# Patient Record
Sex: FEMALE | Race: BLACK OR AFRICAN AMERICAN | Employment: OTHER
[De-identification: names, ages, dates, MRNs, and addresses within clinical notes are randomized per-mention and may not be internally consistent; named-entity substitution may affect disease eponyms.]

---

## 2017-02-20 ENCOUNTER — TELEPHONE (OUTPATIENT)
Dept: INTERNAL MEDICINE | Facility: CLINIC | Age: 62
End: 2017-02-20

## 2017-03-07 ENCOUNTER — HOSPITAL ENCOUNTER (OUTPATIENT)
Age: 62
Discharge: HOME | End: 2017-03-07
Payer: MEDICARE

## 2017-03-07 ENCOUNTER — OFFICE VISIT (OUTPATIENT)
Dept: INTERNAL MEDICINE | Facility: CLINIC | Age: 62
End: 2017-03-07

## 2017-03-07 ENCOUNTER — TELEPHONE (OUTPATIENT)
Dept: INTERNAL MEDICINE | Facility: CLINIC | Age: 62
End: 2017-03-07

## 2017-03-07 VITALS
SYSTOLIC BLOOD PRESSURE: 127 MMHG | OXYGEN SATURATION: 97 % | DIASTOLIC BLOOD PRESSURE: 79 MMHG | BODY MASS INDEX: 33.12 KG/M2 | WEIGHT: 194 LBS | HEIGHT: 64 IN | HEART RATE: 84 BPM

## 2017-03-07 DIAGNOSIS — R73.03 PREDIABETES: ICD-10-CM

## 2017-03-07 DIAGNOSIS — E03.9 HYPOTHYROIDISM, UNSPECIFIED TYPE: ICD-10-CM

## 2017-03-07 DIAGNOSIS — J43.9 PULMONARY EMPHYSEMA, UNSPECIFIED EMPHYSEMA TYPE (HCC): ICD-10-CM

## 2017-03-07 DIAGNOSIS — G47.33 OSA (OBSTRUCTIVE SLEEP APNEA): ICD-10-CM

## 2017-03-07 DIAGNOSIS — I10 ESSENTIAL HYPERTENSION: Primary | ICD-10-CM

## 2017-03-07 DIAGNOSIS — E78.00 PURE HYPERCHOLESTEROLEMIA: ICD-10-CM

## 2017-03-07 DIAGNOSIS — R06.09 DOE (DYSPNEA ON EXERTION): ICD-10-CM

## 2017-03-07 PROCEDURE — G8484 FLU IMMUNIZE NO ADMIN: HCPCS | Performed by: INTERNAL MEDICINE

## 2017-03-07 PROCEDURE — 3023F SPIROM DOC REV: CPT | Performed by: INTERNAL MEDICINE

## 2017-03-07 PROCEDURE — 1036F TOBACCO NON-USER: CPT | Performed by: INTERNAL MEDICINE

## 2017-03-07 PROCEDURE — 3017F COLORECTAL CA SCREEN DOC REV: CPT | Performed by: INTERNAL MEDICINE

## 2017-03-07 PROCEDURE — 99214 OFFICE O/P EST MOD 30 MIN: CPT | Performed by: INTERNAL MEDICINE

## 2017-03-07 PROCEDURE — 99213 OFFICE O/P EST LOW 20 MIN: CPT | Performed by: INTERNAL MEDICINE

## 2017-03-07 PROCEDURE — G8599 NO ASA/ANTIPLAT THER USE RNG: HCPCS | Performed by: INTERNAL MEDICINE

## 2017-03-07 PROCEDURE — G8417 CALC BMI ABV UP PARAM F/U: HCPCS | Performed by: INTERNAL MEDICINE

## 2017-03-07 PROCEDURE — G8926 SPIRO NO PERF OR DOC: HCPCS | Performed by: INTERNAL MEDICINE

## 2017-03-07 PROCEDURE — 3014F SCREEN MAMMO DOC REV: CPT | Performed by: INTERNAL MEDICINE

## 2017-03-07 PROCEDURE — G8427 DOCREV CUR MEDS BY ELIG CLIN: HCPCS | Performed by: INTERNAL MEDICINE

## 2017-03-07 RX ORDER — ATORVASTATIN CALCIUM 40 MG/1
40 TABLET, FILM COATED ORAL DAILY
Qty: 30 TABLET | Refills: 3 | Status: SHIPPED | OUTPATIENT
Start: 2017-03-07 | End: 2017-10-27 | Stop reason: ALTCHOICE

## 2017-03-07 RX ORDER — CEPHALEXIN 500 MG/1
500 CAPSULE ORAL 2 TIMES DAILY
COMMUNITY
End: 2018-01-19

## 2017-03-08 ASSESSMENT — ENCOUNTER SYMPTOMS
SPUTUM PRODUCTION: 0
ABDOMINAL PAIN: 0
SORE THROAT: 0
PHOTOPHOBIA: 0
NAUSEA: 0
SHORTNESS OF BREATH: 1
CONSTIPATION: 0
EYE REDNESS: 0
COUGH: 0
BLURRED VISION: 0

## 2017-06-01 DIAGNOSIS — E03.9 ACQUIRED HYPOTHYROIDISM: ICD-10-CM

## 2017-06-02 RX ORDER — LEVOTHYROXINE SODIUM 0.03 MG/1
TABLET ORAL
Qty: 30 TABLET | Refills: 0 | Status: SHIPPED | OUTPATIENT
Start: 2017-06-02 | End: 2017-07-13 | Stop reason: SDUPTHER

## 2017-07-13 DIAGNOSIS — E03.9 ACQUIRED HYPOTHYROIDISM: ICD-10-CM

## 2017-07-13 RX ORDER — LEVOTHYROXINE SODIUM 0.03 MG/1
TABLET ORAL
Qty: 30 TABLET | Refills: 0 | Status: SHIPPED | OUTPATIENT
Start: 2017-07-13 | End: 2017-08-03 | Stop reason: SDUPTHER

## 2017-07-20 DIAGNOSIS — I10 ESSENTIAL HYPERTENSION: ICD-10-CM

## 2017-08-03 DIAGNOSIS — E03.9 ACQUIRED HYPOTHYROIDISM: ICD-10-CM

## 2017-08-04 RX ORDER — LEVOTHYROXINE SODIUM 0.03 MG/1
TABLET ORAL
Qty: 30 TABLET | Refills: 0 | Status: SHIPPED | OUTPATIENT
Start: 2017-08-04 | End: 2017-09-06 | Stop reason: SDUPTHER

## 2017-09-06 DIAGNOSIS — I10 ESSENTIAL HYPERTENSION: ICD-10-CM

## 2017-09-06 DIAGNOSIS — E03.9 ACQUIRED HYPOTHYROIDISM: ICD-10-CM

## 2017-09-07 RX ORDER — LEVOTHYROXINE SODIUM 0.03 MG/1
TABLET ORAL
Qty: 30 TABLET | Refills: 0 | Status: SHIPPED | OUTPATIENT
Start: 2017-09-07 | End: 2017-10-27 | Stop reason: SDUPTHER

## 2017-10-03 ENCOUNTER — HOSPITAL ENCOUNTER (OUTPATIENT)
Age: 62
Discharge: HOME | End: 2017-10-03
Payer: MEDICARE

## 2017-10-03 LAB
ALBUMIN SERPL-MCNC: 4.3 G/DL (ref 3.5–5.2)
ALBUMIN/GLOBULIN RATIO: ABNORMAL (ref 1–2.5)
ALP BLD-CCNC: 73 U/L (ref 35–104)
ALT SERPL-CCNC: 35 U/L (ref 5–33)
ANION GAP SERPL CALCULATED.3IONS-SCNC: 12 MMOL/L (ref 9–17)
AST SERPL-CCNC: 34 U/L
BILIRUB SERPL-MCNC: 0.71 MG/DL (ref 0.3–1.2)
BUN BLDV-MCNC: 16 MG/DL (ref 8–23)
BUN/CREAT BLD: 17 (ref 9–20)
CALCIUM SERPL-MCNC: 10.2 MG/DL (ref 8.6–10.4)
CHLORIDE BLD-SCNC: 104 MMOL/L (ref 98–107)
CHOLESTEROL/HDL RATIO: 3.6
CHOLESTEROL: 216 MG/DL
CO2: 27 MMOL/L (ref 20–31)
CREAT SERPL-MCNC: 0.95 MG/DL (ref 0.5–0.9)
GFR AFRICAN AMERICAN: >60 ML/MIN
GFR NON-AFRICAN AMERICAN: 59 ML/MIN
GFR SERPL CREATININE-BSD FRML MDRD: ABNORMAL ML/MIN/{1.73_M2}
GFR SERPL CREATININE-BSD FRML MDRD: ABNORMAL ML/MIN/{1.73_M2}
GLUCOSE BLD-MCNC: 87 MG/DL (ref 70–99)
HDLC SERPL-MCNC: 60 MG/DL
LDL CHOLESTEROL: 142 MG/DL (ref 0–130)
LITHIUM DATE LAST DOSE: ABNORMAL
LITHIUM DOSE AMOUNT: ABNORMAL
LITHIUM DOSE TIME: ABNORMAL
LITHIUM LEVEL: 0.2 MMOL/L (ref 0.6–1.2)
POTASSIUM SERPL-SCNC: 4.5 MMOL/L (ref 3.7–5.3)
SODIUM BLD-SCNC: 143 MMOL/L (ref 135–144)
TOTAL PROTEIN: 7.6 G/DL (ref 6.4–8.3)
TRIGL SERPL-MCNC: 70 MG/DL
VLDLC SERPL CALC-MCNC: ABNORMAL MG/DL (ref 1–30)

## 2017-10-03 PROCEDURE — 80053 COMPREHEN METABOLIC PANEL: CPT

## 2017-10-03 PROCEDURE — 80061 LIPID PANEL: CPT

## 2017-10-03 PROCEDURE — 83036 HEMOGLOBIN GLYCOSYLATED A1C: CPT

## 2017-10-03 PROCEDURE — 36415 COLL VENOUS BLD VENIPUNCTURE: CPT

## 2017-10-03 PROCEDURE — 80178 ASSAY OF LITHIUM: CPT

## 2017-10-04 LAB
ESTIMATED AVERAGE GLUCOSE: 103 MG/DL
HBA1C MFR BLD: 5.2 % (ref 4–6)

## 2017-10-05 DIAGNOSIS — I10 ESSENTIAL HYPERTENSION: ICD-10-CM

## 2017-10-27 ENCOUNTER — OFFICE VISIT (OUTPATIENT)
Dept: INTERNAL MEDICINE | Age: 62
End: 2017-10-27
Payer: MEDICARE

## 2017-10-27 VITALS
HEIGHT: 65 IN | DIASTOLIC BLOOD PRESSURE: 80 MMHG | BODY MASS INDEX: 30.52 KG/M2 | HEART RATE: 77 BPM | WEIGHT: 183.2 LBS | SYSTOLIC BLOOD PRESSURE: 130 MMHG

## 2017-10-27 DIAGNOSIS — Z11.59 NEED FOR HEPATITIS C SCREENING TEST: ICD-10-CM

## 2017-10-27 DIAGNOSIS — Z11.4 SCREENING FOR HIV WITHOUT PRESENCE OF RISK FACTORS: ICD-10-CM

## 2017-10-27 DIAGNOSIS — E03.9 ACQUIRED HYPOTHYROIDISM: ICD-10-CM

## 2017-10-27 DIAGNOSIS — F31.9 BIPOLAR AFFECTIVE DISORDER, REMISSION STATUS UNSPECIFIED (HCC): ICD-10-CM

## 2017-10-27 DIAGNOSIS — E78.00 PURE HYPERCHOLESTEROLEMIA: ICD-10-CM

## 2017-10-27 DIAGNOSIS — R06.09 DOE (DYSPNEA ON EXERTION): ICD-10-CM

## 2017-10-27 DIAGNOSIS — J43.9 PULMONARY EMPHYSEMA, UNSPECIFIED EMPHYSEMA TYPE (HCC): ICD-10-CM

## 2017-10-27 DIAGNOSIS — I10 ESSENTIAL HYPERTENSION: Primary | ICD-10-CM

## 2017-10-27 DIAGNOSIS — G47.33 OSA (OBSTRUCTIVE SLEEP APNEA): ICD-10-CM

## 2017-10-27 DIAGNOSIS — R73.03 PREDIABETES: ICD-10-CM

## 2017-10-27 PROCEDURE — G8926 SPIRO NO PERF OR DOC: HCPCS | Performed by: INTERNAL MEDICINE

## 2017-10-27 PROCEDURE — G8427 DOCREV CUR MEDS BY ELIG CLIN: HCPCS | Performed by: INTERNAL MEDICINE

## 2017-10-27 PROCEDURE — 1036F TOBACCO NON-USER: CPT | Performed by: INTERNAL MEDICINE

## 2017-10-27 PROCEDURE — G8417 CALC BMI ABV UP PARAM F/U: HCPCS | Performed by: INTERNAL MEDICINE

## 2017-10-27 PROCEDURE — G8599 NO ASA/ANTIPLAT THER USE RNG: HCPCS | Performed by: INTERNAL MEDICINE

## 2017-10-27 PROCEDURE — 99214 OFFICE O/P EST MOD 30 MIN: CPT

## 2017-10-27 PROCEDURE — 99214 OFFICE O/P EST MOD 30 MIN: CPT | Performed by: INTERNAL MEDICINE

## 2017-10-27 PROCEDURE — 3023F SPIROM DOC REV: CPT | Performed by: INTERNAL MEDICINE

## 2017-10-27 PROCEDURE — 3014F SCREEN MAMMO DOC REV: CPT | Performed by: INTERNAL MEDICINE

## 2017-10-27 PROCEDURE — 3017F COLORECTAL CA SCREEN DOC REV: CPT | Performed by: INTERNAL MEDICINE

## 2017-10-27 PROCEDURE — G8484 FLU IMMUNIZE NO ADMIN: HCPCS | Performed by: INTERNAL MEDICINE

## 2017-10-27 RX ORDER — LEVOTHYROXINE SODIUM 0.03 MG/1
TABLET ORAL
Qty: 30 TABLET | Refills: 2 | Status: SHIPPED | OUTPATIENT
Start: 2017-10-27 | End: 2018-01-19 | Stop reason: SDUPTHER

## 2017-10-27 ASSESSMENT — ENCOUNTER SYMPTOMS
PHOTOPHOBIA: 0
SHORTNESS OF BREATH: 1
SPUTUM PRODUCTION: 0
COUGH: 0
NAUSEA: 0
ABDOMINAL PAIN: 0
EYE REDNESS: 0
SORE THROAT: 0
BLURRED VISION: 0
CONSTIPATION: 0

## 2017-10-27 NOTE — PROGRESS NOTES
a history of hypertension, diet-controlled diabetes and hyperlipidemia. Past Medical History:   Diagnosis Date    Anxiety     Arrhythmia     Asthma     [facility]     Depression     [facility]ma of lung ([facility]     GERD (gastroesophageal reflux disease)     GERD (gastroesophageal reflux disease)     Hyperlipidemia     Hypertension     Hypothyroidism     OAB (overactive bladder)     Osteoarthritis     Pulmonary fibrosis (HCC)     sjogrens syndrome    Pulmonary hypertension (HCC)     Stroke ([facility]     Unspecified sleep apnea     on cpap    Urinary incontinence        Past Surgical History:   Procedure Laterality Date    COLONOSCOPY  04/2015         ALLERGIES      Allergies   Allergen Reactions    Motrin [Ibuprofen]     Aspirin Rash    Blue Dyes (Parenteral) Rash    Hctz [Hydrochlorothiazide] Rash     Fixed drug reaction    Sulfa Antibiotics Rash    Tetracyclines & Related Rash       MEDICATIONS:      Current Outpatient Prescriptions on File Prior to Visit   Medication Sig Dispense Refill    metoprolol tartrate (LOPRESSOR) 25 MG tablet TAKE ONE TABLET BY MOUTH TWICE DAILY 60 tablet 0    metoprolol tartrate (LOPRESSOR) 25 MG tablet TAKE ONE TABLET BY MOUTH TWICE DAILY **GENERIC  LOPRESSOR 60 tablet 0    levothyroxine (SYNTHROID) 25 MCG tablet TAKE ONE TABLET BY MOUTH ONCE DAILY **CALL  FOR  APPOINTMENT  BEFORE  ANY  OTHER  REFILLS** 30 tablet 0    cephALEXin (KEFLEX) 500 MG capsule Take 500 mg by mouth 2 times daily      FLUoxetine (PROZAC) 40 MG capsule Take 40 mg by mouth daily      ipratropium (ATROVENT) 0.03 % nasal spray 2 sprays by Nasal route every 12 hours      ranitidine (ZANTAC) 150 MG tablet Take 150 mg by mouth daily      valACYclovir (VALTREX) 1 G tablet Take 1,000 mg by mouth daily.  fluticasone (FLONASE) 50 MCG/ACT nasal spray 2 sprays by Nasal route daily.  lithium 300 MG capsule Take 300 mg by mouth 2 times daily (with meals).       albuterol 130/80   Site: Left Arm   Position: Sitting   Cuff Size: Medium Adult   Pulse: 77   Weight: 183 lb 3.2 oz (83.1 kg)   Height: 5' 5\" (1.651 m)     Body mass index is 30.49 kg/m². BP Readings from Last 3 Encounters:   10/27/17 130/80   03/07/17 127/79   11/10/16 (!) 145/97        Wt Readings from Last 3 Encounters:   10/27/17 183 lb 3.2 oz (83.1 kg)   03/07/17 194 lb (88 kg)   11/10/16 191 lb (86.6 kg)       Physical Exam      HENT:  Normocephalic, Atraumatic. Neck- Normal range of motion, No tenderness, Supple, No stridor. Eyes:  PERRL, EOMI, Conjunctiva normal, No discharge. Respiratory:  Normal breath sounds, No respiratory distress, No wheezing, No chest tenderness. Cardiovascular:  Normal heart rate, Normal rhythm, No murmurs, No rubs, No gallops. GI:  Bowel sounds normal, Soft, No tenderness  Musculoskeletal:  Intact distal pulses, No edema, No tenderness,  Back- No tenderness. No edema noted on exam today. Integument:  Warm, Dry, No erythema, No rash. Lymphatic:  No lymphadenopathy noted. Neurologic:  Alert & oriented x 3, Normal motor function, Normal sensory function, No focal deficits noted.    Psychiatric:  Affect normal    LABORATORY FINDINGS:    CBC:  Lab Results   Component Value Date    WBC 7.6 11/10/2016    HGB 11.8 11/10/2016     11/10/2016     05/02/2012     BMP:    Lab Results   Component Value Date     10/03/2017    K 4.5 10/03/2017     10/03/2017    CO2 27 10/03/2017    BUN 16 10/03/2017    CREATININE 0.95 10/03/2017    GLUCOSE 87 10/03/2017    GLUCOSE 119 05/02/2012     HEMOGLOBIN A1C:   Lab Results   Component Value Date    LABA1C 5.2 10/03/2017     MICROALBUMIN URINE:   Lab Results   Component Value Date    MICROALBUR <12 11/10/2016     FASTING LIPID PANEL:  Lab Results   Component Value Date    CHOL 216 (H) 10/03/2017    HDL 60 10/03/2017    TRIG 70 10/03/2017     Lab Results   Component Value Date    LDLCHOLESTEROL 142 (H) 10/03/2017       LIVER PROFILE:  Lab Results   Component Value Date    ALT 35 10/03/2017    AST 34 10/03/2017    PROT 7.6 10/03/2017    BILITOT 0.71 10/03/2017    BILIDIR 0.11 08/05/2014    LABALBU 4.3 10/03/2017    LABALBU 4.8 05/02/2012      THYROID FUNCTION:   Lab Results   Component Value Date    TSH 2.00 11/10/2016      URINE ANALYSIS: No results found for: LABURIN  ASSESSMENT AND PLAN:    1. Essential hypertension    - metoprolol tartrate (LOPRESSOR) 25 MG tablet; TAKE ONE TABLET BY MOUTH TWICE DAILY **GENERIC  LOPRESSOR  Dispense: 60 tablet; Refill: 2    2. Pulmonary emphysema, unspecified emphysema type ([facility]  Get PFT's from pulmonology office  Refuses flu vaccine    - ECHO Complete 2D W Doppler W Color; Future    3. Prediabetes  Weight loss  Diet changes  monitor    4. HOUGH (dyspnea on exertion)    - ECHO Complete 2D W Doppler W Color; Future    5. XAVIER (obstructive sleep apnea)  Advised compliance  Follow up with Pulmonology    6. Acquired hypothyroidism    - levothyroxine (SYNTHROID) 25 MCG tablet; TAKE ONE TABLET BY MOUTH ONCE DAILY **CALL  FOR  APPOINTMENT  BEFORE  ANY  OTHER  REFILLS**  Dispense: 30 tablet; Refill: 2    7. Pure hypercholesterolemia  Not taking statins  Lipid panel      8. Need for hepatitis C screening test    - Hepatitis C Antibody; Future    9. Screening for HIV without presence of risk factors    - HIV Screen; Future    10. Bipolar affective disorder, remission status unspecified ([facility]  Follow up with psychiatry            FOLLOW UP AND INSTRUCTIONS:   No Follow-up on file. 1. Debere received counseling on the following healthy behaviors: nutrition    2. Reviewed prior labs and health maintenance. 3. Discussed use, benefit, and side effects of prescribed medications. Barriers to medication compliance addressed. All patient questions answered. Pt voiced understanding.        Sony Tirado  Attending Physician, [facility]  [facility] - [facility]  10/27/2017, 3:28 PM

## 2017-10-27 NOTE — PATIENT INSTRUCTIONS
-Follow-up appointment scheduled for 4/27/18, AVS given to patient. It is very important that you keep your appointments, please contact us if you are unable to keep your scheduled appt     -Labs given to patient, they will have them done before their next visit.    -Echo ordered for patient-- she will call and set up an appt  -Tarps form completed for patient and given back to her  -Records from pulmonary and GI requested---Jayshree

## 2017-11-06 ENCOUNTER — HOSPITAL ENCOUNTER (OUTPATIENT)
Dept: NON INVASIVE DIAGNOSTICS | Age: 62
Discharge: HOME | End: 2017-11-06
Payer: MEDICARE

## 2017-11-06 ENCOUNTER — HOSPITAL ENCOUNTER (OUTPATIENT)
Age: 62
Discharge: HOME | End: 2017-11-06
Payer: MEDICARE

## 2017-11-06 DIAGNOSIS — R06.09 DOE (DYSPNEA ON EXERTION): ICD-10-CM

## 2017-11-06 DIAGNOSIS — Z11.59 NEED FOR HEPATITIS C SCREENING TEST: ICD-10-CM

## 2017-11-06 DIAGNOSIS — J43.9 PULMONARY EMPHYSEMA, UNSPECIFIED EMPHYSEMA TYPE (HCC): ICD-10-CM

## 2017-11-06 DIAGNOSIS — Z11.4 SCREENING FOR HIV WITHOUT PRESENCE OF RISK FACTORS: ICD-10-CM

## 2017-11-06 LAB
HEPATITIS C ANTIBODY: NONREACTIVE
HIV AG/AB: NONREACTIVE
LV EF: 60 %
LVEF MODALITY: NORMAL

## 2017-11-06 PROCEDURE — 93306 TTE W/DOPPLER COMPLETE: CPT

## 2017-11-06 PROCEDURE — 36415 COLL VENOUS BLD VENIPUNCTURE: CPT

## 2017-11-06 PROCEDURE — 86803 HEPATITIS C AB TEST: CPT

## 2017-11-06 PROCEDURE — 87389 HIV-1 AG W/HIV-1&-2 AB AG IA: CPT

## 2017-11-07 ENCOUNTER — TELEPHONE (OUTPATIENT)
Dept: INTERNAL MEDICINE | Age: 62
End: 2017-11-07

## 2017-11-07 DIAGNOSIS — Z12.39 BREAST CANCER SCREENING: Primary | ICD-10-CM

## 2017-11-07 NOTE — TELEPHONE ENCOUNTER
Phone call from patient she states that she had a heart echo and labs at [facility] 11/6 and would like results, please review and advise.

## 2017-11-08 NOTE — TELEPHONE ENCOUNTER
Pt calling and asking for results, pt made aware that her results of her ECHO and Lab work came back normal, Pt also asking for an order to get a mammogram done.  Order is pending please sign if appropriate

## 2018-01-19 ENCOUNTER — OFFICE VISIT (OUTPATIENT)
Dept: INTERNAL MEDICINE | Age: 63
End: 2018-01-19
Payer: MEDICARE

## 2018-01-19 ENCOUNTER — HOSPITAL ENCOUNTER (OUTPATIENT)
Age: 63
Setting detail: SPECIMEN
Discharge: HOME | End: 2018-01-19
Payer: MEDICARE

## 2018-01-19 VITALS
HEIGHT: 65 IN | WEIGHT: 179 LBS | SYSTOLIC BLOOD PRESSURE: 136 MMHG | OXYGEN SATURATION: 98 % | DIASTOLIC BLOOD PRESSURE: 74 MMHG | HEART RATE: 90 BPM | BODY MASS INDEX: 29.82 KG/M2

## 2018-01-19 DIAGNOSIS — Z99.89 OSA ON CPAP: Primary | ICD-10-CM

## 2018-01-19 DIAGNOSIS — I10 ESSENTIAL HYPERTENSION: ICD-10-CM

## 2018-01-19 DIAGNOSIS — E03.9 ACQUIRED HYPOTHYROIDISM: ICD-10-CM

## 2018-01-19 DIAGNOSIS — G47.33 OSA ON CPAP: Primary | ICD-10-CM

## 2018-01-19 DIAGNOSIS — R73.03 PREDIABETES: ICD-10-CM

## 2018-01-19 DIAGNOSIS — E78.00 PURE HYPERCHOLESTEROLEMIA: ICD-10-CM

## 2018-01-19 DIAGNOSIS — I50.32 CHRONIC DIASTOLIC HEART FAILURE (HCC): ICD-10-CM

## 2018-01-19 DIAGNOSIS — J45.30 MILD PERSISTENT ASTHMA WITHOUT COMPLICATION: ICD-10-CM

## 2018-01-19 LAB — TSH SERPL DL<=0.05 MIU/L-ACNC: 1.59 MIU/L (ref 0.3–5)

## 2018-01-19 PROCEDURE — 1036F TOBACCO NON-USER: CPT | Performed by: INTERNAL MEDICINE

## 2018-01-19 PROCEDURE — G8427 DOCREV CUR MEDS BY ELIG CLIN: HCPCS | Performed by: INTERNAL MEDICINE

## 2018-01-19 PROCEDURE — 99213 OFFICE O/P EST LOW 20 MIN: CPT | Performed by: INTERNAL MEDICINE

## 2018-01-19 PROCEDURE — 3014F SCREEN MAMMO DOC REV: CPT | Performed by: INTERNAL MEDICINE

## 2018-01-19 PROCEDURE — G8417 CALC BMI ABV UP PARAM F/U: HCPCS | Performed by: INTERNAL MEDICINE

## 2018-01-19 PROCEDURE — 3017F COLORECTAL CA SCREEN DOC REV: CPT | Performed by: INTERNAL MEDICINE

## 2018-01-19 PROCEDURE — G8484 FLU IMMUNIZE NO ADMIN: HCPCS | Performed by: INTERNAL MEDICINE

## 2018-01-19 PROCEDURE — 36415 COLL VENOUS BLD VENIPUNCTURE: CPT

## 2018-01-19 PROCEDURE — 84443 ASSAY THYROID STIM HORMONE: CPT

## 2018-01-19 PROCEDURE — G8599 NO ASA/ANTIPLAT THER USE RNG: HCPCS | Performed by: INTERNAL MEDICINE

## 2018-01-19 PROCEDURE — 99213 OFFICE O/P EST LOW 20 MIN: CPT

## 2018-01-19 RX ORDER — LEVOTHYROXINE SODIUM 0.03 MG/1
TABLET ORAL
Qty: 30 TABLET | Refills: 2 | Status: SHIPPED | OUTPATIENT
Start: 2018-01-19 | End: 2018-02-28 | Stop reason: SDUPTHER

## 2018-01-19 ASSESSMENT — PATIENT HEALTH QUESTIONNAIRE - PHQ9
1. LITTLE INTEREST OR PLEASURE IN DOING THINGS: 0
2. FEELING DOWN, DEPRESSED OR HOPELESS: 0
SUM OF ALL RESPONSES TO PHQ9 QUESTIONS 1 & 2: 0
SUM OF ALL RESPONSES TO PHQ QUESTIONS 1-9: 0

## 2018-01-19 ASSESSMENT — ENCOUNTER SYMPTOMS
SHORTNESS OF BREATH: 1
SPUTUM PRODUCTION: 0
PHOTOPHOBIA: 0
ABDOMINAL PAIN: 0
EYE REDNESS: 0
CONSTIPATION: 0
SORE THROAT: 0
BLURRED VISION: 0
COUGH: 0
NAUSEA: 0

## 2018-01-19 NOTE — PROGRESS NOTES
diabetes and hyperlipidemia. Echo 2017  CONCLUSIONS    Summary  Left ventricle is normal in size and wall thickness. Global left ventricular systolic function is normal with an estimated  ejection fraction of 60 % . No obvious wall motion abnormality seen. Grade I (mild) left ventricular diastolic dysfunction. Thickened mitral valve leaflets. Mild mitral regurgitation. Mild tricuspid regurgitation. Estimated right ventricular systolic pressure is 28 mmHg. No significant pericardial effusion is seen.     Past Medical History:   Diagnosis Date    Anxiety     Arrhythmia     Asthma     Bipolar disorder ([address]     Depression     Emphysema of lung (HCC)     GERD (gastroesophageal reflux disease)     GERD (gastroesophageal reflux disease)     Hyperlipidemia     Hypertension     Hypothyroidism     OAB (overactive bladder)     Osteoarthritis     Pulmonary fibrosis (HCC)     sjogrens syndrome    Pulmonary hypertension     Stroke (HCC)     Unspecified sleep apnea     on cpap    Urinary incontinence        Past Surgical History:   Procedure Laterality Date    COLONOSCOPY  04/2015         ALLERGIES      Allergies   Allergen Reactions    Motrin [Ibuprofen]     Aspirin Rash    Blue Dyes (Parenteral) Rash    Hctz [Hydrochlorothiazide] Rash     Fixed drug reaction    Sulfa Antibiotics Rash    Tetracyclines & Related Rash       MEDICATIONS:      Current Outpatient Prescriptions on File Prior to Visit   Medication Sig Dispense Refill    metoprolol tartrate (LOPRESSOR) 25 MG tablet TAKE ONE TABLET BY MOUTH TWICE DAILY **GENERIC  LOPRESSOR 60 tablet 2    levothyroxine (SYNTHROID) 25 MCG tablet TAKE ONE TABLET BY MOUTH ONCE DAILY **CALL  FOR  APPOINTMENT  BEFORE  ANY  OTHER  REFILLS** 30 tablet 2    cephALEXin (KEFLEX) 500 MG capsule Take 500 mg by mouth 2 times daily      FLUoxetine (PROZAC) 40 MG capsule Take 40 mg by mouth daily      ipratropium (ATROVENT) 0.03 % nasal spray 2 sprays by Nasal route every 12 hours      ranitidine (ZANTAC) 150 MG tablet Take 150 mg by mouth daily      valACYclovir (VALTREX) 1 G tablet Take 1,000 mg by mouth daily.  fluticasone (FLONASE) 50 MCG/ACT nasal spray 2 sprays by Nasal route daily.  lithium 300 MG capsule Take 300 mg by mouth 2 times daily (with meals).  albuterol (PROVENTIL HFA;VENTOLIN HFA) 108 (90 BASE) MCG/ACT inhaler Inhale 2 puffs into the lungs 4 times daily as needed for Wheezing.  QUEtiapine (SEROQUEL) 200 MG tablet Take 200 mg by mouth daily.  Fesoterodine Fumarate ER (TOVIAZ) 8 MG TB24 Take 8 mg by mouth daily as needed. No current facility-administered medications on file prior to visit. SOCIAL HISTORY    Reviewed and no change from previous record. Dulce  reports that she has never smoked. She has never used smokeless tobacco.    FAMILY HISTORY:    Reviewed and No change from previous visit    HEALTH MAINTENANCE DUE:      Health Maintenance Due   Topic Date Due    Pneumococcal med risk (1 of 1 - PPSV23) 04/22/1972    Cervical cancer screen  04/22/1974    Colon cancer screen colonoscopy  04/22/2003    Flu vaccine (1) 09/01/2017    TSH testing  11/10/2017       REVIEW OF SYSTEMS:    12 point review of symptoms completed and found to be normal except noted in the HPI    Review of Systems   Constitutional: Negative for fever, malaise/fatigue and weight loss. HENT: Negative for congestion and sore throat. Eyes: Negative for blurred vision, photophobia and redness. Respiratory: Positive for shortness of breath. Negative for cough and sputum production. Cardiovascular: Positive for leg swelling. Negative for chest pain and palpitations. Gastrointestinal: Negative for abdominal pain, constipation and nausea. Genitourinary: Positive for urgency. Negative for dysuria, frequency and hematuria. Musculoskeletal: Negative for joint pain and myalgias.    Neurological: Negative for dizziness, tremors, sensory change, loss of consciousness and headaches. Endo/Heme/Allergies: Negative for polydipsia. Does not bruise/bleed easily. Psychiatric/Behavioral: Positive for depression. Negative for hallucinations, substance abuse and suicidal ideas. PHYSICAL EXAM:      Vitals:    01/19/18 1334   BP: 136/74   Site: Right Arm   Position: Sitting   Cuff Size: Medium Adult   Pulse: 90   SpO2: 98%   Weight: 179 lb (81.2 kg)   Height: 5' 5\" (1.651 m)     Body mass index is 29.79 kg/m². BP Readings from Last 3 Encounters:   01/19/18 136/74   10/27/17 130/80   03/07/17 127/79        Wt Readings from Last 3 Encounters:   01/19/18 179 lb (81.2 kg)   10/27/17 183 lb 3.2 oz (83.1 kg)   03/07/17 194 lb (88 kg)       Physical Exam      HENT:  Normocephalic, Atraumatic. Neck- Normal range of motion, No tenderness, Supple, No stridor. Eyes:  PERRL, EOMI, Conjunctiva normal, No discharge. Respiratory:  Normal breath sounds, No respiratory distress, No wheezing, No chest tenderness. Cardiovascular:  Normal heart rate, Normal rhythm, No murmurs, No rubs, No gallops. GI:  Bowel sounds normal, Soft, No tenderness  Musculoskeletal:  Intact distal pulses, No edema, No tenderness,  Back- No tenderness. No edema noted on exam today. Integument:  Warm, Dry, No erythema, No rash. Lymphatic:  No lymphadenopathy noted. Neurologic:  Alert & oriented x 3, Normal motor function, Normal sensory function, No focal deficits noted.    Psychiatric:  Affect normal    LABORATORY FINDINGS:    CBC:  Lab Results   Component Value Date    WBC 7.6 11/10/2016    HGB 11.8 11/10/2016     11/10/2016     05/02/2012     BMP:    Lab Results   Component Value Date     10/03/2017    K 4.5 10/03/2017     10/03/2017    CO2 27 10/03/2017    BUN 16 10/03/2017    CREATININE 0.95 10/03/2017    GLUCOSE 87 10/03/2017    GLUCOSE 119 05/02/2012     HEMOGLOBIN A1C:   Lab Results   Component Value Date    LABA1C 5.2 10/03/2017

## 2018-01-23 ENCOUNTER — HOSPITAL ENCOUNTER (OUTPATIENT)
Age: 63
Discharge: HOME | End: 2018-01-23
Payer: MEDICARE

## 2018-01-23 LAB
ALBUMIN SERPL-MCNC: 4.2 G/DL (ref 3.5–5.2)
ALBUMIN/GLOBULIN RATIO: ABNORMAL (ref 1–2.5)
ALP BLD-CCNC: 89 U/L (ref 35–104)
ALT SERPL-CCNC: 19 U/L (ref 5–33)
ANION GAP SERPL CALCULATED.3IONS-SCNC: 12 MMOL/L (ref 9–17)
AST SERPL-CCNC: 23 U/L
BILIRUB SERPL-MCNC: 0.41 MG/DL (ref 0.3–1.2)
BUN BLDV-MCNC: 25 MG/DL (ref 8–23)
BUN/CREAT BLD: 29 (ref 9–20)
CALCIUM SERPL-MCNC: 10.1 MG/DL (ref 8.6–10.4)
CHLORIDE BLD-SCNC: 101 MMOL/L (ref 98–107)
CHOLESTEROL, FASTING: 197 MG/DL
CHOLESTEROL/HDL RATIO: 3.5
CO2: 24 MMOL/L (ref 20–31)
CREAT SERPL-MCNC: 0.86 MG/DL (ref 0.5–0.9)
GFR AFRICAN AMERICAN: >60 ML/MIN
GFR NON-AFRICAN AMERICAN: >60 ML/MIN
GFR SERPL CREATININE-BSD FRML MDRD: ABNORMAL ML/MIN/{1.73_M2}
GFR SERPL CREATININE-BSD FRML MDRD: ABNORMAL ML/MIN/{1.73_M2}
GLUCOSE FASTING: 80 MG/DL (ref 70–99)
HDLC SERPL-MCNC: 56 MG/DL
LDL CHOLESTEROL: 118 MG/DL (ref 0–130)
LITHIUM DATE LAST DOSE: ABNORMAL
LITHIUM DOSE AMOUNT: ABNORMAL
LITHIUM DOSE TIME: 2330
LITHIUM LEVEL: 0.5 MMOL/L (ref 0.6–1.2)
POTASSIUM SERPL-SCNC: 4.3 MMOL/L (ref 3.7–5.3)
SODIUM BLD-SCNC: 137 MMOL/L (ref 135–144)
TOTAL PROTEIN: 7.5 G/DL (ref 6.4–8.3)
TRIGLYCERIDE, FASTING: 113 MG/DL
VLDLC SERPL CALC-MCNC: NORMAL MG/DL (ref 1–30)

## 2018-01-23 PROCEDURE — 80053 COMPREHEN METABOLIC PANEL: CPT

## 2018-01-23 PROCEDURE — 36415 COLL VENOUS BLD VENIPUNCTURE: CPT

## 2018-01-23 PROCEDURE — 80178 ASSAY OF LITHIUM: CPT

## 2018-01-23 PROCEDURE — 80061 LIPID PANEL: CPT

## 2018-01-29 ENCOUNTER — HOSPITAL ENCOUNTER (OUTPATIENT)
Dept: MAMMOGRAPHY | Age: 63
Discharge: HOME | End: 2018-01-29
Payer: MEDICARE

## 2018-01-29 DIAGNOSIS — Z12.39 BREAST CANCER SCREENING: ICD-10-CM

## 2018-01-29 PROCEDURE — 77063 BREAST TOMOSYNTHESIS BI: CPT

## 2018-02-28 ENCOUNTER — OFFICE VISIT (OUTPATIENT)
Dept: INTERNAL MEDICINE | Age: 63
End: 2018-02-28
Payer: MEDICARE

## 2018-02-28 VITALS
HEART RATE: 76 BPM | DIASTOLIC BLOOD PRESSURE: 80 MMHG | WEIGHT: 182 LBS | HEIGHT: 65 IN | BODY MASS INDEX: 30.32 KG/M2 | OXYGEN SATURATION: 99 % | SYSTOLIC BLOOD PRESSURE: 142 MMHG

## 2018-02-28 DIAGNOSIS — Z99.89 OSA ON CPAP: ICD-10-CM

## 2018-02-28 DIAGNOSIS — E66.9 OBESITY (BMI 30-39.9): ICD-10-CM

## 2018-02-28 DIAGNOSIS — N32.81 OAB (OVERACTIVE BLADDER): ICD-10-CM

## 2018-02-28 DIAGNOSIS — I51.89 DIASTOLIC DYSFUNCTION WITHOUT HEART FAILURE: ICD-10-CM

## 2018-02-28 DIAGNOSIS — J45.30 MILD PERSISTENT ASTHMA WITHOUT COMPLICATION: ICD-10-CM

## 2018-02-28 DIAGNOSIS — G47.33 OSA ON CPAP: ICD-10-CM

## 2018-02-28 DIAGNOSIS — E78.00 PURE HYPERCHOLESTEROLEMIA: ICD-10-CM

## 2018-02-28 DIAGNOSIS — E03.9 ACQUIRED HYPOTHYROIDISM: ICD-10-CM

## 2018-02-28 DIAGNOSIS — I10 ESSENTIAL HYPERTENSION: Primary | ICD-10-CM

## 2018-02-28 PROCEDURE — 3017F COLORECTAL CA SCREEN DOC REV: CPT | Performed by: INTERNAL MEDICINE

## 2018-02-28 PROCEDURE — 1036F TOBACCO NON-USER: CPT | Performed by: INTERNAL MEDICINE

## 2018-02-28 PROCEDURE — G8484 FLU IMMUNIZE NO ADMIN: HCPCS | Performed by: INTERNAL MEDICINE

## 2018-02-28 PROCEDURE — G8427 DOCREV CUR MEDS BY ELIG CLIN: HCPCS | Performed by: INTERNAL MEDICINE

## 2018-02-28 PROCEDURE — 99212 OFFICE O/P EST SF 10 MIN: CPT

## 2018-02-28 PROCEDURE — G8599 NO ASA/ANTIPLAT THER USE RNG: HCPCS | Performed by: INTERNAL MEDICINE

## 2018-02-28 PROCEDURE — G8417 CALC BMI ABV UP PARAM F/U: HCPCS | Performed by: INTERNAL MEDICINE

## 2018-02-28 PROCEDURE — 99214 OFFICE O/P EST MOD 30 MIN: CPT | Performed by: INTERNAL MEDICINE

## 2018-02-28 PROCEDURE — 3014F SCREEN MAMMO DOC REV: CPT | Performed by: INTERNAL MEDICINE

## 2018-02-28 RX ORDER — LEVOTHYROXINE SODIUM 0.03 MG/1
TABLET ORAL
Qty: 30 TABLET | Refills: 2 | Status: SHIPPED | OUTPATIENT
Start: 2018-02-28 | End: 2018-07-25 | Stop reason: SDUPTHER

## 2018-02-28 RX ORDER — AMLODIPINE BESYLATE 5 MG/1
5 TABLET ORAL DAILY
Qty: 30 TABLET | Refills: 3 | Status: SHIPPED | OUTPATIENT
Start: 2018-02-28 | End: 2018-07-09 | Stop reason: SDUPTHER

## 2018-02-28 ASSESSMENT — ENCOUNTER SYMPTOMS
COUGH: 0
SHORTNESS OF BREATH: 1
WHEEZING: 0

## 2018-02-28 NOTE — PROGRESS NOTES
right ventricular systolic pressure is 28 mmHg. No significant pericardial effusion is seen. Past Medical History:   Diagnosis Date    Anxiety     Arrhythmia     Asthma     [facility] ([facility]     Depression     Emphysema of lung (HCC)     GERD (gastroesophageal reflux disease)     GERD (gastroesophageal reflux disease)     Hyperlipidemia     Hypertension     Hypothyroidism     OAB (overactive bladder)     Osteoarthritis     Pulmonary fibrosis (HCC)     sjogrens syndrome    Pulmonary hypertension     Stroke (HCC)     Unspecified sleep apnea     on cpap    Urinary incontinence        Past Surgical History:   Procedure Laterality Date    COLONOSCOPY  04/2015         ALLERGIES      Allergies   Allergen Reactions    Motrin [Ibuprofen]     Aspirin Rash    Blue Dyes (Parenteral) Rash    Hctz [Hydrochlorothiazide] Rash     Fixed drug reaction    Sulfa Antibiotics Rash    Tetracyclines & Related Rash       MEDICATIONS:      Current Outpatient Prescriptions on File Prior to Visit   Medication Sig Dispense Refill    metoprolol tartrate (LOPRESSOR) 25 MG tablet TAKE ONE TABLET BY MOUTH TWICE DAILY **GENERIC  LOPRESSOR 60 tablet 2    levothyroxine (SYNTHROID) 25 MCG tablet TAKE ONE TABLET BY MOUTH ONCE DAILY **CALL  FOR  APPOINTMENT  BEFORE  ANY  OTHER  REFILLS** 30 tablet 2    FLUoxetine (PROZAC) 40 MG capsule Take 40 mg by mouth daily      ipratropium (ATROVENT) 0.03 % nasal spray 2 sprays by Nasal route every 12 hours      ranitidine (ZANTAC) 150 MG tablet Take 150 mg by mouth daily      valACYclovir (VALTREX) 1 G tablet Take 1,000 mg by mouth daily.  fluticasone (FLONASE) 50 MCG/ACT nasal spray 2 sprays by Nasal route daily.  lithium 300 MG capsule Take 300 mg by mouth 2 times daily (with meals).  albuterol (PROVENTIL HFA;VENTOLIN HFA) 108 (90 BASE) MCG/ACT inhaler Inhale 2 puffs into the lungs 4 times daily as needed for Wheezing.       QUEtiapine (SEROQUEL) 200 MG (82.6 kg)   01/19/18 179 lb (81.2 kg)   10/27/17 183 lb 3.2 oz (83.1 kg)       Physical Exam      HENT:  Normocephalic, Atraumatic, Neck- Normal range of motion, No tenderness, Supple, No stridor. Eyes:  PERRL, EOMI, Conjunctiva normal, No discharge. Respiratory:  Normal breath sounds, No respiratory distress, No wheezing, No chest tenderness. Cardiovascular:  Normal heart rate, Normal rhythm, No murmurs  GI:  Bowel sounds normal, Soft, No tenderness  Musculoskeletal:  Intact distal pulses, No edema, No tenderness, Back- No tenderness. Integument:  Warm, Dry, No erythema, No rash. Lymphatic:  No lymphadenopathy noted. Neurologic:  Alert & oriented x 3, Normal motor function, Normal sensory function, No focal deficits noted. Psychiatric:  Affect normal    LABORATORY FINDINGS:    CBC:  Lab Results   Component Value Date    WBC 7.6 11/10/2016    HGB 11.8 11/10/2016     11/10/2016     05/02/2012     BMP:    Lab Results   Component Value Date     01/23/2018    K 4.3 01/23/2018     01/23/2018    CO2 24 01/23/2018    BUN 25 01/23/2018    CREATININE 0.86 01/23/2018    GLUCOSE 87 10/03/2017    GLUCOSE 119 05/02/2012     HEMOGLOBIN A1C:   Lab Results   Component Value Date    LABA1C 5.2 10/03/2017     MICROALBUMIN URINE:   Lab Results   Component Value Date    MICROALBUR <12 11/10/2016     FASTING LIPID PANEL:  Lab Results   Component Value Date    CHOL 216 (H) 10/03/2017    HDL 56 01/23/2018    TRIG 70 10/03/2017     Lab Results   Component Value Date    LDLCHOLESTEROL 118 01/23/2018       LIVER PROFILE:  Lab Results   Component Value Date    ALT 19 01/23/2018    AST 23 01/23/2018    PROT 7.5 01/23/2018    BILITOT 0.41 01/23/2018    BILIDIR 0.11 08/05/2014    LABALBU 4.2 01/23/2018    LABALBU 4.8 05/02/2012      THYROID FUNCTION:   Lab Results   Component Value Date    TSH 1.59 01/19/2018      URINE ANALYSIS: No results found for: LABURIN  ASSESSMENT AND PLAN:    1.  Essential

## 2018-03-02 ASSESSMENT — ENCOUNTER SYMPTOMS
BLURRED VISION: 0
NAUSEA: 0
SORE THROAT: 0
CONSTIPATION: 0
ABDOMINAL PAIN: 0
SPUTUM PRODUCTION: 0
EYE REDNESS: 0

## 2018-03-13 ENCOUNTER — TELEPHONE (OUTPATIENT)
Dept: INTERNAL MEDICINE | Age: 63
End: 2018-03-13

## 2018-03-16 NOTE — TELEPHONE ENCOUNTER
PC to pt pt states she is doing research on herself and would like to know and learn more about herself. Caller informed pt she needs to be seen as Conception Sade is unsure of the cause for the referral, pt stated she will keep apt in April.

## 2018-03-21 ENCOUNTER — HOSPITAL ENCOUNTER (EMERGENCY)
Age: 63
Discharge: HOME | End: 2018-03-21
Attending: EMERGENCY MEDICINE
Payer: MEDICARE

## 2018-03-21 ENCOUNTER — TELEPHONE (OUTPATIENT)
Dept: INTERNAL MEDICINE CLINIC | Age: 63
End: 2018-03-21

## 2018-03-21 VITALS
HEART RATE: 79 BPM | OXYGEN SATURATION: 100 % | DIASTOLIC BLOOD PRESSURE: 98 MMHG | TEMPERATURE: 98.4 F | RESPIRATION RATE: 18 BRPM | SYSTOLIC BLOOD PRESSURE: 164 MMHG

## 2018-03-21 DIAGNOSIS — M79.605 LEFT LEG PAIN: Primary | ICD-10-CM

## 2018-03-21 PROCEDURE — 99283 EMERGENCY DEPT VISIT LOW MDM: CPT

## 2018-03-21 PROCEDURE — 93970 EXTREMITY STUDY: CPT

## 2018-03-21 RX ORDER — ACETAMINOPHEN 325 MG/1
650 TABLET ORAL EVERY 6 HOURS PRN
Qty: 60 TABLET | Refills: 0 | Status: SHIPPED | OUTPATIENT
Start: 2018-03-21 | End: 2020-08-13 | Stop reason: ALTCHOICE

## 2018-03-22 ENCOUNTER — CARE COORDINATION (OUTPATIENT)
Dept: CARE COORDINATION | Age: 63
End: 2018-03-22

## 2018-03-22 ASSESSMENT — ENCOUNTER SYMPTOMS
SHORTNESS OF BREATH: 0
COLOR CHANGE: 0
COUGH: 0
CHOKING: 0

## 2018-03-22 NOTE — ED PROVIDER NOTES
[facility] ED  Emergency Department Encounter  Emergency Medicine Resident     Pt Name: Ai Pham  MRN: [id number]  [place] 1955  Date of evaluation: 3/21/18  PCP:  Lisbeth Naranjo MD    [address] [place] [address]       Chief Complaint   Patient presents with    Leg Pain     left leg pain that started a few days ago. Denies any history of problems with this leg. HISTORY OF PRESENT ILLNESS  (Location/Symptom, Timing/Onset, Context/Setting, Quality, Duration, Modifying Factors, Severity.)      Dulce Serrano is a 57 y.o. female who presents with Left leg pain that started approximately 2-3 days ago. Patient states that she's been more active chasing after her dog, but she's noticed that she's been having pain in the back of her left calf extending into her left knee. She states that she had a borrow a can to walk due to the pain. She has no history of previous blood clots. She has no history of shortness of breath or chest pain. She is not on any long trips recently and is not on any exogenous hormones. She denies any trauma to the leg. Patient called her primary care physician and told to come in to be evaluated. PAST MEDICAL / SURGICAL / SOCIAL / FAMILY HISTORY      has a past medical history of Anxiety; Arrhythmia; Asthma; Bipolar disorder ([facility] Depression; GERD (gastroesophageal reflux disease); GERD (gastroesophageal reflux disease); Hyperlipidemia; Hypertension; Hypothyroidism; OAB (overactive bladder); Osteoarthritis; Pulmonary fibrosis ([facility] Pulmonary hypertension; Stroke [facility]); Unspecified sleep apnea; and Urinary incontinence. has a past surgical history that includes Colonoscopy (04/2015). Social History     Social History    Marital status:      Spouse name: N/A    Number of children: N/A    Years of education: N/A     Occupational History    Not on file.      Social History Main Topics    Smoking status: Never Smoker    Smokeless

## 2018-04-11 ENCOUNTER — OFFICE VISIT (OUTPATIENT)
Dept: INTERNAL MEDICINE | Age: 63
End: 2018-04-11
Payer: MEDICARE

## 2018-04-11 VITALS
SYSTOLIC BLOOD PRESSURE: 129 MMHG | WEIGHT: 178 LBS | HEART RATE: 101 BPM | HEIGHT: 65 IN | DIASTOLIC BLOOD PRESSURE: 80 MMHG | BODY MASS INDEX: 29.66 KG/M2

## 2018-04-11 DIAGNOSIS — J45.30 MILD PERSISTENT ASTHMA WITHOUT COMPLICATION: ICD-10-CM

## 2018-04-11 DIAGNOSIS — E78.00 PURE HYPERCHOLESTEROLEMIA: ICD-10-CM

## 2018-04-11 DIAGNOSIS — I10 ESSENTIAL HYPERTENSION: Primary | ICD-10-CM

## 2018-04-11 DIAGNOSIS — F31.81 BIPOLAR 2 DISORDER (HCC): ICD-10-CM

## 2018-04-11 DIAGNOSIS — L30.9 DERMATITIS: ICD-10-CM

## 2018-04-11 DIAGNOSIS — E03.9 HYPOTHYROIDISM, UNSPECIFIED TYPE: ICD-10-CM

## 2018-04-11 DIAGNOSIS — Z99.89 OSA ON CPAP: ICD-10-CM

## 2018-04-11 DIAGNOSIS — G47.33 OSA ON CPAP: ICD-10-CM

## 2018-04-11 PROCEDURE — G8599 NO ASA/ANTIPLAT THER USE RNG: HCPCS | Performed by: INTERNAL MEDICINE

## 2018-04-11 PROCEDURE — 99211 OFF/OP EST MAY X REQ PHY/QHP: CPT

## 2018-04-11 PROCEDURE — 3014F SCREEN MAMMO DOC REV: CPT | Performed by: INTERNAL MEDICINE

## 2018-04-11 PROCEDURE — 3017F COLORECTAL CA SCREEN DOC REV: CPT | Performed by: INTERNAL MEDICINE

## 2018-04-11 PROCEDURE — 1036F TOBACCO NON-USER: CPT | Performed by: INTERNAL MEDICINE

## 2018-04-11 PROCEDURE — G8427 DOCREV CUR MEDS BY ELIG CLIN: HCPCS | Performed by: INTERNAL MEDICINE

## 2018-04-11 PROCEDURE — G8417 CALC BMI ABV UP PARAM F/U: HCPCS | Performed by: INTERNAL MEDICINE

## 2018-04-11 PROCEDURE — 99213 OFFICE O/P EST LOW 20 MIN: CPT | Performed by: INTERNAL MEDICINE

## 2018-04-15 ASSESSMENT — ENCOUNTER SYMPTOMS
SHORTNESS OF BREATH: 0
EYE REDNESS: 0
CONSTIPATION: 0
COUGH: 0
NAUSEA: 0
SPUTUM PRODUCTION: 0
ABDOMINAL PAIN: 0
BLURRED VISION: 0

## 2018-05-16 ENCOUNTER — TELEPHONE (OUTPATIENT)
Dept: INTERNAL MEDICINE | Age: 63
End: 2018-05-16

## 2018-05-22 ENCOUNTER — TELEPHONE (OUTPATIENT)
Dept: INTERNAL MEDICINE | Age: 63
End: 2018-05-22

## 2018-06-15 ENCOUNTER — TELEPHONE (OUTPATIENT)
Dept: INTERNAL MEDICINE | Age: 63
End: 2018-06-15

## 2018-07-09 ENCOUNTER — HOSPITAL ENCOUNTER (OUTPATIENT)
Age: 63
Setting detail: SPECIMEN
Discharge: HOME | End: 2018-07-09
Payer: MEDICARE

## 2018-07-09 DIAGNOSIS — I10 ESSENTIAL HYPERTENSION: ICD-10-CM

## 2018-07-09 LAB
DIRECT EXAM: ABNORMAL
HIV AG/AB: NONREACTIVE
Lab: ABNORMAL
SPECIMEN DESCRIPTION: ABNORMAL
STATUS: ABNORMAL
T. PALLIDUM, IGG: NONREACTIVE

## 2018-07-09 RX ORDER — AMLODIPINE BESYLATE 5 MG/1
TABLET ORAL
Qty: 30 TABLET | Refills: 3 | Status: SHIPPED | OUTPATIENT
Start: 2018-07-09 | End: 2018-10-02 | Stop reason: SDUPTHER

## 2018-07-10 LAB
C TRACH DNA GENITAL QL NAA+PROBE: NEGATIVE
N. GONORRHOEAE DNA: NEGATIVE

## 2018-07-11 LAB
HPV SAMPLE: NORMAL
HPV SOURCE: NORMAL
HPV, GENOTYPE 16: NOT DETECTED
HPV, GENOTYPE 18: NOT DETECTED
HPV, HIGH RISK OTHER: NOT DETECTED
HPV, INTERPRETATION: NORMAL

## 2018-07-12 LAB
HERPES SIMPLEX VIRUS 1 IGG: 0.57
HERPES SIMPLEX VIRUS 2 IGG: 6.83

## 2018-07-20 LAB — CYTOLOGY REPORT: NORMAL

## 2018-07-25 ENCOUNTER — OFFICE VISIT (OUTPATIENT)
Dept: INTERNAL MEDICINE | Age: 63
End: 2018-07-25
Payer: MEDICARE

## 2018-07-25 VITALS
DIASTOLIC BLOOD PRESSURE: 77 MMHG | HEART RATE: 83 BPM | HEIGHT: 65 IN | SYSTOLIC BLOOD PRESSURE: 132 MMHG | BODY MASS INDEX: 30.16 KG/M2 | WEIGHT: 181 LBS

## 2018-07-25 DIAGNOSIS — J45.30 MILD PERSISTENT ASTHMA WITHOUT COMPLICATION: ICD-10-CM

## 2018-07-25 DIAGNOSIS — E78.00 PURE HYPERCHOLESTEROLEMIA: ICD-10-CM

## 2018-07-25 DIAGNOSIS — Z99.89 OSA ON CPAP: ICD-10-CM

## 2018-07-25 DIAGNOSIS — E03.9 HYPOTHYROIDISM, UNSPECIFIED TYPE: Primary | ICD-10-CM

## 2018-07-25 DIAGNOSIS — E66.9 OBESITY (BMI 30-39.9): ICD-10-CM

## 2018-07-25 DIAGNOSIS — G47.33 OSA ON CPAP: ICD-10-CM

## 2018-07-25 DIAGNOSIS — I10 ESSENTIAL HYPERTENSION: ICD-10-CM

## 2018-07-25 PROCEDURE — 1090F PRES/ABSN URINE INCON ASSESS: CPT | Performed by: INTERNAL MEDICINE

## 2018-07-25 PROCEDURE — 1123F ACP DISCUSS/DSCN MKR DOCD: CPT | Performed by: INTERNAL MEDICINE

## 2018-07-25 PROCEDURE — 1101F PT FALLS ASSESS-DOCD LE1/YR: CPT | Performed by: INTERNAL MEDICINE

## 2018-07-25 PROCEDURE — G8417 CALC BMI ABV UP PARAM F/U: HCPCS | Performed by: INTERNAL MEDICINE

## 2018-07-25 PROCEDURE — G8400 PT W/DXA NO RESULTS DOC: HCPCS | Performed by: INTERNAL MEDICINE

## 2018-07-25 PROCEDURE — 99214 OFFICE O/P EST MOD 30 MIN: CPT | Performed by: INTERNAL MEDICINE

## 2018-07-25 PROCEDURE — 3017F COLORECTAL CA SCREEN DOC REV: CPT | Performed by: INTERNAL MEDICINE

## 2018-07-25 PROCEDURE — G8599 NO ASA/ANTIPLAT THER USE RNG: HCPCS | Performed by: INTERNAL MEDICINE

## 2018-07-25 PROCEDURE — 1036F TOBACCO NON-USER: CPT | Performed by: INTERNAL MEDICINE

## 2018-07-25 PROCEDURE — 99213 OFFICE O/P EST LOW 20 MIN: CPT | Performed by: INTERNAL MEDICINE

## 2018-07-25 PROCEDURE — 4040F PNEUMOC VAC/ADMIN/RCVD: CPT | Performed by: INTERNAL MEDICINE

## 2018-07-25 PROCEDURE — G8427 DOCREV CUR MEDS BY ELIG CLIN: HCPCS | Performed by: INTERNAL MEDICINE

## 2018-07-25 RX ORDER — LEVOTHYROXINE SODIUM 0.03 MG/1
TABLET ORAL
Qty: 30 TABLET | Refills: 2 | Status: SHIPPED | OUTPATIENT
Start: 2018-07-25 | End: 2018-10-02 | Stop reason: SDUPTHER

## 2018-07-25 NOTE — PATIENT INSTRUCTIONS
RTC on 11/27/18. Medications e-scribe to pharmacy of pt's choice. Tarps form scanned and given back to pt. An After Visit Summary was printed and given to the patient.  CB

## 2018-07-25 NOTE — PROGRESS NOTES
[facility]    Progress Note    Date of patient's visit: 7/25/2018    Patient's Name:  Wilbur Nissen    YOB: 1955            Patient Care Team:  Kamla Dash MD as PCP - Rosenda Torres MD as PCP - [facility] Attributed Provider    REASON FOR VISIT: Routine outpatient follow     Chief Complaint   Patient presents with    Forms     Pt needs TARPS forms completed, she would also like a form completed that states she needs someone to come to her house to watch her in the bath so she does not fall asleep.  Health Maintenance     vaccines and dexa refused     Discuss Labs     7/9/18         HISTORY OF PRESENT ILLNESS:    History was obtained from the patient. Wilbur Nissen is a 70 y.o. is here for Follow-up and med refills and forms to be filled. She has a tarps form for transportation that she needs filled. She's also brought a physical form from [facility]. She has a history of obstructive sleep apnea. She says she has been compliant with CPAP. She says sometimes she falls asleep in her bath. She is asking for home care. I've advised her to use a shower and not a bath. But she also has been advised to follow-up with her pulmonologist.  She does need to be been checked for narcolepsy. She has bipolar disorder. She is going to a new psychiatrist and therapist at [facility]. She has paranoid behavior. She says she has a guardian and she has been told she'll be institutionalized and therefore she wants to see a new psychiatrist.  She keeps asking me to revoke her guardianship and to give her control on her financial affairs but I have no information on her. I will have her sign a release to get her information from her previous psychiatrist.  Asthma has been stable. She denies any falls. Sleep is fine. She states medication compliance.         Past Medical History:   Diagnosis Date    Anxiety     Arrhythmia     puffs into the lungs 4 times daily as needed for Wheezing.  QUEtiapine (SEROQUEL) 200 MG tablet Take 200 mg by mouth daily.  Fesoterodine Fumarate ER (TOVIAZ) 8 MG TB24 Take 8 mg by mouth daily as needed. No current facility-administered medications on file prior to visit. SOCIAL HISTORY    Reviewed and no change from previous record. Dulce  reports that she has never smoked. She has never used smokeless tobacco.    FAMILY HISTORY:    Reviewed and No change from previous visit    HEALTH MAINTENANCE DUE:      Health Maintenance Due   Topic Date Due    DTaP/Tdap/Td vaccine (1 - Tdap) 04/22/1972    Shingles Vaccine (1 of 2 - 2 Dose Series) 04/22/2003    DEXA (modify frequency per FRAX score)  04/22/2018    Pneumococcal low/med risk (1 of 2 - PCV13) 04/22/2018       REVIEW OF SYSTEMS:    12 point review of symptoms completed and found to be normal except noted in the HPI    Review of Systems   Constitutional: Negative for fever, malaise/fatigue and weight loss. Eyes: Negative for blurred vision and redness. Respiratory: Negative for cough, sputum production and shortness of breath. Cardiovascular: Negative for chest pain, palpitations and leg swelling. Gastrointestinal: Negative for abdominal pain, constipation and nausea. Genitourinary: Negative for dysuria and frequency. Neurological: Negative for dizziness, tremors, seizures and loss of consciousness. Endo/Heme/Allergies: Negative for polydipsia. Does not bruise/bleed easily. Psychiatric/Behavioral: Positive for depression. Negative for substance abuse. The patient does not have insomnia. PHYSICAL EXAM:      Vitals:    07/25/18 1320   BP: 132/77   Site: Left Arm   Position: Sitting   Cuff Size: Medium Adult   Pulse: 83   Weight: 181 lb (82.1 kg)   Height: 5' 5\" (1.651 m)     Body mass index is 30.12 kg/m².     BP Readings from Last 3 Encounters:   07/25/18 132/77   04/11/18 129/80   03/21/18 (!) 164/98        [employer] 01/19/2018      URINE ANALYSIS: No results found for: LABURIN  ASSESSMENT AND PLAN:    1. Hypothyroidism, unspecified type    - levothyroxine (SYNTHROID) 25 MCG tablet; TAKE ONE TABLET BY MOUTH ONCE DAILY **CALL  FOR  APPOINTMENT  BEFORE  ANY  OTHER  REFILLS**  Dispense: 30 tablet; Refill: 2    2. Essential hypertension  norvasc    - metoprolol tartrate (LOPRESSOR) 25 MG tablet; TAKE ONE TABLET BY MOUTH TWICE DAILY **GENERIC  LOPRESSOR  Dispense: 60 tablet; Refill: 2    3. Pure hypercholesterolemia  low fat diet  Recheck    4. XAVIER on CPAP  Follow up with pulmonologist  cpap      5. Mild persistent asthma without complication    - beclomethasone (QVAR) 80 MCG/ACT inhaler; Inhale 1 puff into the lungs 2 times daily  Dispense: 1 Inhaler; Refill: 3    6. Obesity (BMI 30-39. 9)            FOLLOW UP AND INSTRUCTIONS:   Return in about 4 months (around 11/25/2018). 1. Debere received counseling on the following healthy behaviors: nutrition, exercise and medication adherence    2. Reviewed prior labs and health maintenance. 3. Discussed use, benefit, and side effects of prescribed medications. Barriers to medication compliance addressed. All patient questions answered. Pt voiced understanding.        Skylar Navarro  Attending Physician, [address] [place] [address], Internal Medicine Residency Program  [address] [place] [address]  7/25/2018, 1:44 PM

## 2018-07-26 ENCOUNTER — HOSPITAL ENCOUNTER (OUTPATIENT)
Age: 63
Setting detail: SPECIMEN
Discharge: HOME | End: 2018-07-26
Payer: MEDICARE

## 2018-07-26 ENCOUNTER — TELEPHONE (OUTPATIENT)
Dept: BARIATRICS/WEIGHT MGMT | Age: 63
End: 2018-07-26

## 2018-07-26 LAB
ALBUMIN SERPL-MCNC: 4.4 G/DL (ref 3.5–5.2)
ALBUMIN/GLOBULIN RATIO: 1.3 (ref 1–2.5)
ALP BLD-CCNC: 97 U/L (ref 35–104)
ALT SERPL-CCNC: 33 U/L (ref 5–33)
ANION GAP SERPL CALCULATED.3IONS-SCNC: 13 MMOL/L (ref 9–17)
AST SERPL-CCNC: 33 U/L
BILIRUB SERPL-MCNC: 0.22 MG/DL (ref 0.3–1.2)
BUN BLDV-MCNC: 22 MG/DL (ref 8–23)
BUN/CREAT BLD: ABNORMAL (ref 9–20)
CALCIUM SERPL-MCNC: 9.8 MG/DL (ref 8.6–10.4)
CHLORIDE BLD-SCNC: 105 MMOL/L (ref 98–107)
CHOLESTEROL/HDL RATIO: 3.3
CHOLESTEROL: 162 MG/DL
CO2: 22 MMOL/L (ref 20–31)
CREAT SERPL-MCNC: 1.01 MG/DL (ref 0.5–0.9)
ESTIMATED AVERAGE GLUCOSE: 108 MG/DL
GFR AFRICAN AMERICAN: >60 ML/MIN
GFR NON-AFRICAN AMERICAN: 55 ML/MIN
GFR SERPL CREATININE-BSD FRML MDRD: ABNORMAL ML/MIN/{1.73_M2}
GFR SERPL CREATININE-BSD FRML MDRD: ABNORMAL ML/MIN/{1.73_M2}
GLUCOSE BLD-MCNC: 97 MG/DL (ref 70–99)
HBA1C MFR BLD: 5.4 % (ref 4–6)
HDLC SERPL-MCNC: 49 MG/DL
LDL CHOLESTEROL: 98 MG/DL (ref 0–130)
LITHIUM DATE LAST DOSE: ABNORMAL
LITHIUM DOSE AMOUNT: ABNORMAL
LITHIUM DOSE TIME: ABNORMAL
LITHIUM LEVEL: 0.5 MMOL/L (ref 0.6–1.2)
POTASSIUM SERPL-SCNC: 3.9 MMOL/L (ref 3.7–5.3)
SODIUM BLD-SCNC: 140 MMOL/L (ref 135–144)
TOTAL PROTEIN: 7.9 G/DL (ref 6.4–8.3)
TRIGL SERPL-MCNC: 76 MG/DL
VLDLC SERPL CALC-MCNC: NORMAL MG/DL (ref 1–30)

## 2018-07-26 PROCEDURE — 36415 COLL VENOUS BLD VENIPUNCTURE: CPT

## 2018-07-26 PROCEDURE — 80061 LIPID PANEL: CPT

## 2018-07-26 PROCEDURE — 83036 HEMOGLOBIN GLYCOSYLATED A1C: CPT

## 2018-07-26 PROCEDURE — 80178 ASSAY OF LITHIUM: CPT

## 2018-07-26 PROCEDURE — 80053 COMPREHEN METABOLIC PANEL: CPT

## 2018-07-28 ASSESSMENT — ENCOUNTER SYMPTOMS
COUGH: 0
NAUSEA: 0
ABDOMINAL PAIN: 0
BLURRED VISION: 0
SHORTNESS OF BREATH: 0
EYE REDNESS: 0
SPUTUM PRODUCTION: 0
CONSTIPATION: 0

## 2018-08-06 ENCOUNTER — TELEPHONE (OUTPATIENT)
Dept: INTERNAL MEDICINE | Age: 63
End: 2018-08-06

## 2018-08-09 ENCOUNTER — OFFICE VISIT (OUTPATIENT)
Dept: BARIATRICS/WEIGHT MGMT | Age: 63
End: 2018-08-09
Payer: MEDICARE

## 2018-08-09 VITALS
WEIGHT: 187.8 LBS | HEIGHT: 64 IN | BODY MASS INDEX: 32.06 KG/M2 | DIASTOLIC BLOOD PRESSURE: 68 MMHG | RESPIRATION RATE: 22 BRPM | SYSTOLIC BLOOD PRESSURE: 130 MMHG | HEART RATE: 74 BPM

## 2018-08-09 DIAGNOSIS — Z86.73 HISTORY OF STROKE: ICD-10-CM

## 2018-08-09 DIAGNOSIS — F31.81 BIPOLAR 2 DISORDER (HCC): ICD-10-CM

## 2018-08-09 DIAGNOSIS — K21.9 GASTROESOPHAGEAL REFLUX DISEASE, ESOPHAGITIS PRESENCE NOT SPECIFIED: ICD-10-CM

## 2018-08-09 DIAGNOSIS — J45.30 MILD PERSISTENT ASTHMA WITHOUT COMPLICATION: ICD-10-CM

## 2018-08-09 DIAGNOSIS — I10 ESSENTIAL HYPERTENSION: Primary | ICD-10-CM

## 2018-08-09 DIAGNOSIS — F41.9 ANXIETY: ICD-10-CM

## 2018-08-09 DIAGNOSIS — E78.00 PURE HYPERCHOLESTEROLEMIA: ICD-10-CM

## 2018-08-09 DIAGNOSIS — E03.9 HYPOTHYROIDISM, UNSPECIFIED TYPE: ICD-10-CM

## 2018-08-09 DIAGNOSIS — R32 URINARY INCONTINENCE, UNSPECIFIED TYPE: ICD-10-CM

## 2018-08-09 DIAGNOSIS — E66.9 OBESITY (BMI 30-39.9): ICD-10-CM

## 2018-08-09 DIAGNOSIS — G47.33 OBSTRUCTIVE SLEEP APNEA SYNDROME: ICD-10-CM

## 2018-08-09 DIAGNOSIS — I27.20 PULMONARY HYPERTENSION (HCC): ICD-10-CM

## 2018-08-09 DIAGNOSIS — J44.9 CHRONIC OBSTRUCTIVE PULMONARY DISEASE, UNSPECIFIED COPD TYPE (HCC): ICD-10-CM

## 2018-08-09 DIAGNOSIS — I49.9 CARDIAC ARRHYTHMIA, UNSPECIFIED CARDIAC ARRHYTHMIA TYPE: ICD-10-CM

## 2018-08-09 PROCEDURE — 4040F PNEUMOC VAC/ADMIN/RCVD: CPT | Performed by: NURSE PRACTITIONER

## 2018-08-09 PROCEDURE — 3017F COLORECTAL CA SCREEN DOC REV: CPT | Performed by: NURSE PRACTITIONER

## 2018-08-09 PROCEDURE — G8427 DOCREV CUR MEDS BY ELIG CLIN: HCPCS | Performed by: NURSE PRACTITIONER

## 2018-08-09 PROCEDURE — G8599 NO ASA/ANTIPLAT THER USE RNG: HCPCS | Performed by: NURSE PRACTITIONER

## 2018-08-09 PROCEDURE — G8926 SPIRO NO PERF OR DOC: HCPCS | Performed by: NURSE PRACTITIONER

## 2018-08-09 PROCEDURE — G8400 PT W/DXA NO RESULTS DOC: HCPCS | Performed by: NURSE PRACTITIONER

## 2018-08-09 PROCEDURE — 1123F ACP DISCUSS/DSCN MKR DOCD: CPT | Performed by: NURSE PRACTITIONER

## 2018-08-09 PROCEDURE — G8417 CALC BMI ABV UP PARAM F/U: HCPCS | Performed by: NURSE PRACTITIONER

## 2018-08-09 PROCEDURE — 1090F PRES/ABSN URINE INCON ASSESS: CPT | Performed by: NURSE PRACTITIONER

## 2018-08-09 PROCEDURE — 0509F URINE INCON PLAN DOCD: CPT | Performed by: NURSE PRACTITIONER

## 2018-08-09 PROCEDURE — 1101F PT FALLS ASSESS-DOCD LE1/YR: CPT | Performed by: NURSE PRACTITIONER

## 2018-08-09 PROCEDURE — 3023F SPIROM DOC REV: CPT | Performed by: NURSE PRACTITIONER

## 2018-08-09 PROCEDURE — 99214 OFFICE O/P EST MOD 30 MIN: CPT | Performed by: NURSE PRACTITIONER

## 2018-08-09 PROCEDURE — 1036F TOBACCO NON-USER: CPT | Performed by: NURSE PRACTITIONER

## 2018-08-30 ENCOUNTER — TELEPHONE (OUTPATIENT)
Dept: INTERNAL MEDICINE | Age: 63
End: 2018-08-30

## 2018-08-30 NOTE — TELEPHONE ENCOUNTER
Pt calling stating she was seen on 7/25/18 and brought a form that needed to be completed for help that she needs. Pt was asking if the form was completed. Pt would like a phone when it's ready to be picked up. Health Maintenance   Topic Date Due    DTaP/Tdap/Td vaccine (1 - Tdap) 04/22/1972    Shingles Vaccine (1 of 2 - 2 Dose Series) 04/22/2003    DEXA (modify frequency per FRAX score)  04/22/2018    Pneumococcal low/med risk (1 of 2 - PCV13) 04/22/2018    Flu vaccine (1) 09/01/2018    TSH testing  01/19/2019    Potassium monitoring  07/26/2019    Creatinine monitoring  07/26/2019    Breast cancer screen  01/29/2020    Cervical cancer screen  07/09/2023    Lipid screen  07/26/2023    Colon cancer screen colonoscopy  09/25/2024    Hepatitis C screen  Completed    HIV screen  Completed             (applicable per patient's age: Cancer Screenings, Depression Screening, Fall Risk Screening, Immunizations)    Hemoglobin A1C (%)   Date Value   07/26/2018 5.4   10/03/2017 5.2   11/10/2016 5.5     Microalb/Crt.  Ratio (mcg/mg creat)   Date Value   11/10/2016 9     LDL Cholesterol (mg/dL)   Date Value   07/26/2018 98     AST (U/L)   Date Value   07/26/2018 33 (H)     ALT (U/L)   Date Value   07/26/2018 33     BUN (mg/dL)   Date Value   07/26/2018 22      (goal A1C is < 7)   (goal LDL is <100) need 30-50% reduction from baseline     BP Readings from Last 3 Encounters:   08/09/18 130/68   07/25/18 132/77   04/11/18 129/80    (goal /80)      All Future Testing planned in [facility]:  Lab Frequency Next Occurrence   CBC Auto Differential Once 09/19/2018   TSH without Reflex Once 09/19/2018   Uric Acid Once 09/19/2018   EKG 12 Lead Once 08/09/2018       Next Visit Date:  Future Appointments  Date Time Provider Ekta Tapia   9/18/2018 5:30 PM MANDE[facility] Castellanos - C[facility]   9/19/2018 1:30 PM Jemal Cunningham MD [facility] IM [facility]   11/27/2018 2:00 PM Jemal Cunningham MD [facility] IM [address] [facility]

## 2018-09-12 ENCOUNTER — TELEPHONE (OUTPATIENT)
Dept: BARIATRICS/WEIGHT MGMT | Age: 63
End: 2018-09-12

## 2018-09-18 ENCOUNTER — HOSPITAL ENCOUNTER (OUTPATIENT)
Age: 63
Discharge: HOME | End: 2018-09-18
Payer: MEDICARE

## 2018-09-18 LAB
ABSOLUTE EOS #: 0.1 K/UL (ref 0–0.4)
ABSOLUTE IMMATURE GRANULOCYTE: ABNORMAL K/UL (ref 0–0.3)
ABSOLUTE LYMPH #: 1.9 K/UL (ref 1–4.8)
ABSOLUTE MONO #: 0.8 K/UL (ref 0.2–0.8)
BASOPHILS # BLD: 0 % (ref 0–2)
BASOPHILS ABSOLUTE: 0 K/UL (ref 0–0.2)
DIFFERENTIAL TYPE: ABNORMAL
EKG ATRIAL RATE: 93 BPM
EKG P AXIS: 63 DEGREES
EKG P-R INTERVAL: 142 MS
EKG Q-T INTERVAL: 374 MS
EKG QRS DURATION: 76 MS
EKG QTC CALCULATION (BAZETT): 465 MS
EKG R AXIS: 67 DEGREES
EKG T AXIS: 52 DEGREES
EKG VENTRICULAR RATE: 93 BPM
EOSINOPHILS RELATIVE PERCENT: 1 % (ref 1–4)
HCT VFR BLD CALC: 35.8 % (ref 36–46)
HEMOGLOBIN: 11.5 G/DL (ref 12–16)
IMMATURE GRANULOCYTES: ABNORMAL %
LYMPHOCYTES # BLD: 33 % (ref 24–44)
MCH RBC QN AUTO: 28.3 PG (ref 26–34)
MCHC RBC AUTO-ENTMCNC: 32.1 G/DL (ref 31–37)
MCV RBC AUTO: 88.3 FL (ref 80–100)
MONOCYTES # BLD: 15 % (ref 1–7)
NRBC AUTOMATED: ABNORMAL PER 100 WBC
PDW BLD-RTO: 15.6 % (ref 11.5–14.5)
PLATELET # BLD: 211 K/UL (ref 130–400)
PLATELET ESTIMATE: ABNORMAL
PMV BLD AUTO: 9.1 FL (ref 6–12)
RBC # BLD: 4.06 M/UL (ref 4–5.2)
RBC # BLD: ABNORMAL 10*6/UL
SEG NEUTROPHILS: 51 % (ref 36–66)
SEGMENTED NEUTROPHILS ABSOLUTE COUNT: 2.8 K/UL (ref 1.8–7.7)
TSH SERPL DL<=0.05 MIU/L-ACNC: 2.68 MIU/L (ref 0.3–5)
URIC ACID: 8.7 MG/DL (ref 2.4–5.7)
WBC # BLD: 5.6 K/UL (ref 3.5–11)
WBC # BLD: ABNORMAL 10*3/UL

## 2018-09-18 PROCEDURE — 84443 ASSAY THYROID STIM HORMONE: CPT

## 2018-09-18 PROCEDURE — 84550 ASSAY OF BLOOD/URIC ACID: CPT

## 2018-09-18 PROCEDURE — 93005 ELECTROCARDIOGRAM TRACING: CPT

## 2018-09-18 PROCEDURE — 36415 COLL VENOUS BLD VENIPUNCTURE: CPT

## 2018-09-18 PROCEDURE — 85025 COMPLETE CBC W/AUTO DIFF WBC: CPT

## 2018-10-02 ENCOUNTER — OFFICE VISIT (OUTPATIENT)
Dept: INTERNAL MEDICINE | Age: 63
End: 2018-10-02
Payer: MEDICARE

## 2018-10-02 VITALS
DIASTOLIC BLOOD PRESSURE: 84 MMHG | HEART RATE: 86 BPM | BODY MASS INDEX: 31.82 KG/M2 | WEIGHT: 191 LBS | SYSTOLIC BLOOD PRESSURE: 135 MMHG | HEIGHT: 65 IN

## 2018-10-02 DIAGNOSIS — I10 ESSENTIAL HYPERTENSION: Primary | ICD-10-CM

## 2018-10-02 DIAGNOSIS — E66.9 OBESITY (BMI 30-39.9): ICD-10-CM

## 2018-10-02 DIAGNOSIS — K52.9 CHRONIC DIARRHEA: ICD-10-CM

## 2018-10-02 DIAGNOSIS — J45.30 MILD PERSISTENT ASTHMA WITHOUT COMPLICATION: ICD-10-CM

## 2018-10-02 DIAGNOSIS — G47.33 OSA (OBSTRUCTIVE SLEEP APNEA): ICD-10-CM

## 2018-10-02 DIAGNOSIS — F31.81 BIPOLAR 2 DISORDER (HCC): ICD-10-CM

## 2018-10-02 DIAGNOSIS — Z78.0 POST-MENOPAUSAL: ICD-10-CM

## 2018-10-02 DIAGNOSIS — E03.9 HYPOTHYROIDISM, UNSPECIFIED TYPE: ICD-10-CM

## 2018-10-02 PROCEDURE — 1101F PT FALLS ASSESS-DOCD LE1/YR: CPT | Performed by: INTERNAL MEDICINE

## 2018-10-02 PROCEDURE — 99211 OFF/OP EST MAY X REQ PHY/QHP: CPT | Performed by: INTERNAL MEDICINE

## 2018-10-02 PROCEDURE — 4040F PNEUMOC VAC/ADMIN/RCVD: CPT | Performed by: INTERNAL MEDICINE

## 2018-10-02 PROCEDURE — G8599 NO ASA/ANTIPLAT THER USE RNG: HCPCS | Performed by: INTERNAL MEDICINE

## 2018-10-02 PROCEDURE — G8400 PT W/DXA NO RESULTS DOC: HCPCS | Performed by: INTERNAL MEDICINE

## 2018-10-02 PROCEDURE — G8484 FLU IMMUNIZE NO ADMIN: HCPCS | Performed by: INTERNAL MEDICINE

## 2018-10-02 PROCEDURE — 1036F TOBACCO NON-USER: CPT | Performed by: INTERNAL MEDICINE

## 2018-10-02 PROCEDURE — G8427 DOCREV CUR MEDS BY ELIG CLIN: HCPCS | Performed by: INTERNAL MEDICINE

## 2018-10-02 PROCEDURE — 1123F ACP DISCUSS/DSCN MKR DOCD: CPT | Performed by: INTERNAL MEDICINE

## 2018-10-02 PROCEDURE — 99213 OFFICE O/P EST LOW 20 MIN: CPT | Performed by: INTERNAL MEDICINE

## 2018-10-02 PROCEDURE — G8417 CALC BMI ABV UP PARAM F/U: HCPCS | Performed by: INTERNAL MEDICINE

## 2018-10-02 PROCEDURE — 1090F PRES/ABSN URINE INCON ASSESS: CPT | Performed by: INTERNAL MEDICINE

## 2018-10-02 PROCEDURE — 3017F COLORECTAL CA SCREEN DOC REV: CPT | Performed by: INTERNAL MEDICINE

## 2018-10-02 RX ORDER — LEVOTHYROXINE SODIUM 0.03 MG/1
TABLET ORAL
Qty: 30 TABLET | Refills: 2 | Status: SHIPPED | OUTPATIENT
Start: 2018-10-02 | End: 2018-12-05 | Stop reason: SDUPTHER

## 2018-10-02 RX ORDER — AMLODIPINE BESYLATE 5 MG/1
TABLET ORAL
Qty: 30 TABLET | Refills: 3 | Status: SHIPPED | OUTPATIENT
Start: 2018-10-02 | End: 2018-12-05 | Stop reason: SDUPTHER

## 2018-10-02 NOTE — PROGRESS NOTES
reflux disease)     Hyperlipidemia     Hypertension     Hypothyroidism     OAB (overactive bladder)     Osteoarthritis     Pulmonary fibrosis (HCC)     sjogrens syndrome    Pulmonary hypertension (HCC)     Stroke (HCC)     Unspecified sleep apnea     on cpap    Urinary incontinence        Past Surgical History:   Procedure Laterality Date    COLONOSCOPY  04/2015    COLONOSCOPY  2018             ALLERGIES      Allergies   Allergen Reactions    Motrin [Ibuprofen]     Aspirin Rash    Blue Dyes (Parenteral) Rash    Hctz [Hydrochlorothiazide] Rash     Fixed drug reaction    Sulfa Antibiotics Rash    Tetracyclines & Related Rash       MEDICATIONS:      Current Outpatient Prescriptions on File Prior to Visit   Medication Sig Dispense Refill    beclomethasone (QVAR) 80 MCG/ACT inhaler Inhale 1 puff into the lungs 2 times daily 1 Inhaler 3    acetaminophen (TYLENOL) 325 MG tablet Take 2 tablets by mouth every 6 hours as needed for Pain 60 tablet 0    FLUoxetine (PROZAC) 40 MG capsule Take 40 mg by mouth daily      ipratropium (ATROVENT) 0.03 % nasal spray 2 sprays by Nasal route every 12 hours      ranitidine (ZANTAC) 150 MG tablet Take 150 mg by mouth daily      valACYclovir (VALTREX) 1 G tablet Take 1,000 mg by mouth daily.  fluticasone (FLONASE) 50 MCG/ACT nasal spray 2 sprays by Nasal route daily.  lithium 300 MG capsule Take 300 mg by mouth 2 times daily (with meals).  albuterol (PROVENTIL HFA;VENTOLIN HFA) 108 (90 BASE) MCG/ACT inhaler Inhale 2 puffs into the lungs 4 times daily as needed for Wheezing.  QUEtiapine (SEROQUEL) 200 MG tablet Take 200 mg by mouth daily.  Fesoterodine Fumarate ER (TOVIAZ) 8 MG TB24 Take 8 mg by mouth daily as needed. No current facility-administered medications on file prior to visit. SOCIAL HISTORY    Reviewed and no change from previous record. Dulce  reports that she has never smoked.  She has never used smokeless

## 2018-10-07 ASSESSMENT — ENCOUNTER SYMPTOMS
NAUSEA: 0
CONSTIPATION: 0
BLURRED VISION: 0
SHORTNESS OF BREATH: 0
DIARRHEA: 1
EYE REDNESS: 0
COUGH: 0
ABDOMINAL PAIN: 0
SPUTUM PRODUCTION: 0
BLOOD IN STOOL: 0

## 2018-10-16 ENCOUNTER — OFFICE VISIT (OUTPATIENT)
Dept: BARIATRICS/WEIGHT MGMT | Age: 63
End: 2018-10-16
Payer: MEDICARE

## 2018-10-16 DIAGNOSIS — G47.33 OBSTRUCTIVE SLEEP APNEA SYNDROME: ICD-10-CM

## 2018-10-16 DIAGNOSIS — J45.30 MILD PERSISTENT ASTHMA WITHOUT COMPLICATION: ICD-10-CM

## 2018-10-16 DIAGNOSIS — I27.20 PULMONARY HYPERTENSION (HCC): ICD-10-CM

## 2018-10-16 DIAGNOSIS — J44.9 CHRONIC OBSTRUCTIVE PULMONARY DISEASE, UNSPECIFIED COPD TYPE (HCC): ICD-10-CM

## 2018-10-16 DIAGNOSIS — I10 ESSENTIAL HYPERTENSION: Primary | ICD-10-CM

## 2018-10-16 DIAGNOSIS — K21.9 GASTROESOPHAGEAL REFLUX DISEASE, ESOPHAGITIS PRESENCE NOT SPECIFIED: ICD-10-CM

## 2018-10-16 DIAGNOSIS — Z86.73 HISTORY OF STROKE: ICD-10-CM

## 2018-10-16 DIAGNOSIS — E03.9 HYPOTHYROIDISM, UNSPECIFIED TYPE: ICD-10-CM

## 2018-10-16 DIAGNOSIS — E78.00 PURE HYPERCHOLESTEROLEMIA: ICD-10-CM

## 2018-10-16 DIAGNOSIS — F31.81 BIPOLAR 2 DISORDER (HCC): ICD-10-CM

## 2018-10-16 DIAGNOSIS — E66.9 OBESITY (BMI 30-39.9): ICD-10-CM

## 2018-10-16 PROCEDURE — 1090F PRES/ABSN URINE INCON ASSESS: CPT | Performed by: NURSE PRACTITIONER

## 2018-10-16 PROCEDURE — G8400 PT W/DXA NO RESULTS DOC: HCPCS | Performed by: NURSE PRACTITIONER

## 2018-10-16 PROCEDURE — 3023F SPIROM DOC REV: CPT | Performed by: NURSE PRACTITIONER

## 2018-10-16 PROCEDURE — G8599 NO ASA/ANTIPLAT THER USE RNG: HCPCS | Performed by: NURSE PRACTITIONER

## 2018-10-16 PROCEDURE — 1123F ACP DISCUSS/DSCN MKR DOCD: CPT | Performed by: NURSE PRACTITIONER

## 2018-10-16 PROCEDURE — 4040F PNEUMOC VAC/ADMIN/RCVD: CPT | Performed by: NURSE PRACTITIONER

## 2018-10-16 PROCEDURE — G8417 CALC BMI ABV UP PARAM F/U: HCPCS | Performed by: NURSE PRACTITIONER

## 2018-10-16 PROCEDURE — 99213 OFFICE O/P EST LOW 20 MIN: CPT | Performed by: NURSE PRACTITIONER

## 2018-10-16 PROCEDURE — 1036F TOBACCO NON-USER: CPT | Performed by: NURSE PRACTITIONER

## 2018-10-16 PROCEDURE — G8484 FLU IMMUNIZE NO ADMIN: HCPCS | Performed by: NURSE PRACTITIONER

## 2018-10-16 PROCEDURE — 3017F COLORECTAL CA SCREEN DOC REV: CPT | Performed by: NURSE PRACTITIONER

## 2018-10-16 PROCEDURE — 1101F PT FALLS ASSESS-DOCD LE1/YR: CPT | Performed by: NURSE PRACTITIONER

## 2018-10-16 PROCEDURE — G8427 DOCREV CUR MEDS BY ELIG CLIN: HCPCS | Performed by: NURSE PRACTITIONER

## 2018-10-16 PROCEDURE — G8926 SPIRO NO PERF OR DOC: HCPCS | Performed by: NURSE PRACTITIONER

## 2018-10-17 VITALS
DIASTOLIC BLOOD PRESSURE: 78 MMHG | HEART RATE: 74 BPM | WEIGHT: 191.9 LBS | BODY MASS INDEX: 32.76 KG/M2 | SYSTOLIC BLOOD PRESSURE: 126 MMHG | HEIGHT: 64 IN

## 2018-10-17 NOTE — PROGRESS NOTES
Topics Concern    Not on file     Social History Narrative    No narrative on file       Current Medications:  Current Outpatient Prescriptions   Medication Sig Dispense Refill    amLODIPine (NORVASC) 5 MG tablet TAKE 1 TABLET BY MOUTH ONCE DAILY 30 tablet 3    levothyroxine (SYNTHROID) 25 MCG tablet TAKE ONE TABLET BY MOUTH ONCE DAILY **CALL  FOR  APPOINTMENT  BEFORE  ANY  OTHER  REFILLS** 30 tablet 2    metoprolol tartrate (LOPRESSOR) 25 MG tablet TAKE ONE TABLET BY MOUTH TWICE DAILY **GENERIC  LOPRESSOR 60 tablet 2    beclomethasone (QVAR) 80 MCG/ACT inhaler Inhale 1 puff into the lungs 2 times daily 1 Inhaler 3    acetaminophen (TYLENOL) 325 MG tablet Take 2 tablets by mouth every 6 hours as needed for Pain 60 tablet 0    FLUoxetine (PROZAC) 40 MG capsule Take 40 mg by mouth daily      ipratropium (ATROVENT) 0.03 % nasal spray 2 sprays by Nasal route every 12 hours      ranitidine (ZANTAC) 150 MG tablet Take 150 mg by mouth daily      valACYclovir (VALTREX) 1 G tablet Take 1,000 mg by mouth daily.  fluticasone (FLONASE) 50 MCG/ACT nasal spray 2 sprays by Nasal route daily.  lithium 300 MG capsule Take 300 mg by mouth 2 times daily (with meals).  albuterol (PROVENTIL HFA;VENTOLIN HFA) 108 (90 BASE) MCG/ACT inhaler Inhale 2 puffs into the lungs 4 times daily as needed for Wheezing.  QUEtiapine (SEROQUEL) 200 MG tablet Take 200 mg by mouth daily.  Fesoterodine Fumarate ER (TOVIAZ) 8 MG TB24 Take 8 mg by mouth daily as needed. No current facility-administered medications for this visit. Vital Signs:  /78 (Site: Right Upper Arm, Position: Sitting, Cuff Size: Large Adult)   Pulse 74   Ht 5' 4.49\" (1.638 m)   Wt 191 lb 14.4 oz (87 kg)   BMI 32.44 kg/m²     BMI/Height/Weight:  Body mass index is 32.44 kg/m². Review of Systems  Constitutional: Weight gain      Objective:      Physical Exam   Vital signs reviewed.   General Appearance: Well-developed and esophagitis presence not specified     9. History of stroke     10. Obesity (BMI 30-39.9)     11. Pure hypercholesterolemia         Plan:      Track food and weight. Return to clinic as per group medical appointment schedule. Follow-up:  Return in about 1 week (around 10/23/2018). Orders:  No orders of the defined types were placed in this encounter. Prescriptions:  No orders of the defined types were placed in this encounter.       Electronically signed by:  Jesse Kerns CNP

## 2018-10-22 ENCOUNTER — TELEPHONE (OUTPATIENT)
Dept: INTERNAL MEDICINE | Age: 63
End: 2018-10-22

## 2018-10-22 DIAGNOSIS — J45.30 MILD PERSISTENT ASTHMA WITHOUT COMPLICATION: ICD-10-CM

## 2018-10-22 DIAGNOSIS — J44.9 CHRONIC OBSTRUCTIVE PULMONARY DISEASE, UNSPECIFIED COPD TYPE (HCC): Primary | ICD-10-CM

## 2018-10-22 DIAGNOSIS — G47.33 OBSTRUCTIVE SLEEP APNEA SYNDROME: ICD-10-CM

## 2018-11-08 ENCOUNTER — OFFICE VISIT (OUTPATIENT)
Dept: BARIATRICS/WEIGHT MGMT | Age: 63
End: 2018-11-08
Payer: MEDICARE

## 2018-11-08 VITALS
DIASTOLIC BLOOD PRESSURE: 74 MMHG | HEART RATE: 82 BPM | SYSTOLIC BLOOD PRESSURE: 136 MMHG | BODY MASS INDEX: 32.3 KG/M2 | HEIGHT: 64 IN | WEIGHT: 189.2 LBS

## 2018-11-08 DIAGNOSIS — K21.9 GASTROESOPHAGEAL REFLUX DISEASE, ESOPHAGITIS PRESENCE NOT SPECIFIED: ICD-10-CM

## 2018-11-08 DIAGNOSIS — F31.81 BIPOLAR 2 DISORDER (HCC): ICD-10-CM

## 2018-11-08 DIAGNOSIS — E66.9 OBESITY (BMI 30-39.9): ICD-10-CM

## 2018-11-08 DIAGNOSIS — G47.33 OBSTRUCTIVE SLEEP APNEA SYNDROME: ICD-10-CM

## 2018-11-08 DIAGNOSIS — I10 ESSENTIAL HYPERTENSION: Primary | ICD-10-CM

## 2018-11-08 DIAGNOSIS — J45.30 MILD PERSISTENT ASTHMA WITHOUT COMPLICATION: ICD-10-CM

## 2018-11-08 DIAGNOSIS — I27.20 PULMONARY HYPERTENSION (HCC): ICD-10-CM

## 2018-11-08 DIAGNOSIS — E78.00 PURE HYPERCHOLESTEROLEMIA: ICD-10-CM

## 2018-11-08 DIAGNOSIS — E03.9 HYPOTHYROIDISM, UNSPECIFIED TYPE: ICD-10-CM

## 2018-11-08 DIAGNOSIS — J44.9 CHRONIC OBSTRUCTIVE PULMONARY DISEASE, UNSPECIFIED COPD TYPE (HCC): ICD-10-CM

## 2018-11-08 DIAGNOSIS — F41.9 ANXIETY: ICD-10-CM

## 2018-11-08 PROCEDURE — 99213 OFFICE O/P EST LOW 20 MIN: CPT | Performed by: NURSE PRACTITIONER

## 2018-11-08 PROCEDURE — G8427 DOCREV CUR MEDS BY ELIG CLIN: HCPCS | Performed by: NURSE PRACTITIONER

## 2018-11-08 PROCEDURE — 3023F SPIROM DOC REV: CPT | Performed by: NURSE PRACTITIONER

## 2018-11-08 PROCEDURE — G8484 FLU IMMUNIZE NO ADMIN: HCPCS | Performed by: NURSE PRACTITIONER

## 2018-11-08 PROCEDURE — G8417 CALC BMI ABV UP PARAM F/U: HCPCS | Performed by: NURSE PRACTITIONER

## 2018-11-08 PROCEDURE — G8400 PT W/DXA NO RESULTS DOC: HCPCS | Performed by: NURSE PRACTITIONER

## 2018-11-08 PROCEDURE — 1090F PRES/ABSN URINE INCON ASSESS: CPT | Performed by: NURSE PRACTITIONER

## 2018-11-08 PROCEDURE — 1101F PT FALLS ASSESS-DOCD LE1/YR: CPT | Performed by: NURSE PRACTITIONER

## 2018-11-08 PROCEDURE — G8926 SPIRO NO PERF OR DOC: HCPCS | Performed by: NURSE PRACTITIONER

## 2018-11-08 PROCEDURE — 1036F TOBACCO NON-USER: CPT | Performed by: NURSE PRACTITIONER

## 2018-11-08 PROCEDURE — 3017F COLORECTAL CA SCREEN DOC REV: CPT | Performed by: NURSE PRACTITIONER

## 2018-11-08 PROCEDURE — 4040F PNEUMOC VAC/ADMIN/RCVD: CPT | Performed by: NURSE PRACTITIONER

## 2018-11-08 PROCEDURE — G8599 NO ASA/ANTIPLAT THER USE RNG: HCPCS | Performed by: NURSE PRACTITIONER

## 2018-11-08 PROCEDURE — 1123F ACP DISCUSS/DSCN MKR DOCD: CPT | Performed by: NURSE PRACTITIONER

## 2018-11-29 ENCOUNTER — OFFICE VISIT (OUTPATIENT)
Dept: BARIATRICS/WEIGHT MGMT | Age: 63
End: 2018-11-29
Payer: MEDICARE

## 2018-11-29 DIAGNOSIS — E03.9 HYPOTHYROIDISM, UNSPECIFIED TYPE: ICD-10-CM

## 2018-11-29 DIAGNOSIS — I27.20 PULMONARY HYPERTENSION (HCC): ICD-10-CM

## 2018-11-29 DIAGNOSIS — J44.9 CHRONIC OBSTRUCTIVE PULMONARY DISEASE, UNSPECIFIED COPD TYPE (HCC): ICD-10-CM

## 2018-11-29 DIAGNOSIS — I10 ESSENTIAL HYPERTENSION: Primary | ICD-10-CM

## 2018-11-29 DIAGNOSIS — J45.30 MILD PERSISTENT ASTHMA WITHOUT COMPLICATION: ICD-10-CM

## 2018-11-29 DIAGNOSIS — Z86.73 HISTORY OF STROKE: ICD-10-CM

## 2018-11-29 DIAGNOSIS — F41.9 ANXIETY: ICD-10-CM

## 2018-11-29 DIAGNOSIS — E66.9 OBESITY (BMI 30-39.9): ICD-10-CM

## 2018-11-29 DIAGNOSIS — K21.9 GASTROESOPHAGEAL REFLUX DISEASE, ESOPHAGITIS PRESENCE NOT SPECIFIED: ICD-10-CM

## 2018-11-29 DIAGNOSIS — F31.81 BIPOLAR 2 DISORDER (HCC): ICD-10-CM

## 2018-11-29 DIAGNOSIS — E78.00 PURE HYPERCHOLESTEROLEMIA: ICD-10-CM

## 2018-11-29 DIAGNOSIS — G47.33 OBSTRUCTIVE SLEEP APNEA SYNDROME: ICD-10-CM

## 2018-11-29 PROCEDURE — 1036F TOBACCO NON-USER: CPT | Performed by: NURSE PRACTITIONER

## 2018-11-29 PROCEDURE — G8427 DOCREV CUR MEDS BY ELIG CLIN: HCPCS | Performed by: NURSE PRACTITIONER

## 2018-11-29 PROCEDURE — G8926 SPIRO NO PERF OR DOC: HCPCS | Performed by: NURSE PRACTITIONER

## 2018-11-29 PROCEDURE — G8599 NO ASA/ANTIPLAT THER USE RNG: HCPCS | Performed by: NURSE PRACTITIONER

## 2018-11-29 PROCEDURE — 99213 OFFICE O/P EST LOW 20 MIN: CPT | Performed by: NURSE PRACTITIONER

## 2018-11-29 PROCEDURE — G8484 FLU IMMUNIZE NO ADMIN: HCPCS | Performed by: NURSE PRACTITIONER

## 2018-11-29 PROCEDURE — 1090F PRES/ABSN URINE INCON ASSESS: CPT | Performed by: NURSE PRACTITIONER

## 2018-11-29 PROCEDURE — 1101F PT FALLS ASSESS-DOCD LE1/YR: CPT | Performed by: NURSE PRACTITIONER

## 2018-11-29 PROCEDURE — G8417 CALC BMI ABV UP PARAM F/U: HCPCS | Performed by: NURSE PRACTITIONER

## 2018-11-29 PROCEDURE — 3023F SPIROM DOC REV: CPT | Performed by: NURSE PRACTITIONER

## 2018-11-29 PROCEDURE — 3017F COLORECTAL CA SCREEN DOC REV: CPT | Performed by: NURSE PRACTITIONER

## 2018-11-29 PROCEDURE — 4040F PNEUMOC VAC/ADMIN/RCVD: CPT | Performed by: NURSE PRACTITIONER

## 2018-11-29 PROCEDURE — 1123F ACP DISCUSS/DSCN MKR DOCD: CPT | Performed by: NURSE PRACTITIONER

## 2018-11-29 PROCEDURE — G8400 PT W/DXA NO RESULTS DOC: HCPCS | Performed by: NURSE PRACTITIONER

## 2018-11-30 VITALS
HEART RATE: 78 BPM | HEIGHT: 64 IN | WEIGHT: 196.9 LBS | BODY MASS INDEX: 33.61 KG/M2 | SYSTOLIC BLOOD PRESSURE: 128 MMHG | DIASTOLIC BLOOD PRESSURE: 78 MMHG

## 2018-12-05 ENCOUNTER — OFFICE VISIT (OUTPATIENT)
Dept: INTERNAL MEDICINE | Age: 63
End: 2018-12-05
Payer: MEDICARE

## 2018-12-05 VITALS
HEART RATE: 70 BPM | HEIGHT: 64 IN | SYSTOLIC BLOOD PRESSURE: 152 MMHG | DIASTOLIC BLOOD PRESSURE: 92 MMHG | WEIGHT: 195.4 LBS | BODY MASS INDEX: 33.36 KG/M2

## 2018-12-05 DIAGNOSIS — E78.00 PURE HYPERCHOLESTEROLEMIA: ICD-10-CM

## 2018-12-05 DIAGNOSIS — J45.30 MILD PERSISTENT ASTHMA WITHOUT COMPLICATION: ICD-10-CM

## 2018-12-05 DIAGNOSIS — E66.9 OBESITY (BMI 30-39.9): ICD-10-CM

## 2018-12-05 DIAGNOSIS — E03.9 HYPOTHYROIDISM, UNSPECIFIED TYPE: ICD-10-CM

## 2018-12-05 DIAGNOSIS — I10 ESSENTIAL HYPERTENSION: Primary | ICD-10-CM

## 2018-12-05 PROCEDURE — 4040F PNEUMOC VAC/ADMIN/RCVD: CPT | Performed by: INTERNAL MEDICINE

## 2018-12-05 PROCEDURE — G8427 DOCREV CUR MEDS BY ELIG CLIN: HCPCS | Performed by: INTERNAL MEDICINE

## 2018-12-05 PROCEDURE — G8417 CALC BMI ABV UP PARAM F/U: HCPCS | Performed by: INTERNAL MEDICINE

## 2018-12-05 PROCEDURE — 99213 OFFICE O/P EST LOW 20 MIN: CPT | Performed by: INTERNAL MEDICINE

## 2018-12-05 PROCEDURE — 1090F PRES/ABSN URINE INCON ASSESS: CPT | Performed by: INTERNAL MEDICINE

## 2018-12-05 PROCEDURE — 1036F TOBACCO NON-USER: CPT | Performed by: INTERNAL MEDICINE

## 2018-12-05 PROCEDURE — 1101F PT FALLS ASSESS-DOCD LE1/YR: CPT | Performed by: INTERNAL MEDICINE

## 2018-12-05 PROCEDURE — G8599 NO ASA/ANTIPLAT THER USE RNG: HCPCS | Performed by: INTERNAL MEDICINE

## 2018-12-05 PROCEDURE — 3017F COLORECTAL CA SCREEN DOC REV: CPT | Performed by: INTERNAL MEDICINE

## 2018-12-05 PROCEDURE — 1123F ACP DISCUSS/DSCN MKR DOCD: CPT | Performed by: INTERNAL MEDICINE

## 2018-12-05 PROCEDURE — G8484 FLU IMMUNIZE NO ADMIN: HCPCS | Performed by: INTERNAL MEDICINE

## 2018-12-05 PROCEDURE — G8400 PT W/DXA NO RESULTS DOC: HCPCS | Performed by: INTERNAL MEDICINE

## 2018-12-05 PROCEDURE — 99211 OFF/OP EST MAY X REQ PHY/QHP: CPT | Performed by: INTERNAL MEDICINE

## 2018-12-05 RX ORDER — AMLODIPINE BESYLATE 5 MG/1
TABLET ORAL
Qty: 30 TABLET | Refills: 3 | Status: SHIPPED | OUTPATIENT
Start: 2018-12-05 | End: 2019-02-14 | Stop reason: SDUPTHER

## 2018-12-05 RX ORDER — LEVOTHYROXINE SODIUM 0.03 MG/1
TABLET ORAL
Qty: 30 TABLET | Refills: 2 | Status: SHIPPED | OUTPATIENT
Start: 2018-12-05 | End: 2019-02-14 | Stop reason: SDUPTHER

## 2018-12-05 NOTE — PROGRESS NOTES
Results   Component Value Date    MICROALBUR <12 11/10/2016     FASTING LIPID [email or web address]  Lab Results   Component Value Date    LDLCHOLESTEROL 98 07/26/2018       LIVER PROFILE:  Lab Results   Component Value Date    ALT 33 07/26/2018    AST 33 07/26/2018    PROT 7.9 07/26/2018    BILITOT 0.22 07/26/2018    BILIDIR 0.11 08/05/2014    LABALBU 4.4 07/26/2018    LABALBU 4.8 05/02/2012      THYROID FUNCTION:   Lab Results   Component Value Date    TSH 2.68 09/18/2018      URINEANALYSIS: No results found for: LABURIN  ASSESSMENT AND PLAN:    1. Essential hypertension  Advised compliance    - amLODIPine (NORVASC) 5 MG tablet; TAKE 1 TABLET BY MOUTH ONCE DAILY  Dispense: 30 tablet; Refill: 3  - metoprolol tartrate (LOPRESSOR) 25 MG tablet; TAKE ONE TABLET BY MOUTH TWICE DAILY **GENERIC  LOPRESSOR  Dispense: 60 tablet; Refill: 2    2. Pure hypercholesterolemia  Low fat diet  Recheck lipids    3. Hypothyroidism, unspecified type    - levothyroxine (SYNTHROID) 25 MCG tablet; TAKE ONE TABLET BY MOUTH ONCE DAILY **CALL  FOR  APPOINTMENT  BEFORE  ANY  OTHER  REFILLS**  Dispense: 30 tablet; Refill: 2    4. Mild persistent asthma without complication  Albuterol prn  Refuses flu vaccine    5. Obesity (BMI 30-39. 9)  Diet changes          FOLLOW UP AND INSTRUCTIONS:   Return in about 3 months (around 3/5/2019). 1. Debere received counseling on the following healthy behaviors: nutrition, exercise and medication adherence    2. Reviewed prior labs and health maintenance. 3. Discussed use, benefit, and side effects of prescribed medications. Barriers to medication compliance addressed. All patient questions answered. Pt voiced understanding.        Coty Lira  Attending Physician, [facility], Internal Medicine Residency Program  [address] [place] [address]  12/5/2018, 4:37 PM

## 2018-12-05 NOTE — PATIENT INSTRUCTIONS
Return To Clinic Tuesday 3/12/2019 @ 4:00 . After Visit Summary  given and reviewed. It is very important for your care that you keep your appointment. If for some reason you are unable to keep your appointment it is equally important that you call our office at [phone] to cancel your appointment and reschedule. Failure to do so may result in your termination from our practice.      -Dexa scan scheduled for Wednesday 01/02/2018 @ 1:45 pm @ [facility].    -Records from Pulmonary at [facility] requested

## 2018-12-10 ASSESSMENT — ENCOUNTER SYMPTOMS
BACK PAIN: 1
SINUS PRESSURE: 0
SHORTNESS OF BREATH: 0
RHINORRHEA: 1
CONSTIPATION: 0
EYE REDNESS: 0
DIARRHEA: 0
SINUS PAIN: 0
NAUSEA: 0
ABDOMINAL PAIN: 0
WHEEZING: 0
COUGH: 0
SORE THROAT: 0

## 2018-12-13 ENCOUNTER — OFFICE VISIT (OUTPATIENT)
Dept: BARIATRICS/WEIGHT MGMT | Age: 63
End: 2018-12-13
Payer: MEDICARE

## 2018-12-13 VITALS
SYSTOLIC BLOOD PRESSURE: 126 MMHG | BODY MASS INDEX: 32.56 KG/M2 | HEART RATE: 80 BPM | WEIGHT: 190.7 LBS | HEIGHT: 64 IN | DIASTOLIC BLOOD PRESSURE: 74 MMHG

## 2018-12-13 DIAGNOSIS — Z86.73 HISTORY OF STROKE: ICD-10-CM

## 2018-12-13 DIAGNOSIS — F31.81 BIPOLAR 2 DISORDER (HCC): ICD-10-CM

## 2018-12-13 DIAGNOSIS — I10 ESSENTIAL HYPERTENSION: Primary | ICD-10-CM

## 2018-12-13 DIAGNOSIS — J44.9 CHRONIC OBSTRUCTIVE PULMONARY DISEASE, UNSPECIFIED COPD TYPE (HCC): ICD-10-CM

## 2018-12-13 DIAGNOSIS — R32 URINARY INCONTINENCE, UNSPECIFIED TYPE: ICD-10-CM

## 2018-12-13 DIAGNOSIS — E66.9 OBESITY (BMI 30-39.9): ICD-10-CM

## 2018-12-13 DIAGNOSIS — I27.20 PULMONARY HYPERTENSION (HCC): ICD-10-CM

## 2018-12-13 DIAGNOSIS — E78.00 PURE HYPERCHOLESTEROLEMIA: ICD-10-CM

## 2018-12-13 DIAGNOSIS — J45.30 MILD PERSISTENT ASTHMA WITHOUT COMPLICATION: ICD-10-CM

## 2018-12-13 DIAGNOSIS — E03.9 HYPOTHYROIDISM, UNSPECIFIED TYPE: ICD-10-CM

## 2018-12-13 DIAGNOSIS — K21.9 GASTROESOPHAGEAL REFLUX DISEASE, ESOPHAGITIS PRESENCE NOT SPECIFIED: ICD-10-CM

## 2018-12-13 DIAGNOSIS — F41.9 ANXIETY: ICD-10-CM

## 2018-12-13 DIAGNOSIS — G47.33 OBSTRUCTIVE SLEEP APNEA SYNDROME: ICD-10-CM

## 2018-12-13 PROCEDURE — G8599 NO ASA/ANTIPLAT THER USE RNG: HCPCS | Performed by: NURSE PRACTITIONER

## 2018-12-13 PROCEDURE — 99213 OFFICE O/P EST LOW 20 MIN: CPT | Performed by: NURSE PRACTITIONER

## 2018-12-13 PROCEDURE — 1090F PRES/ABSN URINE INCON ASSESS: CPT | Performed by: NURSE PRACTITIONER

## 2018-12-13 PROCEDURE — 0509F URINE INCON PLAN DOCD: CPT | Performed by: NURSE PRACTITIONER

## 2018-12-13 PROCEDURE — 4040F PNEUMOC VAC/ADMIN/RCVD: CPT | Performed by: NURSE PRACTITIONER

## 2018-12-13 PROCEDURE — 3023F SPIROM DOC REV: CPT | Performed by: NURSE PRACTITIONER

## 2018-12-13 PROCEDURE — 3017F COLORECTAL CA SCREEN DOC REV: CPT | Performed by: NURSE PRACTITIONER

## 2018-12-13 PROCEDURE — G8400 PT W/DXA NO RESULTS DOC: HCPCS | Performed by: NURSE PRACTITIONER

## 2018-12-13 PROCEDURE — G8484 FLU IMMUNIZE NO ADMIN: HCPCS | Performed by: NURSE PRACTITIONER

## 2018-12-13 PROCEDURE — G8427 DOCREV CUR MEDS BY ELIG CLIN: HCPCS | Performed by: NURSE PRACTITIONER

## 2018-12-13 PROCEDURE — G8417 CALC BMI ABV UP PARAM F/U: HCPCS | Performed by: NURSE PRACTITIONER

## 2018-12-13 PROCEDURE — 1036F TOBACCO NON-USER: CPT | Performed by: NURSE PRACTITIONER

## 2018-12-13 PROCEDURE — 1123F ACP DISCUSS/DSCN MKR DOCD: CPT | Performed by: NURSE PRACTITIONER

## 2018-12-13 PROCEDURE — 1101F PT FALLS ASSESS-DOCD LE1/YR: CPT | Performed by: NURSE PRACTITIONER

## 2018-12-13 PROCEDURE — G8926 SPIRO NO PERF OR DOC: HCPCS | Performed by: NURSE PRACTITIONER

## 2018-12-20 ENCOUNTER — OFFICE VISIT (OUTPATIENT)
Dept: BARIATRICS/WEIGHT MGMT | Age: 63
End: 2018-12-20
Payer: MEDICARE

## 2018-12-20 VITALS
WEIGHT: 198 LBS | BODY MASS INDEX: 33.8 KG/M2 | HEART RATE: 70 BPM | SYSTOLIC BLOOD PRESSURE: 120 MMHG | HEIGHT: 64 IN | DIASTOLIC BLOOD PRESSURE: 72 MMHG

## 2018-12-20 DIAGNOSIS — E66.9 OBESITY (BMI 30-39.9): ICD-10-CM

## 2018-12-20 DIAGNOSIS — E03.9 HYPOTHYROIDISM, UNSPECIFIED TYPE: ICD-10-CM

## 2018-12-20 DIAGNOSIS — E78.00 PURE HYPERCHOLESTEROLEMIA: ICD-10-CM

## 2018-12-20 DIAGNOSIS — Z86.73 HISTORY OF STROKE: ICD-10-CM

## 2018-12-20 DIAGNOSIS — J44.9 CHRONIC OBSTRUCTIVE PULMONARY DISEASE, UNSPECIFIED COPD TYPE (HCC): ICD-10-CM

## 2018-12-20 DIAGNOSIS — F31.81 BIPOLAR 2 DISORDER (HCC): ICD-10-CM

## 2018-12-20 DIAGNOSIS — I27.20 PULMONARY HYPERTENSION (HCC): ICD-10-CM

## 2018-12-20 DIAGNOSIS — J45.30 MILD PERSISTENT ASTHMA WITHOUT COMPLICATION: ICD-10-CM

## 2018-12-20 DIAGNOSIS — K21.9 GASTROESOPHAGEAL REFLUX DISEASE, ESOPHAGITIS PRESENCE NOT SPECIFIED: ICD-10-CM

## 2018-12-20 DIAGNOSIS — G47.33 OBSTRUCTIVE SLEEP APNEA SYNDROME: ICD-10-CM

## 2018-12-20 DIAGNOSIS — F41.9 ANXIETY: ICD-10-CM

## 2018-12-20 DIAGNOSIS — I10 ESSENTIAL HYPERTENSION: Primary | ICD-10-CM

## 2018-12-20 PROCEDURE — 99213 OFFICE O/P EST LOW 20 MIN: CPT | Performed by: NURSE PRACTITIONER

## 2018-12-20 PROCEDURE — 1101F PT FALLS ASSESS-DOCD LE1/YR: CPT | Performed by: NURSE PRACTITIONER

## 2018-12-20 PROCEDURE — G8484 FLU IMMUNIZE NO ADMIN: HCPCS | Performed by: NURSE PRACTITIONER

## 2018-12-20 PROCEDURE — G8926 SPIRO NO PERF OR DOC: HCPCS | Performed by: NURSE PRACTITIONER

## 2018-12-20 PROCEDURE — G8400 PT W/DXA NO RESULTS DOC: HCPCS | Performed by: NURSE PRACTITIONER

## 2018-12-20 PROCEDURE — G8427 DOCREV CUR MEDS BY ELIG CLIN: HCPCS | Performed by: NURSE PRACTITIONER

## 2018-12-20 PROCEDURE — 1036F TOBACCO NON-USER: CPT | Performed by: NURSE PRACTITIONER

## 2018-12-20 PROCEDURE — 1123F ACP DISCUSS/DSCN MKR DOCD: CPT | Performed by: NURSE PRACTITIONER

## 2018-12-20 PROCEDURE — 3017F COLORECTAL CA SCREEN DOC REV: CPT | Performed by: NURSE PRACTITIONER

## 2018-12-20 PROCEDURE — 3023F SPIROM DOC REV: CPT | Performed by: NURSE PRACTITIONER

## 2018-12-20 PROCEDURE — 1090F PRES/ABSN URINE INCON ASSESS: CPT | Performed by: NURSE PRACTITIONER

## 2018-12-20 PROCEDURE — 4040F PNEUMOC VAC/ADMIN/RCVD: CPT | Performed by: NURSE PRACTITIONER

## 2018-12-20 PROCEDURE — G8417 CALC BMI ABV UP PARAM F/U: HCPCS | Performed by: NURSE PRACTITIONER

## 2018-12-20 PROCEDURE — G8599 NO ASA/ANTIPLAT THER USE RNG: HCPCS | Performed by: NURSE PRACTITIONER

## 2018-12-21 NOTE — PROGRESS NOTES
well-nourished. No acute distress. Skin: Warm, dry. Head: Normocephalic. Pulmonary/Chest: Normal respiratory effort. Musculoskeletal: Movement x4. Neurological:  Alert and oriented. Individual goal for this encounter:  I would like to become more active and start eating the right foods. [x] Vital signs reviewed and discussed with patient.   [] Labs/EKG reviewed and discussed with patient. [] Blood sugar log reviewed and discussed with patient. [] Physical activity discussed. [] Medication changes recommended. [] Smoking cessation discussed. [x] Specific questions/concerns addressed. Specific group medical question(s) addressed in this encounter:  Discussion about diabetes complications. Group discussion topic for this encounter:     [] Welcome Jumpstart   [] Be a Fat & Calorie    [] Healthy Eating   [] Move Those Muscles   [] Tip the Calorie Balance   [] Take charge of What's Around You   [x] Problem Solving   [] Four Keys to Healthy Eating Out   [] S[place]ery [place] of Lifestyle Change   [] Jumpstart your Activity Plan   [] Make Social Cues Work for Earnestine Fernandez   [] Ways to Stay Motivated   [] Preparing for Long Term Self-Management   [] More Volume, Fewer Calories   [] Balance Your Thoughts   [] Strengthen your Exercise Program   [] Mindful Eating   [] Stress and Time Managment   [] Standing Up for Your Health   [] Heart Health   [] Stretching:  The Truth About Flexibility   [] Looking Back and Looking Forward    Total time:  90 minutes, greater than 50% of visit spent in group counseling/education. Assessment:       Diagnosis Orders   1. Essential hypertension     2. Hypothyroidism, unspecified type     3. Pure hypercholesterolemia     4. Chronic obstructive pulmonary disease, unspecified COPD type ([facility] [address]     5. Anxiety     6. Obstructive sleep apnea syndrome     7. Bipolar 2 disorder ([facility]     8. Pulmonary hypertension ([facility]     9. History of stroke     10.  Mild persistent

## 2019-01-11 ENCOUNTER — OFFICE VISIT (OUTPATIENT)
Dept: BARIATRICS/WEIGHT MGMT | Age: 64
End: 2019-01-11
Payer: MEDICARE

## 2019-01-11 VITALS
HEIGHT: 64 IN | DIASTOLIC BLOOD PRESSURE: 80 MMHG | HEART RATE: 76 BPM | WEIGHT: 194.1 LBS | BODY MASS INDEX: 33.14 KG/M2 | SYSTOLIC BLOOD PRESSURE: 128 MMHG

## 2019-01-11 DIAGNOSIS — E66.9 OBESITY (BMI 30-39.9): ICD-10-CM

## 2019-01-11 DIAGNOSIS — E03.9 HYPOTHYROIDISM, UNSPECIFIED TYPE: ICD-10-CM

## 2019-01-11 DIAGNOSIS — J45.30 MILD PERSISTENT ASTHMA WITHOUT COMPLICATION: ICD-10-CM

## 2019-01-11 DIAGNOSIS — K21.9 GASTROESOPHAGEAL REFLUX DISEASE, ESOPHAGITIS PRESENCE NOT SPECIFIED: ICD-10-CM

## 2019-01-11 DIAGNOSIS — F41.9 ANXIETY: ICD-10-CM

## 2019-01-11 DIAGNOSIS — F31.81 BIPOLAR 2 DISORDER (HCC): ICD-10-CM

## 2019-01-11 DIAGNOSIS — J44.9 CHRONIC OBSTRUCTIVE PULMONARY DISEASE, UNSPECIFIED COPD TYPE (HCC): ICD-10-CM

## 2019-01-11 DIAGNOSIS — G47.33 OBSTRUCTIVE SLEEP APNEA SYNDROME: ICD-10-CM

## 2019-01-11 DIAGNOSIS — I27.20 PULMONARY HYPERTENSION (HCC): ICD-10-CM

## 2019-01-11 DIAGNOSIS — Z86.73 HISTORY OF STROKE: ICD-10-CM

## 2019-01-11 DIAGNOSIS — E78.00 PURE HYPERCHOLESTEROLEMIA: ICD-10-CM

## 2019-01-11 DIAGNOSIS — I10 ESSENTIAL HYPERTENSION: Primary | ICD-10-CM

## 2019-01-11 PROCEDURE — G8926 SPIRO NO PERF OR DOC: HCPCS | Performed by: NURSE PRACTITIONER

## 2019-01-11 PROCEDURE — G8484 FLU IMMUNIZE NO ADMIN: HCPCS | Performed by: NURSE PRACTITIONER

## 2019-01-11 PROCEDURE — 3023F SPIROM DOC REV: CPT | Performed by: NURSE PRACTITIONER

## 2019-01-11 PROCEDURE — G8427 DOCREV CUR MEDS BY ELIG CLIN: HCPCS | Performed by: NURSE PRACTITIONER

## 2019-01-11 PROCEDURE — 1090F PRES/ABSN URINE INCON ASSESS: CPT | Performed by: NURSE PRACTITIONER

## 2019-01-11 PROCEDURE — 3017F COLORECTAL CA SCREEN DOC REV: CPT | Performed by: NURSE PRACTITIONER

## 2019-01-11 PROCEDURE — G8417 CALC BMI ABV UP PARAM F/U: HCPCS | Performed by: NURSE PRACTITIONER

## 2019-01-11 PROCEDURE — 99213 OFFICE O/P EST LOW 20 MIN: CPT | Performed by: NURSE PRACTITIONER

## 2019-01-11 PROCEDURE — 1101F PT FALLS ASSESS-DOCD LE1/YR: CPT | Performed by: NURSE PRACTITIONER

## 2019-01-11 PROCEDURE — 1123F ACP DISCUSS/DSCN MKR DOCD: CPT | Performed by: NURSE PRACTITIONER

## 2019-01-11 PROCEDURE — G8599 NO ASA/ANTIPLAT THER USE RNG: HCPCS | Performed by: NURSE PRACTITIONER

## 2019-01-11 PROCEDURE — G8400 PT W/DXA NO RESULTS DOC: HCPCS | Performed by: NURSE PRACTITIONER

## 2019-01-11 PROCEDURE — 4040F PNEUMOC VAC/ADMIN/RCVD: CPT | Performed by: NURSE PRACTITIONER

## 2019-01-11 PROCEDURE — 1036F TOBACCO NON-USER: CPT | Performed by: NURSE PRACTITIONER

## 2019-01-16 ENCOUNTER — TELEPHONE (OUTPATIENT)
Dept: INTERNAL MEDICINE | Age: 64
End: 2019-01-16

## 2019-01-28 ENCOUNTER — HOSPITAL ENCOUNTER (OUTPATIENT)
Dept: MAMMOGRAPHY | Age: 64
Discharge: HOME | End: 2019-01-30
Payer: MEDICARE

## 2019-01-28 DIAGNOSIS — Z78.0 POST-MENOPAUSAL: ICD-10-CM

## 2019-01-28 PROCEDURE — 77080 DXA BONE DENSITY AXIAL: CPT

## 2019-02-04 ENCOUNTER — TELEPHONE (OUTPATIENT)
Dept: INTERNAL MEDICINE | Age: 64
End: 2019-02-04

## 2019-02-14 ENCOUNTER — OFFICE VISIT (OUTPATIENT)
Dept: INTERNAL MEDICINE | Age: 64
End: 2019-02-14
Payer: MEDICARE

## 2019-02-14 ENCOUNTER — HOSPITAL ENCOUNTER (OUTPATIENT)
Age: 64
Setting detail: SPECIMEN
Discharge: HOME | End: 2019-02-14
Payer: MEDICARE

## 2019-02-14 ENCOUNTER — CARE COORDINATION (OUTPATIENT)
Dept: INTERNAL MEDICINE | Age: 64
End: 2019-02-14

## 2019-02-14 VITALS
HEART RATE: 93 BPM | BODY MASS INDEX: 33.46 KG/M2 | HEIGHT: 64 IN | SYSTOLIC BLOOD PRESSURE: 130 MMHG | WEIGHT: 196 LBS | DIASTOLIC BLOOD PRESSURE: 82 MMHG

## 2019-02-14 DIAGNOSIS — M25.50 POLYARTHRALGIA: Primary | ICD-10-CM

## 2019-02-14 DIAGNOSIS — J44.9 CHRONIC OBSTRUCTIVE PULMONARY DISEASE, UNSPECIFIED COPD TYPE (HCC): ICD-10-CM

## 2019-02-14 DIAGNOSIS — I10 ESSENTIAL HYPERTENSION: ICD-10-CM

## 2019-02-14 DIAGNOSIS — E03.9 HYPOTHYROIDISM, UNSPECIFIED TYPE: ICD-10-CM

## 2019-02-14 DIAGNOSIS — F31.9 BIPOLAR 1 DISORDER (HCC): ICD-10-CM

## 2019-02-14 DIAGNOSIS — M79.651 RIGHT THIGH PAIN: ICD-10-CM

## 2019-02-14 DIAGNOSIS — J45.30 MILD PERSISTENT ASTHMA WITHOUT COMPLICATION: ICD-10-CM

## 2019-02-14 DIAGNOSIS — E78.00 PURE HYPERCHOLESTEROLEMIA: ICD-10-CM

## 2019-02-14 DIAGNOSIS — K52.9 CHRONIC DIARRHEA: ICD-10-CM

## 2019-02-14 DIAGNOSIS — M25.50 POLYARTHRALGIA: ICD-10-CM

## 2019-02-14 LAB
C-REACTIVE PROTEIN: 1.8 MG/L (ref 0–5)
RHEUMATOID FACTOR: 11.8 IU/ML
SEDIMENTATION RATE, ERYTHROCYTE: 19 MM (ref 0–20)

## 2019-02-14 PROCEDURE — 1123F ACP DISCUSS/DSCN MKR DOCD: CPT | Performed by: INTERNAL MEDICINE

## 2019-02-14 PROCEDURE — 1101F PT FALLS ASSESS-DOCD LE1/YR: CPT | Performed by: INTERNAL MEDICINE

## 2019-02-14 PROCEDURE — G8417 CALC BMI ABV UP PARAM F/U: HCPCS | Performed by: INTERNAL MEDICINE

## 2019-02-14 PROCEDURE — 36415 COLL VENOUS BLD VENIPUNCTURE: CPT

## 2019-02-14 PROCEDURE — 86431 RHEUMATOID FACTOR QUANT: CPT

## 2019-02-14 PROCEDURE — G8926 SPIRO NO PERF OR DOC: HCPCS | Performed by: INTERNAL MEDICINE

## 2019-02-14 PROCEDURE — 1090F PRES/ABSN URINE INCON ASSESS: CPT | Performed by: INTERNAL MEDICINE

## 2019-02-14 PROCEDURE — 4040F PNEUMOC VAC/ADMIN/RCVD: CPT | Performed by: INTERNAL MEDICINE

## 2019-02-14 PROCEDURE — G8427 DOCREV CUR MEDS BY ELIG CLIN: HCPCS | Performed by: INTERNAL MEDICINE

## 2019-02-14 PROCEDURE — 85651 RBC SED RATE NONAUTOMATED: CPT

## 2019-02-14 PROCEDURE — 3023F SPIROM DOC REV: CPT | Performed by: INTERNAL MEDICINE

## 2019-02-14 PROCEDURE — G8484 FLU IMMUNIZE NO ADMIN: HCPCS | Performed by: INTERNAL MEDICINE

## 2019-02-14 PROCEDURE — 3017F COLORECTAL CA SCREEN DOC REV: CPT | Performed by: INTERNAL MEDICINE

## 2019-02-14 PROCEDURE — 99211 OFF/OP EST MAY X REQ PHY/QHP: CPT | Performed by: INTERNAL MEDICINE

## 2019-02-14 PROCEDURE — 99213 OFFICE O/P EST LOW 20 MIN: CPT | Performed by: INTERNAL MEDICINE

## 2019-02-14 PROCEDURE — G8599 NO ASA/ANTIPLAT THER USE RNG: HCPCS | Performed by: INTERNAL MEDICINE

## 2019-02-14 PROCEDURE — 1036F TOBACCO NON-USER: CPT | Performed by: INTERNAL MEDICINE

## 2019-02-14 PROCEDURE — 86140 C-REACTIVE PROTEIN: CPT

## 2019-02-14 PROCEDURE — G8399 PT W/DXA RESULTS DOCUMENT: HCPCS | Performed by: INTERNAL MEDICINE

## 2019-02-14 RX ORDER — AMLODIPINE BESYLATE 10 MG/1
TABLET ORAL
Qty: 90 TABLET | Refills: 1 | Status: SHIPPED | OUTPATIENT
Start: 2019-02-14 | End: 2019-05-15 | Stop reason: SDUPTHER

## 2019-02-14 RX ORDER — LEVOTHYROXINE SODIUM 0.03 MG/1
TABLET ORAL
Qty: 30 TABLET | Refills: 2 | Status: SHIPPED | OUTPATIENT
Start: 2019-02-14 | End: 2019-05-21 | Stop reason: SDUPTHER

## 2019-02-14 ASSESSMENT — ENCOUNTER SYMPTOMS
COUGH: 0
BLOOD IN STOOL: 0
BACK PAIN: 0
SHORTNESS OF BREATH: 0
NAUSEA: 0
DIARRHEA: 1
WHEEZING: 0
ABDOMINAL PAIN: 0
CONSTIPATION: 0

## 2019-02-18 ASSESSMENT — ENCOUNTER SYMPTOMS
PHOTOPHOBIA: 0
COLOR CHANGE: 0
RHINORRHEA: 0

## 2019-04-04 ENCOUNTER — TELEPHONE (OUTPATIENT)
Dept: BARIATRICS/WEIGHT MGMT | Age: 64
End: 2019-04-04

## 2019-04-04 NOTE — TELEPHONE ENCOUNTER
Patient left msg wanting to know the date of her next office appointment.  Attempted to reach patient, unable to leave vm

## 2019-04-15 ENCOUNTER — CARE COORDINATION (OUTPATIENT)
Dept: CARE COORDINATION | Age: 64
End: 2019-04-15

## 2019-04-15 NOTE — CARE COORDINATION
Ambulatory Care Coordination Note  4/15/2019  CM Risk Score: 3  Yuliana Mortality Risk Score: 4    ACC: Rory Alva RN    Summary Note: Attempted to reach patient for CC follow up. No answer on either contact and both mailboxes are full. Patient has follow up scheduled with PCP on 4/23. Will meet with her at that time. Care Coordination Interventions    Program Enrollment:  Rising Risk  Referral from Primary Care Provider:  No  Suggested Interventions and Community Resources  Medication Assistance Program:  Not Started  Medi Set or Pill Pack:  Completed  Transportation Support:  Completed  Zone Management Tools: In Process         Goals Addressed     None          Prior to Admission medications    Medication Sig Start Date End Date Taking? Authorizing Provider   levothyroxine (SYNTHROID) 25 MCG tablet TAKE ONE TABLET BY MOUTH ONCE DAILY **CALL  FOR  APPOINTMENT  BEFORE  ANY  OTHER  REFILLS** 2/14/19   Howard Christian MD   amLODIPine (NORVASC) 10 MG tablet TAKE 1 TABLET BY MOUTH ONCE DAILY 2/14/19   Howard Christian MD   beclomethasone (QVAR) 80 MCG/ACT inhaler Inhale 1 puff into the lungs 2 times daily 7/25/18   Howard Christian MD   acetaminophen (TYLENOL) 325 MG tablet Take 2 tablets by mouth every 6 hours as needed for Pain 3/21/18   Ena Goetz MD   FLUoxetine (PROZAC) 40 MG capsule Take 40 mg by mouth daily    Historical Provider, MD   ipratropium (ATROVENT) 0.03 % nasal spray 2 sprays by Nasal route every 12 hours    Historical Provider, MD   ranitidine (ZANTAC) 150 MG tablet Take 150 mg by mouth daily    Historical Provider, MD   valACYclovir (VALTREX) 1 G tablet Take 1,000 mg by mouth daily. Historical Provider, MD   fluticasone (FLONASE) 50 MCG/ACT nasal spray 2 sprays by Nasal route daily. Historical Provider, MD   lithium 300 MG capsule Take 300 mg by mouth 2 times daily (with meals).     Historical Provider, MD   albuterol (PROVENTIL HFA;VENTOLIN HFA) 108 (90 BASE) MCG/ACT inhaler Inhale 2 puffs into the lungs 4 times daily as needed for Wheezing. Historical Provider, MD   QUEtiapine (SEROQUEL) 200 MG tablet Take 200 mg by mouth daily. Historical Provider, MD   Fesoterodine Fumarate ER (TOVIAZ) 8 MG TB24 Take 8 mg by mouth daily as needed.     Historical Provider, MD       Future Appointments   Date Time Provider Ekta Tapia   4/23/2019  2:30 PM Cyn Ford MD [facility] EVELYN Lujan

## 2019-04-24 ENCOUNTER — CARE COORDINATION (OUTPATIENT)
Dept: CARE COORDINATION | Age: 64
End: 2019-04-24

## 2019-04-24 NOTE — CARE COORDINATION
Ambulatory Care Coordination Note  4/24/2019  CM Risk Score: 3  Yuliana Mortality Risk Score: 4    ACC: Tyrone Espinoza RN    Summary Note: Attempted to reach patient for follow up. She cancelled her appt for today. No answer and her mailbox is full, unable to leave a message. Care Coordination Interventions    Program Enrollment:  Rising Risk  Referral from Primary Care Provider:  No  Suggested Interventions and Community Resources  Medication Assistance Program:  Not Started  Medi Set or Pill Pack:  Completed  Transportation Support:  Completed  Zone Management Tools: In Process         Goals Addressed     None          Prior to Admission medications    Medication Sig Start Date End Date Taking? Authorizing Provider   levothyroxine (SYNTHROID) 25 MCG tablet TAKE ONE TABLET BY MOUTH ONCE DAILY **CALL  FOR  APPOINTMENT  BEFORE  ANY  OTHER  REFILLS** 2/14/19   Michael Berg MD   amLODIPine (NORVASC) 10 MG tablet TAKE 1 TABLET BY MOUTH ONCE DAILY 2/14/19   Michael Berg MD   beclomethasone (QVAR) 80 MCG/ACT inhaler Inhale 1 puff into the lungs 2 times daily 7/25/18   Michael Berg MD   acetaminophen (TYLENOL) 325 MG tablet Take 2 tablets by mouth every 6 hours as needed for Pain 3/21/18   Sol Goodwin MD   FLUoxetine (PROZAC) 40 MG capsule Take 40 mg by mouth daily    Historical Provider, MD   ipratropium (ATROVENT) 0.03 % nasal spray 2 sprays by Nasal route every 12 hours    Historical Provider, MD   ranitidine (ZANTAC) 150 MG tablet Take 150 mg by mouth daily    Historical Provider, MD   valACYclovir (VALTREX) 1 G tablet Take 1,000 mg by mouth daily. Historical Provider, MD   fluticasone (FLONASE) 50 MCG/ACT nasal spray 2 sprays by Nasal route daily. Historical Provider, MD   lithium 300 MG capsule Take 300 mg by mouth 2 times daily (with meals).     Historical Provider, MD   albuterol (PROVENTIL HFA;VENTOLIN HFA) 108 (90 BASE) MCG/ACT inhaler Inhale 2 puffs into the lungs 4 times daily as needed for Wheezing. Historical Provider, MD   QUEtiapine (SEROQUEL) 200 MG tablet Take 200 mg by mouth daily. Historical Provider, MD   Fesoterodine Fumarate ER (TOVIAZ) 8 MG TB24 Take 8 mg by mouth daily as needed.     Historical Provider, MD       Future Appointments   Date Time Provider Ekta Tapia   5/21/2019  3:15 PM Chantel Landers MD [facility] EVELYN De Leon

## 2019-04-25 ENCOUNTER — CARE COORDINATION (OUTPATIENT)
Dept: CARE COORDINATION | Age: 64
End: 2019-04-25

## 2019-04-26 RX ORDER — VALACYCLOVIR HYDROCHLORIDE 1 G/1
1000 TABLET, FILM COATED ORAL DAILY
COMMUNITY
End: 2020-05-15 | Stop reason: ALTCHOICE

## 2019-04-26 RX ORDER — CEPHALEXIN 500 MG/1
500 CAPSULE ORAL 2 TIMES DAILY
COMMUNITY
End: 2019-07-03

## 2019-04-26 RX ORDER — MONTELUKAST SODIUM 10 MG/1
10 TABLET ORAL NIGHTLY
COMMUNITY
End: 2022-03-10 | Stop reason: SDUPTHER

## 2019-04-26 RX ORDER — MOMETASONE FUROATE 50 UG/1
2 SPRAY, METERED NASAL DAILY
COMMUNITY
End: 2022-02-08 | Stop reason: SDUPTHER

## 2019-04-26 RX ORDER — BUDESONIDE AND FORMOTEROL FUMARATE DIHYDRATE 160; 4.5 UG/1; UG/1
2 AEROSOL RESPIRATORY (INHALATION) 2 TIMES DAILY
COMMUNITY
End: 2019-12-26 | Stop reason: SDUPTHER

## 2019-04-26 RX ORDER — ALBUTEROL SULFATE 90 UG/1
2 AEROSOL, METERED RESPIRATORY (INHALATION) 2 TIMES DAILY PRN
COMMUNITY
End: 2019-09-25 | Stop reason: SDUPTHER

## 2019-04-26 RX ORDER — RANITIDINE 150 MG/1
150 TABLET ORAL 2 TIMES DAILY
COMMUNITY
End: 2020-01-10 | Stop reason: ALTCHOICE

## 2019-04-26 NOTE — CARE COORDINATION
Ambulatory Care Coordination Note  4/25/2019  CM Risk Score: 3  Yuliana Mortality Risk Score: 4    ACC: Bridget Jeter, RN    Summary Note: received call from patient, returning call to [address] [place] [address]. She said she had to cancel her appt and reschedule because she had another appt at the same time. She had followed up at [facility]. She said she was late, because of traffic. She goes to [facility] on [address] and says it's so far to go. CC suggested asking them if there was a location closer to her home, maybe the [place] location. Reminded her of her appt on 4/30, at the [facility]. She has rescheduled her appt with PCP for 5/21/19. She also has an appt coming up with her GI , Dr. Nedra Conti. She asked if CC could get the number for her to call and find out when her appt is. The doctor had started her on xifaxan for her diarrhea, but she states it is very expensive. Her copay would be $400. She said the office helped her get it through a pharmacy in [place], [place] but she still had to pay $300. Asked her what ever happened with the meeting she was to have with [facility]. She said the meeting never happened. CC stated that she could check to see if any patient assistance programs are available. She thanked . She also stated that she was going to check with the [place] on benefits, as her  had been in the service. Called her GI office to verify dose. Patient follows with several specialists. Called her pharmacy and reviewed medications with the pharmacist and updated her medication list. The patient has not picked up the Symbicort because of a cost of $280.00. PLAN:  Will look for patient assistance programs for the symbicort and the xifaxan. Patient uses [employer] for her Part D plan.         Care Coordination Interventions    Program Enrollment:  Rising Risk  Referral from Primary Care Provider:  No  Suggested Interventions and Community Resources  Medication Assistance Program: Not Started  Medi Set or Pill Pack:  Completed  Transportation Support:  Completed  Zone Management Tools: In Process         Goals Addressed     None          Prior to Admission medications    Medication Sig Start Date End Date Taking? Authorizing Provider   levothyroxine (SYNTHROID) 25 MCG tablet TAKE ONE TABLET BY MOUTH ONCE DAILY **CALL  FOR  APPOINTMENT  BEFORE  ANY  OTHER  REFILLS** 2/14/19   Manav Morris MD   amLODIPine (NORVASC) 10 MG tablet TAKE 1 TABLET BY MOUTH ONCE DAILY 2/14/19   Manav Morris MD   beclomethasone (QVAR) 80 MCG/ACT inhaler Inhale 1 puff into the lungs 2 times daily 7/25/18   Manav Morris MD   acetaminophen (TYLENOL) 325 MG tablet Take 2 tablets by mouth every 6 hours as needed for Pain 3/21/18   Jordyn Elena MD   FLUoxetine (PROZAC) 40 MG capsule Take 40 mg by mouth daily    Historical Provider, MD   ipratropium (ATROVENT) 0.03 % nasal spray 2 sprays by Nasal route every 12 hours    Historical Provider, MD   ranitidine (ZANTAC) 150 MG tablet Take 150 mg by mouth daily    Historical Provider, MD   valACYclovir (VALTREX) 1 G tablet Take 1,000 mg by mouth daily. Historical Provider, MD   fluticasone (FLONASE) 50 MCG/ACT nasal spray 2 sprays by Nasal route daily. Historical Provider, MD   lithium 300 MG capsule Take 300 mg by mouth 2 times daily (with meals). Historical Provider, MD   albuterol (PROVENTIL HFA;VENTOLIN HFA) 108 (90 BASE) MCG/ACT inhaler Inhale 2 puffs into the lungs 4 times daily as needed for Wheezing. Historical Provider, MD   QUEtiapine (SEROQUEL) 200 MG tablet Take 200 mg by mouth daily. Historical Provider, MD   Fesoterodine Fumarate ER (TOVIAZ) 8 MG TB24 Take 8 mg by mouth daily as needed.     Historical Provider, MD       Future Appointments   Date Time Provider Ekta Tapia   4/30/2019  1:00 PM MANDEEP Montilla - CNP Weight Mgmt [facility]   5/21/2019  3:15 PM Manav Morris MD [facility]

## 2019-05-15 DIAGNOSIS — I10 ESSENTIAL HYPERTENSION: ICD-10-CM

## 2019-05-15 RX ORDER — AMLODIPINE BESYLATE 10 MG/1
TABLET ORAL
Qty: 90 TABLET | Refills: 1 | Status: SHIPPED | OUTPATIENT
Start: 2019-05-15 | End: 2019-09-25 | Stop reason: SDUPTHER

## 2019-05-15 NOTE — TELEPHONE ENCOUNTER
Amlodipine pending for refill     Health Maintenance   Topic Date Due    DTaP/Tdap/Td vaccine (1 - Tdap) 04/22/1972    Shingles Vaccine (1 of 2) 04/22/2003    Pneumococcal 65+ years Vaccine (1 of 2 - PCV13) 04/22/2018    Potassium monitoring  07/26/2019    Creatinine monitoring  07/26/2019    Flu vaccine (Season Ended) 09/01/2019    TSH testing  09/18/2019    Breast cancer screen  01/29/2020    Lipid screen  07/26/2023    Colon cancer screen colonoscopy  09/25/2028    DEXA (modify frequency per FRAX score)  Completed    Hepatitis C screen  Completed             (applicable per patient's age: Cancer Screenings, Depression Screening, Fall Risk Screening, Immunizations)    Hemoglobin A1C (%)   Date Value   07/26/2018 5.4   10/03/2017 5.2   11/10/2016 5.5     Microalb/Crt.  Ratio (mcg/mg creat)   Date Value   11/10/2016 9     LDL Cholesterol (mg/dL)   Date Value   07/26/2018 98     AST (U/L)   Date Value   07/26/2018 33 (H)     ALT (U/L)   Date Value   07/26/2018 33     BUN (mg/dL)   Date Value   07/26/2018 22      (goal A1C is < 7)   (goal LDL is <100) need 30-50% reduction from baseline     BP Readings from Last 3 Encounters:   02/14/19 130/82   01/11/19 128/80   12/20/18 120/72    (goal /80)      All Future Testing planned in [facility]:  Lab Frequency Next Occurrence   EKG 12 Lead Once 08/09/2018   XR KNEE RIGHT (3 VIEWS) Once 06/01/2019   XR FEMUR RIGHT (MIN 2 VIEWS) Once 06/01/2019       Next Visit Date:  Future Appointments   Date Time Provider Ekta Tapia   5/21/2019  3:15 PM Ketan Reina MD [facility]   6/4/2019  1:00 PM MANDEEP Fernandez - [facility]            Patient Active Problem List:     Essential hypertension     Pure hypercholesterolemia     Urinary incontinence     Arrhythmia     COPD (chronic obstructive pulmonary disease) ([facility]     Anxiety     Sleep apnea     Stroke ([facility]     GERD (gastroesophageal reflux disease)     Hypothyroidism     Obesity (BMI 30-39. 9)     Mild persistent asthma without complication     History of stroke     Pulmonary hypertension (HCC)     Chronic diarrhea     Bipolar 1 disorder ([facility]

## 2019-05-21 ENCOUNTER — OFFICE VISIT (OUTPATIENT)
Dept: INTERNAL MEDICINE | Age: 64
End: 2019-05-21
Payer: MEDICARE

## 2019-05-21 ENCOUNTER — HOSPITAL ENCOUNTER (OUTPATIENT)
Age: 64
Setting detail: SPECIMEN
Discharge: HOME | End: 2019-05-21
Payer: MEDICARE

## 2019-05-21 VITALS
DIASTOLIC BLOOD PRESSURE: 87 MMHG | HEIGHT: 64 IN | SYSTOLIC BLOOD PRESSURE: 139 MMHG | HEART RATE: 89 BPM | BODY MASS INDEX: 33.29 KG/M2 | WEIGHT: 195 LBS

## 2019-05-21 DIAGNOSIS — D17.23 LIPOMA OF RIGHT LOWER EXTREMITY: Primary | ICD-10-CM

## 2019-05-21 DIAGNOSIS — E03.9 HYPOTHYROIDISM, UNSPECIFIED TYPE: ICD-10-CM

## 2019-05-21 DIAGNOSIS — I10 ESSENTIAL HYPERTENSION: ICD-10-CM

## 2019-05-21 DIAGNOSIS — R44.1 VISUAL HALLUCINATIONS: ICD-10-CM

## 2019-05-21 LAB
ALBUMIN SERPL-MCNC: 4.7 G/DL (ref 3.5–5.2)
ALBUMIN/GLOBULIN RATIO: 1.3 (ref 1–2.5)
ALP BLD-CCNC: 89 U/L (ref 35–104)
ALT SERPL-CCNC: 45 U/L (ref 5–33)
ANION GAP SERPL CALCULATED.3IONS-SCNC: 15 MMOL/L (ref 9–17)
AST SERPL-CCNC: 36 U/L
BILIRUB SERPL-MCNC: 0.65 MG/DL (ref 0.3–1.2)
BILIRUBIN DIRECT: 0.14 MG/DL
BILIRUBIN, INDIRECT: 0.51 MG/DL (ref 0–1)
BUN BLDV-MCNC: 13 MG/DL (ref 8–23)
BUN/CREAT BLD: NORMAL (ref 9–20)
CALCIUM SERPL-MCNC: 10.3 MG/DL (ref 8.6–10.4)
CHLORIDE BLD-SCNC: 103 MMOL/L (ref 98–107)
CO2: 23 MMOL/L (ref 20–31)
CREAT SERPL-MCNC: 0.73 MG/DL (ref 0.5–0.9)
GFR AFRICAN AMERICAN: >60 ML/MIN
GFR NON-AFRICAN AMERICAN: >60 ML/MIN
GFR SERPL CREATININE-BSD FRML MDRD: NORMAL ML/MIN/{1.73_M2}
GFR SERPL CREATININE-BSD FRML MDRD: NORMAL ML/MIN/{1.73_M2}
GLOBULIN: ABNORMAL G/DL (ref 1.5–3.8)
GLUCOSE BLD-MCNC: 89 MG/DL (ref 70–99)
POTASSIUM SERPL-SCNC: 4 MMOL/L (ref 3.7–5.3)
SODIUM BLD-SCNC: 141 MMOL/L (ref 135–144)
TOTAL PROTEIN: 8.4 G/DL (ref 6.4–8.3)
TSH SERPL DL<=0.05 MIU/L-ACNC: 3.24 MIU/L (ref 0.3–5)

## 2019-05-21 PROCEDURE — G8427 DOCREV CUR MEDS BY ELIG CLIN: HCPCS | Performed by: INTERNAL MEDICINE

## 2019-05-21 PROCEDURE — G8399 PT W/DXA RESULTS DOCUMENT: HCPCS | Performed by: INTERNAL MEDICINE

## 2019-05-21 PROCEDURE — 36415 COLL VENOUS BLD VENIPUNCTURE: CPT

## 2019-05-21 PROCEDURE — 1090F PRES/ABSN URINE INCON ASSESS: CPT | Performed by: INTERNAL MEDICINE

## 2019-05-21 PROCEDURE — G8417 CALC BMI ABV UP PARAM F/U: HCPCS | Performed by: INTERNAL MEDICINE

## 2019-05-21 PROCEDURE — 1036F TOBACCO NON-USER: CPT | Performed by: INTERNAL MEDICINE

## 2019-05-21 PROCEDURE — G8599 NO ASA/ANTIPLAT THER USE RNG: HCPCS | Performed by: INTERNAL MEDICINE

## 2019-05-21 PROCEDURE — 4040F PNEUMOC VAC/ADMIN/RCVD: CPT | Performed by: INTERNAL MEDICINE

## 2019-05-21 PROCEDURE — 3017F COLORECTAL CA SCREEN DOC REV: CPT | Performed by: INTERNAL MEDICINE

## 2019-05-21 PROCEDURE — 1123F ACP DISCUSS/DSCN MKR DOCD: CPT | Performed by: INTERNAL MEDICINE

## 2019-05-21 PROCEDURE — 99213 OFFICE O/P EST LOW 20 MIN: CPT | Performed by: INTERNAL MEDICINE

## 2019-05-21 PROCEDURE — 99211 OFF/OP EST MAY X REQ PHY/QHP: CPT | Performed by: INTERNAL MEDICINE

## 2019-05-21 PROCEDURE — 84443 ASSAY THYROID STIM HORMONE: CPT

## 2019-05-21 PROCEDURE — 80076 HEPATIC FUNCTION PANEL: CPT

## 2019-05-21 PROCEDURE — 80048 BASIC METABOLIC PNL TOTAL CA: CPT

## 2019-05-21 RX ORDER — LEVOTHYROXINE SODIUM 0.03 MG/1
TABLET ORAL
Qty: 30 TABLET | Refills: 2 | Status: SHIPPED | OUTPATIENT
Start: 2019-05-21 | End: 2019-09-25 | Stop reason: SDUPTHER

## 2019-05-21 NOTE — PROGRESS NOTES
[facility]    Progress Note    Date of patient's visit: 5/21/2019    Patient's Name:  Seymour Drake    YOB: 1955            Patient Care Team:  Juan Alberto Kilgore MD as PCP - Momo Billy MD as PCP - [facility] Attributed Provider  Nano Pham RN as Care Coordinator  Yoel Cunningham MD as Consulting Physician (Gastroenterology)    REASON FOR VISIT: Routine outpatient follow     Chief Complaint   Patient presents with    Leg Pain     pt states she has lump on outside of right thigh that she states is painful and she needs to see specialisty for it     Hallucinations     pt states she is occasionally having hallucinations of buble bees that turn into snakes, wonders if she needs Brain testing? HISTORY OF PRESENT ILLNESS:    History was obtained from the patient. Seymour Drake is a 75 y.o. is here for follow-up. She is complaining of a small lump on the right thigh that she's had for 2 or 3 years. It is not painful. It's not getting bigger. She is also complaining of visual hallucinations that have been happening for 2 or 3 years. She says she has these hallucinations every 1 or 2 months. She sees bumble bees or snakes. They disappear within seconds if she closes her eyes. No headaches. No visual or auditory hallucinations. She follows up with a psychiatrist.  She states compliance with all her medications. No new medications. She denies drug or substance abuse. She states compliance with all her medications.        Past Medical History:   Diagnosis Date    Anxiety     Arrhythmia     Asthma     Bipolar disorder ([address]     Depression     GERD (gastroesophageal reflux disease)     GERD (gastroesophageal reflux disease)     Hyperlipidemia     Hypertension     Hypothyroidism     OAB (overactive bladder)     Osteoarthritis     Pulmonary fibrosis (HCC)     sjogrens syndrome    Pulmonary hypertension ([facility]     Stroke ([facility]     Unspecified sleep apnea     on cpap    Urinary incontinence        Past Surgical History:   Procedure Laterality Date    COLONOSCOPY  04/2015    COLONOSCOPY  2018             ALLERGIES      Allergies   Allergen Reactions    Motrin [Ibuprofen]     Aspirin Rash    Blue Dyes (Parenteral) Rash    Hctz [Hydrochlorothiazide] Rash     Fixed drug reaction    Sulfa Antibiotics Rash    Tetracyclines & Related Rash       MEDICATIONS:      Current Outpatient Medications on File Prior to Visit   Medication Sig Dispense Refill    amLODIPine (NORVASC) 10 MG tablet TAKE 1 TABLET BY MOUTH ONCE DAILY 90 tablet 1    mometasone (NASONEX) 50 MCG/ACT nasal spray 2 sprays by Nasal route daily 1 spray each nostril daily      ranitidine (ZANTAC) 150 MG tablet Take 150 mg by mouth 2 times daily      cephALEXin (KEFLEX) 500 MG capsule Take 500 mg by mouth 2 times daily Indications: prescribed by dermatologist      valACYclovir (VALTREX) 1 g tablet Take 1,000 mg by mouth daily      albuterol sulfate HFA (PROAIR HFA) 108 (90 Base) MCG/ACT inhaler Inhale 2 puffs into the lungs 2 times daily as needed for Wheezing (no more than 4 times daily)      montelukast (SINGULAIR) 10 MG tablet Take 10 mg by mouth nightly      budesonide-formoterol (SYMBICORT) 160-4.5 MCG/ACT AERO Inhale 2 puffs into the lungs 2 times daily      rifaximin (XIFAXAN) 550 MG tablet Take 550 mg by mouth every 8 hours      levothyroxine (SYNTHROID) 25 MCG tablet TAKE ONE TABLET BY MOUTH ONCE DAILY **CALL  FOR  APPOINTMENT  BEFORE  ANY  OTHER  REFILLS** 30 tablet 2    acetaminophen (TYLENOL) 325 MG tablet Take 2 tablets by mouth every 6 hours as needed for Pain 60 tablet 0    FLUoxetine (PROZAC) 40 MG capsule Take 40 mg by mouth daily      ipratropium (ATROVENT) 0.03 % nasal spray 2 sprays by Nasal route 3 times daily as needed for Rhinitis       lithium 300 MG capsule Take 300 mg by mouth 2 times daily (with meals). 05/21/19 1545   BP: 139/87   Site: Right Upper Arm   Position: Sitting   Cuff Size: Medium Adult   Pulse: 89   Weight: 195 lb (88.5 kg)   Height: 5' 4\" (1.626 m)     Body mass index is 33.47 kg/m². BP Readings from Last 3 Encounters:   05/21/19 139/87   02/14/19 130/82   01/11/19 128/80        Wt Readings from Last 3 Encounters:   05/21/19 195 lb (88.5 kg)   02/14/19 196 lb (88.9 kg)   01/11/19 194 lb 1.6 oz (88 kg)       Physical Exam         HENT: Normocephalic, Atraumatic, Bilateral external ears normal, Oropharynx moist, . Neck- Normal range of motion, No tenderness, Supple, No stridor. Eyes:  PERRL, EOMI, Conjunctiva normal, No discharge. Respiratory:  Normalbreath sounds, No respiratory distress, No wheezing, No chest tenderness. Cardiovascular:  Normal heart rate, Normal rhythm, No murmurs  Musculoskeletal:  Intact distal pulses, No edema, No tenderness,  Good range of motion in all major joints. No tenderness to palpation or major deformities noted. Back- Notenderness. Small lipoma on right thigh. Less than 1 cm in subcutaneous area  Integument:  Warm, Dry, No erythema, No rash. Skin pigmentation - diffuse, chronic  Lymphatic:  No lymphadenopathy noted. Neurologic:  Alert & oriented x 3, Normal motor function, Normal sensory function, No focal deficits noted.    Psychiatric:  Affect normal       LABORATORY FINDINGS:    CBC:  Lab Results   Component Value Date    WBC 5.6 09/18/2018    HGB 11.5 09/18/2018     09/18/2018     05/02/2012     BMP:    Lab Results   Component Value Date     07/26/2018    K 3.9 07/26/2018     07/26/2018    CO2 22 07/26/2018    BUN 22 07/26/2018    CREATININE 1.01 07/26/2018    GLUCOSE 97 07/26/2018    GLUCOSE 119 05/02/2012     HEMOGLOBIN A1C:   Lab Results   Component Value Date    LABA1C 5.4 07/26/2018     MICROALBUMIN URINE:   Lab Results   Component Value Date    MICROALBUR <12 11/10/2016     FASTING LIPID [email or web address]  Lab Results   Component Value Date    LDLCHOLESTEROL 98 07/26/2018       LIVER PROFILE:  Lab Results   Component Value Date    ALT 33 07/26/2018    AST 33 07/26/2018    PROT 7.9 07/26/2018    BILITOT 0.22 07/26/2018    BILIDIR 0.11 08/05/2014    LABALBU 4.4 07/26/2018    LABALBU 4.8 05/02/2012      THYROID FUNCTION:   Lab Results   Component Value Date    TSH 2.68 09/18/2018      URINEANALYSIS: No results found for: LABURIN  ASSESSMENT AND PLAN:    1. Lipoma of right lower extremity  Small  No surgical intervention for now    2. Essential hypertension    - Basic Metabolic Panel; Future    3. Hypothyroidism, unspecified type    - levothyroxine (SYNTHROID) 25 MCG tablet; TAKE ONE TABLET BY MOUTH ONCE DAILY **CALL  FOR  APPOINTMENT  BEFORE  ANY  OTHER  REFILLS**  Dispense: 30 tablet; Refill: 2  - TSH with Reflex; Future    4. Visual hallucinations  H/o schizophrenia  Discuss with psychiatry  Ongoing for years    - MRI Brain WO Contrast; Future  - Hepatic Function Panel; Future          FOLLOW UP AND INSTRUCTIONS:   Return in about 4 months (around 9/21/2019). 1. Dulce received counseling on the following healthy behaviors: nutrition, exercise and medication adherence    2. Reviewed prior labs and health maintenance. 3. Discussed use, benefit, and side effects of prescribed medications. Barriers to medication compliance addressed. All patient questions answered. Pt voiced understanding.        Melyssa Arcos  Attending Physician, [facility], Internal Medicine Residency Program  [address] [place] [address]  5/21/2019, 3:52 PM

## 2019-05-26 ASSESSMENT — ENCOUNTER SYMPTOMS
BACK PAIN: 0
ABDOMINAL PAIN: 0
VOICE CHANGE: 0
WHEEZING: 0
RHINORRHEA: 0
DIARRHEA: 1
CONSTIPATION: 0
SINUS PAIN: 0
PHOTOPHOBIA: 0
SINUS PRESSURE: 0
BLOOD IN STOOL: 0
COUGH: 0
TROUBLE SWALLOWING: 0
SHORTNESS OF BREATH: 0

## 2019-05-30 ENCOUNTER — HOSPITAL ENCOUNTER (OUTPATIENT)
Dept: MRI IMAGING | Age: 64
Discharge: HOME | End: 2019-06-01
Payer: MEDICARE

## 2019-05-30 DIAGNOSIS — R44.1 VISUAL HALLUCINATIONS: ICD-10-CM

## 2019-05-30 PROCEDURE — 70551 MRI BRAIN STEM W/O DYE: CPT

## 2019-06-04 ENCOUNTER — OFFICE VISIT (OUTPATIENT)
Dept: BARIATRICS/WEIGHT MGMT | Age: 64
End: 2019-06-04
Payer: MEDICARE

## 2019-06-04 VITALS
HEART RATE: 68 BPM | BODY MASS INDEX: 33.31 KG/M2 | DIASTOLIC BLOOD PRESSURE: 70 MMHG | HEIGHT: 64 IN | SYSTOLIC BLOOD PRESSURE: 126 MMHG | WEIGHT: 195.1 LBS

## 2019-06-04 DIAGNOSIS — J44.9 CHRONIC OBSTRUCTIVE PULMONARY DISEASE, UNSPECIFIED COPD TYPE (HCC): ICD-10-CM

## 2019-06-04 DIAGNOSIS — I10 ESSENTIAL HYPERTENSION: Primary | ICD-10-CM

## 2019-06-04 DIAGNOSIS — K21.9 GASTROESOPHAGEAL REFLUX DISEASE, ESOPHAGITIS PRESENCE NOT SPECIFIED: ICD-10-CM

## 2019-06-04 DIAGNOSIS — J45.30 MILD PERSISTENT ASTHMA WITHOUT COMPLICATION: ICD-10-CM

## 2019-06-04 DIAGNOSIS — E78.00 PURE HYPERCHOLESTEROLEMIA: ICD-10-CM

## 2019-06-04 DIAGNOSIS — E03.9 HYPOTHYROIDISM, UNSPECIFIED TYPE: ICD-10-CM

## 2019-06-04 DIAGNOSIS — G47.33 OBSTRUCTIVE SLEEP APNEA SYNDROME: ICD-10-CM

## 2019-06-04 DIAGNOSIS — F31.9 BIPOLAR 1 DISORDER (HCC): ICD-10-CM

## 2019-06-04 DIAGNOSIS — Z86.73 HISTORY OF STROKE: ICD-10-CM

## 2019-06-04 DIAGNOSIS — E66.9 OBESITY (BMI 30-39.9): ICD-10-CM

## 2019-06-04 PROCEDURE — G8926 SPIRO NO PERF OR DOC: HCPCS | Performed by: NURSE PRACTITIONER

## 2019-06-04 PROCEDURE — 3017F COLORECTAL CA SCREEN DOC REV: CPT | Performed by: NURSE PRACTITIONER

## 2019-06-04 PROCEDURE — G8399 PT W/DXA RESULTS DOCUMENT: HCPCS | Performed by: NURSE PRACTITIONER

## 2019-06-04 PROCEDURE — G8417 CALC BMI ABV UP PARAM F/U: HCPCS | Performed by: NURSE PRACTITIONER

## 2019-06-04 PROCEDURE — G8427 DOCREV CUR MEDS BY ELIG CLIN: HCPCS | Performed by: NURSE PRACTITIONER

## 2019-06-04 PROCEDURE — G8599 NO ASA/ANTIPLAT THER USE RNG: HCPCS | Performed by: NURSE PRACTITIONER

## 2019-06-04 PROCEDURE — 99213 OFFICE O/P EST LOW 20 MIN: CPT | Performed by: NURSE PRACTITIONER

## 2019-06-04 PROCEDURE — 1036F TOBACCO NON-USER: CPT | Performed by: NURSE PRACTITIONER

## 2019-06-04 PROCEDURE — 4040F PNEUMOC VAC/ADMIN/RCVD: CPT | Performed by: NURSE PRACTITIONER

## 2019-06-04 PROCEDURE — 1123F ACP DISCUSS/DSCN MKR DOCD: CPT | Performed by: NURSE PRACTITIONER

## 2019-06-04 PROCEDURE — 1090F PRES/ABSN URINE INCON ASSESS: CPT | Performed by: NURSE PRACTITIONER

## 2019-06-04 PROCEDURE — 3023F SPIROM DOC REV: CPT | Performed by: NURSE PRACTITIONER

## 2019-06-05 ENCOUNTER — TELEPHONE (OUTPATIENT)
Dept: INTERNAL MEDICINE | Age: 64
End: 2019-06-05

## 2019-06-06 ENCOUNTER — CARE COORDINATION (OUTPATIENT)
Dept: CARE COORDINATION | Age: 64
End: 2019-06-06

## 2019-06-06 NOTE — TELEPHONE ENCOUNTER
pc again asking what her results mean and what those dx are , please advise        Next Visit Date:  Future Appointments   Date Time Provider Ekta Tapia   7/2/2019  1:00 PM MANDEEP Sorensen - CNP Weight Mgmt [address] Maintenance   Topic Date Due    DTaP/Tdap/Td vaccine (1 - Tdap) 04/22/1972    Shingles Vaccine (1 of 2) 04/22/2003    Pneumococcal 65+ years Vaccine (1 of 2 - PCV13) 04/22/2018    Flu vaccine (Season Ended) 09/01/2019    Breast cancer screen  01/29/2020    TSH testing  05/21/2020    Potassium monitoring  05/21/2020    Creatinine monitoring  05/21/2020    Lipid screen  07/26/2023    Colon cancer screen colonoscopy  09/25/2028    DEXA (modify frequency per FRAX score)  Completed    Hepatitis C screen  Completed       Hemoglobin A1C (%)   Date Value   07/26/2018 5.4   10/03/2017 5.2   11/10/2016 5.5             ( goal A1C is < 7)   Microalb/Crt. Ratio (mcg/mg creat)   Date Value   11/10/2016 9     LDL Cholesterol (mg/dL)   Date Value   07/26/2018 98   01/23/2018 118       (goal LDL is <100)   AST (U/L)   Date Value   05/21/2019 36 (H)     ALT (U/L)   Date Value   05/21/2019 45 (H)     BUN (mg/dL)   Date Value   05/21/2019 13     BP Readings from Last 3 Encounters:   06/04/19 126/70   05/21/19 139/87   02/14/19 130/82          (goal 120/80)    All Future Testing planned in [facility]  Lab Frequency Next Occurrence   EKG 12 Lead Once 08/09/2018   XR KNEE RIGHT (3 VIEWS) Once 06/01/2019   XR FEMUR RIGHT (MIN 2 VIEWS) Once 06/01/2019               Patient Active Problem List:     Essential hypertension     Pure hypercholesterolemia     Urinary incontinence     Arrhythmia     COPD (chronic obstructive pulmonary disease) (HCC)     Anxiety     Sleep apnea     Stroke (HCC)     GERD (gastroesophageal reflux disease)     Hypothyroidism     Obesity (BMI 30-39. 9)     Mild persistent asthma without complication     History of stroke     Pulmonary hypertension (HCC)     Chronic diarrhea     Bipolar 1 disorder ([facility]

## 2019-06-07 NOTE — CARE COORDINATION
Ambulatory Care Coordination Note  6/6/2019  CM Risk Score: 3  Yuliana Mortality Risk Score: 5    ACC: Ophelia Galindo RN    Summary Note: Received a phone call from patient. She had her MRI results and wanted explanation of what it meant. Discussed with her PCP. Informed her that the MRI showed nothing acute and that the chronic microvascular disease was just part of aging. She read each paragraph of the report wanting explanation. CC explained MRI report to patient's satisfaction. She stated \"why am I still having these hallucinations? \"  CC stated that it could be medication related. Suggested she follow up with her psychiatrist, as her PCP recommended. She said her appt was later in the month. Encouraged her to call and see if she could get a sooner appt. Care Coordination Interventions    Program Enrollment:  Rising Risk  Referral from Primary Care Provider:  No  Suggested Interventions and Community Resources  Medication Assistance Program:  Not Started  Medi Set or Pill Pack:  Completed  Transportation Support:  Completed  Zone Management Tools: In Process         Goals Addressed                 This Visit's Progress     Medication Management   On track     I will take my medication as directed. I will notify my provider of any problems with medications, like adverse effects or side effects. I will notify my provider/Care Coordinator if I am unable to afford my medications. I will notify my provider for advice before I stop taking any of my medication. Barriers: financial, overwhelmed by complexity of regimen and lack of education  Plan for overcoming my barriers: Care Coordination  Confidence: 8/10  Anticipated Goal Completion Date:6/1/2019            Prior to Admission medications    Medication Sig Start Date End Date Taking?  Authorizing Provider   levothyroxine (SYNTHROID) 25 MCG tablet TAKE ONE TABLET BY MOUTH ONCE DAILY **CALL  FOR  APPOINTMENT  BEFORE  ANY  OTHER  REFILLS** 5/21/19 Dre Cotton MD   amLODIPine (NORVASC) 10 MG tablet TAKE 1 TABLET BY MOUTH ONCE DAILY 5/15/19   Dre Cotton MD   mometasone (NASONEX) 50 MCG/ACT nasal spray 2 sprays by Nasal route daily 1 spray each nostril daily    Historical Provider, MD   ranitidine (ZANTAC) 150 MG tablet Take 150 mg by mouth 2 times daily    Historical Provider, MD   cephALEXin (KEFLEX) 500 MG capsule Take 500 mg by mouth 2 times daily Indications: prescribed by dermatologist    Historical Provider, MD   valACYclovir (VALTREX) 1 g tablet Take 1,000 mg by mouth daily    Historical Provider, MD   albuterol sulfate HFA (PROAIR HFA) 108 (90 Base) MCG/ACT inhaler Inhale 2 puffs into the lungs 2 times daily as needed for Wheezing (no more than 4 times daily)    Historical Provider, MD   montelukast (SINGULAIR) 10 MG tablet Take 10 mg by mouth nightly    Historical Provider, MD   budesonide-formoterol (SYMBICORT) 160-4.5 MCG/ACT AERO Inhale 2 puffs into the lungs 2 times daily    Historical Provider, MD   rifaximin (XIFAXAN) 550 MG tablet Take 550 mg by mouth every 8 hours    Historical Provider, MD   acetaminophen (TYLENOL) 325 MG tablet Take 2 tablets by mouth every 6 hours as needed for Pain 3/21/18   Aidan Olivas MD   FLUoxetine (PROZAC) 40 MG capsule Take 40 mg by mouth daily    Historical Provider, MD   ipratropium (ATROVENT) 0.03 % nasal spray 2 sprays by Nasal route 3 times daily as needed for Rhinitis     Historical Provider, MD   lithium 300 MG capsule Take 300 mg by mouth 2 times daily (with meals).     Historical Provider, MD   QUEtiapine (SEROQUEL) 300 MG tablet Take 300 mg by mouth 3 times daily     Historical Provider, MD       Future Appointments   Date Time Provider Ekta Tapia   7/2/2019  1:00 PM MANDEEP Garcia - CNP Weight Mgmt [facility]     ,   COPD Assessment    Does the patient understand envrionmental exposure?:  Yes  Is the patient able to verbalize Rescue vs. Long Acting medications?:  No  Does the patient have a nebulizer?:  No  Does the patient use a space with inhaled medications?:  No     No patient-reported symptoms         Symptoms:      Have you had a recent diagnosis of pneumonia either by PCP or at a hospital?:  No      and   General Assessment    Do you have any symptoms that are causing concern?:  Yes  Progression since Onset:  Unchanged  Reported Symptoms:  Other (Comment: visual hallucinations)

## 2019-06-26 ENCOUNTER — HOSPITAL ENCOUNTER (OUTPATIENT)
Age: 64
Discharge: HOME | End: 2019-06-26
Payer: MEDICARE

## 2019-06-26 LAB
ALBUMIN SERPL-MCNC: 4.5 G/DL (ref 3.5–5.2)
ALBUMIN/GLOBULIN RATIO: 1.3 (ref 1–2.5)
ALP BLD-CCNC: 92 U/L (ref 35–104)
ALT SERPL-CCNC: 36 U/L (ref 5–33)
ANION GAP SERPL CALCULATED.3IONS-SCNC: 12 MMOL/L (ref 9–17)
AST SERPL-CCNC: 29 U/L
BILIRUB SERPL-MCNC: 0.31 MG/DL (ref 0.3–1.2)
BUN BLDV-MCNC: 18 MG/DL (ref 8–23)
BUN/CREAT BLD: ABNORMAL (ref 9–20)
CALCIUM SERPL-MCNC: 9.8 MG/DL (ref 8.6–10.4)
CHLORIDE BLD-SCNC: 111 MMOL/L (ref 98–107)
CHOLESTEROL, FASTING: 212 MG/DL
CHOLESTEROL/HDL RATIO: 4.3
CO2: 21 MMOL/L (ref 20–31)
CREAT SERPL-MCNC: 0.84 MG/DL (ref 0.5–0.9)
GFR AFRICAN AMERICAN: >60 ML/MIN
GFR NON-AFRICAN AMERICAN: >60 ML/MIN
GFR SERPL CREATININE-BSD FRML MDRD: ABNORMAL ML/MIN/{1.73_M2}
GFR SERPL CREATININE-BSD FRML MDRD: ABNORMAL ML/MIN/{1.73_M2}
GLUCOSE BLD-MCNC: 86 MG/DL (ref 70–99)
HDLC SERPL-MCNC: 49 MG/DL
LDL CHOLESTEROL: 131 MG/DL (ref 0–130)
LITHIUM DATE LAST DOSE: ABNORMAL
LITHIUM DOSE AMOUNT: ABNORMAL
LITHIUM DOSE TIME: ABNORMAL
LITHIUM LEVEL: 0.3 MMOL/L (ref 0.6–1.2)
POTASSIUM SERPL-SCNC: 4.1 MMOL/L (ref 3.7–5.3)
SODIUM BLD-SCNC: 144 MMOL/L (ref 135–144)
TOTAL PROTEIN: 7.9 G/DL (ref 6.4–8.3)
TRIGLYCERIDE, FASTING: 160 MG/DL
VLDLC SERPL CALC-MCNC: ABNORMAL MG/DL (ref 1–30)

## 2019-06-26 PROCEDURE — 80053 COMPREHEN METABOLIC PANEL: CPT

## 2019-06-26 PROCEDURE — 83036 HEMOGLOBIN GLYCOSYLATED A1C: CPT

## 2019-06-26 PROCEDURE — 36415 COLL VENOUS BLD VENIPUNCTURE: CPT

## 2019-06-26 PROCEDURE — 80061 LIPID PANEL: CPT

## 2019-06-26 PROCEDURE — 80178 ASSAY OF LITHIUM: CPT

## 2019-06-27 ENCOUNTER — CARE COORDINATION (OUTPATIENT)
Dept: CARE COORDINATION | Age: 64
End: 2019-06-27

## 2019-06-27 LAB
ESTIMATED AVERAGE GLUCOSE: 103 MG/DL
HBA1C MFR BLD: 5.2 % (ref 4–6)

## 2019-07-03 ENCOUNTER — HOSPITAL ENCOUNTER (EMERGENCY)
Age: 64
Discharge: HOME | End: 2019-07-03
Attending: EMERGENCY MEDICINE
Payer: MEDICARE

## 2019-07-03 VITALS
SYSTOLIC BLOOD PRESSURE: 141 MMHG | DIASTOLIC BLOOD PRESSURE: 92 MMHG | HEART RATE: 98 BPM | TEMPERATURE: 98.2 F | OXYGEN SATURATION: 96 % | RESPIRATION RATE: 18 BRPM

## 2019-07-03 DIAGNOSIS — L03.116 CELLULITIS OF LEFT LOWER EXTREMITY: Primary | ICD-10-CM

## 2019-07-03 PROCEDURE — 6370000000 HC RX 637 (ALT 250 FOR IP): Performed by: STUDENT IN AN ORGANIZED HEALTH CARE EDUCATION/TRAINING PROGRAM

## 2019-07-03 PROCEDURE — 99282 EMERGENCY DEPT VISIT SF MDM: CPT

## 2019-07-03 RX ORDER — CEPHALEXIN 500 MG/1
500 CAPSULE ORAL 4 TIMES DAILY
Qty: 28 CAPSULE | Refills: 0 | Status: SHIPPED | OUTPATIENT
Start: 2019-07-03 | End: 2019-07-10

## 2019-07-03 RX ORDER — CEPHALEXIN 250 MG/1
500 CAPSULE ORAL ONCE
Status: COMPLETED | OUTPATIENT
Start: 2019-07-03 | End: 2019-07-03

## 2019-07-03 RX ADMIN — CEPHALEXIN 500 MG: 250 CAPSULE ORAL at 19:19

## 2019-07-03 ASSESSMENT — PAIN DESCRIPTION - LOCATION: LOCATION: LEG

## 2019-07-03 ASSESSMENT — PAIN DESCRIPTION - ORIENTATION: ORIENTATION: LEFT;LOWER

## 2019-07-03 ASSESSMENT — ENCOUNTER SYMPTOMS
NAUSEA: 0
VOMITING: 0
ABDOMINAL PAIN: 0
COUGH: 0
BACK PAIN: 0
EYE ITCHING: 0
RHINORRHEA: 0
DIARRHEA: 0
SHORTNESS OF BREATH: 0
EYE REDNESS: 0

## 2019-07-03 ASSESSMENT — PAIN DESCRIPTION - DESCRIPTORS: DESCRIPTORS: ACHING

## 2019-07-03 ASSESSMENT — PAIN SCALES - GENERAL: PAINLEVEL_OUTOF10: 8

## 2019-07-03 NOTE — ED PROVIDER NOTES
[facility] ED  Emergency Department Encounter  EmergencyMedicine Resident     Pt Name:Dulce Hernandez Or  MRN: [id number]  Birthdate 1955  Date of evaluation: 7/3/19  PCP:  Katya Melchor MD    [address] [place] [address]       Chief Complaint   Patient presents with    Leg Pain     small warm bump to left lower leg       HISTORY OF PRESENT ILLNESS  (Location/Symptom, Timing/Onset, Context/Setting, Quality, Duration, Modifying Factors, Severity.)      Jose Perales is a 75 y.o. female who presents with lateral left lower extremity swelling erythema and warmth. Patient does not have trauma to the area, has been progressive over the last week. Patient has pain with deep palpation of the area as well as with pressure on the heel. Patient has no decreased range of motion of the ankle or knee joint. Patient has no fevers, chills, vaginal discharge or bleeding. No history of cellulitis that she knows of, no family history of abscesses. Patient is never had an abscess in the past, has not taken medication for pain. PAST MEDICAL / SURGICAL / SOCIAL / FAMILY HISTORY      has a past medical history of Anxiety, Arrhythmia, Asthma, Bipolar disorder ([address] Depression, GERD (gastroesophageal reflux disease), GERD (gastroesophageal reflux disease), Hyperlipidemia, Hypertension, Hypothyroidism, OAB (overactive bladder), Osteoarthritis, Pulmonary fibrosis ([address] Pulmonary hypertension ([address] Stroke ([address] Unspecified sleep apnea, and Urinary incontinence. has a past surgical history that includes Colonoscopy (04/2015) and Colonoscopy (2018). Social History     Socioeconomic History    Marital status:       Spouse name: Not on file    Number of children: Not on file    Years of education: Not on file    Highest education level: Not on file   Occupational History    Not on file   Social Needs    Financial resource strain: Not on file    Food insecurity:     Worry: Not on file 1 spray each nostril daily    Historical Provider, MD   ranitidine (ZANTAC) 150 MG tablet Take 150 mg by mouth 2 times daily    Historical Provider, MD   valACYclovir (VALTREX) 1 g tablet Take 1,000 mg by mouth daily    Historical Provider, MD   albuterol sulfate HFA (PROAIR HFA) 108 (90 Base) MCG/ACT inhaler Inhale 2 puffs into the lungs 2 times daily as needed for Wheezing (no more than 4 times daily)    Historical Provider, MD   montelukast (SINGULAIR) 10 MG tablet Take 10 mg by mouth nightly    Historical Provider, MD   budesonide-formoterol (SYMBICORT) 160-4.5 MCG/ACT AERO Inhale 2 puffs into the lungs 2 times daily    Historical Provider, MD   rifaximin (XIFAXAN) 550 MG tablet Take 550 mg by mouth every 8 hours    Historical Provider, MD   acetaminophen (TYLENOL) 325 MG tablet Take 2 tablets by mouth every 6 hours as needed for Pain 3/21/18   Angelica Mccauley MD   FLUoxetine (PROZAC) 40 MG capsule Take 40 mg by mouth daily    Historical Provider, MD   ipratropium (ATROVENT) 0.03 % nasal spray 2 sprays by Nasal route 3 times daily as needed for Rhinitis     Historical Provider, MD   lithium 300 MG capsule Take 300 mg by mouth 2 times daily (with meals). Historical Provider, MD   QUEtiapine (SEROQUEL) 300 MG tablet Take 300 mg by mouth 3 times daily     Historical Provider, MD       REVIEW OF SYSTEMS    (2-9 systems for level 4, 10 or more for level 5)      Review of Systems   Constitutional: Negative for chills and fever. HENT: Negative for congestion and rhinorrhea. Eyes: Negative for redness and itching. Respiratory: Negative for cough and shortness of breath. Cardiovascular: Negative for chest pain, palpitations and leg swelling. Gastrointestinal: Negative for abdominal pain, diarrhea, nausea and vomiting. Genitourinary: Negative for dysuria and frequency. Musculoskeletal: Negative for back pain, joint swelling and myalgias. Skin: Positive for rash. Negative for pallor.    Neurological:

## 2019-07-09 ENCOUNTER — OFFICE VISIT (OUTPATIENT)
Dept: BARIATRICS/WEIGHT MGMT | Age: 64
End: 2019-07-09
Payer: MEDICARE

## 2019-07-09 VITALS
BODY MASS INDEX: 33.68 KG/M2 | WEIGHT: 197.3 LBS | HEART RATE: 74 BPM | SYSTOLIC BLOOD PRESSURE: 128 MMHG | DIASTOLIC BLOOD PRESSURE: 78 MMHG | HEIGHT: 64 IN

## 2019-07-09 DIAGNOSIS — E03.9 HYPOTHYROIDISM, UNSPECIFIED TYPE: ICD-10-CM

## 2019-07-09 DIAGNOSIS — E66.9 OBESITY (BMI 30-39.9): ICD-10-CM

## 2019-07-09 DIAGNOSIS — I49.9 CARDIAC ARRHYTHMIA, UNSPECIFIED CARDIAC ARRHYTHMIA TYPE: ICD-10-CM

## 2019-07-09 DIAGNOSIS — J44.9 CHRONIC OBSTRUCTIVE PULMONARY DISEASE, UNSPECIFIED COPD TYPE (HCC): ICD-10-CM

## 2019-07-09 DIAGNOSIS — I10 ESSENTIAL HYPERTENSION: Primary | ICD-10-CM

## 2019-07-09 DIAGNOSIS — K21.9 GASTROESOPHAGEAL REFLUX DISEASE, ESOPHAGITIS PRESENCE NOT SPECIFIED: ICD-10-CM

## 2019-07-09 DIAGNOSIS — F41.9 ANXIETY: ICD-10-CM

## 2019-07-09 DIAGNOSIS — G47.33 OBSTRUCTIVE SLEEP APNEA SYNDROME: ICD-10-CM

## 2019-07-09 DIAGNOSIS — N39.3 STRESS INCONTINENCE OF URINE: ICD-10-CM

## 2019-07-09 DIAGNOSIS — Z86.73 HISTORY OF STROKE: ICD-10-CM

## 2019-07-09 DIAGNOSIS — I27.20 PULMONARY HYPERTENSION (HCC): ICD-10-CM

## 2019-07-09 DIAGNOSIS — F31.9 BIPOLAR 1 DISORDER (HCC): ICD-10-CM

## 2019-07-09 DIAGNOSIS — J45.30 MILD PERSISTENT ASTHMA WITHOUT COMPLICATION: ICD-10-CM

## 2019-07-09 DIAGNOSIS — E78.00 PURE HYPERCHOLESTEROLEMIA: ICD-10-CM

## 2019-07-09 PROCEDURE — G8417 CALC BMI ABV UP PARAM F/U: HCPCS | Performed by: NURSE PRACTITIONER

## 2019-07-09 PROCEDURE — 1123F ACP DISCUSS/DSCN MKR DOCD: CPT | Performed by: NURSE PRACTITIONER

## 2019-07-09 PROCEDURE — G8427 DOCREV CUR MEDS BY ELIG CLIN: HCPCS | Performed by: NURSE PRACTITIONER

## 2019-07-09 PROCEDURE — 3017F COLORECTAL CA SCREEN DOC REV: CPT | Performed by: NURSE PRACTITIONER

## 2019-07-09 PROCEDURE — G8926 SPIRO NO PERF OR DOC: HCPCS | Performed by: NURSE PRACTITIONER

## 2019-07-09 PROCEDURE — 1036F TOBACCO NON-USER: CPT | Performed by: NURSE PRACTITIONER

## 2019-07-09 PROCEDURE — 4040F PNEUMOC VAC/ADMIN/RCVD: CPT | Performed by: NURSE PRACTITIONER

## 2019-07-09 PROCEDURE — 3023F SPIROM DOC REV: CPT | Performed by: NURSE PRACTITIONER

## 2019-07-09 PROCEDURE — G8599 NO ASA/ANTIPLAT THER USE RNG: HCPCS | Performed by: NURSE PRACTITIONER

## 2019-07-09 PROCEDURE — 1090F PRES/ABSN URINE INCON ASSESS: CPT | Performed by: NURSE PRACTITIONER

## 2019-07-09 PROCEDURE — 99213 OFFICE O/P EST LOW 20 MIN: CPT | Performed by: NURSE PRACTITIONER

## 2019-07-09 PROCEDURE — G8399 PT W/DXA RESULTS DOCUMENT: HCPCS | Performed by: NURSE PRACTITIONER

## 2019-07-09 NOTE — PROGRESS NOTES
AERO Inhale 2 puffs into the lungs 2 times daily      rifaximin (XIFAXAN) 550 MG tablet Take 550 mg by mouth every 8 hours      acetaminophen (TYLENOL) 325 MG tablet Take 2 tablets by mouth every 6 hours as needed for Pain 60 tablet 0    FLUoxetine (PROZAC) 40 MG capsule Take 40 mg by mouth daily      ipratropium (ATROVENT) 0.03 % nasal spray 2 sprays by Nasal route 3 times daily as needed for Rhinitis       lithium 300 MG capsule Take 300 mg by mouth 2 times daily (with meals).  QUEtiapine (SEROQUEL) 300 MG tablet Take 300 mg by mouth 3 times daily        No current facility-administered medications for this visit. Vital Signs:  /78 (Site: Right Upper Arm, Position: Sitting, Cuff Size: Large Adult)   Pulse 74   Ht 5' 4.02\" (1.626 m)   Wt 197 lb 4.8 oz (89.5 kg)   BMI 33.85 kg/m²     BMI/Height/Weight:  Body mass index is 33.85 kg/m². Review of Systems  Constitutional: Weight gain      Objective:      Physical Exam   Vital signs reviewed. General Appearance: Well-developed and well-nourished. No acute distress. Skin: Warm, dry. Head: Normocephalic. Pulmonary/Chest: Normal respiratory effort. Musculoskeletal: Movement x4. Neurological:  Alert and oriented. Individual goal for this encounter:  I would like to become more active and start eating the right foods. [x] Vital signs reviewed and discussed with patient.   [] Labs/EKG reviewed and discussed with patient. [] Blood sugar log reviewed and discussed with patient. [] Physical activity discussed. [] Medication changes recommended. [] Smoking cessation discussed. [x] Specific questions/concerns addressed. Specific group medical question(s) addressed in this encounter:  Discussion about cholesterol.     Group discussion topic for this encounter:     [] Welcome Jumpstart   [] Be a Fat & Calorie    [] Healthy Eating   [] Move Those Muscles   [] Tip the Calorie Balance   [] Take charge of What's Around Jeronimo Rodriguez   [] Problem Solving   [] Four Keys to Healthy Eating Out   [] [place] of Lifestyle Change   [] Jumpstart your Activity Plan   [] Make Social Cues Work for Jeronimo Rodriguez   [] Ways to Stay Motivated   [] Preparing for Long Term Self-Management   [] More Volume, Fewer Calories   [] Balance Your Thoughts   [] Strengthen your Exercise Program   [] Mindful Eating   [x] Stress and Time Managment   [] Standing Up for Your Health   [] Heart Health   [] Stretching:  The Truth About Flexibility   [] Looking Back and Looking Forward    Total time:  90 minutes, greater than 50% of visit spent in group counseling/education. Assessment:       Diagnosis Orders   1. Essential hypertension     2. Pure hypercholesterolemia     3. Stress incontinence of urine     4. Cardiac arrhythmia, unspecified cardiac arrhythmia type     5. Chronic obstructive pulmonary disease, unspecified COPD type ([address]     6. Anxiety     7. Obstructive sleep apnea syndrome     8. Gastroesophageal reflux disease, esophagitis presence not specified     9. Hypothyroidism, unspecified type     10. Mild persistent asthma without complication     11. History of stroke     12. Pulmonary hypertension ([address]     13. Bipolar 1 disorder ([address]     14. Obesity (BMI 30-39. 9)         Plan:      Track food and weight. Return to clinic as per group medical appointment schedule. Follow-up:  Return in about 1 month (around 8/9/2019). Orders:  No orders of the defined types were placed in this encounter. Prescriptions:  No orders of the defined types were placed in this encounter.       Electronically signed by:  Pina Cruz CNP

## 2019-07-10 ENCOUNTER — OFFICE VISIT (OUTPATIENT)
Dept: PRIMARY CARE CLINIC | Age: 64
End: 2019-07-10
Payer: MEDICARE

## 2019-07-10 VITALS
DIASTOLIC BLOOD PRESSURE: 87 MMHG | SYSTOLIC BLOOD PRESSURE: 150 MMHG | OXYGEN SATURATION: 91 % | BODY MASS INDEX: 33.97 KG/M2 | HEART RATE: 98 BPM | WEIGHT: 198 LBS

## 2019-07-10 DIAGNOSIS — L03.116 CELLULITIS OF LEFT LOWER LEG: Primary | ICD-10-CM

## 2019-07-10 PROCEDURE — 1036F TOBACCO NON-USER: CPT | Performed by: NURSE PRACTITIONER

## 2019-07-10 PROCEDURE — G8417 CALC BMI ABV UP PARAM F/U: HCPCS | Performed by: NURSE PRACTITIONER

## 2019-07-10 PROCEDURE — G8399 PT W/DXA RESULTS DOCUMENT: HCPCS | Performed by: NURSE PRACTITIONER

## 2019-07-10 PROCEDURE — 3017F COLORECTAL CA SCREEN DOC REV: CPT | Performed by: NURSE PRACTITIONER

## 2019-07-10 PROCEDURE — 99212 OFFICE O/P EST SF 10 MIN: CPT | Performed by: NURSE PRACTITIONER

## 2019-07-10 PROCEDURE — 1123F ACP DISCUSS/DSCN MKR DOCD: CPT | Performed by: NURSE PRACTITIONER

## 2019-07-10 PROCEDURE — 1090F PRES/ABSN URINE INCON ASSESS: CPT | Performed by: NURSE PRACTITIONER

## 2019-07-10 PROCEDURE — 4040F PNEUMOC VAC/ADMIN/RCVD: CPT | Performed by: NURSE PRACTITIONER

## 2019-07-10 PROCEDURE — G8599 NO ASA/ANTIPLAT THER USE RNG: HCPCS | Performed by: NURSE PRACTITIONER

## 2019-07-10 PROCEDURE — G8427 DOCREV CUR MEDS BY ELIG CLIN: HCPCS | Performed by: NURSE PRACTITIONER

## 2019-07-10 RX ORDER — CEPHALEXIN 500 MG/1
500 CAPSULE ORAL 4 TIMES DAILY
Qty: 28 CAPSULE | Refills: 0 | Status: SHIPPED | OUTPATIENT
Start: 2019-07-10 | End: 2019-07-17

## 2019-07-10 ASSESSMENT — ENCOUNTER SYMPTOMS
SHORTNESS OF BREATH: 0
VOMITING: 0
SORE THROAT: 0
SINUS PAIN: 0
NAUSEA: 0
COUGH: 0
ABDOMINAL PAIN: 0
DIARRHEA: 0

## 2019-07-10 NOTE — PROGRESS NOTES
[facility]  [address]  [address] [facility] [address]  Dept: [phone]  Dept Fax: [phone]    Radhika Perea is a 75 y.o. female who presents to the urgent care today for her medicalconditions/complaints as noted below. Radhika Perea is c/o of Swelling (left leg)      HPI:       76-year-old female presents with complaint of localized swelling to the anterior lateral portion of the left lower leg. Patient denies any recent injury or trauma. Describes this is been present over the past 2 weeks. Area is slightly raised, firm, slightly warm, reddened. No area of fluctuance. Denies numbness or tingling of the lower extremity. Denies previous blood clot. relieving factors include none. Worsening factors include none.       Past Medical History:   Diagnosis Date    Anxiety     Arrhythmia     Asthma     B[address] disorder [address]     Depression     GERD (gastroesophageal reflux disease)     GERD (gastroesophageal reflux disease)     Hyperlipidemia     Hypertension     Hypothyroidism     OAB (overactive bladder)     Osteoarthritis     Pulmonary fibrosis (HCC)     sjogrens syndrome    Pulmonary hypertension (HCC)     Stroke ([facility]     Unspecified sleep apnea     on cpap    Urinary incontinence         Current Outpatient Medications   Medication Sig Dispense Refill    cephALEXin (KEFLEX) 500 MG capsule Take 1 capsule by mouth 4 times daily for 7 days 28 capsule 0    levothyroxine (SYNTHROID) 25 MCG tablet TAKE ONE TABLET BY MOUTH ONCE DAILY **CALL  FOR  APPOINTMENT  BEFORE  ANY  OTHER  REFILLS** 30 tablet 2    amLODIPine (NORVASC) 10 MG tablet TAKE 1 TABLET BY MOUTH ONCE DAILY 90 tablet 1    mometasone (NASONEX) 50 MCG/ACT nasal spray 2 sprays by Nasal route daily 1 spray each nostril daily      ranitidine (ZANTAC) 150 MG tablet Take 150 mg by mouth 2 times daily      valACYclovir (VALTREX) 1 g tablet Take 1,000 mg by

## 2019-07-15 ENCOUNTER — OFFICE VISIT (OUTPATIENT)
Dept: PRIMARY CARE CLINIC | Age: 64
End: 2019-07-15
Payer: MEDICARE

## 2019-07-15 VITALS — SYSTOLIC BLOOD PRESSURE: 139 MMHG | DIASTOLIC BLOOD PRESSURE: 78 MMHG | TEMPERATURE: 97.3 F

## 2019-07-15 DIAGNOSIS — L03.116 CELLULITIS OF LEFT LOWER LEG: Primary | ICD-10-CM

## 2019-07-15 PROCEDURE — G8599 NO ASA/ANTIPLAT THER USE RNG: HCPCS | Performed by: NURSE PRACTITIONER

## 2019-07-15 PROCEDURE — 99213 OFFICE O/P EST LOW 20 MIN: CPT | Performed by: NURSE PRACTITIONER

## 2019-07-15 PROCEDURE — 3017F COLORECTAL CA SCREEN DOC REV: CPT | Performed by: NURSE PRACTITIONER

## 2019-07-15 PROCEDURE — 1123F ACP DISCUSS/DSCN MKR DOCD: CPT | Performed by: NURSE PRACTITIONER

## 2019-07-15 PROCEDURE — 1036F TOBACCO NON-USER: CPT | Performed by: NURSE PRACTITIONER

## 2019-07-15 PROCEDURE — G8417 CALC BMI ABV UP PARAM F/U: HCPCS | Performed by: NURSE PRACTITIONER

## 2019-07-15 PROCEDURE — G8399 PT W/DXA RESULTS DOCUMENT: HCPCS | Performed by: NURSE PRACTITIONER

## 2019-07-15 PROCEDURE — 1090F PRES/ABSN URINE INCON ASSESS: CPT | Performed by: NURSE PRACTITIONER

## 2019-07-15 PROCEDURE — G8427 DOCREV CUR MEDS BY ELIG CLIN: HCPCS | Performed by: NURSE PRACTITIONER

## 2019-07-15 PROCEDURE — 4040F PNEUMOC VAC/ADMIN/RCVD: CPT | Performed by: NURSE PRACTITIONER

## 2019-07-15 RX ORDER — DOXYCYCLINE HYCLATE 100 MG
100 TABLET ORAL 2 TIMES DAILY
Qty: 20 TABLET | Refills: 0 | Status: SHIPPED | OUTPATIENT
Start: 2019-07-15 | End: 2019-07-25

## 2019-07-15 ASSESSMENT — ENCOUNTER SYMPTOMS: VOMITING: 0

## 2019-07-15 NOTE — PATIENT INSTRUCTIONS
Elevate left leg   Return worse  Stop the keflex  Patient Education        Cellulitis: Care Instructions  Your Care Instructions    Cellulitis is a skin infection caused by bacteria, most often strep or staph. It often occurs after a break in the skin from a scrape, cut, bite, or puncture, or after a rash. Cellulitis may be treated without doing tests to find out what caused it. But your doctor may do tests, if needed, to look for a specific bacteria, like methicillin-resistant Staphylococcus aureus (MRSA). The doctor has checked you carefully, but problems can develop later. If you notice any problems or new symptoms, get medical treatment right away. Follow-up care is a key part of your treatment and safety. Be sure to make and go to all appointments, and call your doctor if you are having problems. It's also a good idea to know your test results and keep a list of the medicines you take. How can you care for yourself at home? · Take your antibiotics as directed. Do not stop taking them just because you feel better. You need to take the full course of antibiotics. · Prop up the infected area on pillows to reduce pain and swelling. Try to keep the area above the level of your heart as often as you can. · If your doctor told you how to care for your wound, follow your doctor's instructions. If you did not get instructions, follow this general advice:  ? Wash the wound with clean water 2 times a day. Don't use hydrogen peroxide or alcohol, which can slow healing. ? You may cover the wound with a thin layer of petroleum jelly, such as Vaseline, and a nonstick bandage. ? Apply more petroleum jelly and replace the bandage as needed. · Be safe with medicines. Take pain medicines exactly as directed. ? If the doctor gave you a prescription medicine for pain, take it as prescribed. ? If you are not taking a prescription pain medicine, ask your doctor if you can take an over-the-counter medicine.   To prevent cellulitis in the future  · Try to prevent cuts, scrapes, or other injuries to your skin. Cellulitis most often occurs where there is a break in the skin. · If you get a scrape, cut, mild burn, or bite, wash the wound with clean water as soon as you can to help avoid infection. Don't use hydrogen peroxide or alcohol, which can slow healing. · If you have swelling in your legs (edema), support stockings and good skin care may help prevent leg sores and cellulitis. · Take care of your feet, especially if you have diabetes or other conditions that increase the risk of infection. Wear shoes and socks. Do not go barefoot. If you have athlete's foot or other skin problems on your feet, talk to your doctor about how to treat them. When should you call for help? Call your doctor now or seek immediate medical care if:    · You have signs that your infection is getting worse, such as:  ? Increased pain, swelling, warmth, or redness. ? Red streaks leading from the area. ? Pus draining from the area. ? A fever.     · You get a rash.    Watch closely for changes in your health, and be sure to contact your doctor if:    · You do not get better as expected. Where can you learn more? Go to https://[employer] Credeb[employer]il IQ. org and sign in to your [employer] account. Enter L042 in the [facility] box to learn more about \"Cellulitis: Care Instructions. \"     If you do not have an account, please click on the \"Sign Up Now\" link. Current as of: April 17, 2018  Content Version: 12.0  © 0171-6679 Healthwise, Incorporated. Care instructions adapted under license by [facility] [employer] [facility] ([facility]). If you have questions about a medical condition or this instruction, always ask your healthcare professional. [address] any warranty or liability for your use of this information.

## 2019-07-15 NOTE — PROGRESS NOTES
Visit Information    Have you changed or started any medications since your last visit including any over-the-counter medicines, vitamins, or herbal medicines? no   Are you having any side effects from any of your medications? -  no  Have you stopped taking any of your medications? Is so, why? -  no    Have you seen any other physician or provider since your last visit? No  Have you had any other diagnostic tests since your last visit? No  Have you been seen in the emergency room and/or had an admission to a hospital since we last saw you? No  Have you had your routine dental cleaning in the past 6 months? no    Have you activated your [facility] account? If not, what are your barriers?  Yes     Patient Care Team:  Jeannine Eid MD as PCP - Fred Thompson MD as PCP - [facility]  Arnoldo Cantrell RN as Care Coordinator  Lori Glass MD as Consulting Physician (Gastroenterology)    Medical History Review  Past Medical, Family, and Social History reviewed and does not contribute to the patient presenting condition    Health Maintenance   Topic Date Due    DTaP/Tdap/Td vaccine (1 - Tdap) 04/22/1972    Shingles Vaccine (1 of 2) 04/22/2003    Annual Wellness Visit (AWV)  04/22/2016    Pneumococcal 65+ years Vaccine (1 of 2 - PCV13) 04/22/2018    Flu vaccine (1) 09/01/2019    Breast cancer screen  01/29/2020    TSH testing  05/21/2020    Potassium monitoring  06/26/2020    Creatinine monitoring  06/26/2020    Lipid screen  06/26/2024    Colon cancer screen colonoscopy  09/25/2028    DEXA (modify frequency per FRAX score)  Completed    Hepatitis C screen  Completed

## 2019-08-06 ENCOUNTER — OFFICE VISIT (OUTPATIENT)
Dept: BARIATRICS/WEIGHT MGMT | Age: 64
End: 2019-08-06
Payer: MEDICARE

## 2019-08-06 VITALS
SYSTOLIC BLOOD PRESSURE: 132 MMHG | BODY MASS INDEX: 33.29 KG/M2 | DIASTOLIC BLOOD PRESSURE: 80 MMHG | WEIGHT: 195 LBS | HEART RATE: 84 BPM | HEIGHT: 64 IN

## 2019-08-06 DIAGNOSIS — G47.33 OBSTRUCTIVE SLEEP APNEA SYNDROME: ICD-10-CM

## 2019-08-06 DIAGNOSIS — E03.9 HYPOTHYROIDISM, UNSPECIFIED TYPE: ICD-10-CM

## 2019-08-06 DIAGNOSIS — E66.9 OBESITY (BMI 30-39.9): ICD-10-CM

## 2019-08-06 DIAGNOSIS — Z86.73 HISTORY OF STROKE: ICD-10-CM

## 2019-08-06 DIAGNOSIS — K21.9 GASTROESOPHAGEAL REFLUX DISEASE, ESOPHAGITIS PRESENCE NOT SPECIFIED: ICD-10-CM

## 2019-08-06 DIAGNOSIS — E78.00 PURE HYPERCHOLESTEROLEMIA: ICD-10-CM

## 2019-08-06 DIAGNOSIS — I27.20 PULMONARY HYPERTENSION (HCC): ICD-10-CM

## 2019-08-06 DIAGNOSIS — I10 ESSENTIAL HYPERTENSION: Primary | ICD-10-CM

## 2019-08-06 DIAGNOSIS — F31.9 BIPOLAR 1 DISORDER (HCC): ICD-10-CM

## 2019-08-06 DIAGNOSIS — J45.30 MILD PERSISTENT ASTHMA WITHOUT COMPLICATION: ICD-10-CM

## 2019-08-06 DIAGNOSIS — J44.9 CHRONIC OBSTRUCTIVE PULMONARY DISEASE, UNSPECIFIED COPD TYPE (HCC): ICD-10-CM

## 2019-08-06 DIAGNOSIS — N39.3 STRESS INCONTINENCE OF URINE: ICD-10-CM

## 2019-08-06 DIAGNOSIS — F41.9 ANXIETY: ICD-10-CM

## 2019-08-06 PROCEDURE — 3017F COLORECTAL CA SCREEN DOC REV: CPT | Performed by: NURSE PRACTITIONER

## 2019-08-06 PROCEDURE — 3023F SPIROM DOC REV: CPT | Performed by: NURSE PRACTITIONER

## 2019-08-06 PROCEDURE — 99213 OFFICE O/P EST LOW 20 MIN: CPT | Performed by: NURSE PRACTITIONER

## 2019-08-06 PROCEDURE — G8926 SPIRO NO PERF OR DOC: HCPCS | Performed by: NURSE PRACTITIONER

## 2019-08-06 PROCEDURE — 1090F PRES/ABSN URINE INCON ASSESS: CPT | Performed by: NURSE PRACTITIONER

## 2019-08-06 PROCEDURE — G8399 PT W/DXA RESULTS DOCUMENT: HCPCS | Performed by: NURSE PRACTITIONER

## 2019-08-06 PROCEDURE — G8599 NO ASA/ANTIPLAT THER USE RNG: HCPCS | Performed by: NURSE PRACTITIONER

## 2019-08-06 PROCEDURE — G8417 CALC BMI ABV UP PARAM F/U: HCPCS | Performed by: NURSE PRACTITIONER

## 2019-08-06 PROCEDURE — 4040F PNEUMOC VAC/ADMIN/RCVD: CPT | Performed by: NURSE PRACTITIONER

## 2019-08-06 PROCEDURE — 1036F TOBACCO NON-USER: CPT | Performed by: NURSE PRACTITIONER

## 2019-08-06 PROCEDURE — 1123F ACP DISCUSS/DSCN MKR DOCD: CPT | Performed by: NURSE PRACTITIONER

## 2019-08-06 PROCEDURE — G8427 DOCREV CUR MEDS BY ELIG CLIN: HCPCS | Performed by: NURSE PRACTITIONER

## 2019-08-07 NOTE — PROGRESS NOTES
by mouth every 8 hours      acetaminophen (TYLENOL) 325 MG tablet Take 2 tablets by mouth every 6 hours as needed for Pain 60 tablet 0    FLUoxetine (PROZAC) 40 MG capsule Take 40 mg by mouth daily      ipratropium (ATROVENT) 0.03 % nasal spray 2 sprays by Nasal route 3 times daily as needed for Rhinitis       lithium 300 MG capsule Take 300 mg by mouth 2 times daily (with meals).  QUEtiapine (SEROQUEL) 300 MG tablet Take 300 mg by mouth 3 times daily        No current facility-administered medications for this visit. Vital Signs:  /80 (Site: Right Upper Arm, Position: Sitting, Cuff Size: Large Adult)   Pulse 84   Ht 5' 4.02\" (1.626 m)   Wt 195 lb (88.5 kg)   BMI 33.45 kg/m²     BMI/Height/Weight:  Body mass index is 33.45 kg/m². Review of Systems  Constitutional: Weight gain      Objective:      Physical Exam   Vital signs reviewed. General Appearance: Well-developed and well-nourished. No acute distress. Skin: Warm, dry. Head: Normocephalic. Pulmonary/Chest: Normal respiratory effort. Musculoskeletal: Movement x4. Neurological:  Alert and oriented. Individual goal for this encounter:  I would like to become more active and start eating the right foods. [x] Vital signs reviewed and discussed with patient.   [] Labs/EKG reviewed and discussed with patient. [] Blood sugar log reviewed and discussed with patient. [] Physical activity discussed. [] Medication changes recommended. [] Smoking cessation discussed. [x] Specific questions/concerns addressed. Specific group medical question(s) addressed in this encounter:  Discussion about fluid retention.     Group discussion topic for this encounter:     [] Welcome Jumpstart   [] Be a Fat & Calorie    [] Healthy Eating   [] Move Those Muscles   [] Tip the Calorie Balance   [] Take charge of What's Around You   [] Problem Solving   [] Four Keys to Healthy Eating Out   [] [facility] of Lifestyle

## 2019-08-22 ENCOUNTER — HOSPITAL ENCOUNTER (OUTPATIENT)
Age: 64
Discharge: HOME | End: 2019-08-22
Payer: MEDICARE

## 2019-08-22 LAB
-: NORMAL
AMORPHOUS: NORMAL
BACTERIA: NORMAL
BILIRUBIN URINE: NEGATIVE
CASTS UA: NORMAL /LPF (ref 0–8)
COLOR: YELLOW
COMMENT UA: ABNORMAL
CRYSTALS, UA: NORMAL /HPF
EPITHELIAL CELLS UA: NORMAL /HPF (ref 0–5)
GLUCOSE URINE: NEGATIVE
KETONES, URINE: NEGATIVE
LEUKOCYTE ESTERASE, URINE: ABNORMAL
MUCUS: NORMAL
NITRITE, URINE: NEGATIVE
OTHER OBSERVATIONS UA: NORMAL
PH UA: 6 (ref 5–8)
PROTEIN UA: NEGATIVE
RBC UA: NORMAL /HPF (ref 0–4)
RENAL EPITHELIAL, UA: NORMAL /HPF
SPECIFIC GRAVITY UA: 1.02 (ref 1–1.03)
TRICHOMONAS: NORMAL
TURBIDITY: CLEAR
URINE HGB: ABNORMAL
UROBILINOGEN, URINE: NORMAL
WBC UA: NORMAL /HPF (ref 0–5)
YEAST: NORMAL

## 2019-08-22 PROCEDURE — 81001 URINALYSIS AUTO W/SCOPE: CPT

## 2019-08-22 PROCEDURE — 87086 URINE CULTURE/COLONY COUNT: CPT

## 2019-08-24 LAB
CULTURE: NORMAL
Lab: NORMAL
SPECIMEN DESCRIPTION: NORMAL

## 2019-08-28 ENCOUNTER — CARE COORDINATION (OUTPATIENT)
Dept: CARE COORDINATION | Age: 64
End: 2019-08-28

## 2019-08-28 NOTE — CARE COORDINATION
Ambulatory Care Coordination Note  8/28/2019  CM Risk Score: 3  Charlson 10 Year Mortality Risk Score: 47%     ACC: Shayy Davis, ADEEL    Summary Note: attempted to reach patient for follow up. No answer and her mailbox is full; unable to leave a message. She has missed her last couple of appts and needs a follow up with PCP scheduled. Care Coordination Interventions    Program Enrollment:  Rising Risk  Referral from Primary Care Provider:  No  Suggested Interventions and Community Resources  Medication Assistance Program:  Not Started  Medi Set or Pill Pack:  Completed  Transportation Support:  Completed  Zone Management Tools: In Process         Goals Addressed    None         Prior to Admission medications    Medication Sig Start Date End Date Taking?  Authorizing Provider   levothyroxine (SYNTHROID) 25 MCG tablet TAKE ONE TABLET BY MOUTH ONCE DAILY **CALL  FOR  APPOINTMENT  BEFORE  ANY  OTHER  REFILLS** 5/21/19   Cecelia Nielsen MD   amLODIPine (NORVASC) 10 MG tablet TAKE 1 TABLET BY MOUTH ONCE DAILY 5/15/19   Cecelia Nielsen MD   mometasone (NASONEX) 50 MCG/ACT nasal spray 2 sprays by Nasal route daily 1 spray each nostril daily    Historical Provider, MD   ranitidine (ZANTAC) 150 MG tablet Take 150 mg by mouth 2 times daily    Historical Provider, MD   valACYclovir (VALTREX) 1 g tablet Take 1,000 mg by mouth daily    Historical Provider, MD   albuterol sulfate HFA (PROAIR HFA) 108 (90 Base) MCG/ACT inhaler Inhale 2 puffs into the lungs 2 times daily as needed for Wheezing (no more than 4 times daily)    Historical Provider, MD   montelukast (SINGULAIR) 10 MG tablet Take 10 mg by mouth nightly    Historical Provider, MD   budesonide-formoterol (SYMBICORT) 160-4.5 MCG/ACT AERO Inhale 2 puffs into the lungs 2 times daily    Historical Provider, MD   rifaximin (XIFAXAN) 550 MG tablet Take 550 mg by mouth every 8 hours    Historical Provider, MD   acetaminophen (TYLENOL) 325 MG tablet Take 2 tablets

## 2019-09-03 ENCOUNTER — OFFICE VISIT (OUTPATIENT)
Dept: BARIATRICS/WEIGHT MGMT | Age: 64
End: 2019-09-03
Payer: MEDICARE

## 2019-09-03 VITALS
HEART RATE: 80 BPM | HEIGHT: 64 IN | DIASTOLIC BLOOD PRESSURE: 84 MMHG | WEIGHT: 194 LBS | SYSTOLIC BLOOD PRESSURE: 128 MMHG | BODY MASS INDEX: 33.12 KG/M2

## 2019-09-03 DIAGNOSIS — I27.20 PULMONARY HYPERTENSION (HCC): ICD-10-CM

## 2019-09-03 DIAGNOSIS — J45.30 MILD PERSISTENT ASTHMA WITHOUT COMPLICATION: ICD-10-CM

## 2019-09-03 DIAGNOSIS — J44.9 CHRONIC OBSTRUCTIVE PULMONARY DISEASE, UNSPECIFIED COPD TYPE (HCC): ICD-10-CM

## 2019-09-03 DIAGNOSIS — I10 ESSENTIAL HYPERTENSION: Primary | ICD-10-CM

## 2019-09-03 DIAGNOSIS — F31.9 BIPOLAR 1 DISORDER (HCC): ICD-10-CM

## 2019-09-03 DIAGNOSIS — G47.30 SLEEP APNEA, UNSPECIFIED TYPE: ICD-10-CM

## 2019-09-03 DIAGNOSIS — E03.9 HYPOTHYROIDISM, UNSPECIFIED TYPE: ICD-10-CM

## 2019-09-03 DIAGNOSIS — Z86.73 HISTORY OF STROKE: ICD-10-CM

## 2019-09-03 DIAGNOSIS — N39.3 STRESS INCONTINENCE OF URINE: ICD-10-CM

## 2019-09-03 DIAGNOSIS — E78.00 PURE HYPERCHOLESTEROLEMIA: ICD-10-CM

## 2019-09-03 DIAGNOSIS — E66.9 OBESITY (BMI 30-39.9): ICD-10-CM

## 2019-09-03 DIAGNOSIS — I49.9 CARDIAC ARRHYTHMIA, UNSPECIFIED CARDIAC ARRHYTHMIA TYPE: ICD-10-CM

## 2019-09-03 DIAGNOSIS — F41.9 ANXIETY: ICD-10-CM

## 2019-09-03 PROCEDURE — G8427 DOCREV CUR MEDS BY ELIG CLIN: HCPCS | Performed by: NURSE PRACTITIONER

## 2019-09-03 PROCEDURE — 3023F SPIROM DOC REV: CPT | Performed by: NURSE PRACTITIONER

## 2019-09-03 PROCEDURE — 1123F ACP DISCUSS/DSCN MKR DOCD: CPT | Performed by: NURSE PRACTITIONER

## 2019-09-03 PROCEDURE — G8599 NO ASA/ANTIPLAT THER USE RNG: HCPCS | Performed by: NURSE PRACTITIONER

## 2019-09-03 PROCEDURE — 1036F TOBACCO NON-USER: CPT | Performed by: NURSE PRACTITIONER

## 2019-09-03 PROCEDURE — 4040F PNEUMOC VAC/ADMIN/RCVD: CPT | Performed by: NURSE PRACTITIONER

## 2019-09-03 PROCEDURE — 3017F COLORECTAL CA SCREEN DOC REV: CPT | Performed by: NURSE PRACTITIONER

## 2019-09-03 PROCEDURE — 1090F PRES/ABSN URINE INCON ASSESS: CPT | Performed by: NURSE PRACTITIONER

## 2019-09-03 PROCEDURE — G8417 CALC BMI ABV UP PARAM F/U: HCPCS | Performed by: NURSE PRACTITIONER

## 2019-09-03 PROCEDURE — G8926 SPIRO NO PERF OR DOC: HCPCS | Performed by: NURSE PRACTITIONER

## 2019-09-03 PROCEDURE — 99213 OFFICE O/P EST LOW 20 MIN: CPT | Performed by: NURSE PRACTITIONER

## 2019-09-03 PROCEDURE — G8399 PT W/DXA RESULTS DOCUMENT: HCPCS | Performed by: NURSE PRACTITIONER

## 2019-09-04 NOTE — PROGRESS NOTES
Inability: Not on file    Transportation needs:     Medical: Not on file     Non-medical: Not on file   Tobacco Use    Smoking status: Never Smoker    Smokeless tobacco: Never Used   Substance and Sexual Activity    Alcohol use: No    Drug use: No    Sexual activity: Not on file   Lifestyle    Physical activity:     Days per week: Not on file     Minutes per session: Not on file    Stress: Not on file   Relationships    Social connections:     Talks on phone: Not on file     Gets together: Not on file     Attends [employer] service: Not on file     Active member of club or organization: Not on file     Attends meetings of clubs or organizations: Not on file     Relationship status: Not on file    Intimate partner violence:     Fear of current or ex partner: Not on file     Emotionally abused: Not on file     Physically abused: Not on file     Forced sexual activity: Not on file   Other Topics Concern    Not on file   Social History Narrative    Not on file       Current Medications:  Current Outpatient Medications   Medication Sig Dispense Refill    levothyroxine (SYNTHROID) 25 MCG tablet TAKE ONE TABLET BY MOUTH ONCE DAILY **CALL  FOR  APPOINTMENT  BEFORE  ANY  OTHER  REFILLS** 30 tablet 2    amLODIPine (NORVASC) 10 MG tablet TAKE 1 TABLET BY MOUTH ONCE DAILY 90 tablet 1    mometasone (NASONEX) 50 MCG/ACT nasal spray 2 sprays by Nasal route daily 1 spray each nostril daily      ranitidine (ZANTAC) 150 MG tablet Take 150 mg by mouth 2 times daily      valACYclovir (VALTREX) 1 g tablet Take 1,000 mg by mouth daily      albuterol sulfate HFA (PROAIR HFA) 108 (90 Base) MCG/ACT inhaler Inhale 2 puffs into the lungs 2 times daily as needed for Wheezing (no more than 4 times daily)      montelukast (SINGULAIR) 10 MG tablet Take 10 mg by mouth nightly      budesonide-formoterol (SYMBICORT) 160-4.5 MCG/ACT AERO Inhale 2 puffs into the lungs 2 times daily      rifaximin (XIFAXAN) 550 MG tablet Take 550 mg Lifestyle Change   [] Jumpstart your Activity Plan   [] Make Social Cues Work for Jeronimo Rodriguez   [] Ways to Stay Motivated   [] Preparing for Long Term Self-Management   [] More Volume, Fewer Calories   [] Balance Your Thoughts   [] Strengthen your Exercise Program   [] Mindful Eating   [] Stress and Time Managment   [x] Standing Up for Your Health   [] Heart Health   [] Stretching:  The Truth About Flexibility   [] Looking Back and Looking Forward    Total time:  90 minutes, greater than 50% of visit spent in group counseling/education. Assessment:       Diagnosis Orders   1. Essential hypertension     2. Pure hypercholesterolemia     3. Stress incontinence of urine     4. Cardiac arrhythmia, unspecified cardiac arrhythmia type     5. Chronic obstructive pulmonary disease, unspecified COPD type ([facility]     6. Anxiety     7. Sleep apnea, unspecified type     8. Hypothyroidism, unspecified type     9. Mild persistent asthma without complication     10. History of stroke     6. Pulmonary hypertension ([facility]     12. Bipolar 1 disorder ([facility]     13. Obesity (BMI 30-39. 9)         Plan:      Track food and weight. Return to clinic as per group medical appointment schedule. Follow-up:  Return in about 1 month (around 10/3/2019). Orders:  No orders of the defined types were placed in this encounter. Prescriptions:  No orders of the defined types were placed in this encounter.       Electronically signed by:  Jelena Keen CNP

## 2019-09-13 ENCOUNTER — TELEPHONE (OUTPATIENT)
Dept: INTERNAL MEDICINE | Age: 64
End: 2019-09-13

## 2019-09-16 ENCOUNTER — TELEPHONE (OUTPATIENT)
Dept: BARIATRICS/WEIGHT MGMT | Age: 64
End: 2019-09-16

## 2019-09-16 NOTE — TELEPHONE ENCOUNTER
Pt called to request if her GLB homework has been located; I talked with her last week and informed her that it has not been found; I informed her today that it still has not been found and most likely has been lost; she requested to talk to sherry and I offered to transfer her to [facility] but she did not want to do that; she is requesting that sherry and the  call her back please

## 2019-09-25 ENCOUNTER — OFFICE VISIT (OUTPATIENT)
Dept: INTERNAL MEDICINE | Age: 64
End: 2019-09-25
Payer: MEDICARE

## 2019-09-25 ENCOUNTER — CARE COORDINATION (OUTPATIENT)
Dept: CARE COORDINATION | Age: 64
End: 2019-09-25

## 2019-09-25 VITALS
HEART RATE: 102 BPM | HEIGHT: 64 IN | WEIGHT: 196 LBS | DIASTOLIC BLOOD PRESSURE: 78 MMHG | BODY MASS INDEX: 33.46 KG/M2 | SYSTOLIC BLOOD PRESSURE: 126 MMHG

## 2019-09-25 DIAGNOSIS — S82.891A CLOSED FRACTURE OF RIGHT ANKLE, INITIAL ENCOUNTER: ICD-10-CM

## 2019-09-25 DIAGNOSIS — F31.9 BIPOLAR 1 DISORDER (HCC): ICD-10-CM

## 2019-09-25 DIAGNOSIS — E03.9 HYPOTHYROIDISM, UNSPECIFIED TYPE: ICD-10-CM

## 2019-09-25 DIAGNOSIS — E78.00 PURE HYPERCHOLESTEROLEMIA: ICD-10-CM

## 2019-09-25 DIAGNOSIS — I10 ESSENTIAL HYPERTENSION: Primary | ICD-10-CM

## 2019-09-25 DIAGNOSIS — G47.33 OSA (OBSTRUCTIVE SLEEP APNEA): ICD-10-CM

## 2019-09-25 DIAGNOSIS — J45.30 MILD PERSISTENT ASTHMA WITHOUT COMPLICATION: ICD-10-CM

## 2019-09-25 PROCEDURE — 3017F COLORECTAL CA SCREEN DOC REV: CPT | Performed by: INTERNAL MEDICINE

## 2019-09-25 PROCEDURE — 4040F PNEUMOC VAC/ADMIN/RCVD: CPT | Performed by: INTERNAL MEDICINE

## 2019-09-25 PROCEDURE — G8427 DOCREV CUR MEDS BY ELIG CLIN: HCPCS | Performed by: INTERNAL MEDICINE

## 2019-09-25 PROCEDURE — G8417 CALC BMI ABV UP PARAM F/U: HCPCS | Performed by: INTERNAL MEDICINE

## 2019-09-25 PROCEDURE — 1090F PRES/ABSN URINE INCON ASSESS: CPT | Performed by: INTERNAL MEDICINE

## 2019-09-25 PROCEDURE — G8399 PT W/DXA RESULTS DOCUMENT: HCPCS | Performed by: INTERNAL MEDICINE

## 2019-09-25 PROCEDURE — 1036F TOBACCO NON-USER: CPT | Performed by: INTERNAL MEDICINE

## 2019-09-25 PROCEDURE — 99211 OFF/OP EST MAY X REQ PHY/QHP: CPT | Performed by: INTERNAL MEDICINE

## 2019-09-25 PROCEDURE — 1123F ACP DISCUSS/DSCN MKR DOCD: CPT | Performed by: INTERNAL MEDICINE

## 2019-09-25 PROCEDURE — 99213 OFFICE O/P EST LOW 20 MIN: CPT | Performed by: INTERNAL MEDICINE

## 2019-09-25 PROCEDURE — G8599 NO ASA/ANTIPLAT THER USE RNG: HCPCS | Performed by: INTERNAL MEDICINE

## 2019-09-25 RX ORDER — ALBUTEROL SULFATE 90 UG/1
2 AEROSOL, METERED RESPIRATORY (INHALATION) 2 TIMES DAILY PRN
Qty: 1 INHALER | Refills: 3 | Status: SHIPPED | OUTPATIENT
Start: 2019-09-25 | End: 2021-10-13

## 2019-09-25 RX ORDER — AMLODIPINE BESYLATE 10 MG/1
TABLET ORAL
Qty: 90 TABLET | Refills: 1 | Status: SHIPPED | OUTPATIENT
Start: 2019-09-25 | End: 2020-01-10 | Stop reason: SDUPTHER

## 2019-09-25 RX ORDER — LEVOTHYROXINE SODIUM 0.03 MG/1
TABLET ORAL
Qty: 30 TABLET | Refills: 3 | Status: SHIPPED | OUTPATIENT
Start: 2019-09-25 | End: 2020-01-10 | Stop reason: SDUPTHER

## 2019-09-25 NOTE — PATIENT INSTRUCTIONS
Medications e-scribe to pharmacy of pt's choice. Patient will be contacted to schedule their next appointment.     JOHNATHON

## 2019-09-25 NOTE — PROGRESS NOTES
[Ibuprofen]     Aspirin Rash    Blue Dyes (Parenteral) Rash    Hctz [Hydrochlorothiazide] Rash     Fixed drug reaction    Sulfa Antibiotics Rash    Tetracyclines & Related Rash       MEDICATIONS:      Current Outpatient Medications on File Prior to Visit   Medication Sig Dispense Refill    levothyroxine (SYNTHROID) 25 MCG tablet TAKE ONE TABLET BY MOUTH ONCE DAILY **CALL  FOR  APPOINTMENT  BEFORE  ANY  OTHER  REFILLS** 30 tablet 2    amLODIPine (NORVASC) 10 MG tablet TAKE 1 TABLET BY MOUTH ONCE DAILY 90 tablet 1    mometasone (NASONEX) 50 MCG/ACT nasal spray 2 sprays by Nasal route daily 1 spray each nostril daily      ranitidine (ZANTAC) 150 MG tablet Take 150 mg by mouth 2 times daily      valACYclovir (VALTREX) 1 g tablet Take 1,000 mg by mouth daily      albuterol sulfate HFA (PROAIR HFA) 108 (90 Base) MCG/ACT inhaler Inhale 2 puffs into the lungs 2 times daily as needed for Wheezing (no more than 4 times daily)      montelukast (SINGULAIR) 10 MG tablet Take 10 mg by mouth nightly      budesonide-formoterol (SYMBICORT) 160-4.5 MCG/ACT AERO Inhale 2 puffs into the lungs 2 times daily      rifaximin (XIFAXAN) 550 MG tablet Take 550 mg by mouth every 8 hours      acetaminophen (TYLENOL) 325 MG tablet Take 2 tablets by mouth every 6 hours as needed for Pain 60 tablet 0    FLUoxetine (PROZAC) 40 MG capsule Take 40 mg by mouth daily      ipratropium (ATROVENT) 0.03 % nasal spray 2 sprays by Nasal route 3 times daily as needed for Rhinitis       lithium 300 MG capsule Take 300 mg by mouth 2 times daily (with meals).  QUEtiapine (SEROQUEL) 300 MG tablet Take 300 mg by mouth 3 times daily        No current facility-administered medications on file prior to visit. SOCIAL HISTORY    Reviewed and no change from previous record. Dulce  reports that she has never smoked.  She has never used smokeless tobacco.    FAMILY HISTORY:    Reviewed and No change from previous visit    [address] DUE:      Health Maintenance Due   Topic Date Due    DTaP/Tdap/Td vaccine (1 - Tdap) 04/22/1972    Shingles Vaccine (1 of 2) 04/22/2003    Pneumococcal 65+ years Vaccine (1 of 2 - PCV13) 04/22/2018    Annual Wellness Visit (AWV)  05/29/2019    Flu vaccine (1) 09/01/2019       REVIEW OF SYSTEMS:    12 point review of symptoms completed and found to be normal except noted in the HPI    Review of Systems     PHYSICAL EXAM:     Vitals:    09/25/19 1153   BP: 126/78   Site: Left Upper Arm   Position: Sitting   Cuff Size: Medium Adult   Pulse: 102   Weight: 196 lb (88.9 kg)   Height: 5' 4\" (1.626 m)     Body mass index is 33.64 kg/m². BP Readings from Last 3 Encounters:   09/25/19 126/78   09/03/19 128/84   08/06/19 132/80        Wt Readings from Last 3 Encounters:   09/25/19 196 lb (88.9 kg)   09/03/19 194 lb (88 kg)   08/06/19 195 lb (88.5 kg)       Physical Exam      HENT: Normocephalic, Atraumatic, Bilateral external ears normal, Oropharynx moist, No oral exudates, Nose normal. Neck- Normal range of motion, No tenderness, Supple, No stridor. Eyes:  PERRL, EOMI, Conjunctiva normal, No discharge. Respiratory:  Normalbreath sounds, No respiratory distress, No wheezing, No chest tenderness. Cardiovascular:  Normal heart rate, Normal rhythm, No murmurs, No rubs, No gallops. GI:  Bowel sounds normal, Soft, No tenderness, No masses, No pulsatile masses. :  External genitalia appear normal, No masses orlesions. No discharge. No CVA tenderness. Musculoskeletal:  Intact distal pulses, No edema, No tenderness, No cyanosis, No clubbing. Good range of motion in all major joints. No tenderness to palpation or major deformities noted. Back- Notenderness. Integument:  Warm, Dry, No erythema, No rash. Lymphatic:  No lymphadenopathy noted. Neurologic:  Alert & oriented x 3, Normal motor function, Normal sensory function, No focal deficits noted.    Psychiatric:  Affect normal, Judgmentnormal, Mood normal.

## 2019-09-27 NOTE — CARE COORDINATION
Ambulatory Care Coordination Note  2019  CM Risk Score: 3  Charlson 10 Year Mortality Risk Score: 47%     ACC: Mellisa Pillai RN    Summary Note: Met with patient during office visit. She is here for hospital follow up. She had been hospitalized at [facility] after a fall, with a fractured ankle. She said she had slipped on some milk that she had poured for her cat. She had it repaired and has a splint on currently. She was supposed to have home care, lovenox for 28 days, and non weight bearing. The patient states she has not had any lovenox; home care has not started; and she is walking on her foot, using a rollator. She also doesn't know when she is supposed to follow up with the ortho surgeon. She also states that she is currently being treated, for cellulitis on her left leg per her dermatologist.   Called patient's pulmonology office to get last progress note faxed over. Patient related that she had had the police out to her house for a safety check because her neighbors called them. She stated \"I had a cat that , and I cremated it myself. They probably wanted to make sure I was ok\". [place]er[place] regarding referral. They informed [facility] that they had tried to call the patient several times and went by the house with no success. Verified phone number with them. They had the wrong address. They will need a new order if home care is still needed. Explained that the patient still needs and will obtain and order and fax it to them. Order obtained and signed by provider. Faxed to [facility]. They will call her and try to see her tomorrow or the next day. Called the [facility] ortho clinic to schedule a follow up appt. Informed them that the patient never received the lovenox, does not have home care and has not been compliant with the non weight bearing status. Appointment made for 19 at 0830. Called patient and informed her of appt for . Gave her office location and phone number.

## 2019-11-20 ENCOUNTER — TELEPHONE (OUTPATIENT)
Dept: INTERNAL MEDICINE | Age: 64
End: 2019-11-20

## 2019-11-21 ENCOUNTER — CARE COORDINATION (OUTPATIENT)
Dept: CARE COORDINATION | Age: 64
End: 2019-11-21

## 2019-11-29 ENCOUNTER — TELEPHONE (OUTPATIENT)
Dept: INTERNAL MEDICINE | Age: 64
End: 2019-11-29

## 2019-11-29 ENCOUNTER — CARE COORDINATION (OUTPATIENT)
Dept: CARE COORDINATION | Age: 64
End: 2019-11-29

## 2019-11-29 DIAGNOSIS — S82.891A CLOSED FRACTURE OF RIGHT ANKLE, INITIAL ENCOUNTER: Primary | ICD-10-CM

## 2019-12-26 RX ORDER — BUDESONIDE AND FORMOTEROL FUMARATE DIHYDRATE 160; 4.5 UG/1; UG/1
2 AEROSOL RESPIRATORY (INHALATION) 2 TIMES DAILY
Qty: 1 INHALER | Refills: 3 | Status: SHIPPED | OUTPATIENT
Start: 2019-12-26 | End: 2022-02-08 | Stop reason: SDUPTHER

## 2020-01-10 ENCOUNTER — OFFICE VISIT (OUTPATIENT)
Dept: INTERNAL MEDICINE | Age: 65
End: 2020-01-10
Payer: MEDICARE

## 2020-01-10 VITALS
HEIGHT: 64 IN | WEIGHT: 196 LBS | HEART RATE: 76 BPM | SYSTOLIC BLOOD PRESSURE: 142 MMHG | DIASTOLIC BLOOD PRESSURE: 84 MMHG | BODY MASS INDEX: 33.46 KG/M2

## 2020-01-10 PROCEDURE — G8399 PT W/DXA RESULTS DOCUMENT: HCPCS | Performed by: INTERNAL MEDICINE

## 2020-01-10 PROCEDURE — 4040F PNEUMOC VAC/ADMIN/RCVD: CPT | Performed by: INTERNAL MEDICINE

## 2020-01-10 PROCEDURE — G8484 FLU IMMUNIZE NO ADMIN: HCPCS | Performed by: INTERNAL MEDICINE

## 2020-01-10 PROCEDURE — 1123F ACP DISCUSS/DSCN MKR DOCD: CPT | Performed by: INTERNAL MEDICINE

## 2020-01-10 PROCEDURE — 1090F PRES/ABSN URINE INCON ASSESS: CPT | Performed by: INTERNAL MEDICINE

## 2020-01-10 PROCEDURE — 3017F COLORECTAL CA SCREEN DOC REV: CPT | Performed by: INTERNAL MEDICINE

## 2020-01-10 PROCEDURE — 99211 OFF/OP EST MAY X REQ PHY/QHP: CPT | Performed by: INTERNAL MEDICINE

## 2020-01-10 PROCEDURE — 99213 OFFICE O/P EST LOW 20 MIN: CPT | Performed by: INTERNAL MEDICINE

## 2020-01-10 PROCEDURE — G8427 DOCREV CUR MEDS BY ELIG CLIN: HCPCS | Performed by: INTERNAL MEDICINE

## 2020-01-10 PROCEDURE — G8417 CALC BMI ABV UP PARAM F/U: HCPCS | Performed by: INTERNAL MEDICINE

## 2020-01-10 PROCEDURE — 1036F TOBACCO NON-USER: CPT | Performed by: INTERNAL MEDICINE

## 2020-01-10 RX ORDER — QUETIAPINE FUMARATE 200 MG/1
200 TABLET, FILM COATED ORAL DAILY
COMMUNITY

## 2020-01-10 RX ORDER — LEVOTHYROXINE SODIUM 0.03 MG/1
TABLET ORAL
Qty: 30 TABLET | Refills: 3 | Status: SHIPPED | OUTPATIENT
Start: 2020-01-10 | End: 2020-03-22

## 2020-01-10 RX ORDER — AMLODIPINE BESYLATE 10 MG/1
TABLET ORAL
Qty: 90 TABLET | Refills: 1 | Status: SHIPPED | OUTPATIENT
Start: 2020-01-10 | End: 2020-03-22

## 2020-01-10 NOTE — PATIENT INSTRUCTIONS
-Pt due for 3 month f/u in April-- pt to call in March to set up an appt--reminder in [facility] to contact patient as well--AVS given to patient    -[facility] records requested--Jayshree

## 2020-01-10 NOTE — PROGRESS NOTES
[facility]    Progress Note    Date of patient's visit: 1/10/2020    Patient's Name:  Sue Keys    YOB: 1955            Patient Care Team:  Fozia Vaz MD as PCP - Giovanna Briceno MD as PCP - [facility] Empaneled Provider  Aryan Ugarte MD as Consulting Physician (Gastroenterology)  Janett Jonas MD as Consulting Physician (Pulmonology)  Ricki Ayon MD as Surgeon (Orthopedic Surgery)    REASON FOR VISIT: Routine outpatient follow     Chief Complaint   Patient presents with    Hypertension    Medication Refill    Health Maintenance     vaccines refused          HISTORY OF PRESENT ILLNESS:    History was obtained from the patient. Sue Keys is a 75 y.o. is here for follow up. . she had right ankle fracture and had surgery at [facility] in September. She is now in a walking boot. She is requesting narcotics though it is 3 months out since fracture. She is seeing Pain management. She is scheduled for lumbar block. She has been following up with Dermatology. She is on keflex and Valtrex since 12/26/2019. She states she had cellulitis of both lower extremities. It is improving per patient. She continues to follow up with psychiatry. She also follows up with Pulmonologist for asthma and XAVIER.            Past Medical History:   Diagnosis Date    Anxiety     Arrhythmia     Asthma     Bipolar disorder ([address]     Depression     GERD (gastroesophageal reflux disease)     GERD (gastroesophageal reflux disease)     Hyperlipidemia     Hypertension     Hypothyroidism     OAB (overactive bladder)     Osteoarthritis     Pulmonary fibrosis (HCC)     sjogrens syndrome    Pulmonary hypertension ([address]     Stroke ([address]     Unspecified sleep apnea     on cpap    Urinary incontinence        Past Surgical History:   Procedure Laterality Date    COLONOSCOPY  04/2015    COLONOSCOPY  2018             ALLERGIES

## 2020-01-12 ASSESSMENT — ENCOUNTER SYMPTOMS
SHORTNESS OF BREATH: 0
BACK PAIN: 1
CONSTIPATION: 0
WHEEZING: 0
COUGH: 0
ABDOMINAL PAIN: 0

## 2020-02-14 ENCOUNTER — OFFICE VISIT (OUTPATIENT)
Dept: INTERNAL MEDICINE | Age: 65
End: 2020-02-14
Payer: MEDICARE

## 2020-02-14 VITALS — DIASTOLIC BLOOD PRESSURE: 84 MMHG | HEART RATE: 79 BPM | SYSTOLIC BLOOD PRESSURE: 131 MMHG

## 2020-02-14 PROCEDURE — 1036F TOBACCO NON-USER: CPT | Performed by: INTERNAL MEDICINE

## 2020-02-14 PROCEDURE — G8417 CALC BMI ABV UP PARAM F/U: HCPCS | Performed by: INTERNAL MEDICINE

## 2020-02-14 PROCEDURE — 99211 OFF/OP EST MAY X REQ PHY/QHP: CPT | Performed by: INTERNAL MEDICINE

## 2020-02-14 PROCEDURE — G8427 DOCREV CUR MEDS BY ELIG CLIN: HCPCS | Performed by: INTERNAL MEDICINE

## 2020-02-14 PROCEDURE — 3017F COLORECTAL CA SCREEN DOC REV: CPT | Performed by: INTERNAL MEDICINE

## 2020-02-14 PROCEDURE — 1123F ACP DISCUSS/DSCN MKR DOCD: CPT | Performed by: INTERNAL MEDICINE

## 2020-02-14 PROCEDURE — G8399 PT W/DXA RESULTS DOCUMENT: HCPCS | Performed by: INTERNAL MEDICINE

## 2020-02-14 PROCEDURE — 4040F PNEUMOC VAC/ADMIN/RCVD: CPT | Performed by: INTERNAL MEDICINE

## 2020-02-14 PROCEDURE — 1090F PRES/ABSN URINE INCON ASSESS: CPT | Performed by: INTERNAL MEDICINE

## 2020-02-14 PROCEDURE — G8484 FLU IMMUNIZE NO ADMIN: HCPCS | Performed by: INTERNAL MEDICINE

## 2020-02-14 PROCEDURE — 99213 OFFICE O/P EST LOW 20 MIN: CPT | Performed by: INTERNAL MEDICINE

## 2020-02-14 RX ORDER — CEPHALEXIN 500 MG/1
500 CAPSULE ORAL 4 TIMES DAILY
COMMUNITY
End: 2020-05-15 | Stop reason: ALTCHOICE

## 2020-02-14 RX ORDER — FAMOTIDINE 20 MG/1
20 TABLET, FILM COATED ORAL 2 TIMES DAILY
COMMUNITY
End: 2020-10-12 | Stop reason: ALTCHOICE

## 2020-02-14 ASSESSMENT — ENCOUNTER SYMPTOMS
COUGH: 0
SHORTNESS OF BREATH: 0
WHEEZING: 0
BLOOD IN STOOL: 0
DIARRHEA: 1
NAUSEA: 0
ABDOMINAL PAIN: 0
BACK PAIN: 1

## 2020-02-14 NOTE — PATIENT INSTRUCTIONS
Return To Clinic around 5/14/2020 for 3 month follow up. Patient was added to wait list. Patient will be contacted by office to schedule upcoming appointment with the physician. After Visit Summary given and reviewed. The medication list included in this document is our record of what you are currently taking, including any changes that were made at today's visit. If you find any differences when compared to your medications at home, or have any questions that were not answered at your visit, please contact the office. It is very important for your care that you keep your appointment. If for some reason you are unable to keep your appointment, it is equally important that you call our office at [phone] to cancel your appointment and reschedule. Failure to do so may result in your termination from our practice. LABORATORY INSTRUCTIONS    Your doctor has ordered a stool sample. You can get this testing done at the Lab located on the first floor of the [facility], or at any other [facility]. Please stop at Main Registration, before going to the lab, as you must be registered first.     Please get this done before your next visit. TESTING INSTRUCTIONS    Your doctor has ordered a/an Ultrasound for you, this will be done at any [facility] location of your choice    You will be called by the Scheduling Department to schedule your testing. If you do not hear from them within a week, please call them at [phone] to schedule the appointment. On the day of your appointment, please report to the Admitting Department, located on the main floor of the [facility] behind the information desk. If you cannot keep this appointment, please call [phone] to cancel and reschedule your appointment. Referral for Infectious Disease sent to Meredith Burton. Specialty office will contact patient for appointment.  A copy of referral with contact information and

## 2020-02-14 NOTE — PROGRESS NOTES
[facility]    Progress Note    Date of patient's visit: 2/14/2020    Patient's Name:  Katy Gillette    YOB: 1955            Patient Care Team:  Duncan Gallo MD as PCP - Hortencia Rivera MD as PCP - [facility]  Boubacar Helm MD as Consulting Physician (Gastroenterology)  Krish Patton MD as Consulting Physician (Pulmonology)  Emigdio Angel MD as Surgeon (Orthopedic Surgery)    REASON FOR VISIT: Routine outpatient follow     Chief Complaint   Patient presents with    Back Pain    Abdominal Pain    Health Maintenance     mammogram pended, vaccines declined     Follow-Up from Hospital     pt states they sent to to [facility] from therapy yesterday because they thought she had a blood clot in her leg--         HISTORY OF PRESENT ILLNESS:    History was obtained from the patient. Katy Gillette is a 75 y.o. is here for follow-up. She continues to have pain in the right leg. She says the ankle fracture is still not healed. She is following up with orthopedics at [facility]. She still has a walking boot on. That yesterday while she was in therapy she was noted to have some swelling in the back of her leg and she was sent to the emergency room at [facility]. She is states an ultrasound was done but no venous embolism noted. She was discharged home. She also had some x-rays of her back as she had slipped and fallen a few days ago. She has chronic low back pain. She denies any fractures noted on the x-ray yesterday. She follows up with pain management at [facility]. She is still on Keflex and Valtrex. She states she has not seen a dermatologist in some time. She has been on these antibiotics for the last 2 months now. She is also complaining of diarrhea for the last 2 to 3 weeks. She is having 3-4 loose stools. Some abdominal discomfort cramping.   Previously she had seen GI for diarrhea and it had resolved with some treatment which she is unable to elaborate. No fever or chills. No blood in her stools.                Past Medical History:   Diagnosis Date    Anxiety     Arrhythmia     Asthma     [address] disorder [address] [address]     Depression     GERD (gastroesophageal reflux disease)     GERD (gastroesophageal reflux disease)     Hyperlipidemia     Hypertension     Hypothyroidism     OAB (overactive bladder)     Osteoarthritis     Pulmonary fibrosis (HCC)     sjogrens syndrome    Pulmonary hypertension (HCC)     Stroke ([facility]     Unspecified sleep apnea     on cpap    Urinary incontinence        Past Surgical History:   Procedure Laterality Date    COLONOSCOPY  04/2015    COLONOSCOPY  2018             ALLERGIES      Allergies   Allergen Reactions    Motrin [Ibuprofen]     Aspirin Rash    Blue Dyes (Parenteral) Rash    Hctz [Hydrochlorothiazide] Rash     Fixed drug reaction    Sulfa Antibiotics Rash    Tetracyclines & Related Rash       MEDICATIONS:      Current Outpatient Medications on File Prior to Visit   Medication Sig Dispense Refill    cephALEXin (KEFLEX) 500 MG capsule Take 500 mg by mouth 4 times daily      famotidine (PEPCID) 20 MG tablet Take 20 mg by mouth 2 times daily      mirabegron (MYRBETRIQ) 50 MG TB24 Take 50 mg by mouth daily Pt gets samples      QUEtiapine (SEROQUEL) 200 MG tablet Take 200 mg by mouth 3 times daily      amLODIPine (NORVASC) 10 MG tablet Once daily 90 tablet 1    levothyroxine (SYNTHROID) 25 MCG tablet TAKE ONE TABLET BY MOUTH ONCE DAILY **CALL  FOR  APPOINTMENT  BEFORE  ANY  OTHER  REFILLS** 30 tablet 3    budesonide-formoterol (SYMBICORT) 160-4.5 MCG/ACT AERO Inhale 2 puffs into the lungs 2 times daily 1 Inhaler 3    albuterol sulfate HFA (PROAIR HFA) 108 (90 Base) MCG/ACT inhaler Inhale 2 puffs into the lungs 2 times daily as needed for Wheezing (no more than 4 times daily) 1 Inhaler 3    mometasone (NASONEX) 50 MCG/ACT nasal spray 2 sprays by Nasal route and headaches. Hematological: Negative for adenopathy. Psychiatric/Behavioral: Positive for dysphoric mood. The patient is nervous/anxious. PHYSICAL EXAM:     Vitals:    02/14/20 1149   BP: 131/84   Site: Left Upper Arm   Position: Sitting   Cuff Size: Large Adult   Pulse: 79     There is no height or weight on file to calculate BMI. BP Readings from Last 3 Encounters:   02/14/20 131/84   01/10/20 (!) 142/84   09/25/19 126/78        Wt Readings from Last 3 Encounters:   01/10/20 196 lb (88.9 kg)   09/25/19 196 lb (88.9 kg)   09/03/19 194 lb (88 kg)       Physical Exam  Vitals signs and nursing note reviewed. Constitutional:       Appearance: Normal appearance. HENT:      Head: Normocephalic and atraumatic. Mouth/Throat:      Mouth: Mucous membranes are moist.      Pharynx: Oropharynx is clear. Eyes:      Extraocular Movements: Extraocular movements intact. Conjunctiva/sclera: Conjunctivae normal.      Pupils: Pupils are equal, round, and reactive to light. Cardiovascular:      Rate and Rhythm: Normal rate and regular rhythm. Heart sounds: No murmur. Pulmonary:      Effort: Pulmonary effort is normal.      Breath sounds: Normal breath sounds. No wheezing or rales. Abdominal:      General: Bowel sounds are normal.      Palpations: Abdomen is soft. Musculoskeletal:         General: Swelling and tenderness present. Right lower leg: Edema present. Left lower leg: No edema. Comments: Right leg posteriorly has a indurated lump about 3 cm in size. Warm. Non tender. No fluctuance. Skin:     General: Skin is warm and dry. Findings: No rash. Neurological:      Mental Status: She is alert.                LABORATORY FINDINGS:    CBC:  Lab Results   Component Value Date    WBC 5.6 09/18/2018    HGB 11.5 09/18/2018     09/18/2018     05/02/2012     BMP:    Lab Results   Component Value Date     06/26/2019    K 4.1 06/26/2019

## 2020-02-21 ENCOUNTER — HOSPITAL ENCOUNTER (OUTPATIENT)
Dept: VASCULAR LAB | Age: 65
Discharge: HOME | End: 2020-02-21
Payer: MEDICARE

## 2020-02-21 ENCOUNTER — HOSPITAL ENCOUNTER (OUTPATIENT)
Age: 65
Discharge: HOME | End: 2020-02-21
Payer: MEDICARE

## 2020-02-21 PROCEDURE — 87449 NOS EACH ORGANISM AG IA: CPT

## 2020-02-21 PROCEDURE — 87324 CLOSTRIDIUM AG IA: CPT

## 2020-02-21 PROCEDURE — 93971 EXTREMITY STUDY: CPT

## 2020-02-22 LAB
C DIFF AG + TOXIN: NEGATIVE
SPECIMEN DESCRIPTION: NORMAL

## 2020-03-11 PROBLEM — L03.90 CELLULITIS: Status: ACTIVE | Noted: 2020-03-11

## 2020-03-11 NOTE — PROGRESS NOTES
bilaterally, greater on Rt. There is no induration or erythema. She does have generalized edema            There is no evidence of active cellulitis or herpetic outbreak. We discussed the nature of PVD and how that can affect the coloration of the skin. Pt is advised to stop taking the antibiotics and antiviral medication. Labs have been ordered to rule out osteomyelitis     DISCUSSION:    · 74 yo woman s/p Rt ankle fracture s/p fixation  · Developed increased skin changes post operatively  · Has been being treated with Keflex and Valtrex x 4 mos  · Most recently started on Azithromycin x 10 days  · On exam there is no evidence of cellulitis, however there is significant PVD and skin changes related to it to venous stasis. There is also chronic leg edema secondary to prior fracture and surgical correction of the fracture. PLAN:   · Stop Keflex  · Stop Valtrex  · Stop Azithromycin  · Check ESR, CRP, CBC  · Compression stockings daily  · Will request microbiology results from [facility]  · X rays obtained to exclude underlying osteomyelitis. Fortunately, no osteomyelitis noted. 3-12-20: I have personally reviewed the past medical history, past surgical history, medications, social history, and family history, and I have updated the database accordingly. Past Medical History:     Past Medical History:   Diagnosis Date    Anxiety     Arrhythmia     Asthma     Bipolar 1 disorder ([facility] 2/14/2019    Bipolar disorder ([facility]     Chronic diarrhea 2/14/2019    COPD (chronic obstructive pulmonary disease) (HCC)     Depression     Essential hypertension     GERD (gastroesophageal reflux disease)     GERD (gastroesophageal reflux disease)     History of stroke 8/9/2018    Hyperlipidemia     Hypertension     Hypothyroidism     Mild persistent asthma without complication 4/91/0390    OAB (overactive bladder)     Obesity (BMI 30-39. 9) 8/19/2016    Osteoarthritis     Pulmonary fibrosis (HCC)     sjogrens

## 2020-03-12 ENCOUNTER — HOSPITAL ENCOUNTER (OUTPATIENT)
Age: 65
Discharge: HOME | End: 2020-03-14
Payer: MEDICARE

## 2020-03-12 ENCOUNTER — HOSPITAL ENCOUNTER (OUTPATIENT)
Dept: GENERAL RADIOLOGY | Age: 65
Discharge: HOME | End: 2020-03-14
Payer: MEDICARE

## 2020-03-12 ENCOUNTER — HOSPITAL ENCOUNTER (OUTPATIENT)
Age: 65
Discharge: HOME | End: 2020-03-12
Payer: MEDICARE

## 2020-03-12 ENCOUNTER — OFFICE VISIT (OUTPATIENT)
Dept: INFECTIOUS DISEASES | Age: 65
End: 2020-03-12
Payer: MEDICARE

## 2020-03-12 VITALS
WEIGHT: 207 LBS | BODY MASS INDEX: 35.34 KG/M2 | SYSTOLIC BLOOD PRESSURE: 121 MMHG | RESPIRATION RATE: 18 BRPM | OXYGEN SATURATION: 97 % | DIASTOLIC BLOOD PRESSURE: 78 MMHG | TEMPERATURE: 97.6 F | HEIGHT: 64 IN | HEART RATE: 91 BPM

## 2020-03-12 LAB
C-REACTIVE PROTEIN: 1.9 MG/L (ref 0–5)
SEDIMENTATION RATE, ERYTHROCYTE: 24 MM (ref 0–20)

## 2020-03-12 PROCEDURE — 73610 X-RAY EXAM OF ANKLE: CPT

## 2020-03-12 PROCEDURE — G8427 DOCREV CUR MEDS BY ELIG CLIN: HCPCS | Performed by: INTERNAL MEDICINE

## 2020-03-12 PROCEDURE — 87529 HSV DNA AMP PROBE: CPT

## 2020-03-12 PROCEDURE — G8484 FLU IMMUNIZE NO ADMIN: HCPCS | Performed by: INTERNAL MEDICINE

## 2020-03-12 PROCEDURE — 3017F COLORECTAL CA SCREEN DOC REV: CPT | Performed by: INTERNAL MEDICINE

## 2020-03-12 PROCEDURE — 85651 RBC SED RATE NONAUTOMATED: CPT

## 2020-03-12 PROCEDURE — 86140 C-REACTIVE PROTEIN: CPT

## 2020-03-12 PROCEDURE — 36415 COLL VENOUS BLD VENIPUNCTURE: CPT

## 2020-03-12 PROCEDURE — 1036F TOBACCO NON-USER: CPT | Performed by: INTERNAL MEDICINE

## 2020-03-12 PROCEDURE — 1090F PRES/ABSN URINE INCON ASSESS: CPT | Performed by: INTERNAL MEDICINE

## 2020-03-12 PROCEDURE — 1123F ACP DISCUSS/DSCN MKR DOCD: CPT | Performed by: INTERNAL MEDICINE

## 2020-03-12 PROCEDURE — G8399 PT W/DXA RESULTS DOCUMENT: HCPCS | Performed by: INTERNAL MEDICINE

## 2020-03-12 PROCEDURE — 4040F PNEUMOC VAC/ADMIN/RCVD: CPT | Performed by: INTERNAL MEDICINE

## 2020-03-12 PROCEDURE — 99203 OFFICE O/P NEW LOW 30 MIN: CPT | Performed by: INTERNAL MEDICINE

## 2020-03-12 PROCEDURE — G8417 CALC BMI ABV UP PARAM F/U: HCPCS | Performed by: INTERNAL MEDICINE

## 2020-03-12 NOTE — LETTER
updated the database accordingly. Past Medical History:     Past Medical History:   Diagnosis Date    Anxiety     Arrhythmia     Asthma     Bipolar 1 disorder ([facility] [address] 2/14/2019    B[facility]     Chronic diarrhea 2/14/2019    COPD [facility]chronic obstructive pulmonary disease) ([facility]     Depression     Essential hypertension     GERD (gastroesophageal reflux disease)     GERD (gastroesophageal reflux disease)     History of stroke 8/9/2018    Hyperlipidemia     Hypertension     Hypothyroidism     Mild persistent asthma without complication 4/44/4559    OAB (overactive bladder)     Obesity (BMI 30-39. 9) 8/19/2016    Osteoarthritis     Pulmonary fibrosis (HCC)     sjogrens syndrome    Pulmonary hypertension (HCC)     Pure hypercholesterolemia     Sleep apnea     Stroke [facility]     Unspecified sleep apnea     on cpap    Urinary incontinence        Past Surgical  History:     Past Surgical History:   Procedure Laterality Date    ANKLE SURGERY Right 09/11/2019    COLONOSCOPY  04/2015    COLONOSCOPY  2018           Medications:     Current Outpatient Medications:     cephALEXin (KEFLEX) 500 MG capsule, Take 500 mg by mouth 4 times daily, Disp: , Rfl:     famotidine (PEPCID) 20 MG tablet, Take 20 mg by mouth 2 times daily, Disp: , Rfl:     mirabegron (MYRBETRIQ) 50 MG TB24, Take 50 mg by mouth daily Pt gets samples, Disp: , Rfl:     QUEtiapine (SEROQUEL) 200 MG tablet, Take 200 mg by mouth 3 times daily, Disp: , Rfl:     amLODIPine (NORVASC) 10 MG tablet, Once daily, Disp: 90 tablet, Rfl: 1    levothyroxine (SYNTHROID) 25 MCG tablet, TAKE ONE TABLET BY MOUTH ONCE DAILY **CALL  FOR  APPOINTMENT  BEFORE  ANY  OTHER  REFILLS**, Disp: 30 tablet, Rfl: 3    budesonide-formoterol (SYMBICORT) 160-4.5 MCG/ACT AERO, Inhale 2 puffs into the lungs 2 times daily, Disp: 1 Inhaler, Rfl: 3    albuterol sulfate HFA (PROAIR HFA) 108 (90 Base) MCG/ACT inhaler, Inhale 2 puffs into the lungs 2 times daily as needed for Wheezing (no more than 4 times daily), Disp: 1 Inhaler, Rfl: 3    mometasone (NASONEX) 50 MCG/ACT nasal spray, 2 sprays by Nasal route daily 1 spray each nostril daily, Disp: , Rfl:     valACYclovir (VALTREX) 1 g tablet, Take 1,000 mg by mouth daily, Disp: , Rfl:     montelukast (SINGULAIR) 10 MG tablet, Take 10 mg by mouth nightly, Disp: , Rfl:     FLUoxetine (PROZAC) 40 MG capsule, Take 40 mg by mouth daily, Disp: , Rfl:     ipratropium (ATROVENT) 0.03 % nasal spray, 2 sprays by Nasal route 3 times daily as needed for Rhinitis , Disp: , Rfl:     lithium 300 MG capsule, Take 300 mg by mouth 2 times daily (with meals). , Disp: , Rfl:     acetaminophen (TYLENOL) 325 MG tablet, Take 2 tablets by mouth every 6 hours as needed for Pain (Patient not taking: Reported on 3/12/2020), Disp: 60 tablet, Rfl: 0     Social History:     Social History     Socioeconomic History    Marital status:       Spouse name: Not on file    Number of children: Not on file    Years of education: Not on file    Highest education level: Not on file   Occupational History    Not on file   Social Needs    Financial resource strain: Not on file    Food insecurity     Worry: Not on file     Inability: Not on file    Transportation needs     Medical: Not on file     Non-medical: Not on file   Tobacco Use    Smoking status: Never Smoker    Smokeless tobacco: Never Used   Substance and Sexual Activity    Alcohol use: No    Drug use: No    Sexual activity: Not on file   Lifestyle    Physical activity     Days per week: Not on file     Minutes per session: Not on file    Stress: Not on file   Relationships    Social connections     Talks on phone: Not on file     Gets together: Not on file     Attends Mu-ism service: Not on file     Active member of club or organization: Not on file     Attends meetings of clubs or organizations: Not on file ENT: Oropharynx clear, without erythema, exudate, or thrush. No tenderness of sinuses. Mouth/throat: mucosa pink and moist. No lesions. Dentition in good repair. Neck:Supple, without lymphadenopathy. Thyroid normal, No bruits. Pulmonary/Chest: Clear to auscultation, without wheezes, rales, or rhonchi. No dullness to percussion. Cardiovascular: Regular rate and rhythm without murmurs, rubs, or gallops. Abdomen: Soft, non tender. Bowel sounds normal. No organomegaly  All four Extremities: No cyanosis, clubbing, edema, or effusions. Neurologic: No gross sensory or motor deficits. Skin: Warm and dry with good turgor. With signs of peripheral arterial or venous insufficiency.     Medical Decision Making:   I have independently reviewed/ordered the following labs:    CBC with Differential:  Lab Results   Component Value Date    WBC 5.6 09/18/2018    WBC 7.6 11/10/2016    HGB 11.5 09/18/2018    HGB 11.8 11/10/2016    HCT 35.8 09/18/2018    HCT 36.3 11/10/2016     09/18/2018     11/10/2016     05/02/2012     12/08/2011    LYMPHOPCT 33 09/18/2018    LYMPHOPCT 39 04/13/2015    MONOPCT 15 09/18/2018    MONOPCT 10 04/13/2015     BMP:   Lab Results   Component Value Date     06/26/2019     05/21/2019    K 4.1 06/26/2019    K 4.0 05/21/2019     06/26/2019     05/21/2019    CO2 21 06/26/2019    CO2 23 05/21/2019    BUN 18 06/26/2019    BUN 13 05/21/2019    CREATININE 0.84 06/26/2019    CREATININE 0.73 05/21/2019     Hepatic Function Panel:  Lab Results   Component Value Date    PROT 7.9 06/26/2019    PROT 8.4 05/21/2019    LABALBU 4.5 06/26/2019    LABALBU 4.7 05/21/2019    LABALBU 4.8 05/02/2012    LABALBU 4.8 02/16/2012    BILIDIR 0.14 05/21/2019    BILIDIR 0.11 08/05/2014    IBILI 0.51 05/21/2019    IBILI NOT REPORTED 08/05/2014    BILITOT 0.31 06/26/2019    BILITOT 0.65 05/21/2019    ALKPHOS 92 06/26/2019    ALKPHOS 89 05/21/2019    ALT 36 06/26/2019 ALT 45 05/21/2019    AST 29 06/26/2019    AST 36 05/21/2019     No results found for: RPR  No results found for: HIV  No results found for: [facility]  Lab Results   Component Value Date    MUCUS NOT REPORTED 08/22/2019    RBC 4.06 09/18/2018    RBC 4.05 05/02/2012    TRICHOMONAS NOT REPORTED 08/22/2019    WBC 5.6 09/18/2018    YEAST NOT REPORTED 08/22/2019    TURBIDITY CLEAR 08/22/2019     Lab Results   Component Value Date    CREATININE 0.84 06/26/2019    GLUCOSE 86 06/26/2019    GLUCOSE 119 05/02/2012       Thank you for allowing us to participate in the care of this patient. Please call with questions. Delfino Frey MD  Pager: [phone] - Office: [phone]      If you have questions, please do not hesitate to call me. I look forward to following Debere along with you.     Sincerely,        Delfino Frey MD

## 2020-03-15 LAB
HSV BY PCR: NOT DETECTED
HSV SOURCE: NORMAL

## 2020-03-19 NOTE — TELEPHONE ENCOUNTER
Request for amlodipine, levothyroxine - medications pended. Please fill if appropriate. Next Visit Date:  Future Appointments   Date Time Provider Ekta Clarki   5/12/2020  2:00 PM Farhat Bauer MD INFT DISEASE [address] Maintenance   Topic Date Due    DTaP/Tdap/Td vaccine (1 - Tdap) 04/22/1972    Shingles Vaccine (1 of 2) 04/22/2003    Pneumococcal 65+ years Vaccine (1 of 1 - PPSV23) 04/22/2018    Annual Wellness Visit (AWV)  05/29/2019    Flu vaccine (1) 09/01/2019    Breast cancer screen  01/29/2020    TSH testing  05/21/2020    Potassium monitoring  06/26/2020    Creatinine monitoring  06/26/2020    Lipid screen  06/26/2024    Colon cancer screen colonoscopy  09/25/2028    DEXA (modify frequency per FRAX score)  Completed    Hepatitis C screen  Completed    Hepatitis A vaccine  Aged Out    Hepatitis B vaccine  Aged Out    Hib vaccine  Aged Out    Meningococcal (ACWY) vaccine  Aged Out       Hemoglobin A1C (%)   Date Value   06/26/2019 5.2   07/26/2018 5.4   10/03/2017 5.2             ( goal A1C is < 7)   Microalb/Crt. Ratio (mcg/mg creat)   Date Value   11/10/2016 9     LDL Cholesterol (mg/dL)   Date Value   06/26/2019 131 (H)       (goal LDL is <100)   AST (U/L)   Date Value   06/26/2019 29     ALT (U/L)   Date Value   06/26/2019 36 (H)     BUN (mg/dL)   Date Value   06/26/2019 18     BP Readings from Last 3 Encounters:   03/12/20 121/78   02/14/20 131/84   01/10/20 (!) 142/84          (goal 120/80)    All Future Testing planned in [facility]  Lab Frequency Next Occurrence         Patient Active Problem List:     Essential hypertension     Pure hypercholesterolemia     Urinary incontinence     Arrhythmia     COPD (chronic obstructive pulmonary disease) (HCC)     Anxiety     Sleep apnea     Stroke (HCC)     GERD (gastroesophageal reflux disease)     Hypothyroidism     Obesity (BMI 30-39. 9)     Mild persistent asthma without complication     History of stroke     Pulmonary hypertension (HCC)     Chronic diarrhea     Bipolar 1 disorder (HCC)     Cellulitis

## 2020-03-22 RX ORDER — LEVOTHYROXINE SODIUM 0.03 MG/1
TABLET ORAL
Qty: 90 TABLET | Refills: 0 | Status: SHIPPED | OUTPATIENT
Start: 2020-03-22 | End: 2020-05-15 | Stop reason: SDUPTHER

## 2020-03-22 RX ORDER — AMLODIPINE BESYLATE 10 MG/1
TABLET ORAL
Qty: 90 TABLET | Refills: 0 | Status: SHIPPED | OUTPATIENT
Start: 2020-03-22 | End: 2020-05-15 | Stop reason: SDUPTHER

## 2020-04-14 ENCOUNTER — TELEPHONE (OUTPATIENT)
Dept: INFECTIOUS DISEASES | Age: 65
End: 2020-04-14

## 2020-04-14 NOTE — TELEPHONE ENCOUNTER
Patient states she needs another order for the compression stockings. She states she gave it to her [facility] but hasn't heard from them. I put in another order. Please sign it ((while in the office so it prints here)) and I'll mail it to her.  Thank you

## 2020-04-20 ENCOUNTER — TELEPHONE (OUTPATIENT)
Dept: INTERNAL MEDICINE | Age: 65
End: 2020-04-20

## 2020-04-20 NOTE — TELEPHONE ENCOUNTER
76 y/o Medicare pt due for her AWV. LMOR to call [facility] office to schedule Medicare Annual Wellness visit. Message sent via [employer] with Risk Assessment questionnaire and reminder recall set up.

## 2020-05-12 ENCOUNTER — OFFICE VISIT (OUTPATIENT)
Dept: INFECTIOUS DISEASES | Age: 65
End: 2020-05-12
Payer: MEDICARE

## 2020-05-12 VITALS
SYSTOLIC BLOOD PRESSURE: 115 MMHG | TEMPERATURE: 97.4 F | HEART RATE: 101 BPM | HEIGHT: 65 IN | WEIGHT: 199 LBS | BODY MASS INDEX: 33.15 KG/M2 | DIASTOLIC BLOOD PRESSURE: 73 MMHG

## 2020-05-12 PROCEDURE — 1123F ACP DISCUSS/DSCN MKR DOCD: CPT | Performed by: INTERNAL MEDICINE

## 2020-05-12 PROCEDURE — 1090F PRES/ABSN URINE INCON ASSESS: CPT | Performed by: INTERNAL MEDICINE

## 2020-05-12 PROCEDURE — G8427 DOCREV CUR MEDS BY ELIG CLIN: HCPCS | Performed by: INTERNAL MEDICINE

## 2020-05-12 PROCEDURE — 99214 OFFICE O/P EST MOD 30 MIN: CPT | Performed by: INTERNAL MEDICINE

## 2020-05-12 PROCEDURE — 4040F PNEUMOC VAC/ADMIN/RCVD: CPT | Performed by: INTERNAL MEDICINE

## 2020-05-12 PROCEDURE — G8417 CALC BMI ABV UP PARAM F/U: HCPCS | Performed by: INTERNAL MEDICINE

## 2020-05-12 PROCEDURE — G8399 PT W/DXA RESULTS DOCUMENT: HCPCS | Performed by: INTERNAL MEDICINE

## 2020-05-12 PROCEDURE — 1036F TOBACCO NON-USER: CPT | Performed by: INTERNAL MEDICINE

## 2020-05-12 PROCEDURE — 3017F COLORECTAL CA SCREEN DOC REV: CPT | Performed by: INTERNAL MEDICINE

## 2020-05-12 NOTE — PROGRESS NOTES
[facility] - Initial Office Consult Note  Today's Date and Time: 5/12/2020, 3:31 PM    Diagnostic Impression :     1. PVD (peripheral vascular disease) ([address]    2. Lymphedema    3. Cellulitis of left lower extremity    4. PAD (peripheral artery disease) ([facility])        Recommendations   · Continue to wear compression stockings daily  · Monitor off antibiotics  · Return to office prn    Chief complaint/reason for consultation:     Chief Complaint   Patient presents with    Cellulitis       History of Present Illness:   Earl Warner is a 77y.o.-year-old  female who was initially evaluated on 5/12/2020. Patient seen at the request of Dr. Shivani Lind:  Pt first seen on 3-12-20  Patient is a 78-year-old woman who suffered a right ankle fracture on 9-11-19. On 9-12-19 she underwent surgery with Dr Dennis Abdi and had multiple pins placed. She reports that one of the bones has not healed well and that she has been in a walking boot since the surgery. She went to see her dermatologist (Dr Flori Rai) in December with complaints of skin changes to the RLE. He placed her on Keflex and Valtrex at that time due to a concern for a herpetic infection or cellulitis of the leg. She recently saw him again and was prescribed a 10 day course of Azithromycin. She has been taking the Keflex and Valtrex continuously since December without improvement in the skin changes of that leg. Approximately 3 weeks ago she was sent to the ER from physical therapy because of RLE edema. A doppler was done and did not show any DVT. Pt reports that her leg is not painful to touch. It is generally swollen and is discolored. No chills, fevers, night sweats or malaise    On exam she is noted to have PVD bilaterally, greater on Rt. There is no induration or erythema.  She does have generalized edema          There is no evidence of active cellulitis or herpetic GERD (gastroesophageal reflux disease)     GERD (gastroesophageal reflux disease)     History of stroke 8/9/2018    Hyperlipidemia     Hypertension     Hypothyroidism     Mild persistent asthma without complication 7/87/2375    OAB (overactive bladder)     Obesity (BMI 30-39. 9) 8/19/2016    Osteoarthritis     Pulmonary fibrosis (HCC)     sjogrens syndrome    Pulmonary hypertension (HCC)     Pure hypercholesterolemia     Sleep apnea     Stroke ([facility] [address]     Unspecified sleep apnea     on cpap    Urinary incontinence        Past Surgical  History:     Past Surgical History:   Procedure Laterality Date    ANKLE SURGERY Right 09/11/2019    COLONOSCOPY  04/2015    COLONOSCOPY  2018           Medications:     Current Outpatient Medications:     Compression Stockings MISC, by Does not apply route 20-30 mmhg Dx code 173.9, 189.0, Disp: 2 each, Rfl: 0    Compression Stockings MISC, by Does not apply route 25-35 mmHg, Disp: 2 each, Rfl: 0    levothyroxine (SYNTHROID) 25 MCG tablet, TAKE 1 TABLET BY MOUTH ONCE DAILY **CALL  FOR  APPOINTMENT  BEFORE  ANY  OTHER  REFILLS**, Disp: 90 tablet, Rfl: 0    amLODIPine (NORVASC) 10 MG tablet, Take 1 tablet by mouth once daily, Disp: 90 tablet, Rfl: 0    cephALEXin (KEFLEX) 500 MG capsule, Take 500 mg by mouth 4 times daily, Disp: , Rfl:     famotidine (PEPCID) 20 MG tablet, Take 20 mg by mouth 2 times daily, Disp: , Rfl:     mirabegron (MYRBETRIQ) 50 MG TB24, Take 50 mg by mouth daily Pt gets samples, Disp: , Rfl:     QUEtiapine (SEROQUEL) 200 MG tablet, Take 200 mg by mouth 3 times daily, Disp: , Rfl:     budesonide-formoterol (SYMBICORT) 160-4.5 MCG/ACT AERO, Inhale 2 puffs into the lungs 2 times daily, Disp: 1 Inhaler, Rfl: 3    albuterol sulfate HFA (PROAIR HFA) 108 (90 Base) MCG/ACT inhaler, Inhale 2 puffs into the lungs 2 times daily as needed for Wheezing (no more than 4 times daily), Disp: 1 Inhaler, Rfl: 3    mometasone (NASONEX) 50 MCG/ACT Other Topics Concern    Not on file   Social History Narrative    Not on file       Family History:     Family History   Problem Relation Age of Onset    Arthritis Mother     Depression Mother     Heart Disease Mother     High Blood Pressure Father     High Cholesterol Father     Stroke Father     High Blood Pressure Brother         Allergies:   Motrin [ibuprofen]; Aspirin; Blue dyes (parenteral); Hctz [hydrochlorothiazide]; Sulfa antibiotics; and Tetracyclines & related     Review of Systems:   Constitutional: No fevers or chills. No systemic complaints  Head: No headaches  Eyes: No double vision or blurry vision. ENT: No sore throat or runny nose. . No hearing loss, tinnitus or vertigo. Cardiovascular: No chest pain or palpitations. No shortness of breath. No HOUGH  Lung: No shortness of breath or cough. No sputum production  Abdomen: No nausea, vomiting, diarrhea, or abdominal pain. .  Genitourinary: No increased urinary frequency, or dysuria. No hematuria. No suprapubic or CVA pain  Musculoskeletal: No muscle aches or pains. No joint effusions. Hematologic: No bleeding or bruising. Neurologic: No headache, weakness, numbness, or tingling. Physical Examination :   /73 (Site: Left Upper Arm)   Pulse 101   Temp 97.4 °F (36.3 °C)   Ht 5' 4.5\" (1.638 m)   Wt 199 lb (90.3 kg)   BMI 33.63 kg/m²    General Appearance: Awake, alert, and in no apparent distress  Head:  Normocephalic, no trauma  Eyes: Pupils equal, round, reactive, to light and accommodation; extraocular movements intact; sclera anicteric; conjunctivae pink. No embolic phenomena. ENT: Oropharynx clear, without erythema, exudate, or thrush. No tenderness of sinuses. Mouth/throat: mucosa pink and moist. No lesions. Dentition in good repair. Neck:Supple, without lymphadenopathy. Thyroid normal, No bruits. Pulmonary/Chest: Clear to auscultation, without wheezes, rales, or rhonchi. No dullness to percussion.    Cardiovascular: Regular rate and rhythm without murmurs, rubs, or gallops. Abdomen: Soft, non tender. Bowel sounds normal. No organomegaly  All four Extremities: No cyanosis, clubbing, edema, or effusions. Neurologic: No gross sensory or motor deficits. Skin: Warm and dry with good turgor. With signs of peripheral arterial and venous insufficiency.     Medical Decision Making:   I have independently reviewed/ordered the following labs:    CBC with Differential:  Lab Results   Component Value Date    WBC 5.6 09/18/2018    WBC 7.6 11/10/2016    HGB 11.5 09/18/2018    HGB 11.8 11/10/2016    HCT 35.8 09/18/2018    HCT 36.3 11/10/2016     09/18/2018     11/10/2016     05/02/2012     12/08/2011    LYMPHOPCT 33 09/18/2018    LYMPHOPCT 39 04/13/2015    MONOPCT 15 09/18/2018    MONOPCT 10 04/13/2015     BMP:   Lab Results   Component Value Date     06/26/2019     05/21/2019    K 4.1 06/26/2019    K 4.0 05/21/2019     06/26/2019     05/21/2019    CO2 21 06/26/2019    CO2 23 05/21/2019    BUN 18 06/26/2019    BUN 13 05/21/2019    CREATININE 0.84 06/26/2019    CREATININE 0.73 05/21/2019     Hepatic Function Panel:  Lab Results   Component Value Date    PROT 7.9 06/26/2019    PROT 8.4 05/21/2019    LABALBU 4.5 06/26/2019    LABALBU 4.7 05/21/2019    LABALBU 4.8 05/02/2012    LABALBU 4.8 02/16/2012    BILIDIR 0.14 05/21/2019    BILIDIR 0.11 08/05/2014    IBILI 0.51 05/21/2019    IBILI NOT REPORTED 08/05/2014    BILITOT 0.31 06/26/2019    BILITOT 0.65 05/21/2019    ALKPHOS 92 06/26/2019    ALKPHOS 89 05/21/2019    ALT 36 06/26/2019    ALT 45 05/21/2019    AST 29 06/26/2019    AST 36 05/21/2019     No results found for: RPR  No results found for: HIV  No results found for: [facility]  Lab Results   Component Value Date    MUCUS NOT REPORTED 08/22/2019    RBC 4.06 09/18/2018    RBC 4.05 05/02/2012    TRICHOMONAS NOT REPORTED 08/22/2019    WBC 5.6 09/18/2018    YEAST NOT REPORTED 08/22/2019    TURBIDITY

## 2020-05-15 ENCOUNTER — VIRTUAL VISIT (OUTPATIENT)
Dept: INTERNAL MEDICINE | Age: 65
End: 2020-05-15
Payer: MEDICARE

## 2020-05-15 VITALS — HEIGHT: 65 IN | WEIGHT: 199 LBS | BODY MASS INDEX: 33.15 KG/M2

## 2020-05-15 PROCEDURE — 99442 PR PHYS/QHP TELEPHONE EVALUATION 11-20 MIN: CPT | Performed by: INTERNAL MEDICINE

## 2020-05-15 RX ORDER — LEVOTHYROXINE SODIUM 0.03 MG/1
TABLET ORAL
Qty: 90 TABLET | Refills: 0 | Status: SHIPPED | OUTPATIENT
Start: 2020-05-15 | End: 2020-08-13 | Stop reason: SDUPTHER

## 2020-05-15 RX ORDER — AMLODIPINE BESYLATE 10 MG/1
TABLET ORAL
Qty: 90 TABLET | Refills: 0 | Status: SHIPPED | OUTPATIENT
Start: 2020-05-15 | End: 2020-08-13 | Stop reason: SDUPTHER

## 2020-06-17 NOTE — TELEPHONE ENCOUNTER
Patient would like an order for compression stockings. Please sign order at office so it will print. I will mail to patient.

## 2020-06-23 ENCOUNTER — NURSE TRIAGE (OUTPATIENT)
Dept: OTHER | Facility: CLINIC | Age: 65
End: 2020-06-23

## 2020-06-23 NOTE — TELEPHONE ENCOUNTER
Pre-service states they called patient & could not get ahold of her. I got only ringing when I called. About 10 rings before I hung up.

## 2020-06-25 ENCOUNTER — TELEPHONE (OUTPATIENT)
Dept: INFECTIOUS DISEASES | Age: 65
End: 2020-06-25

## 2020-06-25 NOTE — TELEPHONE ENCOUNTER
Dr. Ella Arnold got a text asking to refill a RX that he does not prescribe. I left patient a message to call Dr. Bridgett Moran.

## 2020-07-11 ENCOUNTER — HOSPITAL ENCOUNTER (OUTPATIENT)
Dept: MAMMOGRAPHY | Age: 65
Discharge: HOME | End: 2020-07-13
Payer: MEDICARE

## 2020-07-11 PROCEDURE — 77063 BREAST TOMOSYNTHESIS BI: CPT

## 2020-07-16 ENCOUNTER — TELEPHONE (OUTPATIENT)
Dept: INTERNAL MEDICINE | Age: 65
End: 2020-07-16

## 2020-07-16 ENCOUNTER — HOSPITAL ENCOUNTER (OUTPATIENT)
Age: 65
Setting detail: SPECIMEN
Discharge: HOME | End: 2020-07-16
Payer: MEDICARE

## 2020-07-16 LAB
ANION GAP SERPL CALCULATED.3IONS-SCNC: 16 MMOL/L (ref 9–17)
BUN BLDV-MCNC: 16 MG/DL (ref 8–23)
BUN/CREAT BLD: ABNORMAL (ref 9–20)
CALCIUM SERPL-MCNC: 10.1 MG/DL (ref 8.6–10.4)
CHLORIDE BLD-SCNC: 106 MMOL/L (ref 98–107)
CO2: 24 MMOL/L (ref 20–31)
CREAT SERPL-MCNC: 1.1 MG/DL (ref 0.5–0.9)
GFR AFRICAN AMERICAN: >60 ML/MIN
GFR NON-AFRICAN AMERICAN: 50 ML/MIN
GFR SERPL CREATININE-BSD FRML MDRD: ABNORMAL ML/MIN/{1.73_M2}
GFR SERPL CREATININE-BSD FRML MDRD: ABNORMAL ML/MIN/{1.73_M2}
GLUCOSE BLD-MCNC: 88 MG/DL (ref 70–99)
POTASSIUM SERPL-SCNC: 4.6 MMOL/L (ref 3.7–5.3)
SODIUM BLD-SCNC: 146 MMOL/L (ref 135–144)
TSH SERPL DL<=0.05 MIU/L-ACNC: 1.48 MIU/L (ref 0.3–5)

## 2020-07-30 ENCOUNTER — OFFICE VISIT (OUTPATIENT)
Dept: INFECTIOUS DISEASES | Age: 65
End: 2020-07-30
Payer: MEDICARE

## 2020-07-30 VITALS
DIASTOLIC BLOOD PRESSURE: 78 MMHG | HEIGHT: 65 IN | WEIGHT: 201.6 LBS | RESPIRATION RATE: 18 BRPM | TEMPERATURE: 97.4 F | OXYGEN SATURATION: 98 % | BODY MASS INDEX: 33.59 KG/M2 | SYSTOLIC BLOOD PRESSURE: 128 MMHG | HEART RATE: 90 BPM

## 2020-07-30 PROCEDURE — 4040F PNEUMOC VAC/ADMIN/RCVD: CPT | Performed by: INTERNAL MEDICINE

## 2020-07-30 PROCEDURE — 1036F TOBACCO NON-USER: CPT | Performed by: INTERNAL MEDICINE

## 2020-07-30 PROCEDURE — 1123F ACP DISCUSS/DSCN MKR DOCD: CPT | Performed by: INTERNAL MEDICINE

## 2020-07-30 PROCEDURE — G8417 CALC BMI ABV UP PARAM F/U: HCPCS | Performed by: INTERNAL MEDICINE

## 2020-07-30 PROCEDURE — G8427 DOCREV CUR MEDS BY ELIG CLIN: HCPCS | Performed by: INTERNAL MEDICINE

## 2020-07-30 PROCEDURE — G8399 PT W/DXA RESULTS DOCUMENT: HCPCS | Performed by: INTERNAL MEDICINE

## 2020-07-30 PROCEDURE — 1090F PRES/ABSN URINE INCON ASSESS: CPT | Performed by: INTERNAL MEDICINE

## 2020-07-30 PROCEDURE — 3017F COLORECTAL CA SCREEN DOC REV: CPT | Performed by: INTERNAL MEDICINE

## 2020-07-30 PROCEDURE — 99213 OFFICE O/P EST LOW 20 MIN: CPT | Performed by: INTERNAL MEDICINE

## 2020-07-30 NOTE — PROGRESS NOTES
[facility] - Office Progress Note  Today's Date and Time: 7/30/2020, 4:40 PM    Diagnostic Impression :     1. Venous stasis dermatitis of both lower extremities    2. Lymphedema    3. PAD (peripheral artery disease) ([address]    4. 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 Patient seen at the request of Dr. Galdino Linares:  Pt first seen on 3-12-20  Patient is a 78-year-old woman who suffered a right ankle fracture on 9-11-19. On 9-12-19 she underwent surgery with Dr Praful Mg and had multiple pins placed. She reports that one of the bones has not healed well and that she has been in a walking boot since the surgery. She went to see her dermatologist (Dr Eleni Vasquez) in December with complaints of skin changes to the RLE. He placed her on Keflex and Valtrex at that time due to a concern for a herpetic infection or cellulitis of the leg. She recently saw him again and was prescribed a 10 day course of Azithromycin. She has been taking the Keflex and Valtrex continuously since December without improvement in the skin changes of that leg. Approximately 3 weeks ago she was sent to the ER from physical therapy because of RLE edema. A doppler was done and did not show any DVT. Pt reports that her leg is not painful to touch. It is generally swollen and is discolored. No chills, fevers, night sweats or malaise    On exam she is noted to have PVD bilaterally, greater on Rt.    There is no induration or erythema. She does have generalized edema          There is no evidence of active cellulitis or herpetic outbreak. We discussed the nature of PVD and how that can affect the coloration of the skin. Pt is advised to stop taking the antibiotics and antiviral medication. Labs have been ordered to rule out osteomyelitis       Office Visit 5-:  Pt reports feeling well  She is still wearing the boot for another 2 weeks  The pain has resolved  She has continued color changes of her skin  No chills, fevers, SOB or malaise    Has been off all of the antibiotics without change or worsening in her LE's  Her legs feel better when she wear the compression stockings    On exam  Lungs clear  HRR  Bilateral LE with changes associated with PVD. No induration, erythema or inflammation    Long discussion with pt explaining PAD/PVD and the importance of preventing lower extremity edema. Pt expressed understanding    CURRENT EXAMINATION: 7/30/2020     Patient returns to clinic today for follow up with lower extremity edema and discoloration. Reports that she is no longer taking any antibiotics or Valtrex. She has been wearing a pair of compression stockings that were donated to her from [employer]. Unfortunately she was unable to afford a pair of new stockings. WE were able to secure two pairs of compression stockings for her. She states that her leg feels so much better and the swelling has decreased as well. Her only complaint is that her right foot, leg, and thigh are still discolored. Ms. Zena Solares reports that she was able to start PT last week and has completed 2 session thus far. After the PT session she is sore and her right leg swells but after a couple days of recovery she is ready for PT again. Patient has no new complaints today.      DISCUSSION:    · 74 yo woman s/p Rt ankle fracture s/p fixation  · Developed increased skin changes post operatively  · Has been being treated with Keflex and Valtrex x 4 mos  · Most recently started on Azithromycin x 10 days  · On exam there is no evidence of cellulitis, however there is significant PVD and skin changes related to it to venous stasis. There is also chronic leg edema secondary to prior fracture and surgical correction of the fracture. · Pts pain and edema improved with compression stockings. · There is no acute sign of infection in the right lower extremity. PLAN:   · Continue to wear compression stockings daily  · Continue PT as tolerated   · Monitor off antibiotics  · Return to office prn      3-12-20: I have personally reviewed the past medical history, past surgical history, medications, social history, and family history, and I have updated the database accordingly. Past Medical History:     Past Medical History:   Diagnosis Date    Anxiety     Arrhythmia     Asthma     Bipolar 1 disorder ([facility] 2/14/2019    Bipolar disorder ([facility]     Chronic diarrhea 2/14/2019    COPD (chronic obstructive pulmonary disease) (HCC)     Depression     Essential hypertension     GERD (gastroesophageal reflux disease)     GERD (gastroesophageal reflux disease)     History of stroke 8/9/2018    Hyperlipidemia     Hypertension     Hypothyroidism     Mild persistent asthma without complication 6/47/2269    OAB (overactive bladder)     Obesity (BMI 30-39. 9) 8/19/2016    Osteoarthritis     Pulmonary fibrosis (HCC)     sjogrens syndrome    Pulmonary hypertension (HCC)     Pure hypercholesterolemia     Sleep apnea     Stroke ([facility]     Unspecified sleep apnea     on cpap    Urinary incontinence        Past Surgical  History:     Past Surgical History:   Procedure Laterality Date    ANKLE SURGERY Right 09/11/2019    COLONOSCOPY  04/2015    COLONOSCOPY  2018           Medications:     Current Outpatient Medications:     Handicap Placard MISC, by Does not apply route Dx: ankle sprain Duration: 6 months, Disp: 1 each, Rfl: 0    levothyroxine on file   Social Needs    Financial resource strain: Not on file    Food insecurity     Worry: Not on file     Inability: Not on file    Transportation needs     Medical: Not on file     Non-medical: Not on file   Tobacco Use    Smoking status: Never Smoker    Smokeless tobacco: Never Used   Substance and Sexual Activity    Alcohol use: No    Drug use: No    Sexual activity: Not on file   Lifestyle    Physical activity     Days per week: Not on file     Minutes per session: Not on file    Stress: Not on file   Relationships    Social connections     Talks on phone: Not on file     Gets together: Not on file     Attends L[employer] day service: Not on file     Active member of club or organization: Not on file     Attends meetings of clubs or organizations: Not on file     Relationship status: Not on file    Intimate partner violence     Fear of current or ex partner: Not on file     Emotionally abused: Not on file     Physically abused: Not on file     Forced sexual activity: Not on file   Other Topics Concern    Not on file   Social History Narrative    Not on file       Family History:     Family History   Problem Relation Age of Onset    Arthritis Mother     Depression Mother     Heart Disease Mother     High Blood Pressure Father     High Cholesterol Father     Stroke Father     High Blood Pressure Brother         Allergies:   Motrin [ibuprofen]; Aspirin; Blue dyes (parenteral); Hctz [hydrochlorothiazide]; Sulfa antibiotics; and Tetracyclines & related     Review of Systems:   Constitutional: No fevers or chills. No systemic complaints  Head: No headaches  Eyes: No double vision or blurry vision. ENT: No sore throat or runny nose. . No hearing loss, tinnitus or vertigo. Cardiovascular: No chest pain or palpitations. No shortness of breath. No HOUGH  Lung: No shortness of breath or cough. No sputum production  Abdomen: No nausea, vomiting, diarrhea, or abdominal pain. .  Genitourinary: No increased urinary frequency, or dysuria. No hematuria. No suprapubic or CVA pain  Musculoskeletal: No muscle aches or pains. No joint effusions. Hematologic: No bleeding or bruising. Neurologic: No headache, weakness, numbness, or tingling. Physical Examination :   /78 (Site: Right Upper Arm, Position: Sitting, Cuff Size: Large Adult)   Pulse 90   Temp 97.4 °F (36.3 °C) (Temporal)   Resp 18   Ht 5' 5\" (1.651 m)   Wt 201 lb 9.6 oz (91.4 kg)   SpO2 98% Comment: room air at rest  BMI 33.55 kg/m²    General Appearance: Awake, alert, and in no apparent distress  Head:  Normocephalic, no trauma  Eyes: Pupils equal, round, reactive, to light and accommodation; extraocular movements intact; sclera anicteric; conjunctivae pink. No embolic phenomena. ENT: Oropharynx clear, without erythema, exudate, or thrush. No tenderness of sinuses. Mouth/throat: mucosa pink and moist. No lesions. Dentition in good repair. Neck:Supple, without lymphadenopathy. Thyroid normal, No bruits. Pulmonary/Chest: Clear to auscultation, without wheezes, rales, or rhonchi. No dullness to percussion. Cardiovascular: Regular rate and rhythm without murmurs, rubs, or gallops. Abdomen: Soft, non tender. Bowel sounds normal. No organomegaly  All four Extremities: No cyanosis, clubbing, edema, or effusions. Neurologic: No gross sensory or motor deficits. Skin: Warm and dry with good turgor. With signs of peripheral arterial and venous insufficiency.     Medical Decision Making:   I have independently reviewed/ordered the following labs:    CBC with Differential:  Lab Results   Component Value Date    WBC 5.6 09/18/2018    WBC 7.6 11/10/2016    HGB 11.5 09/18/2018    HGB 11.8 11/10/2016    HCT 35.8 09/18/2018    HCT 36.3 11/10/2016     09/18/2018     11/10/2016     05/02/2012     12/08/2011    LYMPHOPCT 33 09/18/2018    LYMPHOPCT 39 04/13/2015    MONOPCT 15 09/18/2018    MONOPCT 10 04/13/2015     BMP:   Lab Results   Component Value Date     07/16/2020     06/26/2019    K 4.6 07/16/2020    K 4.1 06/26/2019     07/16/2020     06/26/2019    CO2 24 07/16/2020    CO2 21 06/26/2019    BUN 16 07/16/2020    BUN 18 06/26/2019    CREATININE 1.10 07/16/2020    CREATININE 0.84 06/26/2019     Hepatic Function Panel:  Lab Results   Component Value Date    PROT 7.9 06/26/2019    PROT 8.4 05/21/2019    LABALBU 4.5 06/26/2019    LABALBU 4.7 05/21/2019    LABALBU 4.8 05/02/2012    LABALBU 4.8 02/16/2012    BILIDIR 0.14 05/21/2019    BILIDIR 0.11 08/05/2014    IBILI 0.51 05/21/2019    IBILI NOT REPORTED 08/05/2014    BILITOT 0.31 06/26/2019    BILITOT 0.65 05/21/2019    ALKPHOS 92 06/26/2019    ALKPHOS 89 05/21/2019    ALT 36 06/26/2019    ALT 45 05/21/2019    AST 29 06/26/2019    AST 36 05/21/2019     No results found for: RPR  No results found for: HIV  No results found for: [facility]  Lab Results   Component Value Date    MUCUS NOT REPORTED 08/22/2019    RBC 4.06 09/18/2018    RBC 4.05 05/02/2012    TRICHOMONAS NOT REPORTED 08/22/2019    WBC 5.6 09/18/2018    YEAST NOT REPORTED 08/22/2019    TURBIDITY CLEAR 08/22/2019     Lab Results   Component Value Date    CREATININE 1.10 07/16/2020    GLUCOSE 88 07/16/2020    GLUCOSE 119 05/02/2012       Thank you for allowing us to participate in the care of this patient. Please call with questions.     Jeff Helm MD  Pager: [phone] - Office: [phone]

## 2020-08-13 ENCOUNTER — HOSPITAL ENCOUNTER (OUTPATIENT)
Age: 65
Setting detail: SPECIMEN
Discharge: HOME | End: 2020-08-13
Payer: MEDICARE

## 2020-08-13 ENCOUNTER — OFFICE VISIT (OUTPATIENT)
Dept: INTERNAL MEDICINE | Age: 65
End: 2020-08-13
Payer: MEDICARE

## 2020-08-13 VITALS
HEIGHT: 65 IN | BODY MASS INDEX: 33.99 KG/M2 | HEART RATE: 76 BPM | DIASTOLIC BLOOD PRESSURE: 85 MMHG | WEIGHT: 204 LBS | SYSTOLIC BLOOD PRESSURE: 143 MMHG

## 2020-08-13 PROBLEM — L03.90 CELLULITIS: Status: RESOLVED | Noted: 2020-03-11 | Resolved: 2020-08-13

## 2020-08-13 PROBLEM — E66.01 MORBID (SEVERE) OBESITY DUE TO EXCESS CALORIES (HCC): Status: RESOLVED | Noted: 2020-08-13 | Resolved: 2020-08-13

## 2020-08-13 PROBLEM — E66.01 MORBID (SEVERE) OBESITY DUE TO EXCESS CALORIES (HCC): Status: ACTIVE | Noted: 2020-08-13

## 2020-08-13 LAB
ALBUMIN SERPL-MCNC: 4.5 G/DL (ref 3.5–5.2)
ALBUMIN/GLOBULIN RATIO: 1.3 (ref 1–2.5)
ALP BLD-CCNC: 92 U/L (ref 35–104)
ALT SERPL-CCNC: 38 U/L (ref 5–33)
ANION GAP SERPL CALCULATED.3IONS-SCNC: 14 MMOL/L (ref 9–17)
AST SERPL-CCNC: 35 U/L
BILIRUB SERPL-MCNC: 0.44 MG/DL (ref 0.3–1.2)
BUN BLDV-MCNC: 12 MG/DL (ref 8–23)
BUN/CREAT BLD: ABNORMAL (ref 9–20)
CALCIUM SERPL-MCNC: 11.1 MG/DL (ref 8.6–10.4)
CHLORIDE BLD-SCNC: 102 MMOL/L (ref 98–107)
CHOLESTEROL/HDL RATIO: 3.7
CHOLESTEROL: 194 MG/DL
CO2: 24 MMOL/L (ref 20–31)
CREAT SERPL-MCNC: 0.76 MG/DL (ref 0.5–0.9)
GFR AFRICAN AMERICAN: >60 ML/MIN
GFR NON-AFRICAN AMERICAN: >60 ML/MIN
GFR SERPL CREATININE-BSD FRML MDRD: ABNORMAL ML/MIN/{1.73_M2}
GFR SERPL CREATININE-BSD FRML MDRD: ABNORMAL ML/MIN/{1.73_M2}
GLUCOSE BLD-MCNC: 88 MG/DL (ref 70–99)
HDLC SERPL-MCNC: 53 MG/DL
LDL CHOLESTEROL: 121 MG/DL (ref 0–130)
POTASSIUM SERPL-SCNC: 4.2 MMOL/L (ref 3.7–5.3)
SODIUM BLD-SCNC: 140 MMOL/L (ref 135–144)
TOTAL PROTEIN: 8.1 G/DL (ref 6.4–8.3)
TRIGL SERPL-MCNC: 99 MG/DL
VLDLC SERPL CALC-MCNC: NORMAL MG/DL (ref 1–30)

## 2020-08-13 PROCEDURE — 99213 OFFICE O/P EST LOW 20 MIN: CPT | Performed by: INTERNAL MEDICINE

## 2020-08-13 PROCEDURE — G8427 DOCREV CUR MEDS BY ELIG CLIN: HCPCS | Performed by: INTERNAL MEDICINE

## 2020-08-13 PROCEDURE — 3023F SPIROM DOC REV: CPT | Performed by: INTERNAL MEDICINE

## 2020-08-13 PROCEDURE — G8399 PT W/DXA RESULTS DOCUMENT: HCPCS | Performed by: INTERNAL MEDICINE

## 2020-08-13 PROCEDURE — 1123F ACP DISCUSS/DSCN MKR DOCD: CPT | Performed by: INTERNAL MEDICINE

## 2020-08-13 PROCEDURE — 3017F COLORECTAL CA SCREEN DOC REV: CPT | Performed by: INTERNAL MEDICINE

## 2020-08-13 PROCEDURE — 1036F TOBACCO NON-USER: CPT | Performed by: INTERNAL MEDICINE

## 2020-08-13 PROCEDURE — G8417 CALC BMI ABV UP PARAM F/U: HCPCS | Performed by: INTERNAL MEDICINE

## 2020-08-13 PROCEDURE — 1090F PRES/ABSN URINE INCON ASSESS: CPT | Performed by: INTERNAL MEDICINE

## 2020-08-13 PROCEDURE — G8926 SPIRO NO PERF OR DOC: HCPCS | Performed by: INTERNAL MEDICINE

## 2020-08-13 PROCEDURE — 99211 OFF/OP EST MAY X REQ PHY/QHP: CPT | Performed by: INTERNAL MEDICINE

## 2020-08-13 PROCEDURE — 4040F PNEUMOC VAC/ADMIN/RCVD: CPT | Performed by: INTERNAL MEDICINE

## 2020-08-13 RX ORDER — LEVOTHYROXINE SODIUM 0.03 MG/1
TABLET ORAL
Qty: 90 TABLET | Refills: 1 | Status: SHIPPED | OUTPATIENT
Start: 2020-08-13 | End: 2021-01-05

## 2020-08-13 RX ORDER — AMLODIPINE BESYLATE 10 MG/1
TABLET ORAL
Qty: 90 TABLET | Refills: 1 | Status: SHIPPED | OUTPATIENT
Start: 2020-08-13 | End: 2021-04-14

## 2020-08-13 RX ORDER — ACETAMINOPHEN 325 MG/1
650 TABLET ORAL EVERY 6 HOURS PRN
Qty: 60 TABLET | Refills: 0 | Status: SHIPPED | OUTPATIENT
Start: 2020-08-13 | End: 2020-10-12 | Stop reason: SDUPTHER

## 2020-08-13 ASSESSMENT — ENCOUNTER SYMPTOMS
SHORTNESS OF BREATH: 0
BACK PAIN: 0
BLOOD IN STOOL: 0
COUGH: 0
ABDOMINAL PAIN: 0
WHEEZING: 0
CONSTIPATION: 0

## 2020-08-13 NOTE — PROGRESS NOTES
[facility]    Progress Note    Date of patient's visit: 8/13/2020    Patient's Name:  Rio Vieyra    YOB: 1955            Patient Care Team:  Amanda Helm MD as PCP - Laurie Ackerman MD as PCP - [facility] Empaneled Provider  Barbie Cruiel MD as Consulting Physician (Gastroenterology)  Martha Herrera MD as Consulting Physician (Pulmonology)  Latrell Mccullough MD as Surgeon (Orthopedic Surgery)  Andreas Moscoso MD as Consulting Physician (Infectious Diseases)    REASON FOR VISIT: Routine outpatient follow     Chief Complaint   Patient presents with   Roe Olp Hypertension    Health Maintenance     vaccines declined, AWV scheduled for 8/28     Back Pain     Pt states that she missed a step adn fell on the steps a few months back and she is still having some pain, would like a new script for tylenol #3 of possible         HISTORY OF PRESENT ILLNESS:    History was obtained from the patient. Rio Vieyra is a 77 y.o. is here for follow-up. Blood pressure is controlled. She says her asthma has been controlled and she has not used her inhalers at all. She continues to follow-up with her pulmonologist.  She has sleep apnea and uses CPAP. She had ankle fracture but that is healing up. She is doing therapy. Swelling is better. She is wearing compression hoses. She has seen infectious disease. All her antibiotics have been stopped. She is inquiring about Botox injections for bladder control. She has been on Myrbetriq but it is very expensive. She is seeing urologist.  They have offered to do Botox but want to wait till her  Ankle problems are resolved.      Past Medical History:   Diagnosis Date    Anxiety     Arrhythmia     Asthma     Bipolar 1 disorder ([address] 2/14/2019    Bipolar disorder ([address]     Chronic diarrhea 2/14/2019    COPD (chronic obstructive pulmonary disease) (HCC)     Depression     Essential hypertension     GERD (gastroesophageal reflux disease)     GERD (gastroesophageal reflux disease)     History of stroke 8/9/2018    Hyperlipidemia     Hypertension     Hypothyroidism     Mild persistent asthma without complication 9/96/7085    OAB (overactive bladder)     Obesity (BMI 30-39. 9) 8/19/2016    Osteoarthritis     Pulmonary fibrosis (HCC)     sjogrens syndrome    Pulmonary hypertension (HCC)     Pure hypercholesterolemia     Sleep apnea     Stroke ([address]     Unspecified sleep apnea     on cpap    Urinary incontinence        Past Surgical History:   Procedure Laterality Date    ANKLE SURGERY Right 09/11/2019    COLONOSCOPY  04/2015    COLONOSCOPY  2018             ALLERGIES      Allergies   Allergen Reactions    Motrin [Ibuprofen]     Aspirin Rash    Blue Dyes (Parenteral) Rash    Hctz [Hydrochlorothiazide] Rash     Fixed drug reaction    Sulfa Antibiotics Rash    Tetracyclines & Related Rash       MEDICATIONS:      Current Outpatient Medications on File Prior to Visit   Medication Sig Dispense Refill    levothyroxine (SYNTHROID) 25 MCG tablet TAKE 1 TABLET BY MOUTH ONCE DAILY **CALL  FOR  APPOINTMENT  BEFORE  ANY  OTHER  REFILLS** 90 tablet 0    amLODIPine (NORVASC) 10 MG tablet Take 1 tablet by mouth once daily 90 tablet 0    famotidine (PEPCID) 20 MG tablet Take 20 mg by mouth 2 times daily      mirabegron (MYRBETRIQ) 50 MG TB24 Take 50 mg by mouth daily Pt gets samples      QUEtiapine (SEROQUEL) 200 MG tablet Take 200 mg by mouth 3 times daily      budesonide-formoterol (SYMBICORT) 160-4.5 MCG/ACT AERO Inhale 2 puffs into the lungs 2 times daily 1 Inhaler 3    albuterol sulfate HFA (PROAIR HFA) 108 (90 Base) MCG/ACT inhaler Inhale 2 puffs into the lungs 2 times daily as needed for Wheezing (no more than 4 times daily) 1 Inhaler 3    mometasone (NASONEX) 50 MCG/ACT nasal spray 2 sprays by Nasal route daily 1 spray each nostril daily      montelukast (SINGULAIR) 10 MG tablet Take 10 mg by mouth nightly      FLUoxetine (PROZAC) 40 MG capsule Take 40 mg by mouth daily      ipratropium (ATROVENT) 0.03 % nasal spray 2 sprays by Nasal route 3 times daily as needed for Rhinitis       lithium 300 MG capsule Take 300 mg by mouth 2 times daily (with meals). No current facility-administered medications on file prior to visit. SOCIAL HISTORY    Reviewed and no change from previous record. Dulce  reports that she has never smoked. She has never used smokeless tobacco.    FAMILY HISTORY:    Reviewed and No change from previous visit    HEALTH MAINTENANCE DUE:      Health Maintenance Due   Topic Date Due    DTaP/Tdap/Td vaccine (1 - Tdap) 04/22/1972    Shingles Vaccine (1 of 2) 04/22/2003    Pneumococcal 65+ years Vaccine (1 of 1 - PPSV23) 04/22/2018    Annual Wellness Visit (AWV)  05/29/2019       REVIEW OF SYSTEMS:    12 point review of symptoms completed and found to be normal except noted in the HPI    Review of Systems   Constitutional: Negative for diaphoresis, fever and unexpected weight change. Respiratory: Negative for cough, shortness of breath and wheezing. Cardiovascular: Positive for leg swelling. Negative for chest pain and palpitations. Gastrointestinal: Negative for abdominal pain, blood in stool and constipation. Endocrine: Negative for polydipsia and polyuria. Musculoskeletal: Positive for arthralgias. Negative for back pain, gait problem and joint swelling. Neurological: Negative for dizziness, seizures, weakness and light-headedness. Hematological: Negative for adenopathy. Does not bruise/bleed easily. Psychiatric/Behavioral: Negative for dysphoric mood, hallucinations and sleep disturbance.         PHYSICAL EXAM:     Vitals:    08/13/20 1008 08/13/20 1041   BP: (!) 143/90 (!) 143/85   Site: Right Upper Arm    Position: Sitting    Cuff Size: Large Adult    Pulse: 76    Weight: 204 lb (92.5 kg)    Height: 5' 5\" (1.651 m)      Body mass index is 33.95 kg/m². BP Readings from Last 3 Encounters:   08/13/20 (!) 143/85   07/30/20 128/78   05/12/20 115/73        Wt Readings from Last 3 Encounters:   08/13/20 204 lb (92.5 kg)   07/30/20 201 lb 9.6 oz (91.4 kg)   05/15/20 199 lb (90.3 kg)       Physical Exam  Vitals signs and nursing note reviewed. Constitutional:       Appearance: Normal appearance. HENT:      Head: Normocephalic and atraumatic. Eyes:      Extraocular Movements: Extraocular movements intact. Conjunctiva/sclera: Conjunctivae normal.      Pupils: Pupils are equal, round, and reactive to light. Cardiovascular:      Rate and Rhythm: Normal rate and regular rhythm. Heart sounds: No murmur. Pulmonary:      Effort: Pulmonary effort is normal.      Breath sounds: Normal breath sounds. No wheezing or rales. Musculoskeletal: Normal range of motion. Right lower leg: Edema present. Left lower leg: Edema present. Comments: Wearing compression socks   Skin:     Findings: No rash. Neurological:      General: No focal deficit present. Mental Status: She is alert and oriented to person, place, and time.    Psychiatric:         Mood and Affect: Mood normal.             LABORATORY FINDINGS:    CBC:  Lab Results   Component Value Date    WBC 5.6 09/18/2018    HGB 11.5 09/18/2018     09/18/2018     05/02/2012     BMP:    Lab Results   Component Value Date     07/16/2020    K 4.6 07/16/2020     07/16/2020    CO2 24 07/16/2020    BUN 16 07/16/2020    CREATININE 1.10 07/16/2020    GLUCOSE 88 07/16/2020    GLUCOSE 119 05/02/2012     HEMOGLOBIN A1C:   Lab Results   Component Value Date    LABA1C 5.2 06/26/2019     MICROALBUMIN URINE:   Lab Results   Component Value Date    MICROALBUR <12 11/10/2016     FASTING LIPID [employer]  Lab Results   Component Value Date    LDLCHOLESTEROL 131 (H) 06/26/2019       LIVER PROFILE:  Lab Results   Component Value Date    ALT 36 06/26/2019    AST 29 06/26/2019    PROT 7.9 06/26/2019    BILITOT 0.31 06/26/2019    BILIDIR 0.14 05/21/2019    LABALBU 4.5 06/26/2019    LABALBU 4.8 05/02/2012      THYROID FUNCTION:   Lab Results   Component Value Date    TSH 1.48 07/16/2020      URINEANALYSIS: No results found for: LABURIN  ASSESSMENT AND PLAN:    1. Essential hypertension    - Comprehensive Metabolic Panel; Future  - amLODIPine (NORVASC) 10 MG tablet; Take 1 tablet by mouth once daily  Dispense: 90 tablet; Refill: 1    2. Chronic obstructive pulmonary disease, unspecified COPD type ([address]  Follows with pulmonologist.  Stable. 3. Hypothyroidism, unspecified type    - Lipid Panel; Future  - levothyroxine (SYNTHROID) 25 MCG tablet; TAKE 1 TABLET BY MOUTH ONCE DAILY  Dispense: 90 tablet; Refill: 1    4. Obesity (BMI 30-39.9)    - Lipid Panel; Future    5. XAVIER (obstructive sleep apnea)  On cpap            FOLLOW UP AND INSTRUCTIONS:   No follow-ups on file. 1. Debere received counseling on the following healthy behaviors: nutrition, exercise and medication adherence    2. Reviewed prior labs and health maintenance. 3. Discussed use, benefit, and side effects of prescribed medications. Barriers to medication compliance addressed. All patient questions answered. Pt voiced understanding.      4. Patient given educational materials - see patient instructions    Leonie Virk  Attending Physician, [address] [place] [address], Internal Medicine Res[facility] Program  [address] [place] [address]  8/13/2020, 10:30 AM

## 2020-08-13 NOTE — PATIENT INSTRUCTIONS
-Pt due for 6 month f/u in February-- pt to call in January to set up an appt--reminder in [facility] to contact patient as well--AVS given to patient    -Bloodwork orders given to patient, they will have them done before their next visit.     -Noemy Haile

## 2020-08-24 ENCOUNTER — HOSPITAL ENCOUNTER (OUTPATIENT)
Dept: ULTRASOUND IMAGING | Age: 65
Discharge: HOME | End: 2020-08-26
Payer: MEDICARE

## 2020-08-24 ENCOUNTER — HOSPITAL ENCOUNTER (OUTPATIENT)
Dept: MAMMOGRAPHY | Age: 65
Discharge: HOME | End: 2020-08-26
Payer: MEDICARE

## 2020-08-24 PROCEDURE — G0279 TOMOSYNTHESIS, MAMMO: HCPCS

## 2020-08-24 PROCEDURE — 76642 ULTRASOUND BREAST LIMITED: CPT

## 2020-08-28 ENCOUNTER — VIRTUAL VISIT (OUTPATIENT)
Dept: INTERNAL MEDICINE | Age: 65
End: 2020-08-28
Payer: MEDICARE

## 2020-08-28 PROCEDURE — G0438 PPPS, INITIAL VISIT: HCPCS | Performed by: NURSE PRACTITIONER

## 2020-08-28 ASSESSMENT — PATIENT HEALTH QUESTIONNAIRE - PHQ9
SUM OF ALL RESPONSES TO PHQ QUESTIONS 1-9: 0
SUM OF ALL RESPONSES TO PHQ QUESTIONS 1-9: 0

## 2020-08-28 ASSESSMENT — LIFESTYLE VARIABLES: HOW OFTEN DO YOU HAVE A DRINK CONTAINING ALCOHOL: 0

## 2020-08-31 ENCOUNTER — TELEPHONE (OUTPATIENT)
Dept: INTERNAL MEDICINE | Age: 65
End: 2020-08-31

## 2020-08-31 NOTE — TELEPHONE ENCOUNTER
Patient calling requesting iron/d2. She states it was prescribed for her when she broke her ankle & her bones had a hard time healing? She states she got this med from Dr. Maya Andrade but she cannot get a call back from his office. I don't see in med list or historical. Patient states she bought some OTC but the directions are to take 10 a week? I advised her to NOT to do that until she has a response from you.

## 2020-09-01 RX ORDER — MELATONIN
1000 DAILY
Qty: 30 TABLET | Refills: 5 | Status: SHIPPED | OUTPATIENT
Start: 2020-09-01 | End: 2021-06-29 | Stop reason: SDUPTHER

## 2020-09-08 ENCOUNTER — TELEPHONE (OUTPATIENT)
Dept: INTERNAL MEDICINE | Age: 65
End: 2020-09-08

## 2020-09-08 NOTE — TELEPHONE ENCOUNTER
Pt friend came to window asking about handicap card. States pt already missed placed it and would like a new one. Script pended.

## 2020-09-17 ENCOUNTER — HOSPITAL ENCOUNTER (OUTPATIENT)
Age: 65
Setting detail: SPECIMEN
Discharge: HOME | End: 2020-09-17
Payer: MEDICARE

## 2020-09-17 LAB
CALCIUM IONIZED: 1.43 MMOL/L (ref 1.13–1.33)
PTH INTACT: 73.03 PG/ML (ref 15–65)
VITAMIN D 25-HYDROXY: 29.4 NG/ML (ref 30–100)

## 2020-10-12 ENCOUNTER — VIRTUAL VISIT (OUTPATIENT)
Dept: INTERNAL MEDICINE | Age: 65
End: 2020-10-12
Payer: MEDICARE

## 2020-10-12 PROCEDURE — 99441 PR PHYS/QHP TELEPHONE EVALUATION 5-10 MIN: CPT | Performed by: INTERNAL MEDICINE

## 2020-10-12 RX ORDER — ACETAMINOPHEN 325 MG/1
650 TABLET ORAL EVERY 6 HOURS PRN
Qty: 60 TABLET | Refills: 0 | Status: SHIPPED | OUTPATIENT
Start: 2020-10-12 | End: 2022-05-26

## 2020-10-12 RX ORDER — OMEPRAZOLE 20 MG/1
20 CAPSULE, DELAYED RELEASE ORAL
Qty: 90 CAPSULE | Refills: 0 | Status: SHIPPED | OUTPATIENT
Start: 2020-10-12 | End: 2021-01-05

## 2020-10-12 NOTE — PROGRESS NOTES
Jairon Decker is a 77 y.o. female evaluated via telephone on 10/12/2020. Consent:  She and/or health care decision maker is aware that that she may receive a bill for this telephone service, depending on her insurance coverage, and has provided verbal consent to proceed: Yes      Documentation:  I communicated with the patient and/or health care decision maker about her concerns regarding GERD. She says she has been taking over-the-counter Zantac which is not helping. She has been drinking more caffeinated beverages and not following a healthy diet. No abdominal pain. No blood in her stools. She denies excessive NSAID use. She does request a refill on her Tylenol. Asthma is stable. She is following up with her pulmonologist.  She states she has CPAP. She states she is taking her amlodipine and levothyroxine. Details of this discussion including any medical advice provided:   Advised we will switch her to Prilosec for a few weeks. Advised to avoid fried foods and spicy foods along with eating late at night and to avoid caffeinated beverages. Call if not improving. I affirm this is a Patient Initiated Episode with a Patient who has not had a related appointment within my department in the past 7 days or scheduled within the next 24 hours.     Patient identification was verified at the start of the visit: Yes    Total Time: minutes: 5-10 minutes    Note: not billable if this call serves to triage the patient into an appointment for the relevant concern      Handy Sanchez

## 2020-10-29 ENCOUNTER — TELEPHONE (OUTPATIENT)
Dept: INTERNAL MEDICINE | Age: 65
End: 2020-10-29

## 2020-10-29 NOTE — TELEPHONE ENCOUNTER
Pt calling to get fax number for luis ferrari. Pt needs a JAY JAY. Pt does use a CPAP machine and has COPD and HTN. Writer advise pt that once form is received it can take 7-10 business days for completion, and that office only does 3 forms a year.

## 2020-12-21 ENCOUNTER — HOSPITAL ENCOUNTER (OUTPATIENT)
Age: 65
Discharge: HOME | End: 2020-12-21
Payer: MEDICARE

## 2020-12-21 ENCOUNTER — OFFICE VISIT (OUTPATIENT)
Dept: PRIMARY CARE CLINIC | Age: 65
End: 2020-12-21
Payer: MEDICARE

## 2020-12-21 ENCOUNTER — HOSPITAL ENCOUNTER (OUTPATIENT)
Age: 65
Setting detail: SPECIMEN
Discharge: HOME | End: 2020-12-21
Payer: MEDICARE

## 2020-12-21 VITALS
OXYGEN SATURATION: 98 % | DIASTOLIC BLOOD PRESSURE: 79 MMHG | WEIGHT: 198 LBS | BODY MASS INDEX: 31.82 KG/M2 | TEMPERATURE: 98.9 F | HEIGHT: 66 IN | HEART RATE: 78 BPM | SYSTOLIC BLOOD PRESSURE: 133 MMHG

## 2020-12-21 LAB
ALBUMIN SERPL-MCNC: 4.3 G/DL (ref 3.5–5.2)
ALBUMIN/GLOBULIN RATIO: 1.2 (ref 1–2.5)
ALP BLD-CCNC: 84 U/L (ref 35–104)
ALT SERPL-CCNC: 40 U/L (ref 5–33)
ANION GAP SERPL CALCULATED.3IONS-SCNC: 10 MMOL/L (ref 9–17)
AST SERPL-CCNC: 40 U/L
BILIRUB SERPL-MCNC: 0.43 MG/DL (ref 0.3–1.2)
BUN BLDV-MCNC: 18 MG/DL (ref 8–23)
BUN/CREAT BLD: ABNORMAL (ref 9–20)
CALCIUM SERPL-MCNC: 10.1 MG/DL (ref 8.6–10.4)
CHLORIDE BLD-SCNC: 108 MMOL/L (ref 98–107)
CHOLESTEROL, FASTING: 201 MG/DL
CHOLESTEROL/HDL RATIO: 4.1
CO2: 23 MMOL/L (ref 20–31)
CREAT SERPL-MCNC: 0.9 MG/DL (ref 0.5–0.9)
ESTIMATED AVERAGE GLUCOSE: 114 MG/DL
GFR AFRICAN AMERICAN: >60 ML/MIN
GFR NON-AFRICAN AMERICAN: >60 ML/MIN
GFR SERPL CREATININE-BSD FRML MDRD: ABNORMAL ML/MIN/{1.73_M2}
GFR SERPL CREATININE-BSD FRML MDRD: ABNORMAL ML/MIN/{1.73_M2}
GLUCOSE BLD-MCNC: 91 MG/DL (ref 70–99)
HBA1C MFR BLD: 5.6 % (ref 4–6)
HDLC SERPL-MCNC: 49 MG/DL
LDL CHOLESTEROL: 136 MG/DL (ref 0–130)
LITHIUM DATE LAST DOSE: ABNORMAL
LITHIUM DOSE AMOUNT: ABNORMAL
LITHIUM DOSE TIME: 1100
LITHIUM LEVEL: 0.2 MMOL/L (ref 0.6–1.2)
POTASSIUM SERPL-SCNC: 3.9 MMOL/L (ref 3.7–5.3)
SODIUM BLD-SCNC: 141 MMOL/L (ref 135–144)
TOTAL PROTEIN: 7.9 G/DL (ref 6.4–8.3)
TRIGLYCERIDE, FASTING: 78 MG/DL
VLDLC SERPL CALC-MCNC: ABNORMAL MG/DL (ref 1–30)

## 2020-12-21 PROCEDURE — G8428 CUR MEDS NOT DOCUMENT: HCPCS | Performed by: INTERNAL MEDICINE

## 2020-12-21 PROCEDURE — G8484 FLU IMMUNIZE NO ADMIN: HCPCS | Performed by: INTERNAL MEDICINE

## 2020-12-21 PROCEDURE — 80061 LIPID PANEL: CPT

## 2020-12-21 PROCEDURE — 1090F PRES/ABSN URINE INCON ASSESS: CPT | Performed by: INTERNAL MEDICINE

## 2020-12-21 PROCEDURE — 99213 OFFICE O/P EST LOW 20 MIN: CPT | Performed by: INTERNAL MEDICINE

## 2020-12-21 PROCEDURE — 3017F COLORECTAL CA SCREEN DOC REV: CPT | Performed by: INTERNAL MEDICINE

## 2020-12-21 PROCEDURE — 1036F TOBACCO NON-USER: CPT | Performed by: INTERNAL MEDICINE

## 2020-12-21 PROCEDURE — G8399 PT W/DXA RESULTS DOCUMENT: HCPCS | Performed by: INTERNAL MEDICINE

## 2020-12-21 PROCEDURE — 80053 COMPREHEN METABOLIC PANEL: CPT

## 2020-12-21 PROCEDURE — G8417 CALC BMI ABV UP PARAM F/U: HCPCS | Performed by: INTERNAL MEDICINE

## 2020-12-21 PROCEDURE — 36415 COLL VENOUS BLD VENIPUNCTURE: CPT

## 2020-12-21 PROCEDURE — 4040F PNEUMOC VAC/ADMIN/RCVD: CPT | Performed by: INTERNAL MEDICINE

## 2020-12-21 PROCEDURE — 1123F ACP DISCUSS/DSCN MKR DOCD: CPT | Performed by: INTERNAL MEDICINE

## 2020-12-21 PROCEDURE — 80178 ASSAY OF LITHIUM: CPT

## 2020-12-21 PROCEDURE — 83036 HEMOGLOBIN GLYCOSYLATED A1C: CPT

## 2020-12-21 ASSESSMENT — ENCOUNTER SYMPTOMS: FLU SYMPTOMS: 1

## 2020-12-21 NOTE — PROGRESS NOTES
[address] [facility] [address]  Dept: [phone]  Dept Fax: [phone]    Zofia Lopes is a 77 y.o. female who presents to the urgent care today for her medicalconditions/complaints as noted below. Zofia Lopes is c/o of Other (no taste or smell onset today)      HPI:     Influenza  This is a new problem. The problem occurs constantly. The problem has been unchanged. Associated symptoms include congestion. Nothing aggravates the symptoms. She has tried nothing for the symptoms. The treatment provided no relief. Past Medical History:   Diagnosis Date    Anxiety     Arrhythmia     Asthma     Bipolar 1 disorder ([facility] 2/14/2019    Bipolar disorder ([facility]     Chronic diarrhea 2/14/2019    COPD (chronic obstructive pulmonary disease) (HCC)     Depression     Essential hypertension     GERD (gastroesophageal reflux disease)     GERD (gastroesophageal reflux disease)     History of stroke 8/9/2018    Hyperlipidemia     Hypertension     Hypothyroidism     Mild persistent asthma without complication 3/84/6590    OAB (overactive bladder)     Obesity (BMI 30-39. 9) 8/19/2016    Osteoarthritis     Pulmonary fibrosis (HCC)     sjogrens syndrome    Pulmonary hypertension (HCC)     Pure hypercholesterolemia     Sleep apnea     Stroke ([facility]     Unspecified sleep apnea     on cpap    Urinary incontinence         Current Outpatient Medications   Medication Sig Dispense Refill    omeprazole (PRILOSEC) 20 MG delayed release capsule Take 1 capsule by mouth every morning (before breakfast) 90 capsule 0    acetaminophen (TYLENOL) 325 MG tablet Take 2 tablets by mouth every 6 hours as needed for Pain 60 tablet 0    vitamin D3 (CHOLECALCIFEROL) 25 MCG (1000 UT) TABS tablet Take 1 tablet by mouth daily 30 tablet 5

## 2020-12-23 LAB — SARS-COV-2, NAA: NOT DETECTED

## 2020-12-24 ENCOUNTER — TELEPHONE (OUTPATIENT)
Dept: PRIMARY CARE CLINIC | Age: 65
End: 2020-12-24

## 2021-01-01 DIAGNOSIS — E03.9 HYPOTHYROIDISM, UNSPECIFIED TYPE: ICD-10-CM

## 2021-01-01 DIAGNOSIS — K21.9 GASTROESOPHAGEAL REFLUX DISEASE, UNSPECIFIED WHETHER ESOPHAGITIS PRESENT: ICD-10-CM

## 2021-01-04 NOTE — TELEPHONE ENCOUNTER
Request for Levothyroxine, Prilsec. Pt due for f/u in February     Next Visit Date:  No future appointments. Health Maintenance   Topic Date Due    Flu vaccine (1) 09/01/2020    Pneumococcal 65+ years Vaccine (1 of 1 - PPSV23) 02/13/2021 (Originally 4/22/2018)    DTaP/Tdap/Td vaccine (1 - Tdap) 08/13/2021 (Originally 4/22/1972)    Shingles Vaccine (1 of 2) 08/13/2021 (Originally 4/22/2003)    TSH testing  07/16/2021    Annual Wellness Visit (AWV)  08/29/2021    Potassium monitoring  12/21/2021    Creatinine monitoring  12/21/2021    Breast cancer screen  08/24/2022    Lipid screen  12/21/2025    Colon cancer screen colonoscopy  09/25/2028    DEXA (modify frequency per FRAX score)  Completed    Hepatitis C screen  Completed    Hepatitis A vaccine  Aged Out    Hepatitis B vaccine  Aged Out    Hib vaccine  Aged Out    Meningococcal (ACWY) vaccine  Aged Out       Hemoglobin A1C (%)   Date Value   12/21/2020 5.6   06/26/2019 5.2   07/26/2018 5.4             ( goal A1C is < 7)   Microalb/Crt. Ratio (mcg/mg creat)   Date Value   11/10/2016 9     LDL Cholesterol (mg/dL)   Date Value   12/21/2020 136 (H)       (goal LDL is <100)   AST (U/L)   Date Value   12/21/2020 40 (H)     ALT (U/L)   Date Value   12/21/2020 40 (H)     BUN (mg/dL)   Date Value   12/21/2020 18     BP Readings from Last 3 Encounters:   12/21/20 133/79   08/13/20 (!) 143/85   07/30/20 128/78          (goal 120/80)    All Future Testing planned in [facility]  Lab Frequency Next Occurrence         Patient Active Problem List:     Essential hypertension     Pure hypercholesterolemia     Urinary incontinence     Arrhythmia     COPD (chronic obstructive pulmonary disease) (HCC)     Anxiety     Sleep apnea     GERD (gastroesophageal reflux disease)     Hypothyroidism     Obesity (BMI 30-39. 9)     Mild persistent asthma without complication     History of stroke     Pulmonary hypertension (HCC)     Chronic diarrhea     Bipolar 1 disorder ([facility]

## 2021-01-05 RX ORDER — OMEPRAZOLE 20 MG/1
CAPSULE, DELAYED RELEASE ORAL
Qty: 90 CAPSULE | Refills: 0 | Status: SHIPPED | OUTPATIENT
Start: 2021-01-05 | End: 2021-03-29

## 2021-01-05 RX ORDER — LEVOTHYROXINE SODIUM 0.03 MG/1
TABLET ORAL
Qty: 90 TABLET | Refills: 0 | Status: SHIPPED | OUTPATIENT
Start: 2021-01-05 | End: 2021-04-26

## 2021-02-09 ENCOUNTER — VIRTUAL VISIT (OUTPATIENT)
Dept: INTERNAL MEDICINE | Age: 66
End: 2021-02-09
Payer: MEDICARE

## 2021-02-09 DIAGNOSIS — I87.2 VENOUS STASIS DERMATITIS OF RIGHT LOWER EXTREMITY: ICD-10-CM

## 2021-02-09 DIAGNOSIS — M79.651 RIGHT THIGH PAIN: ICD-10-CM

## 2021-02-09 DIAGNOSIS — U07.1 COVID-19: ICD-10-CM

## 2021-02-09 DIAGNOSIS — R22.0 GINGIVAL SWELLING: Primary | ICD-10-CM

## 2021-02-09 PROCEDURE — 99443 PR PHYS/QHP TELEPHONE EVALUATION 21-30 MIN: CPT | Performed by: INTERNAL MEDICINE

## 2021-02-09 SDOH — ECONOMIC STABILITY: FOOD INSECURITY: WITHIN THE PAST 12 MONTHS, YOU WORRIED THAT YOUR FOOD WOULD RUN OUT BEFORE YOU GOT MONEY TO BUY MORE.: NEVER TRUE

## 2021-02-09 SDOH — ECONOMIC STABILITY: TRANSPORTATION INSECURITY
IN THE PAST 12 MONTHS, HAS LACK OF TRANSPORTATION KEPT YOU FROM MEETINGS, WORK, OR FROM GETTING THINGS NEEDED FOR DAILY LIVING?: NO

## 2021-02-09 SDOH — ECONOMIC STABILITY: FOOD INSECURITY: WITHIN THE PAST 12 MONTHS, THE FOOD YOU BOUGHT JUST DIDN'T LAST AND YOU DIDN'T HAVE MONEY TO GET MORE.: NEVER TRUE

## 2021-02-09 SDOH — ECONOMIC STABILITY: TRANSPORTATION INSECURITY
IN THE PAST 12 MONTHS, HAS THE LACK OF TRANSPORTATION KEPT YOU FROM MEDICAL APPOINTMENTS OR FROM GETTING MEDICATIONS?: NO

## 2021-02-09 ASSESSMENT — ENCOUNTER SYMPTOMS
VOMITING: 0
COUGH: 0
ABDOMINAL PAIN: 0
SHORTNESS OF BREATH: 0
CHEST TIGHTNESS: 0
NAUSEA: 0

## 2021-02-09 NOTE — PROGRESS NOTES
Patient initiated a phone visit to discuss her concerns regarding gum swelling. She says been going on for some time but worsening. She has been seeing a dentist but they have advised her she needs some crown placements and other issues and she is not able to afford it. She needs referral to another dentist.  We will try to obtain other dentist notes. Not sure if it is gingivitis versus chronic gingival hypertrophy since she is on amlodipine. We will have her come into the office for exam and try to get her Current dentist note. She is also complaining of right thigh pain. She has lymphedema of her leg. She had an ankle fracture on the right last year and was on nonweightbearing for some time. She was given compression socks. She wants to go back to see ID. She is denying any redness or erythema. Advised to call their office in follow-up and she does not need a referral.  Advised to continue wearing compression socks. She will need to come in for exam of the right leg. She has hypertension. If amlodipine needs to be switched she needs to come in so we can discuss this with her. Attending Physician Statement  I have discussed the care of Rocky Lawson, including pertinent history and exam findings,  with the resident. I have reviewed the key elements of all parts of the encounter with the resident. I agree with the assessment, plan and orders as documented by the resident.   (GE Modifier)

## 2021-02-09 NOTE — PATIENT INSTRUCTIONS
Labs mailed to patient, they will have them done before their next visit, Also gave patient instructions to go over to walk in clinic for Covid test.    Informed patient of ID phone number to schedule appointment and mailed patient list on dentists. PC to patient to contact Dental Office for last Office Visit notes, Per Dr. Dora Lemon. Return To Clinic 4/13/2021. After Visit Summary  mailed to patient. It is very important for your care that you keep your appointment. If for some reason you are unable to keep your appointment it is equally important that you call our office at [phone] to cancel your appointment and reschedule. Failure to do so may result in your termination from our practice.

## 2021-02-09 NOTE — PROGRESS NOTES
2021    TELEHEALTH EVALUATION -- Audio(During TMBXO-60 public health emergency)    Patient and physician are located in their individual homes    HPI:    Amandeep Stephen (:  1955) has requested an audio evaluation for the following concern(s):  Gum swelling, pain in right eye, Covid, compression stockings  Patient states that she has been having gum swellings in the past 4 years which comes and goes, but has progressed and has gotten worse recently. She states that she has been having difficulty just food particles and her pills get stuck in her gums but denies any pain or bleeding. Patient states that she also needs to offer crowns fixed and wants another dentist which would accept her insurance. Patient states that she is also had this pain in her right thigh, progressive in nature, starting from her right knee going on her right thigh, tender on palpation and states that it is red and swollen. She also states that she had peripheral artery disease and had an ulcer on her right lower leg which was managed with compression stockings and wants to follow-up with Dr. Jhon Humphreys. Review of Systems   Constitutional: Negative for activity change, appetite change and fatigue. HENT: Negative for congestion. Gum swelling   Respiratory: Negative for cough, chest tightness and shortness of breath. Cardiovascular: Negative for chest pain and palpitations. Gastrointestinal: Negative for abdominal pain, nausea and vomiting. Musculoskeletal:        Right thigh pain with redness and swelling   Neurological: Negative for dizziness, light-headedness and headaches. Psychiatric/Behavioral: Negative for behavioral problems and confusion. Prior to Visit Medications    Medication Sig Taking?  Authorizing Provider   omeprazole (PRILOSEC) 20 MG delayed release capsule TAKE 1 CAPSULE BY MOUTH ONCE DAILY IN THE MORNING BEFORE BREAKFAST Yes Sue Sifuentes MD levothyroxine (SYNTHROID) 25 MCG tablet TAKE 1 TABLET BY MOUTH ONCE DAILY . APPOINTMENT REQUIRED FOR FUTURE REFILLS Yes Terrie Malik MD   acetaminophen (TYLENOL) 325 MG tablet Take 2 tablets by mouth every 6 hours as needed for Pain Yes Terrie Malik MD   amLODIPine (NORVASC) 10 MG tablet Take 1 tablet by mouth once daily Yes Terrie Malik MD   albuterol sulfate HFA (PROAIR HFA) 108 (90 Base) MCG/ACT inhaler Inhale 2 puffs into the lungs 2 times daily as needed for Wheezing (no more than 4 times daily) Yes Terrie Malik MD   FLUoxetine (PROZAC) 40 MG capsule Take 40 mg by mouth daily Yes Historical Provider, MD   vitamin D3 (CHOLECALCIFEROL) 25 MCG (1000 UT) TABS tablet Take 1 tablet by mouth daily  Patient not taking: Reported on 2/9/2021  Terrie Malik MD   mirabegron (MYRBETRIQ) 50 MG TB24 Take 50 mg by mouth daily Pt gets samples  Historical Provider, MD   QUEtiapine (SEROQUEL) 200 MG tablet Take 200 mg by mouth 3 times daily  Historical Provider, MD   budesonide-formoterol (SYMBICORT) 160-4.5 MCG/ACT AERO Inhale 2 puffs into the lungs 2 times daily  Patient not taking: Reported on 2/9/2021  Terrie Malik MD   mometasone (NASONEX) 50 MCG/ACT nasal spray 2 sprays by Nasal route daily 1 spray each nostril daily  Historical Provider, MD   montelukast (SINGULAIR) 10 MG tablet Take 10 mg by mouth nightly  Historical Provider, MD   ipratropium (ATROVENT) 0.03 % nasal spray 2 sprays by Nasal route 3 times daily as needed for Rhinitis   Historical Provider, MD   lithium 300 MG capsule Take 300 mg by mouth 2 times daily (with meals). Historical Provider, MD       Social History     Tobacco Use    Smoking status: Never Smoker    Smokeless tobacco: Never Used   Substance Use Topics    Alcohol use: No    Drug use: No            RECORD REVIEW: Previous medical records were reviewed at today's visit.     Vital Signs: (As obtained by patient/caregiver at home)

## 2021-02-15 NOTE — PATIENT INSTRUCTIONS
Medications e-scribe to pharmacy of pt's choice. Labs given to patient, they will have them done before their next visit. Order for MRI faxed to [address] [place] [address] they will all pt for appt. Please call [phone] in not heard within 2 weeks. Patient will be contacted to schedule their next appointment.
no

## 2021-02-16 ENCOUNTER — TELEPHONE (OUTPATIENT)
Dept: INTERNAL MEDICINE | Age: 66
End: 2021-02-16

## 2021-02-16 DIAGNOSIS — F41.1 GAD (GENERALIZED ANXIETY DISORDER): Primary | ICD-10-CM

## 2021-02-16 NOTE — TELEPHONE ENCOUNTER
Patient is requesting a referral to a psychiatrist please. She would like to  the referral, so please call when that is ready.  [phone]

## 2021-02-19 ENCOUNTER — TELEPHONE (OUTPATIENT)
Dept: PSYCHIATRY | Age: 66
End: 2021-02-19

## 2021-02-19 DIAGNOSIS — F41.9 ANXIETY: ICD-10-CM

## 2021-02-19 DIAGNOSIS — F31.9 BIPOLAR 1 DISORDER (HCC): Primary | ICD-10-CM

## 2021-02-19 NOTE — TELEPHONE ENCOUNTER
.Appointment Type: New Patient    Outcome of Phone Call: Other Spoke with Pt to let her know she needs to be seen at [facility]    Referring Provider: Rianna Retana      Did provider ask for outreach? Yes    Did patient deny appointment or request an appointment at a later date? No.. Please initiate Outside Referral for pt to [facility]    Are there any barriers with the patient/specific requests?  yes,   Pt needs to be seen at an External Location  After speaking with Kiley Vivas the services this pt needs can only be done at the [facility]    Please contact if phone call is returned: Iraida Kirby- [phone]

## 2021-03-10 ENCOUNTER — TELEPHONE (OUTPATIENT)
Dept: PSYCHIATRY | Age: 66
End: 2021-03-10

## 2021-03-10 NOTE — TELEPHONE ENCOUNTER
Writer spoke with Dr. Caitlyn Obando and Dr. Caitlyn Obando wants to know if the patient already sees someone for psychiatry, does she take any psych medications, why she needs a second psych referral, does her psychiatrist not do the evaluation that she is needing? Dr. Caitlyn Obando also wants the patient to be scheduled for a phone visit either tomorrow or Friday. Writer LM for the patient to call the office back to ask her the questions that Dr. Caitlyn Obando had mentioned and for an appointment.

## 2021-03-10 NOTE — TELEPHONE ENCOUNTER
Patient called back and said [facility] does not do psych evaluations for patients that are not their own. She said she would have to be admitted for them to do the psych evaluation for court and she really does not want to do that. She said the court gave her papers for the evaluation but she mailed them to Ciel[facility] A[facility]. She also said she has a copy of them. The patient said the evaluation is to see if she is competent. She said the last time she had this done was over 8 years ago and it came back that she was incompetent. The patient does not know where she can go and said she refuses to call around to see if who can do the evaluation because she does not have time.

## 2021-03-10 NOTE — TELEPHONE ENCOUNTER
.Appointment Type: New Patient    Outcome of Phone Call: Other Referral error    Referring Provider: yes-see referral information      Did provider ask for outreach? yes-referral call    Did patient deny appointment or request an appointment at a later date? no    Are there any barriers with the patient/specific requests? yes,    [facility] APRN-CNP does not do Psychiatric Eval.. These can be done at [facility] ..  I spoke with Dinora Stewart at Dr Jp Barrett office and she will contact pt with Info    Please contact if phone call is returned: Sudeep Mancia- [phone]

## 2021-03-11 ENCOUNTER — VIRTUAL VISIT (OUTPATIENT)
Dept: INTERNAL MEDICINE | Age: 66
End: 2021-03-11
Payer: MEDICARE

## 2021-03-11 DIAGNOSIS — K52.9 ACUTE GASTROENTERITIS: ICD-10-CM

## 2021-03-11 DIAGNOSIS — F31.9 BIPOLAR 1 DISORDER (HCC): ICD-10-CM

## 2021-03-11 DIAGNOSIS — E78.00 HYPERCHOLESTEROLEMIA: ICD-10-CM

## 2021-03-11 DIAGNOSIS — I10 ESSENTIAL HYPERTENSION: ICD-10-CM

## 2021-03-11 DIAGNOSIS — G47.33 OSA (OBSTRUCTIVE SLEEP APNEA): ICD-10-CM

## 2021-03-11 DIAGNOSIS — K21.9 GASTROESOPHAGEAL REFLUX DISEASE, UNSPECIFIED WHETHER ESOPHAGITIS PRESENT: Primary | ICD-10-CM

## 2021-03-11 PROCEDURE — 99442 PR PHYS/QHP TELEPHONE EVALUATION 11-20 MIN: CPT | Performed by: INTERNAL MEDICINE

## 2021-03-11 NOTE — PROGRESS NOTES
Refer to psychiatry for second opinion as requested by patient. Attending Physician Statement  I have discussed the care of Tara Womack, including pertinent history and exam findings,  with the resident. I have reviewed the key elements of all parts of the encounter with the resident. I agree with the assessment, plan and orders as documented by the resident.   (GE Modifier)

## 2021-03-11 NOTE — PROGRESS NOTES
Patient is mechanical aortic valve, currently on warfarin, with INR 7.0.  Hold anticoagulants.  11/16:  --INR 4.7 today  --pharmacy monitoring  11/17:  --INR 2.1 today  11/18/19 The patients INR was 1.5 this morning. Will start heparin gtt to bridge until patient becomes therapeutic with coumadin. Vitamin K was given today.     Essential hypertension    Pure hypercholesterolemia    Urinary incontinence    Arrhythmia    COPD (chronic obstructive pulmonary disease) (HCC)    Anxiety    Sleep apnea    GERD (gastroesophageal reflux disease)    Hypothyroidism    Obesity (BMI 30-39. 9)    Mild persistent asthma without complication    History of stroke    Pulmonary hypertension ([facility]    Chronic diarrhea    Bipolar 1 disorder ([facility])         Health Maintenance Due   Topic Date Due    COVID-19 Vaccine (1 of 2) Never done    Pneumococcal 65+ years Vaccine (1 of 1 - PPSV23) Never done         Allergies   Allergen Reactions    Motrin [Ibuprofen]     Aspirin Rash    Blue Dyes (Parenteral) Rash    Hctz [Hydrochlorothiazide] Rash     Fixed drug reaction    Sulfa Antibiotics Rash    Tetracyclines & Related Rash         MEDICATIONS:      Current Outpatient Medications   Medication Sig Dispense Refill    levothyroxine (SYNTHROID) 25 MCG tablet TAKE 1 TABLET BY MOUTH ONCE DAILY . APPOINTMENT REQUIRED FOR FUTURE REFILLS 90 tablet 0    acetaminophen (TYLENOL) 325 MG tablet Take 2 tablets by mouth every 6 hours as needed for Pain 60 tablet 0    amLODIPine (NORVASC) 10 MG tablet Take 1 tablet by mouth once daily 90 tablet 1    QUEtiapine (SEROQUEL) 200 MG tablet Take 200 mg by mouth 3 times daily      albuterol sulfate HFA (PROAIR HFA) 108 (90 Base) MCG/ACT inhaler Inhale 2 puffs into the lungs 2 times daily as needed for Wheezing (no more than 4 times daily) 1 Inhaler 3    mometasone (NASONEX) 50 MCG/ACT nasal spray 2 sprays by Nasal route daily 1 spray each nostril daily      FLUoxetine (PROZAC) 40 MG capsule Take 40 mg by mouth daily      lithium 300 MG capsule Take 300 mg by mouth 2 times daily (with meals).       omeprazole (PRILOSEC) 20 MG delayed release capsule TAKE 1 CAPSULE BY MOUTH ONCE DAILY IN THE MORNING BEFORE BREAKFAST (Patient not taking: Reported on 3/11/2021) 90 capsule 0    vitamin D3 (CHOLECALCIFEROL) 25 MCG (1000 UT) TABS tablet Take 1 tablet by mouth daily (Patient not taking: Reported on 3/11/2021) 30 tablet 5    mirabegron (MYRBETRIQ) 50 MG TB24 Take 50 mg by mouth daily Pt gets samples      budesonide-formoterol (SYMBICORT) 160-4.5 MCG/ACT AERO Inhale 2 puffs into the lungs 2 times daily (Patient not taking: Reported on 2/9/2021) 1 Inhaler 3    montelukast (SINGULAIR) 10 MG tablet Take 10 mg by mouth nightly      ipratropium (ATROVENT) 0.03 % nasal spray 2 sprays by Nasal route 3 times daily as needed for Rhinitis        No current facility-administered medications for this visit. SOCIAL HISTORY    Reviewed and no change from previous record. Dulce  reports that she has never smoked. She has never used smokeless tobacco.    FAMILY HISTORY:    Reviewed and No change from previous visit    REVIEW OF SYSTEMS:    12 point ROS Negative except as mentioned above. Review of Systems    PHYSICAL EXAM:    There were no vitals filed for this visit. BP Readings from Last 3 Encounters:   12/21/20 133/79   08/13/20 (!) 143/85   07/30/20 128/78          LABORATORY FINDINGS:    CBC:  Lab Results   Component Value Date    WBC 5.6 09/18/2018    HGB 11.5 09/18/2018     09/18/2018     05/02/2012       BMP:    Lab Results   Component Value Date     12/21/2020    K 3.9 12/21/2020     12/21/2020    CO2 23 12/21/2020    BUN 18 12/21/2020    CREATININE 0.90 12/21/2020    GLUCOSE 91 12/21/2020    GLUCOSE 119 05/02/2012       HEMOGLOBIN A1C:   Lab Results   Component Value Date    LABA1C 5.6 12/21/2020       FASTING LIPID PANEL:  Lab Results   Component Value Date    CHOL 194 08/13/2020    HDL 49 12/21/2020    TRIG 99 08/13/2020       ASSESSMENT AND PLAN:    Devan Sauer was seen today for diarrhea and gastroesophageal reflux. Diagnoses and all orders for this visit:    1. Gastroesophageal reflux disease, unspecified whether esophagitis present  Stable. Continue Prilosec 20 mg extended release daily. 2. Diarrhea secondary to food allergy  Diarrhea improving. No need for any additional medications at this time. 3. Essential hypertension  Could not check blood pressure during this visit as this is a virtual 1.  We will check blood pressure at next visit. Continue Norvasc 10 mg daily. 4. Bipolar 1 disorder ([address]  Patient requesting second opinion for psychiatry evaluation. Referral to psychiatry at [facility]. We will obtain records from current psychiatrist.    5. XAVIER (obstructive sleep apnea)  Follow with pulmonology. Continue CPAP. 6. Hypercholesterolemia  We will discuss starting on statin therapy during the next visit. FOLLOW UP AND INSTRUCTIONS:   No follow-ups on file. · Debere received counseling on the following healthy behaviors: nutrition, exercise and medication adherence    · Discussed use, benefit, and side effects of prescribed medications. Barriers to medication compliance addressed. All patient questions answered. Pt voiced understanding. · Patient given educational materials - see patient instructions        Yassine Rehman MD  Internal Medicine Resident, PGY  [facility];  [place], [place]  3/11/2021, 4:09 PM

## 2021-03-11 NOTE — PATIENT INSTRUCTIONS
Referral to Psychiatry was placed, summary of care printed and faxed to office, phone numbers given to the patient, they will contact office for an appt    Patient was put on a wait list for 3 months and will be contacted to schedule their next follow up appointment once the schedule is available. If the patient is in need of an appointment before their next visit please call the office at [phone]. After Visit Summary  given and reviewed.

## 2021-03-12 ENCOUNTER — TELEPHONE (OUTPATIENT)
Dept: INTERNAL MEDICINE | Age: 66
End: 2021-03-12

## 2021-03-12 NOTE — TELEPHONE ENCOUNTER
Pt needs to schedule an appointment to discuss psych eval, pt would like a telephone visit at phone #: [phone]. Pt needs to get an appt scheduled with Dr. Amirah Rebolledo before April 6th. She has a court date on 4/6. Pt stated she does not trust current psych dr. She stated \" I was told she was trying to destroy my life and I don't believe she likes me at all. That is the reason I don't want to get involved with her. \"

## 2021-03-12 NOTE — TELEPHONE ENCOUNTER
Pt called and was informed that pt needs to come in to sign a JAY JAY for [facility]

## 2021-03-16 ENCOUNTER — TELEPHONE (OUTPATIENT)
Dept: INTERNAL MEDICINE | Age: 66
End: 2021-03-16

## 2021-03-16 NOTE — TELEPHONE ENCOUNTER
Writer called pt to let her know she needed to come in to resign the JAY JAY form for her [facility] phychiatric pcp

## 2021-03-18 ENCOUNTER — TELEPHONE (OUTPATIENT)
Dept: INTERNAL MEDICINE | Age: 66
End: 2021-03-18

## 2021-03-22 NOTE — TELEPHONE ENCOUNTER
Spoke with pt let her know that Dr Heaven Varma will not due a handicap placard at this time. Pt said she will discuss it at her next appt.

## 2021-03-29 DIAGNOSIS — K21.9 GASTROESOPHAGEAL REFLUX DISEASE, UNSPECIFIED WHETHER ESOPHAGITIS PRESENT: ICD-10-CM

## 2021-03-29 NOTE — TELEPHONE ENCOUNTER
Request for Prilosec. Pt is on wait list for appt in June    Next Visit Date:  No future appointments. Health Maintenance   Topic Date Due    COVID-19 Vaccine (1) Never done    Pneumococcal 65+ years Vaccine (1 of 1 - PPSV23) Never done    Flu vaccine (1) 06/30/2021 (Originally 9/1/2020)    DTaP/Tdap/Td vaccine (1 - Tdap) 08/13/2021 (Originally 4/22/1972)    Shingles Vaccine (1 of 2) 08/13/2021 (Originally 4/22/2003)    TSH testing  07/16/2021    Annual Wellness Visit (AWV)  08/29/2021    Potassium monitoring  12/21/2021    Creatinine monitoring  12/21/2021    Breast cancer screen  08/24/2022    Lipid screen  12/21/2025    Colon cancer screen colonoscopy  09/25/2028    DEXA (modify frequency per FRAX score)  Completed    Hepatitis C screen  Completed    Hepatitis A vaccine  Aged Out    Hepatitis B vaccine  Aged Out    Hib vaccine  Aged Out    Meningococcal (ACWY) vaccine  Aged Out       Hemoglobin A1C (%)   Date Value   12/21/2020 5.6   06/26/2019 5.2   07/26/2018 5.4             ( goal A1C is < 7)   Microalb/Crt. Ratio (mcg/mg creat)   Date Value   11/10/2016 9     LDL Cholesterol (mg/dL)   Date Value   12/21/2020 136 (H)       (goal LDL is <100)   AST (U/L)   Date Value   12/21/2020 40 (H)     ALT (U/L)   Date Value   12/21/2020 40 (H)     BUN (mg/dL)   Date Value   12/21/2020 18     BP Readings from Last 3 Encounters:   12/21/20 133/79   08/13/20 (!) 143/85   07/30/20 128/78          (goal 120/80)    All Future Testing planned in [facility]  Lab Frequency Next Occurrence   COVID-19 Once 02/09/2022         Patient Active Problem List:     Essential hypertension     Pure hypercholesterolemia     Urinary incontinence     Arrhythmia     COPD (chronic obstructive pulmonary disease) (HCC)     Anxiety     Sleep apnea     GERD (gastroesophageal reflux disease)     Hypothyroidism     Obesity (BMI 30-39. 9)     Mild persistent asthma without complication     History of stroke     Pulmonary hypertension ([facility]     Chronic diarrhea     Bipolar 1 disorder ([facility]

## 2021-03-30 RX ORDER — OMEPRAZOLE 20 MG/1
CAPSULE, DELAYED RELEASE ORAL
Qty: 90 CAPSULE | Refills: 1 | Status: SHIPPED | OUTPATIENT
Start: 2021-03-30 | End: 2021-10-29

## 2021-04-13 DIAGNOSIS — I10 ESSENTIAL HYPERTENSION: ICD-10-CM

## 2021-04-14 RX ORDER — AMLODIPINE BESYLATE 10 MG/1
TABLET ORAL
Qty: 90 TABLET | Refills: 3 | Status: SHIPPED | OUTPATIENT
Start: 2021-04-14 | End: 2022-02-08 | Stop reason: SDUPTHER

## 2021-04-14 NOTE — TELEPHONE ENCOUNTER
E-scribe request for medication amLODIPine . Pt is on wait list till July. Health Maintenance   Topic Date Due    COVID-19 Vaccine (1) Never done    Pneumococcal 65+ years Vaccine (1 of 1 - PPSV23) Never done    DTaP/Tdap/Td vaccine (1 - Tdap) 08/13/2021 (Originally [birth year]22/1974)    Shingles Vaccine (1 of 2) 08/13/2021 (Originally 4/22/2003)    TSH testing  07/16/2021    Annual Wellness Visit (AWV)  08/29/2021    Flu vaccine (Season Ended) 09/01/2021    Potassium monitoring  12/21/2021    Creatinine monitoring  12/21/2021    Breast cancer screen  08/24/2022    Lipid screen  12/21/2025    Colon cancer screen colonoscopy  09/25/2028    DEXA (modify frequency per FRAX score)  Completed    Hepatitis C screen  Completed    Hepatitis A vaccine  Aged Out    Hepatitis B vaccine  Aged Out    Hib vaccine  Aged Out    Meningococcal (ACWY) vaccine  Aged Out             (applicable per patient's age: Cancer Screenings, Depression Screening, Fall Risk Screening, Immunizations)    Hemoglobin A1C (%)   Date Value   12/21/2020 5.6   06/26/2019 5.2   07/26/2018 5.4     Microalb/Crt. Ratio (mcg/mg creat)   Date Value   11/10/2016 9     LDL Cholesterol (mg/dL)   Date Value   12/21/2020 136 (H)     AST (U/L)   Date Value   12/21/2020 40 (H)     ALT (U/L)   Date Value   12/21/2020 40 (H)     BUN (mg/dL)   Date Value   12/21/2020 18      (goal A1C is < 7)   (goal LDL is <100) need 30-50% reduction from baseline     BP Readings from Last 3 Encounters:   12/21/20 133/79   08/13/20 (!) 143/85   07/30/20 128/78    (goal /80)      All Future Testing planned in [facility]:  Lab Frequency Next Occurrence   COVID-19 Once 02/09/2022       Next Visit Date:  No future appointments.          Patient Active Problem List:     Essential hypertension     Pure hypercholesterolemia     Urinary incontinence     Arrhythmia     COPD (chronic obstructive pulmonary disease) (HCC)     Anxiety     Sleep apnea     GERD (gastroesophageal

## 2021-04-21 ENCOUNTER — TELEPHONE (OUTPATIENT)
Dept: INTERNAL MEDICINE | Age: 66
End: 2021-04-21

## 2021-04-21 NOTE — TELEPHONE ENCOUNTER
Patient calling asking if she should get the Covid Vaccine with all of her health conditions. Patient is scheduled for the vaccine on 4/23. Health Maintenance   Topic Date Due    COVID-19 Vaccine (1) Never done    Pneumococcal 65+ years Vaccine (1 of 1 - PPSV23) Never done    DTaP/Tdap/Td vaccine (1 - Tdap) 08/13/2021 (Originally 4/22/1974)    Shingles Vaccine (1 of 2) 08/13/2021 (Originally 4/22/2003)    TSH testing  07/16/2021    Annual Wellness Visit (AWV)  08/29/2021    Flu vaccine (Season Ended) 09/01/2021    Potassium monitoring  12/21/2021    Creatinine monitoring  12/21/2021    Breast cancer screen  08/24/2022    Lipid screen  12/21/2025    Colon cancer screen colonoscopy  09/25/2028    DEXA (modify frequency per FRAX score)  Completed    Hepatitis C screen  Completed    Hepatitis A vaccine  Aged Out    Hepatitis B vaccine  Aged Out    Hib vaccine  Aged Out    Meningococcal (ACWY) vaccine  Aged Out             (applicable per patient's age: Cancer Screenings, Depression Screening, Fall Risk Screening, Immunizations)    Hemoglobin A1C (%)   Date Value   12/21/2020 5.6   06/26/2019 5.2   07/26/2018 5.4     Microalb/Crt. Ratio (mcg/mg creat)   Date Value   11/10/2016 9     LDL Cholesterol (mg/dL)   Date Value   12/21/2020 136 (H)     AST (U/L)   Date Value   12/21/2020 40 (H)     ALT (U/L)   Date Value   12/21/2020 40 (H)     BUN (mg/dL)   Date Value   12/21/2020 18      (goal A1C is < 7)   (goal LDL is <100) need 30-50% reduction from baseline     BP Readings from Last 3 Encounters:   12/21/20 133/79   08/13/20 (!) 143/85   07/30/20 128/78    (goal /80)      All Future Testing planned in [facility]:  Lab Frequency Next Occurrence   COVID-19 Once 02/09/2022       Next Visit Date:  No future appointments.          Patient Active Problem List:     Essential hypertension     Pure hypercholesterolemia     Urinary incontinence     Arrhythmia     COPD (chronic obstructive pulmonary disease) ([facility]     Anxiety     Sleep apnea     GERD (gastroesophageal reflux disease)     Hypothyroidism     Obesity (BMI 30-39. 9)     Mild persistent asthma without complication     History of stroke     Pulmonary hypertension (HCC)     Chronic diarrhea     Bipolar 1 disorder ([facility]

## 2021-04-25 DIAGNOSIS — E03.9 HYPOTHYROIDISM, UNSPECIFIED TYPE: ICD-10-CM

## 2021-04-26 RX ORDER — LEVOTHYROXINE SODIUM 0.03 MG/1
TABLET ORAL
Qty: 90 TABLET | Refills: 0 | Status: SHIPPED | OUTPATIENT
Start: 2021-04-26 | End: 2021-08-03 | Stop reason: SDUPTHER

## 2021-05-07 ENCOUNTER — TELEPHONE (OUTPATIENT)
Dept: INTERNAL MEDICINE | Age: 66
End: 2021-05-07

## 2021-05-07 DIAGNOSIS — F31.9 BIPOLAR 1 DISORDER (HCC): Primary | ICD-10-CM

## 2021-05-07 NOTE — TELEPHONE ENCOUNTER
PC call from pt, she would like a referral sent to Dr. Jojo Hinton at [address] [facility].

## 2021-05-25 ENCOUNTER — TELEPHONE (OUTPATIENT)
Dept: INTERNAL MEDICINE | Age: 66
End: 2021-05-25

## 2021-05-25 DIAGNOSIS — F31.9 BIPOLAR 1 DISORDER (HCC): Primary | ICD-10-CM

## 2021-05-25 NOTE — TELEPHONE ENCOUNTER
PC from patient - patient states she found a psychiatrist that can accomodates her needs. External referral pended.      [email or web address]     Phone number : [phone]

## 2021-05-27 ENCOUNTER — OFFICE VISIT (OUTPATIENT)
Dept: INFECTIOUS DISEASES | Age: 66
End: 2021-05-27
Payer: MEDICARE

## 2021-05-27 VITALS
RESPIRATION RATE: 14 BRPM | SYSTOLIC BLOOD PRESSURE: 137 MMHG | WEIGHT: 195 LBS | BODY MASS INDEX: 31.47 KG/M2 | HEART RATE: 86 BPM | TEMPERATURE: 96.4 F | DIASTOLIC BLOOD PRESSURE: 86 MMHG

## 2021-05-27 DIAGNOSIS — I87.2 VENOUS STASIS DERMATITIS OF BOTH LOWER EXTREMITIES: Primary | ICD-10-CM

## 2021-05-27 PROCEDURE — 99213 OFFICE O/P EST LOW 20 MIN: CPT | Performed by: INTERNAL MEDICINE

## 2021-05-27 NOTE — PROGRESS NOTES
[facility] - Office Progress Note  Today's Date and Time: 5/27/2021, 4:56 PM    Diagnostic Impression :     1. Venous stasis dermatitis of both lower extremities        Recommendations   · Continue to wear compression stockings daily  · Monitor off antibiotics  · Return to office prn    Chief complaint/reason for consultation:     Chief Complaint   Patient presents with    Cellulitis       History of Present Illness:   Patricia Arango is a 74y.o.-year-old  female who was initially evaluated on 5/27/2021. Patient seen at the request of Dr. Marcia Santos:  Pt first seen on 3-12-20  Patient is a 76-year-old woman who suffered a right ankle fracture on 9-11-19. On 9-12-19 she underwent surgery with Dr Cleopatra Menon and had multiple pins placed. She reports that one of the bones has not healed well and that she has been in a walking boot since the surgery. She went to see her dermatologist (Dr Sanjuana Robles) in December with complaints of skin changes to the RLE. He placed her on Keflex and Valtrex at that time due to a concern for a herpetic infection or cellulitis of the leg. She recently saw him again and was prescribed a 10 day course of Azithromycin. She has been taking the Keflex and Valtrex continuously since December without improvement in the skin changes of that leg. Approximately 3 weeks ago she was sent to the ER from physical therapy because of RLE edema. A doppler was done and did not show any DVT. Pt reports that her leg is not painful to touch. It is generally swollen and is discolored. No chills, fevers, night sweats or malaise    On exam she is noted to have PVD bilaterally, greater on Rt. There is no induration or erythema. She does have generalized edema          There is no evidence of active cellulitis or herpetic outbreak. We discussed the nature of PVD and how that can affect the coloration of the skin.   Pt times daily, Disp: , Rfl:     budesonide-formoterol (SYMBICORT) 160-4.5 MCG/ACT AERO, Inhale 2 puffs into the lungs 2 times daily, Disp: 1 Inhaler, Rfl: 3    albuterol sulfate HFA (PROAIR HFA) 108 (90 Base) MCG/ACT inhaler, Inhale 2 puffs into the lungs 2 times daily as needed for Wheezing (no more than 4 times daily), Disp: 1 Inhaler, Rfl: 3    mometasone (NASONEX) 50 MCG/ACT nasal spray, 2 sprays by Nasal route daily 1 spray each nostril daily, Disp: , Rfl:     montelukast (SINGULAIR) 10 MG tablet, Take 10 mg by mouth nightly, Disp: , Rfl:     FLUoxetine (PROZAC) 40 MG capsule, Take 40 mg by mouth daily, Disp: , Rfl:     ipratropium (ATROVENT) 0.03 % nasal spray, 2 sprays by Nasal route 3 times daily as needed for Rhinitis , Disp: , Rfl:     lithium 300 MG capsule, Take 300 mg by mouth 2 times daily (with meals). , Disp: , Rfl:      Social History:     Social History     Socioeconomic History    Marital status:      Spouse name: Not on file    Number of children: Not on file    Years of education: Not on file    Highest education level: Not on file   Occupational History    Not on file   Tobacco Use    Smoking status: Never Smoker    Smokeless tobacco: Never Used   Substance and Sexual Activity    Alcohol use: No    Drug use: No    Sexual activity: Not on file   Other Topics Concern    Not on file   Social History Narrative    Not on file     Social Determinants of Health     Financial Resource Strain: Low Risk     Difficulty of Paying Living Expenses: Not hard at all   Food Insecurity: No Food Insecurity    Worried About [facility] in the Last Year: Never true    Jaymie of Food in the Last Year: Never true   Transportation Needs: No Transportation Needs    Lack of Transportation (Medical): No    Lack of Transportation (Non-Medical):  No   Physical Activity:     Days of Exercise per Week:     Minutes of Exercise per Session:    Stress:     Feeling of Stress :    Social No results found for: RPR  No results found for: HIV  No results found for: [facility]  Lab Results   Component Value Date    MUCUS NOT REPORTED 08/22/2019    RBC 4.06 09/18/2018    RBC 4.05 05/02/2012    TRICHOMONAS NOT REPORTED 08/22/2019    WBC 5.6 09/18/2018    YEAST NOT REPORTED 08/22/2019    TURBIDITY CLEAR 08/22/2019     Lab Results   Component Value Date    CREATININE 0.90 12/21/2020    GLUCOSE 91 12/21/2020    GLUCOSE 119 05/02/2012       Thank you for allowing us to participate in the care of this patient. Please call with questions. Quincy Villar MD  Internal Medicine Resident, PGY-3  [facility]; [place], [place]  5/27/2021, 4:56 PM      ATTESTATION:    I have discussed the case, including pertinent history and exam findings with the residents and students. I have seen and examined the patient and the key elements of the encounter have been performed by me. I was present when the student obtained his information or examined the patient. I have reviewed the laboratory data, other diagnostic studies and discussed them with the residents. I have updated the medical record where necessary. I agree with the assessment, plan and orders as documented by the resident/ student.     Cheli Givens MD.

## 2021-05-27 NOTE — PROGRESS NOTES
antibiotics and antiviral medication. Labs have been ordered to rule out osteomyelitis       Office Visit 5-:  Pt reports feeling well  She is still wearing the boot for another 2 weeks  The pain has resolved  She has continued color changes of her skin  No chills, fevers, SOB or malaise    Has been off all of the antibiotics without change or worsening in her LE's  Her legs feel better when she wear the compression stockings    On exam  Lungs clear  HRR  Bilateral LE with changes associated with PVD. No induration, erythema or inflammation    Long discussion with pt explaining PAD/PVD and the importance of preventing lower extremity edema. Pt expressed understanding    CURRENT EXAMINATION: 5/27/2021     Patient returns to clinic today for follow up with lower extremity edema and discoloration. Reports that she is no longer taking any antibiotics or Valtrex. She has been wearing a pair of compression stockings that were donated to her from [employer]. Unfortunately she was unable to afford a pair of new stockings. WE were able to secure two pairs of compression stockings for her. She states that her leg feels so much better and the swelling has decreased as well. Her only complaint is that her right foot, leg, and thigh are still discolored. Ms. Rita Paez reports that she was able to start PT last week and has completed 2 session thus far. After the PT session she is sore and her right leg swells but after a couple days of recovery she is ready for PT again. Patient has no new complaints today. DISCUSSION:    · 74 yo woman s/p Rt ankle fracture s/p fixation  · Developed increased skin changes post operatively  · Has been being treated with Keflex and Valtrex x 4 mos  · Most recently started on Azithromycin x 10 days  · On exam there is no evidence of cellulitis, however there is significant PVD and skin changes related to it to venous stasis.  There is also chronic leg edema secondary to prior fracture and surgical correction of the fracture. · Pts pain and edema improved with compression stockings. · There is no acute sign of infection in the right lower extremity. PLAN:   · Continue to wear compression stockings daily  · Continue PT as tolerated   · Monitor off antibiotics  · Return to office prn      3-12-20: I have personally reviewed the past medical history, past surgical history, medications, social history, and family history, and I have updated the database accordingly. Past Medical History:     Past Medical History:   Diagnosis Date    Anxiety     Arrhythmia     Asthma     Bipolar 1 disorder ([facility] 2/14/2019    Bipolar disorder ([facility]     Chronic diarrhea 2/14/2019    COPD (chronic obstructive pulmonary disease) (HCC)     Depression     Essential hypertension     GERD (gastroesophageal reflux disease)     GERD (gastroesophageal reflux disease)     History of stroke 8/9/2018    Hyperlipidemia     Hypertension     Hypothyroidism     Mild persistent asthma without complication 5/18/2033    OAB (overactive bladder)     Obesity (BMI 30-39. 9) 8/19/2016    Osteoarthritis     Pulmonary fibrosis (HCC)     sjogrens syndrome    Pulmonary hypertension (HCC)     Pure hypercholesterolemia     Sleep apnea     Stroke ([facility]     Unspecified sleep apnea     on cpap    Urinary incontinence        Past Surgical  History:     Past Surgical History:   Procedure Laterality Date    ANKLE SURGERY Right 09/11/2019    COLONOSCOPY  04/2015    COLONOSCOPY  2018           Medications:     Current Outpatient Medications:     levothyroxine (SYNTHROID) 25 MCG tablet, TAKE 1 TABLET BY MOUTH ONCE DAILY .  APPOINTMENT REQUIRED FOR FUTURE REFILLS, Disp: 90 tablet, Rfl: 0    amLODIPine (NORVASC) 10 MG tablet, Take 1 tablet by mouth once daily, Disp: 90 tablet, Rfl: 3    omeprazole (PRILOSEC) 20 MG delayed release capsule, TAKE 1 CAPSULE BY MOUTH ONCE DAILY IN THE MORNING BEFORE BREAKFAST, Disp: 90 capsule, Rfl: 1    acetaminophen (TYLENOL) 325 MG tablet, Take 2 tablets by mouth every 6 hours as needed for Pain, Disp: 60 tablet, Rfl: 0    vitamin D3 (CHOLECALCIFEROL) 25 MCG (1000 UT) TABS tablet, Take 1 tablet by mouth daily, Disp: 30 tablet, Rfl: 5    mirabegron (MYRBETRIQ) 50 MG TB24, Take 50 mg by mouth daily Pt gets samples, Disp: , Rfl:     QUEtiapine (SEROQUEL) 200 MG tablet, Take 200 mg by mouth 3 times daily, Disp: , Rfl:     budesonide-formoterol (SYMBICORT) 160-4.5 MCG/ACT AERO, Inhale 2 puffs into the lungs 2 times daily, Disp: 1 Inhaler, Rfl: 3    albuterol sulfate HFA (PROAIR HFA) 108 (90 Base) MCG/ACT inhaler, Inhale 2 puffs into the lungs 2 times daily as needed for Wheezing (no more than 4 times daily), Disp: 1 Inhaler, Rfl: 3    mometasone (NASONEX) 50 MCG/ACT nasal spray, 2 sprays by Nasal route daily 1 spray each nostril daily, Disp: , Rfl:     montelukast (SINGULAIR) 10 MG tablet, Take 10 mg by mouth nightly, Disp: , Rfl:     FLUoxetine (PROZAC) 40 MG capsule, Take 40 mg by mouth daily, Disp: , Rfl:     ipratropium (ATROVENT) 0.03 % nasal spray, 2 sprays by Nasal route 3 times daily as needed for Rhinitis , Disp: , Rfl:     lithium 300 MG capsule, Take 300 mg by mouth 2 times daily (with meals). , Disp: , Rfl:      Social History:     Social History     Socioeconomic History    Marital status:       Spouse name: Not on file    Number of children: Not on file    Years of education: Not on file    Highest education level: Not on file   Occupational History    Not on file   Tobacco Use    Smoking status: Never Smoker    Smokeless tobacco: Never Used   Substance and Sexual Activity    Alcohol use: No    Drug use: No    Sexual activity: Not on file   Other Topics Concern    Not on file   Social History Narrative    Not on file     Social Determinants of Health     Financial Resource Strain: Low Risk     Difficulty of Paying Living Expenses: Not hard at all   Food Insecurity: No Food Insecurity    Worried About [address] [employer] in the Last Year: Never true    Jaymie of Food in the Last Year: Never true   Transportation Needs: No Transportation Needs    Lack of Transportation (Medical): No    Lack of Transportation (Non-Medical): No   Physical Activity:     Days of Exercise per Week:     Minutes of Exercise per Session:    Stress:     Feeling of Stress :    Social Connections:     Frequency of Communication with Friends and Family:     Frequency of Social Gatherings with Friends and Family:     Attends Oriental orthodox Services:     Active Member of Clubs or Organizations:     Attends Club or Organization Meetings:     Marital Status:    Intimate Partner Violence:     Fear of Current or Ex-Partner:     Emotionally Abused:     Physically Abused:     Sexually Abused:        Family History:     Family History   Problem Relation Age of Onset    Arthritis Mother     Depression Mother     Heart Disease Mother     High Blood Pressure Father     High Cholesterol Father     Stroke Father     High Blood Pressure Brother         Allergies:   Motrin [ibuprofen], Aspirin, Blue dyes (parenteral), Hctz [hydrochlorothiazide], Sulfa antibiotics, and Tetracyclines & related     Review of Systems:   Constitutional: No fevers or chills. No systemic complaints  Head: No headaches  Eyes: No double vision or blurry vision. ENT: No sore throat or runny nose. . No hearing loss, tinnitus or vertigo. Cardiovascular: No chest pain or palpitations. No shortness of breath. No HOUGH  Lung: No shortness of breath or cough. No sputum production  Abdomen: No nausea, vomiting, diarrhea, or abdominal pain. .  Genitourinary: No increased urinary frequency, or dysuria. No hematuria. No suprapubic or CVA pain  Musculoskeletal: No muscle aches or pains. No joint effusions. Hematologic: No bleeding or bruising. Neurologic: No headache, weakness, numbness, or tingling. Physical Examination :   /86   Pulse 86   Temp 96.4 °F (35.8 °C) (Temporal)   Resp 14   Wt 195 lb (88.5 kg)   BMI 31.47 kg/m²    General Appearance: Awake, alert, and in no apparent distress  Head:  Normocephalic, no trauma  Eyes: Pupils equal, round, reactive, to light and accommodation; extraocular movements intact; sclera anicteric; conjunctivae pink. No embolic phenomena. ENT: Oropharynx clear, without erythema, exudate, or thrush. No tenderness of sinuses. Mouth/throat: mucosa pink and moist. No lesions. Dentition in good repair. Neck:Supple, without lymphadenopathy. Thyroid normal, No bruits. Pulmonary/Chest: Clear to auscultation, without wheezes, rales, or rhonchi. No dullness to percussion. Cardiovascular: Regular rate and rhythm without murmurs, rubs, or gallops. Abdomen: Soft, non tender. Bowel sounds normal. No organomegaly  All four Extremities: No cyanosis, clubbing, edema, or effusions. Neurologic: No gross sensory or motor deficits. Skin: Warm and dry with good turgor. With signs of peripheral arterial and venous insufficiency.     Medical Decision Making:   I have independently reviewed/ordered the following labs:    CBC with Differential:  Lab Results   Component Value Date    WBC 5.6 09/18/2018    WBC 7.6 11/10/2016    HGB 11.5 09/18/2018    HGB 11.8 11/10/2016    HCT 35.8 09/18/2018    HCT 36.3 11/10/2016     09/18/2018     11/10/2016     05/02/2012     12/08/2011    LYMPHOPCT 33 09/18/2018    LYMPHOPCT 39 04/13/2015    MONOPCT 15 09/18/2018    MONOPCT 10 04/13/2015     BMP:   Lab Results   Component Value Date     12/21/2020     08/13/2020    K 3.9 12/21/2020    K 4.2 08/13/2020     12/21/2020     08/13/2020    CO2 23 12/21/2020    CO2 24 08/13/2020    BUN 18 12/21/2020    BUN 12 08/13/2020    CREATININE 0.90 12/21/2020    CREATININE 0.76 08/13/2020     Hepatic Function Panel:  Lab Results   Component Value Date    PROT 7.9 12/21/2020    PROT 8.1 08/13/2020    LABALBU 4.3 12/21/2020    LABALBU 4.5 08/13/2020    LABALBU 4.8 05/02/2012    LABALBU 4.8 02/16/2012    BILIDIR 0.14 05/21/2019    BILIDIR 0.11 08/05/2014    IBILI 0.51 05/21/2019    IBILI NOT REPORTED 08/05/2014    BILITOT 0.43 12/21/2020    BILITOT 0.44 08/13/2020    ALKPHOS 84 12/21/2020    ALKPHOS 92 08/13/2020    ALT 40 12/21/2020    ALT 38 08/13/2020    AST 40 12/21/2020    AST 35 08/13/2020     No results found for: RPR  No results found for: HIV  No results found for: [facility]  Lab Results   Component Value Date    MUCUS NOT REPORTED 08/22/2019    RBC 4.06 09/18/2018    RBC 4.05 05/02/2012    TRICHOMONAS NOT REPORTED 08/22/2019    WBC 5.6 09/18/2018    YEAST NOT REPORTED 08/22/2019    TURBIDITY CLEAR 08/22/2019     Lab Results   Component Value Date    CREATININE 0.90 12/21/2020    GLUCOSE 91 12/21/2020    GLUCOSE 119 05/02/2012       Thank you for allowing us to participate in the care of this patient. Please call with questions.     MANDEEP Stuart - CNP  Pager: [phone] - Office: [phone]

## 2021-06-24 ENCOUNTER — TELEPHONE (OUTPATIENT)
Dept: INTERNAL MEDICINE | Age: 66
End: 2021-06-24

## 2021-06-24 NOTE — TELEPHONE ENCOUNTER
Pt calling since getting the covid vaccine on the 6/14 her second dose. She has had headaches. She thinks it is from the vaccine. She wants to know what she can take for them? Please advise.

## 2021-06-28 NOTE — TELEPHONE ENCOUNTER
Patient calling requesting a refill on her Vitamin D. Patient has an upcoming appt. Health Maintenance   Topic Date Due    Pneumococcal 65+ years Vaccine (1 of 1 - PPSV23) Never done    DTaP/Tdap/Td vaccine (1 - Tdap) 08/13/2021 (Originally 4/22/1974)    Shingles Vaccine (1 of 2) 08/13/2021 (Originally 4/22/2003)    TSH testing  07/16/2021    Annual Wellness Visit (AWV)  08/29/2021    Flu vaccine (Season Ended) 09/01/2021    Potassium monitoring  12/21/2021    Creatinine monitoring  12/21/2021    Breast cancer screen  08/24/2022    Lipid screen  12/21/2025    Colon cancer screen colonoscopy  09/25/2028    DEXA (modify frequency per FRAX score)  Completed    COVID-19 Vaccine  Completed    Hepatitis C screen  Completed    Hepatitis A vaccine  Aged Out    Hepatitis B vaccine  Aged Out    Hib vaccine  Aged Out    Meningococcal (ACWY) vaccine  Aged Out             (applicable per patient's age: Cancer Screenings, Depression Screening, Fall Risk Screening, Immunizations)    Hemoglobin A1C (%)   Date Value   12/21/2020 5.6   06/26/2019 5.2   07/26/2018 5.4     Microalb/Crt.  Ratio (mcg/mg creat)   Date Value   11/10/2016 9     LDL Cholesterol (mg/dL)   Date Value   12/21/2020 136 (H)     AST (U/L)   Date Value   12/21/2020 40 (H)     ALT (U/L)   Date Value   12/21/2020 40 (H)     BUN (mg/dL)   Date Value   12/21/2020 18      (goal A1C is < 7)   (goal LDL is <100) need 30-50% reduction from baseline     BP Readings from Last 3 Encounters:   05/27/21 137/86   12/21/20 133/79   08/13/20 (!) 143/85    (goal /80)      All Future Testing planned in [facility]:  Lab Frequency Next Occurrence   COVID-19 Once 02/09/2022       Next Visit Date:  Future Appointments   Date Time Provider Ekta Tapia   6/30/2021  1:00 PM Ez Hays MD [id number]            Patient Active Problem List:     Essential hypertension     Pure hypercholesterolemia     Urinary incontinence     Arrhythmia     COPD (chronic obstructive pulmonary disease) (HCC)     Anxiety     Sleep apnea     GERD (gastroesophageal reflux disease)     Hypothyroidism     Obesity (BMI 30-39. 9)     Mild persistent asthma without complication     History of stroke     Pulmonary hypertension (HCC)     Chronic diarrhea     Bipolar 1 disorder ([facility]

## 2021-06-29 RX ORDER — MELATONIN
1000 DAILY
Qty: 30 TABLET | Refills: 5 | Status: SHIPPED | OUTPATIENT
Start: 2021-06-29 | End: 2022-02-08 | Stop reason: SDUPTHER

## 2021-07-08 ENCOUNTER — TELEPHONE (OUTPATIENT)
Dept: INTERNAL MEDICINE | Age: 66
End: 2021-07-08

## 2021-07-08 NOTE — TELEPHONE ENCOUNTER
PC to pt-- no answer unable to leave message due voice mailbox being full. Will try again later. ----- Message from Darwin Hartmann sent at 2021 12:34 PM EDT -----  Subject: Message to Provider    QUESTIONS  Information for Provider? Pt is calling to see if she can drop off the   form for her to ride the bus so her membership doesn't .   ---------------------------------------------------------------------------  --------------  [facility]  What is the best way for the office to contact you? OK to leave message on   voicemail  Preferred Call Back Phone Number? [phone]  ---------------------------------------------------------------------------  --------------  SCRIPT ANSWERS  Relationship to Patient?  Self

## 2021-07-15 ENCOUNTER — TELEPHONE (OUTPATIENT)
Dept: INTERNAL MEDICINE | Age: 66
End: 2021-07-15

## 2021-07-30 ENCOUNTER — HOSPITAL ENCOUNTER (OUTPATIENT)
Age: 66
Discharge: HOME | End: 2021-08-01
Payer: MEDICARE

## 2021-08-03 ENCOUNTER — HOSPITAL ENCOUNTER (OUTPATIENT)
Age: 66
Setting detail: SPECIMEN
Discharge: HOME | End: 2021-08-03
Payer: MEDICARE

## 2021-08-03 ENCOUNTER — OFFICE VISIT (OUTPATIENT)
Dept: INTERNAL MEDICINE | Age: 66
End: 2021-08-03
Payer: MEDICARE

## 2021-08-03 VITALS
SYSTOLIC BLOOD PRESSURE: 146 MMHG | BODY MASS INDEX: 31.96 KG/M2 | HEART RATE: 81 BPM | DIASTOLIC BLOOD PRESSURE: 82 MMHG | WEIGHT: 198 LBS

## 2021-08-03 DIAGNOSIS — E03.9 HYPOTHYROIDISM, UNSPECIFIED TYPE: ICD-10-CM

## 2021-08-03 DIAGNOSIS — R79.89 ELEVATED LFTS: ICD-10-CM

## 2021-08-03 DIAGNOSIS — I10 ESSENTIAL HYPERTENSION: ICD-10-CM

## 2021-08-03 DIAGNOSIS — R79.89 LOW VITAMIN D LEVEL: ICD-10-CM

## 2021-08-03 DIAGNOSIS — G47.33 OSA (OBSTRUCTIVE SLEEP APNEA): ICD-10-CM

## 2021-08-03 DIAGNOSIS — F31.9 BIPOLAR 1 DISORDER (HCC): ICD-10-CM

## 2021-08-03 DIAGNOSIS — J44.9 CHRONIC OBSTRUCTIVE PULMONARY DISEASE, UNSPECIFIED COPD TYPE (HCC): ICD-10-CM

## 2021-08-03 DIAGNOSIS — E34.9 ELEVATED PARATHYROID HORMONE: ICD-10-CM

## 2021-08-03 DIAGNOSIS — Z91.81 AT HIGH RISK FOR FALLS: ICD-10-CM

## 2021-08-03 DIAGNOSIS — I10 ESSENTIAL HYPERTENSION: Primary | ICD-10-CM

## 2021-08-03 PROBLEM — I73.9 PAD (PERIPHERAL ARTERY DISEASE) (HCC): Status: RESOLVED | Noted: 2021-08-03 | Resolved: 2021-08-03

## 2021-08-03 PROBLEM — I73.9 PAD (PERIPHERAL ARTERY DISEASE) (HCC): Status: ACTIVE | Noted: 2021-08-03

## 2021-08-03 PROBLEM — I27.20 PULMONARY HYPERTENSION (HCC): Status: RESOLVED | Noted: 2018-08-09 | Resolved: 2021-08-03

## 2021-08-03 LAB
ALBUMIN SERPL-MCNC: 4.3 G/DL (ref 3.5–5.2)
ALBUMIN/GLOBULIN RATIO: 1.2 (ref 1–2.5)
ALP BLD-CCNC: 104 U/L (ref 35–104)
ALT SERPL-CCNC: 55 U/L (ref 5–33)
ANION GAP SERPL CALCULATED.3IONS-SCNC: 12 MMOL/L (ref 9–17)
AST SERPL-CCNC: 38 U/L
BILIRUB SERPL-MCNC: 0.46 MG/DL (ref 0.3–1.2)
BUN BLDV-MCNC: 14 MG/DL (ref 8–23)
BUN/CREAT BLD: ABNORMAL (ref 9–20)
CALCIUM IONIZED: 1.42 MMOL/L (ref 1.13–1.33)
CALCIUM SERPL-MCNC: 10.4 MG/DL (ref 8.6–10.4)
CHLORIDE BLD-SCNC: 103 MMOL/L (ref 98–107)
CO2: 23 MMOL/L (ref 20–31)
CREAT SERPL-MCNC: 0.83 MG/DL (ref 0.5–0.9)
GFR AFRICAN AMERICAN: >60 ML/MIN
GFR NON-AFRICAN AMERICAN: >60 ML/MIN
GFR SERPL CREATININE-BSD FRML MDRD: ABNORMAL ML/MIN/{1.73_M2}
GFR SERPL CREATININE-BSD FRML MDRD: ABNORMAL ML/MIN/{1.73_M2}
GLUCOSE BLD-MCNC: 90 MG/DL (ref 70–99)
POTASSIUM SERPL-SCNC: 4.2 MMOL/L (ref 3.7–5.3)
PTH INTACT: 86.7 PG/ML (ref 15–65)
SODIUM BLD-SCNC: 138 MMOL/L (ref 135–144)
TOTAL PROTEIN: 7.9 G/DL (ref 6.4–8.3)
TSH SERPL DL<=0.05 MIU/L-ACNC: 1.91 MIU/L (ref 0.3–5)
VITAMIN D 25-HYDROXY: 45.3 NG/ML (ref 30–100)

## 2021-08-03 PROCEDURE — G8427 DOCREV CUR MEDS BY ELIG CLIN: HCPCS | Performed by: INTERNAL MEDICINE

## 2021-08-03 PROCEDURE — 99211 OFF/OP EST MAY X REQ PHY/QHP: CPT | Performed by: INTERNAL MEDICINE

## 2021-08-03 PROCEDURE — 1036F TOBACCO NON-USER: CPT | Performed by: INTERNAL MEDICINE

## 2021-08-03 PROCEDURE — G8417 CALC BMI ABV UP PARAM F/U: HCPCS | Performed by: INTERNAL MEDICINE

## 2021-08-03 PROCEDURE — G8926 SPIRO NO PERF OR DOC: HCPCS | Performed by: INTERNAL MEDICINE

## 2021-08-03 PROCEDURE — 1090F PRES/ABSN URINE INCON ASSESS: CPT | Performed by: INTERNAL MEDICINE

## 2021-08-03 PROCEDURE — 1123F ACP DISCUSS/DSCN MKR DOCD: CPT | Performed by: INTERNAL MEDICINE

## 2021-08-03 PROCEDURE — 3023F SPIROM DOC REV: CPT | Performed by: INTERNAL MEDICINE

## 2021-08-03 PROCEDURE — G8399 PT W/DXA RESULTS DOCUMENT: HCPCS | Performed by: INTERNAL MEDICINE

## 2021-08-03 PROCEDURE — 4040F PNEUMOC VAC/ADMIN/RCVD: CPT | Performed by: INTERNAL MEDICINE

## 2021-08-03 PROCEDURE — 3017F COLORECTAL CA SCREEN DOC REV: CPT | Performed by: INTERNAL MEDICINE

## 2021-08-03 PROCEDURE — 99214 OFFICE O/P EST MOD 30 MIN: CPT | Performed by: INTERNAL MEDICINE

## 2021-08-03 RX ORDER — LEVOTHYROXINE SODIUM 0.03 MG/1
TABLET ORAL
Qty: 90 TABLET | Refills: 0 | Status: SHIPPED | OUTPATIENT
Start: 2021-08-03 | End: 2021-10-29 | Stop reason: SDUPTHER

## 2021-08-03 ASSESSMENT — ENCOUNTER SYMPTOMS
BACK PAIN: 0
ABDOMINAL PAIN: 0
WHEEZING: 0
COUGH: 0
PHOTOPHOBIA: 0
CONSTIPATION: 0
SHORTNESS OF BREATH: 0

## 2021-08-03 NOTE — PATIENT INSTRUCTIONS
Return To Clinic 12/14/2021 . After Visit Summary  given and reviewed. It is very important for your care that you keep your appointment. If for some reason you are unable to keep your appointment it is equally important that you call our office at [phone] to cancel your appointment and reschedule. Failure to do so may result in your termination from our practice.     -Bloodwork orders given to patient, they will have them done before their next visit. -Printed script for Wm. Anuradha Keenan signed and given to pt    -Forms for [employer] completed, scanned in and returned to patient    -FRANCINE Armendariz

## 2021-08-03 NOTE — PROGRESS NOTES
[facility]    Progress Note    Date of patient's visit: 8/3/2021    Patient's Name:  Sherice Means    YOB: 1955            Patient Care Team:  Saad Roper MD as PCP - Violeta Mckee MD as PCP - [facility]  Lani Siddiqui MD as Consulting Physician (Gastroenterology)  Rosa Mauricio MD as Consulting Physician (Pulmonology)  Erasmo Bansal MD as Surgeon (Orthopedic Surgery)  Bisi Jennings MD as Consulting Physician (Infectious Diseases)    REASON FOR VISIT: Routine outpatient follow     Chief Complaint   Patient presents with    Hypertension     med list requested     Forms     Pt requesting [employer]AR[employer] forms to be completed.  Health Maintenance     tsh pended, pneumo declined , Pt is requesting an order for DEXA SCAN    Equipment Request     pt is also reqeusting handicap placard          HISTORY OF PRESENT ILLNESS:    History was obtained from the patient. Sherice Means is a 74 y.o. is here for follow-up. She needs labs done. Blood pressure slightly high. HCTZ was stopped because of hypercalcemia. She is on amlodipine but not sure if she took it today. Advised compliance. She has been noted to have a mildly elevated PTH and calcium. Not sure if it is secondary. She has been on lithium. Will recheck labs again. We may have to do urine studies and other tests. She continues to follow-up with psychiatry. Her history is very poor. She keeps asking for different psychiatrist.  She says she sought a second psychiatrist but they were waiting on notes from her previous psychiatrist.  She is also waiting on a competency test.  This is being arranged by the [facility] system. Will get labs done. She continues to follow-up with pulmonologist for her asthma and her sleep apnea. She states she is using CPAP. Echo 2017  CONCLUSIONS     Summary  Left ventricle is normal in size and wall thickness.   Global left ventricular systolic function is normal with an estimated  ejection fraction of 60 % . No obvious wall motion abnormality seen. Grade I (mild) left ventricular diastolic dysfunction. Thickened mitral valve leaflets. Mild mitral regurgitation. Mild tricuspid regurgitation. Estimated right ventricular systolic pressure is 28 mmHg. No significant pericardial effusion is seen.       Past Medical History:   Diagnosis Date    Anxiety     Arrhythmia     Asthma     Bipolar 1 disorder ([facility] 2/14/2019    Bipolar disorder ([facility]     Chronic diarrhea 2/14/2019    COPD (chronic obstructive pulmonary disease) (HCC)     Depression     Essential hypertension     GERD (gastroesophageal reflux disease)     GERD (gastroesophageal reflux disease)     History of stroke 8/9/2018    Hyperlipidemia     Hypertension     Hypothyroidism     Mild persistent asthma without complication 7/39/4764    OAB (overactive bladder)     Obesity (BMI 30-39. 9) 8/19/2016    Osteoarthritis     Pulmonary fibrosis (HCC)     sjogrens syndrome    Pulmonary hypertension (HCC)     Pure hypercholesterolemia     Sleep apnea     Stroke ([facility]     Unspecified sleep apnea     on cpap    Urinary incontinence        Past Surgical History:   Procedure Laterality Date    ANKLE SURGERY Right 09/11/2019    COLONOSCOPY  04/2015    COLONOSCOPY  2018             ALLERGIES      Allergies   Allergen Reactions    Motrin [Ibuprofen]     Aspirin Rash    Blue Dyes (Parenteral) Rash    Hctz [Hydrochlorothiazide] Rash     Fixed drug reaction    Sulfa Antibiotics Rash    Tetracyclines & Related Rash       MEDICATIONS:      Current Outpatient Medications on File Prior to Visit   Medication Sig Dispense Refill    vitamin D3 (CHOLECALCIFEROL) 25 MCG (1000 UT) TABS tablet Take 1 tablet by mouth daily 30 tablet 5    levothyroxine (SYNTHROID) 25 MCG tablet TAKE 1 TABLET BY MOUTH ONCE DAILY .  APPOINTMENT REQUIRED FOR FUTURE REFILLS 90 tablet 0    amLODIPine (NORVASC) 10 MG tablet Take 1 tablet by mouth once daily 90 tablet 3    omeprazole (PRILOSEC) 20 MG delayed release capsule TAKE 1 CAPSULE BY MOUTH ONCE DAILY IN THE MORNING BEFORE BREAKFAST 90 capsule 1    acetaminophen (TYLENOL) 325 MG tablet Take 2 tablets by mouth every 6 hours as needed for Pain 60 tablet 0    mirabegron (MYRBETRIQ) 50 MG TB24 Take 50 mg by mouth daily Pt gets samples      QUEtiapine (SEROQUEL) 200 MG tablet Take 200 mg by mouth 3 times daily      budesonide-formoterol (SYMBICORT) 160-4.5 MCG/ACT AERO Inhale 2 puffs into the lungs 2 times daily 1 Inhaler 3    albuterol sulfate HFA (PROAIR HFA) 108 (90 Base) MCG/ACT inhaler Inhale 2 puffs into the lungs 2 times daily as needed for Wheezing (no more than 4 times daily) 1 Inhaler 3    mometasone (NASONEX) 50 MCG/ACT nasal spray 2 sprays by Nasal route daily 1 spray each nostril daily      montelukast (SINGULAIR) 10 MG tablet Take 10 mg by mouth nightly      FLUoxetine (PROZAC) 40 MG capsule Take 40 mg by mouth daily      ipratropium (ATROVENT) 0.03 % nasal spray 2 sprays by Nasal route 3 times daily as needed for Rhinitis       lithium 300 MG capsule Take 300 mg by mouth 2 times daily (with meals). No current facility-administered medications on file prior to visit. HISTORY    Reviewed and no change from previous record. Dulce  reports that she has never smoked. She has never used smokeless tobacco.    FAMILY HISTORY:    Reviewed and No change from previous visit    HEALTH MAINTENANCE DUE:      Health Maintenance Due   Topic Date Due    Pneumococcal 65+ years Vaccine (1 of 1 - PPSV23) Never done    TSH testing  07/16/2021    Annual Wellness Visit (AWV)  08/29/2021       REVIEW OF SYSTEMS:    12 point review of symptoms completed and found to be normal except noted in the HPI    Review of Systems   Constitutional: Negative for fatigue and unexpected weight change.    Eyes: Negative for photophobia and visual disturbance. Respiratory: Negative for cough, shortness of breath and wheezing. Cardiovascular: Negative for chest pain, palpitations and leg swelling. Gastrointestinal: Negative for abdominal pain and constipation. Endocrine: Negative for polydipsia and polyuria. Musculoskeletal: Positive for arthralgias and gait problem. Negative for back pain and joint swelling. Neurological: Negative for dizziness, syncope, weakness, light-headedness and headaches. Hematological: Negative for adenopathy. Does not bruise/bleed easily. Psychiatric/Behavioral: Negative for dysphoric mood and hallucinations. The patient is nervous/anxious. PHYSICAL EXAM:     Vitals:    08/03/21 1302 08/03/21 1306   BP: (!) 148/93 (!) 146/82   Site: Left Upper Arm    Position: Sitting    Cuff Size: Medium Adult    Pulse: 82 81   Weight: 198 lb (89.8 kg)      Body mass index is 31.96 kg/m². BP Readings from Last 3 Encounters:   08/03/21 (!) 146/82   05/27/21 137/86   12/21/20 133/79        Wt Readings from Last 3 Encounters:   08/03/21 198 lb (89.8 kg)   05/27/21 195 lb (88.5 kg)   12/21/20 198 lb (89.8 kg)       Physical Exam  Vitals and nursing note reviewed. Constitutional:       Appearance: Normal appearance. HENT:      Head: Normocephalic and atraumatic. Eyes:      Extraocular Movements: Extraocular movements intact. Conjunctiva/sclera: Conjunctivae normal.      Pupils: Pupils are equal, round, and reactive to light. Cardiovascular:      Rate and Rhythm: Normal rate and regular rhythm. Heart sounds: No murmur heard. Pulmonary:      Effort: Pulmonary effort is normal.      Breath sounds: Normal breath sounds. No wheezing. Musculoskeletal:      Right lower leg: No edema. Left lower leg: No edema. Skin:     General: Skin is warm and dry. Comments: Stasis dermatitis b/l   Neurological:      General: No focal deficit present.       Mental Status: She is alert and oriented to person, place, and time. Psychiatric:         Mood and Affect: Mood normal.               LABORATORY FINDINGS:    CBC:  Lab Results   Component Value Date    WBC 5.6 09/18/2018    HGB 11.5 09/18/2018     09/18/2018     05/02/2012     BMP:    Lab Results   Component Value Date     12/21/2020    K 3.9 12/21/2020     12/21/2020    CO2 23 12/21/2020    BUN 18 12/21/2020    CREATININE 0.90 12/21/2020    GLUCOSE 91 12/21/2020    GLUCOSE 119 05/02/2012     HEMOGLOBIN A1C:   Lab Results   Component Value Date    LABA1C 5.6 12/21/2020     MICROALBUMIN URINE:   Lab Results   Component Value Date    MICROALBUR <12 11/10/2016     FASTING LIPID PANEL:  Lab Results   Component Value Date    CHOL 194 08/13/2020    HDL 49 12/21/2020    TRIG 99 08/13/2020     Lab Results   Component Value Date    LDLCHOLESTEROL 136 (H) 12/21/2020       LIVER PROFILE:  Lab Results   Component Value Date    ALT 40 12/21/2020    AST 40 12/21/2020    PROT 7.9 12/21/2020    BILITOT 0.43 12/21/2020    BILIDIR 0.14 05/21/2019    LABALBU 4.3 12/21/2020    LABALBU 4.8 05/02/2012      THYROID FUNCTION:   Lab Results   Component Value Date    TSH 1.48 07/16/2020      URINEANALYSIS: No results found for: LABURIN  ASSESSMENT AND PLAN:    1. Essential hypertension  Continue Amlodipine    - Comprehensive Metabolic Panel; Future    2. Chronic obstructive pulmonary disease, unspecified COPD type ([facility]  monitored by Pulmonology    3. Hypothyroidism, unspecified type    - TSH with Reflex; Future  - levothyroxine (SYNTHROID) 25 MCG tablet; Once daily  Dispense: 90 tablet; Refill: 0    4. Elevated parathyroid hormone  Secondary to hypercalcemia? Recheck labs  Patient on Lithium    - PTH, INTACT WITH IONIZED CALCIUM; Future  - Vitamin D 25 Hydroxy; Future  - Comprehensive Metabolic Panel; Future    5. Bipolar 1 disorder ([facility]  Follow up with Psychiatry    6. Low vitamin D level    - Vitamin D 25 Hydroxy; Future    7.  Elevated LFTs    -

## 2021-08-11 DIAGNOSIS — E05.90 HYPERTHYROIDISM: Primary | ICD-10-CM

## 2021-08-12 ENCOUNTER — TELEPHONE (OUTPATIENT)
Dept: INTERNAL MEDICINE | Age: 66
End: 2021-08-12

## 2021-08-12 NOTE — TELEPHONE ENCOUNTER
Attempted to call patient re: test results and orders placed by PCP. Orders for neck ultrasound and parathyroid scan along with scheduling information mailed to patient.

## 2021-08-31 ENCOUNTER — HOSPITAL ENCOUNTER (OUTPATIENT)
Age: 66
Discharge: HOME | End: 2021-08-31
Payer: MEDICARE

## 2021-08-31 LAB
ALBUMIN SERPL-MCNC: 4.4 G/DL (ref 3.5–5.2)
ALBUMIN/GLOBULIN RATIO: 1.2 (ref 1–2.5)
ALP BLD-CCNC: 113 U/L (ref 35–104)
ALT SERPL-CCNC: 75 U/L (ref 5–33)
ANION GAP SERPL CALCULATED.3IONS-SCNC: 14 MMOL/L (ref 9–17)
AST SERPL-CCNC: 43 U/L
BILIRUB SERPL-MCNC: 0.5 MG/DL (ref 0.3–1.2)
BUN BLDV-MCNC: 15 MG/DL (ref 8–23)
BUN/CREAT BLD: ABNORMAL (ref 9–20)
CALCIUM SERPL-MCNC: 10.7 MG/DL (ref 8.6–10.4)
CHLORIDE BLD-SCNC: 107 MMOL/L (ref 98–107)
CHOLESTEROL, FASTING: 232 MG/DL
CHOLESTEROL/HDL RATIO: 4.5
CO2: 21 MMOL/L (ref 20–31)
CREAT SERPL-MCNC: 0.75 MG/DL (ref 0.5–0.9)
ESTIMATED AVERAGE GLUCOSE: 111 MG/DL
GFR AFRICAN AMERICAN: >60 ML/MIN
GFR NON-AFRICAN AMERICAN: >60 ML/MIN
GFR SERPL CREATININE-BSD FRML MDRD: ABNORMAL ML/MIN/{1.73_M2}
GFR SERPL CREATININE-BSD FRML MDRD: ABNORMAL ML/MIN/{1.73_M2}
GLUCOSE BLD-MCNC: 101 MG/DL (ref 70–99)
HBA1C MFR BLD: 5.5 % (ref 4–6)
HDLC SERPL-MCNC: 52 MG/DL
LDL CHOLESTEROL: 158 MG/DL (ref 0–130)
LITHIUM DATE LAST DOSE: NORMAL
LITHIUM DOSE AMOUNT: NORMAL
LITHIUM DOSE TIME: NORMAL
LITHIUM LEVEL: 0.7 MMOL/L (ref 0.6–1.2)
POTASSIUM SERPL-SCNC: 4 MMOL/L (ref 3.7–5.3)
SODIUM BLD-SCNC: 142 MMOL/L (ref 135–144)
TOTAL PROTEIN: 8.2 G/DL (ref 6.4–8.3)
TRIGLYCERIDE, FASTING: 111 MG/DL
VLDLC SERPL CALC-MCNC: ABNORMAL MG/DL (ref 1–30)

## 2021-08-31 PROCEDURE — 80061 LIPID PANEL: CPT

## 2021-08-31 PROCEDURE — 80178 ASSAY OF LITHIUM: CPT

## 2021-08-31 PROCEDURE — 83036 HEMOGLOBIN GLYCOSYLATED A1C: CPT

## 2021-08-31 PROCEDURE — 80053 COMPREHEN METABOLIC PANEL: CPT

## 2021-08-31 PROCEDURE — 36415 COLL VENOUS BLD VENIPUNCTURE: CPT

## 2021-09-09 ENCOUNTER — TELEPHONE (OUTPATIENT)
Dept: INTERNAL MEDICINE | Age: 66
End: 2021-09-09

## 2021-09-09 NOTE — TELEPHONE ENCOUNTER
Patient wants to talk to about the test that she needs to get done. Patient states that they are scheduled for Monday but she wants to know if the will \"inject something in her\" and if she is allergic to it. Health Maintenance   Topic Date Due    DTaP/Tdap/Td vaccine (1 - Tdap) Never done    Shingles Vaccine (1 of 2) Never done    Pneumococcal 65+ years Vaccine (1 of 1 - PPSV23) Never done   [employer] Visit (AWV)  08/29/2021    Flu vaccine (1) Never done    TSH testing  08/03/2022    Breast cancer screen  08/24/2022    Potassium monitoring  08/31/2022    Creatinine monitoring  08/31/2022    Diabetes screen  08/31/2024    Lipid screen  08/31/2026    Colon cancer screen colonoscopy  09/25/2028    DEXA (modify frequency per FRAX score)  Completed    COVID-19 Vaccine  Completed    Hepatitis C screen  Completed    Hepatitis A vaccine  Aged Out    Hepatitis B vaccine  Aged Out    Hib vaccine  Aged Out    Meningococcal (ACWY) vaccine  Aged Out             (applicable per patient's age: Cancer Screenings, Depression Screening, Fall Risk Screening, Immunizations)    Hemoglobin A1C (%)   Date Value   08/31/2021 5.5   12/21/2020 5.6   06/26/2019 5.2     Microalb/Crt.  Ratio (mcg/mg creat)   Date Value   11/10/2016 9     LDL Cholesterol (mg/dL)   Date Value   08/31/2021 158 (H)     AST (U/L)   Date Value   08/31/2021 43 (H)     ALT (U/L)   Date Value   08/31/2021 75 (H)     BUN (mg/dL)   Date Value   08/31/2021 15      (goal A1C is < 7)   (goal LDL is <100) need 30-50% reduction from baseline     BP Readings from Last 3 Encounters:   08/03/21 (!) 146/82   05/27/21 137/86   12/21/20 133/79    (goal /80)      All Future Testing planned in [facility]:  Lab Frequency Next Occurrence   COVID-19 Once 02/09/2022   US HEAD NECK SOFT TISSUE THYROID Once 09/11/2021   NM PARATHYROID W SPECT/CT Once 11/26/2021       Next Visit Date:  Future Appointments   Date Time Provider Ekta Tapia   9/13/2021 8:00 AM STV NM SPECT/CT [facility] NUC MED [facility]   9/13/2021 10:00 AM STV NM SPECT/CT [facility] NUC MED [facility]   9/13/2021  1:00 PM STV US GE XD CLEAR [facility] US [facility]   9/13/2021  2:00 PM STV NM SPECT/CT ST[facility] NUC MED [facility]   10/29/2021  9:15 AM David Hall MD [address] IM [facility]   12/14/2021  1:30 PM David Hall MD [address] IM [facility]            Patient Active Problem List:     Essential hypertension     Pure hypercholesterolemia     Urinary incontinence     Anxiety     Sleep apnea     GERD (gastroesophageal reflux disease)     Hypothyroidism     Obesity (BMI 30-39. 9)     Mild persistent asthma without complication     History of stroke     Chronic diarrhea     B[facility] 1 disorder ([facility]

## 2021-09-10 NOTE — TELEPHONE ENCOUNTER
Spoke with patient re: further testing of her thyroid. Pt concerned that she will be injected with dye as part of the procedure and she has an allergy to blue dyes. Pt reassured that the dye they use for this scan is different than blue dye. Pt voiced concerns over medical bills piling up. States she is to have bladder surgery in October and the cost is a concern. She asked if the thyroid scans are something she can put off for a while. Pt educated that there are options when it comes to medical bills, either through patient assistance programs or through setting up a payment plan that is affordable in her budget. Pt strongly advised to get scans done and look into assistance with bills. Writer provided pt with the financial assistance number  [phone]. After talking with pt a bit longer she mentioned that she needs dental work done and has no means to pay for that either. States she needs a partial and her portion of the cost is $1000. Writer asked pt if it would be ok to refer pt to [facility] to see if there is additional assistance out there that pt may qualify for. Pt ok with referral. Jamie Parker will reach out to [facility] and Elizabeth Okeefe to see if one is able to touch base with pt.

## 2021-09-21 ENCOUNTER — HOSPITAL ENCOUNTER (OUTPATIENT)
Dept: NUCLEAR MEDICINE | Age: 66
Discharge: HOME | End: 2021-09-23
Payer: MEDICARE

## 2021-09-21 ENCOUNTER — HOSPITAL ENCOUNTER (OUTPATIENT)
Dept: ULTRASOUND IMAGING | Age: 66
Discharge: HOME | End: 2021-09-23
Payer: MEDICARE

## 2021-09-21 DIAGNOSIS — E05.90 HYPERTHYROIDISM: ICD-10-CM

## 2021-09-21 PROCEDURE — A9500 TC99M SESTAMIBI: HCPCS | Performed by: INTERNAL MEDICINE

## 2021-09-21 PROCEDURE — 3430000000 HC RX DIAGNOSTIC RADIOPHARMACEUTICAL: Performed by: INTERNAL MEDICINE

## 2021-09-21 PROCEDURE — 78071 PARATHYRD PLANAR W/WO SUBTRJ: CPT

## 2021-09-21 PROCEDURE — 76536 US EXAM OF HEAD AND NECK: CPT

## 2021-09-21 RX ADMIN — TETRAKIS(2-METHOXYISOBUTYLISOCYANIDE)COPPER(I) TETRAFLUOROBORATE 19 MILLICURIE: 1 INJECTION, POWDER, LYOPHILIZED, FOR SOLUTION INTRAVENOUS at 09:26

## 2021-09-28 ENCOUNTER — TELEPHONE (OUTPATIENT)
Dept: INTERNAL MEDICINE | Age: 66
End: 2021-09-28
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
## 2021-10-11 ENCOUNTER — TELEPHONE (OUTPATIENT)
Dept: INTERNAL MEDICINE | Age: 66
End: 2021-10-11

## 2021-10-12 DIAGNOSIS — J45.30 MILD PERSISTENT ASTHMA WITHOUT COMPLICATION: ICD-10-CM

## 2021-10-12 NOTE — TELEPHONE ENCOUNTER
E-scribe request for Albuterol . Please review and e-scribe if applicable. Next Visit Date:  Future Appointments   Date Time Provider Ekta Clarki   10/29/2021  9:15 AM Hayder Alvarez MD [facility] IM [facility]   12/14/2021  1:30 PM Hayder Alvarez MD [facility] IM [place] Maintenance   Topic Date Due    DTaP/Tdap/Td vaccine (1 - Tdap) Never done    Shingles Vaccine (1 of 2) Never done    Pneumococcal 65+ years Vaccine (1 of 1 - PPSV23) Never done    Flu vaccine (1) Never done    TSH testing  08/03/2022    Breast cancer screen  08/24/2022    Potassium monitoring  08/31/2022    Creatinine monitoring  08/31/2022    Diabetes screen  08/31/2024    Lipid screen  08/31/2026    Colon cancer screen colonoscopy  09/25/2028    DEXA (modify frequency per FRAX score)  Completed    COVID-19 Vaccine  Completed    Hepatitis C screen  Completed    Hepatitis A vaccine  Aged Out    Hepatitis B vaccine  Aged Out    Hib vaccine  Aged Out    Meningococcal (ACWY) vaccine  Aged Out               (applicable per patient's age: Cancer Screenings, Depression Screening, Fall Risk Screening, Immunizations)    Hemoglobin A1C (%)   Date Value   08/31/2021 5.5   12/21/2020 5.6   06/26/2019 5.2     Microalb/Crt.  Ratio (mcg/mg creat)   Date Value   11/10/2016 9     LDL Cholesterol (mg/dL)   Date Value   08/31/2021 158 (H)     AST (U/L)   Date Value   08/31/2021 43 (H)     ALT (U/L)   Date Value   08/31/2021 75 (H)     BUN (mg/dL)   Date Value   08/31/2021 15      (goal A1C is < 7)   (goal LDL is <100) need 30-50% reduction from baseline     BP Readings from Last 3 Encounters:   09/19/21 134/89   08/03/21 (!) 146/82   05/27/21 137/86    (goal /80)      All Future Testing planned in [facility]:  Lab Frequency Next Occurrence   COVID-19 Once 02/09/2022            Patient Active Problem List:     Essential hypertension     Pure hypercholesterolemia     Urinary incontinence     Anxiety     Sleep apnea     GERD (gastroesophageal reflux disease)     Hypothyroidism     Obesity (BMI 30-39. 9)     Mild persistent asthma without complication     History of stroke     Chronic diarrhea     Bipolar 1 disorder ([facility]

## 2021-10-12 NOTE — TELEPHONE ENCOUNTER
Is this a service dog?  She sees psychiatry and should get letter from psychiatry or psychologist. Thanks

## 2021-10-13 RX ORDER — ALBUTEROL SULFATE 90 UG/1
AEROSOL, METERED RESPIRATORY (INHALATION)
Qty: 9 G | Refills: 0 | Status: SHIPPED | OUTPATIENT
Start: 2021-10-13 | End: 2022-01-11

## 2021-10-28 DIAGNOSIS — K21.9 GASTROESOPHAGEAL REFLUX DISEASE, UNSPECIFIED WHETHER ESOPHAGITIS PRESENT: ICD-10-CM

## 2021-10-28 NOTE — TELEPHONE ENCOUNTER
E-scribe rquest for Prilosec. Please review and e-scribe if applicable. Next Visit Date:  Future Appointments   Date Time Provider Ekta Clarki   10/29/2021  2:00 PM Arnold Murillo  [address] Maintenance   Topic Date Due    DTaP/Tdap/Td vaccine (1 - Tdap) Never done    Shingles Vaccine (1 of 2) Never done    Pneumococcal 65+ years Vaccine (1 of 1 - PPSV23) Never done    Flu vaccine (1) Never done    TSH testing  2022    Breast cancer screen  2022    Potassium monitoring  2022    Creatinine monitoring  2022    Diabetes screen  2024    Lipid screen  2026    Colon cancer screen colonoscopy  2028    DEXA (modify frequency per FRAX score)  Completed    COVID-19 Vaccine  Completed    Hepatitis C screen  Completed    Hepatitis A vaccine  Aged Out    Hepatitis B vaccine  Aged Out    Hib vaccine  Aged Out    Meningococcal (ACWY) vaccine  Aged Out               (applicable per patient's age: Cancer Screenings, Depression Screening, Fall Risk Screening, Immunizations)    Hemoglobin A1C (%)   Date Value   2021 5.5   2020 5.6   2019 5.2     Microalb/Crt. Ratio (mcg/mg creat)   Date Value   11/10/2016 9     LDL Cholesterol (mg/dL)   Date Value   2021 158 (H)     AST (U/L)   Date Value   2021 43 (H)     ALT (U/L)   Date Value   2021 75 (H)     BUN (mg/dL)   Date Value   2021 15      (goal A1C is < 7)   (goal LDL is <100) need 30-50% reduction from baseline     BP Readings from Last 3 Encounters:   21 134/89   21 (!) 146/82   21 137/86    (goal /80)      All Future Testing planned in [facility]:  Lab Frequency Next Occurrence   COVID-19 Once 2022            Patient Active Problem List:     Essential hypertension     Pure hypercholesterolemia     Urinary incontinence     Anxiety     Sleep apnea     GERD (gastroesophageal reflux disease)     Hypothyroidism     Obesity (BMI 30-39. 9)     Mild persistent asthma without complication     History of stroke     Chronic diarrhea     Bipolar 1 disorder ([facility]

## 2021-10-29 ENCOUNTER — VIRTUAL VISIT (OUTPATIENT)
Dept: INTERNAL MEDICINE | Age: 66
End: 2021-10-29
Payer: MEDICARE

## 2021-10-29 DIAGNOSIS — R79.89 ELEVATED LFTS: ICD-10-CM

## 2021-10-29 DIAGNOSIS — E03.9 HYPOTHYROIDISM, UNSPECIFIED TYPE: ICD-10-CM

## 2021-10-29 DIAGNOSIS — E34.9 ELEVATED PARATHYROID HORMONE: Primary | ICD-10-CM

## 2021-10-29 DIAGNOSIS — I10 ESSENTIAL HYPERTENSION: ICD-10-CM

## 2021-10-29 DIAGNOSIS — Z78.0 POSTMENOPAUSE: ICD-10-CM

## 2021-10-29 DIAGNOSIS — E78.00 PURE HYPERCHOLESTEROLEMIA: ICD-10-CM

## 2021-10-29 PROCEDURE — 99443 PR PHYS/QHP TELEPHONE EVALUATION 21-30 MIN: CPT | Performed by: INTERNAL MEDICINE

## 2021-10-29 RX ORDER — OMEPRAZOLE 20 MG/1
CAPSULE, DELAYED RELEASE ORAL
Qty: 90 CAPSULE | Refills: 0 | Status: SHIPPED | OUTPATIENT
Start: 2021-10-29 | End: 2022-01-25

## 2021-10-29 RX ORDER — LEVOTHYROXINE SODIUM 0.03 MG/1
TABLET ORAL
Qty: 90 TABLET | Refills: 0 | Status: SHIPPED | OUTPATIENT
Start: 2021-10-29 | End: 2022-02-08 | Stop reason: SDUPTHER
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
## 2021-11-02 ENCOUNTER — TELEPHONE (OUTPATIENT)
Dept: INTERNAL MEDICINE | Age: 66
End: 2021-11-02

## 2021-11-02 NOTE — TELEPHONE ENCOUNTER
Patient calling stating that she wants a referral to [facility] in [facility]. Patient stated that she has had cancer for over 20 years and needs to go to hospice.  in background yelling at patient telling her to hang up the phone that there was nothing wrong with her and that she wasn't dying. She is adamant that a referral be place to [facility]. Health Maintenance   Topic Date Due    DTaP/Tdap/Td vaccine (1 - Tdap) Never done    Shingles Vaccine (1 of 2) Never done    Pneumococcal 65+ years Vaccine (1 of 1 - PPSV23) Never done    Flu vaccine (1) Never done    TSH testing  08/03/2022    Breast cancer screen  08/24/2022    Potassium monitoring  08/31/2022    Creatinine monitoring  08/31/2022    Diabetes screen  08/31/2024    Lipid screen  08/31/2026    Colon cancer screen colonoscopy  09/25/2028    DEXA (modify frequency per FRAX score)  Completed    COVID-19 Vaccine  Completed    Hepatitis C screen  Completed    Hepatitis A vaccine  Aged Out    Hepatitis B vaccine  Aged Out    Hib vaccine  Aged Out    Meningococcal (ACWY) vaccine  Aged Out             (applicable per patient's age: Cancer Screenings, Depression Screening, Fall Risk Screening, Immunizations)    Hemoglobin A1C (%)   Date Value   08/31/2021 5.5   12/21/2020 5.6   06/26/2019 5.2     Microalb/Crt.  Ratio (mcg/mg creat)   Date Value   11/10/2016 9     LDL Cholesterol (mg/dL)   Date Value   08/31/2021 158 (H)     AST (U/L)   Date Value   08/31/2021 43 (H)     ALT (U/L)   Date Value   08/31/2021 75 (H)     BUN (mg/dL)   Date Value   08/31/2021 15      (goal A1C is < 7)   (goal LDL is <100) need 30-50% reduction from baseline     BP Readings from Last 3 Encounters:   09/19/21 134/89   08/03/21 (!) 146/82   05/27/21 137/86    (goal /80)      All Future Testing planned in [facility]:  Lab Frequency Next Occurrence   COVID-19 Once 02/09/2022   US LIVER Once 11/29/2021   DEXA BONE DENSITY 2 SITES Once 10/29/2021       Next Visit Date:  Future Appointments   Date Time Provider Ekta Tapia   2/8/2022  1:30 PM Juan Desir MD [id number]            Patient Active Problem List:     Essential hypertension     Pure hypercholesterolemia     Urinary incontinence     Anxiety     Sleep apnea     GERD (gastroesophageal reflux disease)     Hypothyroidism     Obesity (BMI 30-39. 9)     Mild persistent asthma without complication     History of stroke     Chronic diarrhea     Bipolar 1 disorder ([facility]

## 2021-11-10 NOTE — TELEPHONE ENCOUNTER
PC to pt-- unable to [facility] mailbox is full.      Will need to come in to see PCr or resident as Dr Oliverio Pleitez is unavailable

## 2021-11-11 ENCOUNTER — HOSPITAL ENCOUNTER (OUTPATIENT)
Dept: ULTRASOUND IMAGING | Age: 66
Discharge: HOME | End: 2021-11-13
Payer: MEDICARE

## 2021-11-11 DIAGNOSIS — R79.89 ELEVATED LFTS: ICD-10-CM

## 2021-11-11 PROCEDURE — 76705 ECHO EXAM OF ABDOMEN: CPT

## 2021-11-18 DIAGNOSIS — K76.0 FATTY LIVER: Primary | ICD-10-CM

## 2021-11-18 DIAGNOSIS — K83.8 DILATED CBD, ACQUIRED: ICD-10-CM

## 2021-11-19 ENCOUNTER — TELEPHONE (OUTPATIENT)
Dept: INTERNAL MEDICINE | Age: 66
End: 2021-11-19

## 2021-11-19 NOTE — TELEPHONE ENCOUNTER
----- Message from Delicia Robles MD sent at 6/21/2021  6:14 PM EDT -----  Cholesterol is still a little elevated but other labs are good. ----- Message from Sachin Ernst sent at 11/19/2021 10:56 AM EST -----  Subject: Message to Provider    QUESTIONS  Information for Provider? patient would like to give the officer her case   manager's phone number [phone] ext. Nawaf Sandhu 89 She is moving over   to the [facility].   ---------------------------------------------------------------------------  --------------  [address]  What is the best way for the office to contact you? OK to leave message on   voicemail  Preferred Call Back Phone Number? [phone]  ---------------------------------------------------------------------------  --------------  SCRIPT ANSWERS  Relationship to Patient?  Self

## 2021-12-01 ENCOUNTER — TELEPHONE (OUTPATIENT)
Dept: INTERNAL MEDICINE | Age: 66
End: 2021-12-01

## 2021-12-01 DIAGNOSIS — F31.9 BIPOLAR 1 DISORDER (HCC): Primary | ICD-10-CM
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
## 2021-12-01 NOTE — TELEPHONE ENCOUNTER
Dr. Michelle Mackey called back a second time, she gave me verbal ok for a referral to social work for assistance. I have pended the referral, please route back to me so that I may continue to follow pt. Thank you.

## 2021-12-02 ENCOUNTER — CARE COORDINATION (OUTPATIENT)
Dept: CARE COORDINATION | Age: 66
End: 2021-12-02

## 2021-12-02 NOTE — TELEPHONE ENCOUNTER
Not at this time, Dr. Manav Lyle. I reached out to [facility][note 2] and she is following as well. She said if needed she will contact [facility] to follow pt too. Hopefully between all of us we can get pt back on track.

## 2021-12-03 ENCOUNTER — OFFICE VISIT (OUTPATIENT)
Dept: INTERNAL MEDICINE | Age: 66
End: 2021-12-03
Payer: MEDICARE

## 2021-12-03 VITALS
DIASTOLIC BLOOD PRESSURE: 86 MMHG | SYSTOLIC BLOOD PRESSURE: 150 MMHG | WEIGHT: 192 LBS | HEIGHT: 65 IN | BODY MASS INDEX: 31.99 KG/M2 | HEART RATE: 73 BPM

## 2021-12-03 DIAGNOSIS — E03.9 HYPOTHYROIDISM, UNSPECIFIED TYPE: ICD-10-CM

## 2021-12-03 DIAGNOSIS — G47.30 SLEEP APNEA, UNSPECIFIED TYPE: ICD-10-CM

## 2021-12-03 DIAGNOSIS — I10 ESSENTIAL HYPERTENSION: Primary | ICD-10-CM

## 2021-12-03 DIAGNOSIS — K21.9 GASTROESOPHAGEAL REFLUX DISEASE, UNSPECIFIED WHETHER ESOPHAGITIS PRESENT: ICD-10-CM

## 2021-12-03 PROCEDURE — G8399 PT W/DXA RESULTS DOCUMENT: HCPCS | Performed by: STUDENT IN AN ORGANIZED HEALTH CARE EDUCATION/TRAINING PROGRAM

## 2021-12-03 PROCEDURE — 1036F TOBACCO NON-USER: CPT | Performed by: STUDENT IN AN ORGANIZED HEALTH CARE EDUCATION/TRAINING PROGRAM

## 2021-12-03 PROCEDURE — 1123F ACP DISCUSS/DSCN MKR DOCD: CPT | Performed by: STUDENT IN AN ORGANIZED HEALTH CARE EDUCATION/TRAINING PROGRAM

## 2021-12-03 PROCEDURE — 99211 OFF/OP EST MAY X REQ PHY/QHP: CPT | Performed by: INTERNAL MEDICINE

## 2021-12-03 PROCEDURE — 99213 OFFICE O/P EST LOW 20 MIN: CPT | Performed by: STUDENT IN AN ORGANIZED HEALTH CARE EDUCATION/TRAINING PROGRAM

## 2021-12-03 PROCEDURE — G8484 FLU IMMUNIZE NO ADMIN: HCPCS | Performed by: STUDENT IN AN ORGANIZED HEALTH CARE EDUCATION/TRAINING PROGRAM

## 2021-12-03 PROCEDURE — G8427 DOCREV CUR MEDS BY ELIG CLIN: HCPCS | Performed by: STUDENT IN AN ORGANIZED HEALTH CARE EDUCATION/TRAINING PROGRAM

## 2021-12-03 PROCEDURE — 3017F COLORECTAL CA SCREEN DOC REV: CPT | Performed by: STUDENT IN AN ORGANIZED HEALTH CARE EDUCATION/TRAINING PROGRAM

## 2021-12-03 PROCEDURE — G8417 CALC BMI ABV UP PARAM F/U: HCPCS | Performed by: STUDENT IN AN ORGANIZED HEALTH CARE EDUCATION/TRAINING PROGRAM

## 2021-12-03 PROCEDURE — 1090F PRES/ABSN URINE INCON ASSESS: CPT | Performed by: STUDENT IN AN ORGANIZED HEALTH CARE EDUCATION/TRAINING PROGRAM

## 2021-12-03 PROCEDURE — 4040F PNEUMOC VAC/ADMIN/RCVD: CPT | Performed by: STUDENT IN AN ORGANIZED HEALTH CARE EDUCATION/TRAINING PROGRAM

## 2021-12-03 ASSESSMENT — PATIENT HEALTH QUESTIONNAIRE - PHQ9
1. LITTLE INTEREST OR PLEASURE IN DOING THINGS: 0
SUM OF ALL RESPONSES TO PHQ QUESTIONS 1-9: 0
SUM OF ALL RESPONSES TO PHQ9 QUESTIONS 1 & 2: 0
SUM OF ALL RESPONSES TO PHQ QUESTIONS 1-9: 0
SUM OF ALL RESPONSES TO PHQ QUESTIONS 1-9: 0
2. FEELING DOWN, DEPRESSED OR HOPELESS: 0

## 2021-12-03 NOTE — PROGRESS NOTES
[facility] [address] [address]  Dept: [phone]  Dept Fax: [phone]    Office Progress/Follow Up Note  Date of patient's visit: 12/3/2021  Patient's Name:  Kaylee Stanford YOB: 1955            Patient Care Team:  Tessie Basurto MD as PCP - Eleonora Moya MD as PCP - [facility] Provider  Mauricio Knutson MD as Consulting Physician (Gastroenterology)  Sita Acosta MD as Consulting Physician (Pulmonology)  Yoli Mejia MD as Surgeon (Orthopedic Surgery)  Wendy Drew MD as Consulting Physician (Infectious Diseases)  Kavya Casiano RN as Ambulatory Care Manager    REASON FOR VISIT: Routine outpatient follow up    HISTORY OF PRESENT ILLNESS:      Chief Complaint   Patient presents with   Amanda Duckworth     wants a letter stating she doesn't have to work, so she can stay at the [facility] Maintenance     decline vaccines    Other     wants to see if she qualitfies for hospice, see phone encounter       History was obtained from the patient. Kaylee Stanford is a 74 y.o. is here for a routine follow-up. Patient was seen in the past by Dr. Jaxson Rivera. Patient has history of schizophrenia, essential hypertension , asthma, hypothyroidism. However, patient is noncompliant with any of her medications. Patient is at homeless shelter where she does not have much assistance with her medications. Overall, however, she does follow-up with our specialists including pulmonologist.  Patient advised and instructed to take her medications, note for me stating she cannot go outside during the day from the shelter that is a trigger for her asthma. Patient Active Problem List   Diagnosis    Essential hypertension    Pure hypercholesterolemia    Urinary incontinence    Anxiety    Sleep apnea    GERD (gastroesophageal reflux disease)    Hypothyroidism    Obesity (BMI 30-39. 9)    Mild persistent asthma without complication    History of stroke    Chronic diarrhea    Bipolar 1 disorder (HCC)         Health Maintenance Due   Topic Date Due    DTaP/Tdap/Td vaccine (1 - Tdap) Never done    Shingles Vaccine (1 of 2) Never done    Pneumococcal 65+ years Vaccine (1 of 1 - PPSV23) Never done    Flu vaccine (1) Never done         Allergies   Allergen Reactions    Motrin [Ibuprofen]     Aspirin Rash    Blue Dyes (Parenteral) Rash    Hctz [Hydrochlorothiazide] Rash     Fixed drug reaction    Sulfa Antibiotics Rash    Tetracyclines & Related Rash         MEDICATIONS:      Current Outpatient Medications   Medication Sig Dispense Refill    omeprazole (PRILOSEC) 20 MG delayed release capsule TAKE 1 CAPSULE BY MOUTH ONCE DAILY IN THE MORNING BEFORE BREAKFAST 90 capsule 0    levothyroxine (SYNTHROID) 25 MCG tablet Once daily 90 tablet 0    albuterol sulfate  (90 Base) MCG/ACT inhaler INHALE 2 PUFFS BY MOUTH INTO LUNGS TWICE DAILY AS NEEDED FOR WHEEZING (NO  MORE  THAN  4  TIMES  DAILY) 9 g 0    amLODIPine (NORVASC) 10 MG tablet Take 1 tablet by mouth once daily 90 tablet 3    acetaminophen (TYLENOL) 325 MG tablet Take 2 tablets by mouth every 6 hours as needed for Pain 60 tablet 0    QUEtiapine (SEROQUEL) 200 MG tablet Take 200 mg by mouth 3 times daily      budesonide-formoterol (SYMBICORT) 160-4.5 MCG/ACT AERO Inhale 2 puffs into the lungs 2 times daily 1 Inhaler 3    mometasone (NASONEX) 50 MCG/ACT nasal spray 2 sprays by Nasal route daily 1 spray each nostril daily      montelukast (SINGULAIR) 10 MG tablet Take 10 mg by mouth nightly      FLUoxetine (PROZAC) 40 MG capsule Take 40 mg by mouth daily      ipratropium (ATROVENT) 0.03 % nasal spray 2 sprays by Nasal route 3 times daily as needed for Rhinitis       lithium 300 MG capsule Take 300 mg by mouth 2 times daily (with meals).       vitamin D3 (CHOLECALCIFEROL) 25 MCG (1000 UT) TABS tablet Take 1 tablet by mouth daily (Patient not taking: Reported on 12/3/2021) 30 tablet 5    mirabegron (MYRBETRIQ) 50 MG TB24 Take 50 mg by mouth daily Pt gets samples (Patient not taking: Reported on 12/3/2021)       No current facility-administered medications for this visit. SOCIAL HISTORY    Reviewed and no change from previous record. Dulce  reports that she has never smoked.  She has never used smokeless tobacco.    FAMILY HISTORY:    Reviewed and No change from previous visit    REVIEW OF SYSTEMS:    CONSTITUTIONAL: Denies: fever, chills  PSYCH: Denies: anxiety, depression  ALLERGIES: Denies: urticaria  EYES: Denies: blurry vision, decreased vision, photophobia  ENT: Denies: sore throat, nasal congestion  CARDIOVASCULAR: Denies: chest pain, dyspnea on exertion  RESPIRATORY: Denies: cough, hemoptysis, shortness of breath  GI: Denies: Denies: abdominal pain, flank pain  : Denies: Denies: dysuria, frequency/urgency  NEURO: Denies: dizzy/vertigo, headache  MUSCULOSKELETAL: Denies: back pain, joint pain  SKIN: Denies: rash, itching    PHYSICAL EXAM:      Vitals:    12/03/21 1340 12/03/21 1351   BP: (!) 164/87 (!) 150/86   Pulse: 73 73   Weight: 192 lb (87.1 kg)    Height: 5' 5\" (1.651 m)      BP Readings from Last 3 Encounters:   12/03/21 (!) 150/86   09/19/21 134/89   08/03/21 (!) 146/82      General appearance - alert, well appearing, and in no distress  Mental status - flight of thought  Chest - clear to auscultation, no wheezes, rales or rhonchi, symmetric air entry  Heart - normal rate, regular rhythm, normal S1, S2, no murmurs, rubs, clicks or gallops  Musculoskeletal - no joint tenderness, deformity or swelling  Extremities - peripheral pulses normal, no pedal edema, no clubbing or cyanosis    LABORATORY FINDINGS:    CBC:  Lab Results   Component Value Date    WBC 5.6 09/18/2018    HGB 11.5 09/18/2018     09/18/2018     05/02/2012       BMP:    Lab Results   Component Value Date     08/31/2021    K 4.0 08/31/2021     08/31/2021    CO2 21 08/31/2021    BUN 15 08/31/2021    CREATININE 0.75 08/31/2021    GLUCOSE 101 08/31/2021    GLUCOSE 119 05/02/2012       HEMOGLOBIN A1C:   Lab Results   Component Value Date    LABA1C 5.5 08/31/2021       FASTING LIPID PANEL:  Lab Results   Component Value Date    CHOL 194 08/13/2020    HDL 52 08/31/2021    TRIG 99 08/13/2020       ASSESSMENT AND PLAN:    1. Essential hypertension  norvasc 10 mg    2. Sleep apnea, unspecified type  Follows with pulm    3. Hypothyroidism, unspecified type  Synthroid 25    4. Gastroesophageal reflux disease, unspecified whether esophagitis present  prilosec            FOLLOW UP AND INSTRUCTIONS:   · No follow-ups on file. · Debere received counseling on the following healthy behaviors: nutrition and medication adherence    · Discussed use, benefit, and side effects of prescribed medications. Barriers to medication compliance addressed. All patient questions answered. Pt voiced understanding. · Patient given educational materials - see patient instructions    Milton Madden MD  Internal Medicine Resident, PGY-3  [facility];  [place], [place]  12/3/2021, 2:24 PM

## 2021-12-03 NOTE — LETTER
[address] [facility] [address] [address]  Phone: [phone]  Fax: [phone]    Zacarias Lind MD        December 3, 2021    [place] [address] [address] [address]      Dear Hieu Jimenez:    Due to patient's asthma, it is my recommendation that she does not have to go outside during the day as this is a trigger for her medical condition. Additionally, patient should be able to bring in food from outside facilities. If you have any questions or concerns, please don't hesitate to call.     Sincerely,        Zacarias Lind MD

## 2021-12-03 NOTE — PROGRESS NOTES
Attending Physician Statement  I have discussed the care of Shaheen Currie, including pertinent history and exam findings,  with the resident. I have seen and examined the patient and the key elements of all parts of the encounter have been performed by me. I agree with the assessment, plan and orders as documented by the resident.   (GC Modifier)

## 2021-12-04 NOTE — CARE COORDINATION
Ambulatory Care Coordination Note  12/2/2021  CM Risk Score: 3  Charlson 10 Year Mortality Risk Score: 79%     ACC: Orlando Boucher RN    Summary Note: Patient a referral, for Care Management, from provider. Has multiple requests/needs- see encounter from 12/1. Attempted to reach patient and no answer; her mail box is full; unable to leave a message. Noted telephone encounter on 11/19, stating that she notified the office of her , Roxy Galarza, at the [facility]. Called the [facility] and spoke with [facility]. She stated that both she and Roxy Galarza are working with Oleg Méndez. Oleg Méndez is currently staying at the [employer]. They are helping her to secure safe housing to return to. Informed [facility] of [address] requests. [facility] stated that she would notify her guardian and reach out to the patient. Will try to reach Debere on Monday. Care Coordination Interventions    Program Enrollment: Complex Care  Referral from Primary Care Provider: Yes  Suggested Interventions and Community Resources         Goals Addressed    None         Prior to Admission medications    Medication Sig Start Date End Date Taking?  Authorizing Provider   omeprazole (PRILOSEC) 20 MG delayed release capsule TAKE 1 CAPSULE BY MOUTH ONCE DAILY IN THE MORNING BEFORE BREAKFAST 10/29/21   Yovany Weir MD   levothyroxine (SYNTHROID) 25 MCG tablet Once daily 10/29/21   Yovany Weir MD   albuterol sulfate  (90 Base) MCG/ACT inhaler INHALE 2 PUFFS BY MOUTH INTO LUNGS TWICE DAILY AS NEEDED FOR WHEEZING (NO  MORE  THAN  4  TIMES  DAILY) 10/13/21   Yovany Weir MD   vitamin D3 (CHOLECALCIFEROL) 25 MCG (1000 UT) TABS tablet Take 1 tablet by mouth daily  Patient not taking: Reported on 12/3/2021 6/29/21   Yovany Weir MD   amLODIPine (NORVASC) 10 MG tablet Take 1 tablet by mouth once daily 4/14/21   Yovany Weir MD   acetaminophen (TYLENOL) 325 MG tablet Take 2 tablets by mouth every 6 hours as needed for Pain 10/12/20   [facility] Lisa Hinkle MD   mirabegron (MYRBETRIQ) 50 MG TB24 Take 50 mg by mouth daily Pt gets samples  Patient not taking: Reported on 12/3/2021    Historical Provider, MD   QUEtiapine (SEROQUEL) 200 MG tablet Take 200 mg by mouth 3 times daily    Historical Provider, MD   budesonide-formoterol (SYMBICORT) 160-4.5 MCG/ACT AERO Inhale 2 puffs into the lungs 2 times daily 12/26/19   Evelyn Manning MD   mometasone (NASONEX) 50 MCG/ACT nasal spray 2 sprays by Nasal route daily 1 spray each nostril daily    Historical Provider, MD   montelukast (SINGULAIR) 10 MG tablet Take 10 mg by mouth nightly    Historical Provider, MD   FLUoxetine (PROZAC) 40 MG capsule Take 40 mg by mouth daily    Historical Provider, MD   ipratropium (ATROVENT) 0.03 % nasal spray 2 sprays by Nasal route 3 times daily as needed for Rhinitis     Historical Provider, MD   lithium 300 MG capsule Take 300 mg by mouth 2 times daily (with meals).     Historical Provider, MD       Future Appointments   Date Time Provider Ekta Tapia   12/13/2021  1:00 PM STA DEXA RM [facility] Radiolog   2/8/2022  1:30 PM Evelyn Manning MD [facility]

## 2021-12-06 ENCOUNTER — CARE COORDINATION (OUTPATIENT)
Dept: CARE COORDINATION | Age: 66
End: 2021-12-06

## 2021-12-08 ENCOUNTER — CARE COORDINATION (OUTPATIENT)
Dept: CARE COORDINATION | Age: 66
End: 2021-12-08

## 2021-12-08 NOTE — CARE COORDINATION
Ambulatory Care Coordination Note  12/6/2021  CM Risk Score: 3  Charlson 10 Year Mortality Risk Score: 79%     ACC: Orlando Boucher RN    Summary Note: Called patient and introduced ACM and reason for call. She kept saying she was trying to get transportation to [facility] see you on the 13th\". Informed her that her appt on 12/13, was for a Dexa scan at [facility]. [place]. Reminded her that she has [employer] also for transportation. She said she forgot all about it, and will call [employer] to arrange transportation for the Dexa Scan. She kept talking to someone else, in the background, and answering questions. ACM asked if she was able to talk right now and she was not. ACM stated that she will call next week to discuss patient's concerns. Ambulatory Care Coordination Assessment    Care Coordination Protocol  Program Enrollment: Complex Care  Referral from Primary Care Provider: Yes  Week 1 - Initial Assessment     Do you have all of your prescriptions and are they filled?: Yes  Barriers to medication adherence: None  Are you able to afford your medications?: Yes  How often do you have trouble taking your medications the way you have been told to take them?: I always take them as prescribed. Do you have Home O2 Therapy?: No      Ability to seek help/take action for Emergent Urgent situations i.e. fire, crime, inclement weather or health crisis. : Independent  Ability to ambulate to restroom: Independent  Ability handle personal hygeine needs (bathing/dressing/grooming): Independent  Ability to manage Medications: Independent  Ability to prepare Food Preparation: Independent  Ability to maintain home (clean home, laundry):  Independent  Ability to drive and/or has transportation: Needs Assistance  Ability to do shopping: Independent  Ability to manage finances: Needs Assistance  Is patient able to live independently?: Yes     Current Housing: Private Residence           Frequent urination at night?: No  Do you use Provider, MD   lithium 300 MG capsule Take 300 mg by mouth 2 times daily (with meals).     Historical Provider, MD       Future Appointments   Date Time Provider Ekta Tapia   12/13/2021  1:00 PM JASS MONTGOMERY ST[facility] Radiolog   2/8/2022  1:30 PM Tequila Anaya MD [facility]

## 2021-12-10 ASSESSMENT — SOCIAL DETERMINANTS OF HEALTH (SDOH)
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
## 2021-12-16 ENCOUNTER — CARE COORDINATION (OUTPATIENT)
Dept: CARE COORDINATION | Age: 66
End: 2021-12-16
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
## 2021-12-29 ENCOUNTER — CARE COORDINATION (OUTPATIENT)
Dept: CARE COORDINATION | Age: 66
End: 2021-12-29
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
## 2022-01-03 ENCOUNTER — CARE COORDINATION (OUTPATIENT)
Dept: CARE COORDINATION | Age: 67
End: 2022-01-03

## 2022-01-04 ENCOUNTER — CARE COORDINATION (OUTPATIENT)
Dept: CARE COORDINATION | Age: 67
End: 2022-01-04
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
## 2022-01-10 ENCOUNTER — CARE COORDINATION (OUTPATIENT)
Dept: CARE COORDINATION | Age: 67
End: 2022-01-10

## 2022-01-10 DIAGNOSIS — J45.30 MILD PERSISTENT ASTHMA WITHOUT COMPLICATION: ICD-10-CM
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
## 2022-01-11 RX ORDER — ALBUTEROL SULFATE 90 UG/1
AEROSOL, METERED RESPIRATORY (INHALATION)
Qty: 9 G | Refills: 0 | Status: SHIPPED | OUTPATIENT
Start: 2022-01-11 | End: 2022-02-08 | Stop reason: SDUPTHER

## 2022-01-11 NOTE — TELEPHONE ENCOUNTER
Request for Albuterol . Next Visit Date:  Future Appointments   Date Time Provider Ekta Tapia   1/21/2022  8:30 AM Saurav Gorman MD [facility]   2/8/2022  1:30 PM Saurav Gorman MD [facility] [address] Maintenance   Topic Date Due    DTaP/Tdap/Td vaccine (1 - Tdap) Never done    Shingles Vaccine (1 of 2) Never done    Pneumococcal 65+ years Vaccine (1 of 1 - PPSV23) Never done    Flu vaccine (1) Never done    COVID-19 Vaccine (3 - Booster for Moderna series) 12/14/2021    TSH testing  08/03/2022    Breast cancer screen  08/24/2022    Potassium monitoring  08/31/2022    Creatinine monitoring  08/31/2022    Depression Screen  12/03/2022    Diabetes screen  08/31/2024    Lipid screen  08/31/2026    Colon cancer screen colonoscopy  09/25/2028    DEXA (modify frequency per FRAX score)  Completed    Hepatitis C screen  Completed    Hepatitis A vaccine  Aged Out    Hepatitis B vaccine  Aged Out    Hib vaccine  Aged Out    Meningococcal (ACWY) vaccine  Aged Out       Hemoglobin A1C (%)   Date Value   08/31/2021 5.5   12/21/2020 5.6   06/26/2019 5.2             ( goal A1C is < 7)   Microalb/Crt. Ratio (mcg/mg creat)   Date Value   11/10/2016 9     LDL Cholesterol (mg/dL)   Date Value   08/31/2021 158 (H)       (goal LDL is <100)   AST (U/L)   Date Value   08/31/2021 43 (H)     ALT (U/L)   Date Value   08/31/2021 75 (H)     BUN (mg/dL)   Date Value   08/31/2021 15     BP Readings from Last 3 Encounters:   12/03/21 (!) 150/86   09/19/21 134/89   08/03/21 (!) 146/82          (goal 120/80)    All Future Testing planned in [facility]  Lab Frequency Next Occurrence   COVID-19 Once 02/09/2022   DEXA BONE DENSITY 2 SITES Once 02/15/2022         Patient Active Problem List:     Essential hypertension     Pure hypercholesterolemia     Urinary incontinence     Anxiety     Sleep apnea     GERD (gastroesophageal reflux disease)     Hypothyroidism     Obesity (BMI 30-39. 9)     Mild persistent asthma without complication     History of stroke     Chronic diarrhea     Bipolar 1 disorder ([facility] [address]

## 2022-01-18 ENCOUNTER — CARE COORDINATION (OUTPATIENT)
Dept: CARE COORDINATION | Age: 67
End: 2022-01-18

## 2022-01-18 NOTE — CARE COORDINATION
attempted to contact Dulce.  was unable to leave a message due to mailbox full. Plan:  will attempt to contact Elmer Chambers, guardian to discuss housing.

## 2022-01-19 ENCOUNTER — CARE COORDINATION (OUTPATIENT)
Dept: CARE COORDINATION | Age: 67
End: 2022-01-19

## 2022-01-19 NOTE — CARE COORDINATION
Kelly from [facility] contacted . Kelly reports that she is trying to make contact with Dulce regarding her appointment however [facility] reports that she is no longer staying there.  informed Kelly the Shanthi" number listed is Dulce's cell. Plan:   Gonzalez Shultz will attend her PCP appointment tomorrow. Gonzalez Shultz will continue to work with Murali Velazco in order to move back into her house.

## 2022-01-20 ENCOUNTER — CARE COORDINATION (OUTPATIENT)
Dept: CARE COORDINATION | Age: 67
End: 2022-01-20

## 2022-01-20 ENCOUNTER — HOSPITAL ENCOUNTER (EMERGENCY)
Age: 67
Discharge: HOME | End: 2022-01-20
Attending: EMERGENCY MEDICINE
Payer: MEDICARE

## 2022-01-20 VITALS
BODY MASS INDEX: 31.99 KG/M2 | HEIGHT: 65 IN | OXYGEN SATURATION: 98 % | SYSTOLIC BLOOD PRESSURE: 166 MMHG | HEART RATE: 89 BPM | TEMPERATURE: 98.2 F | DIASTOLIC BLOOD PRESSURE: 91 MMHG | WEIGHT: 192 LBS | RESPIRATION RATE: 18 BRPM

## 2022-01-20 DIAGNOSIS — F32.A DEPRESSION, UNSPECIFIED DEPRESSION TYPE: Primary | ICD-10-CM

## 2022-01-20 PROCEDURE — 99284 EMERGENCY DEPT VISIT MOD MDM: CPT

## 2022-01-20 NOTE — CARE COORDINATION
Ambulatory Care Coordination Note  1/20/2022  CM Risk Score: 3  Charlson 10 Year Mortality Risk Score: 79%     ACC: Jose Carrasco, ADEEL    Summary Note: Attempted to reach Israel Parmar for follow up. No answer and her mail box is full. Unable to leave a message. Attempted to reach her pharmacy for updated medication list. Tried twice and busy both times. Care Coordination Interventions    Program Enrollment: Complex Care  Referral from Primary Care Provider: Yes  Suggested Interventions and Community Resources  Social Work: Completed         Goals Addressed    None         Prior to Admission medications    Medication Sig Start Date End Date Taking?  Authorizing Provider   albuterol sulfate  (90 Base) MCG/ACT inhaler INHALE 2 PUFFS BY MOUTH INTO LUNGS TWICE DAILY AS NEEDED FOR WHEEZING (NO  MORE  THAN  4  TIMES  DAILY) 1/11/22   Nnamdi Coelho MD   omeprazole (PRILOSEC) 20 MG delayed release capsule TAKE 1 CAPSULE BY MOUTH ONCE DAILY IN THE MORNING BEFORE BREAKFAST 10/29/21   Nnamdi Coelho MD   levothyroxine (SYNTHROID) 25 MCG tablet Once daily 10/29/21   Nnamdi Coelho MD   vitamin D3 (CHOLECALCIFEROL) 25 MCG (1000 UT) TABS tablet Take 1 tablet by mouth daily  Patient not taking: Reported on 12/3/2021 6/29/21   Nnamdi Coelho MD   amLODIPine (NORVASC) 10 MG tablet Take 1 tablet by mouth once daily 4/14/21   Nnamdi Coelho MD   acetaminophen (TYLENOL) 325 MG tablet Take 2 tablets by mouth every 6 hours as needed for Pain 10/12/20   Nnamdi Coelho MD   mirabegron (MYRBETRIQ) 50 MG TB24 Take 50 mg by mouth daily Pt gets samples  Patient not taking: Reported on 12/3/2021    Historical Provider, MD   QUEtiapine (SEROQUEL) 200 MG tablet Take 200 mg by mouth 3 times daily    Historical Provider, MD   budesonide-formoterol (SYMBICORT) 160-4.5 MCG/ACT AERO Inhale 2 puffs into the lungs 2 times daily 12/26/19   Nnamdi Coelho MD   mometasone (NASONEX) 50 MCG/ACT nasal spray 2 sprays by Nasal route

## 2022-01-20 NOTE — ED NOTES
Patient to emergency department with depression. Pt states she was sent here by her guardian because her psychiatrist is here. Pt denies [place], [place].  States she has been homeless for 55 days      Cheyanne Katz RN  01/20/22 9986

## 2022-01-21 ENCOUNTER — VIRTUAL VISIT (OUTPATIENT)
Dept: INTERNAL MEDICINE | Age: 67
End: 2022-01-21
Payer: MEDICARE

## 2022-01-21 ENCOUNTER — TELEPHONE (OUTPATIENT)
Dept: INTERNAL MEDICINE | Age: 67
End: 2022-01-21

## 2022-01-21 ENCOUNTER — CARE COORDINATION (OUTPATIENT)
Dept: CARE COORDINATION | Age: 67
End: 2022-01-21

## 2022-01-21 DIAGNOSIS — N39.41 URGE INCONTINENCE OF URINE: ICD-10-CM

## 2022-01-21 DIAGNOSIS — Z59.00 HOMELESSNESS: ICD-10-CM

## 2022-01-21 DIAGNOSIS — R35.0 URINARY FREQUENCY: Primary | ICD-10-CM

## 2022-01-21 PROCEDURE — 99442 PR PHYS/QHP TELEPHONE EVALUATION 11-20 MIN: CPT | Performed by: INTERNAL MEDICINE

## 2022-01-21 SDOH — ECONOMIC STABILITY - HOUSING INSECURITY: HOMELESSNESS UNSPECIFIED: Z59.00

## 2022-01-21 NOTE — LETTER
[address] [address]  Phone: [phone]  Fax: [phone]    Natacha Jacobson MD        January 21, 2022     [address] [place] [address]      Dear Min Alpha: We are sorry that you missed your appointment with Marcos Daniels on 1/21/2022. Your health and follow-up medical care are important to us. Please call our office as soon as possible so that we may reschedule your appointment. If you have already rescheduled your appointment, please disregard this letter.     Sincerely,        Natacha Jacobson MD

## 2022-01-21 NOTE — CARE COORDINATION
Ken Babcock called  stating that Palmer Hinds reports that she homeless. Palmer Hinds was staying with a friend who now has Covid. Palmer Hinds in formed PCP office she was staying in her car.  called guardian to inquire if he was aware of Palmer Hinds staying in her car. Juan Romero reports that Palmer Hinds came to his office yesterday stating that she had stay [place] the [place] because he friend has Covid and not able to stay there. Juan Romero inquire if Dulce wanted to go back to [employer]. Dulce declined. Juan Romero asked Palmer Hinds if she wanted to get a hotel till court. Dulce reports that she money on her debit card. Juan Romero reports Palmer Hinds has $1,000 dollars she can use for a hotel. Dulce declined. Juan Romero asked if she would like him to get her a hotel. Dulce declined. Juan Romero stated that Erastomarybeth Lizet made a comment \"maybe if I hurt myself that will hurt Clarence\". Juan Romero reports that he told Dulce to go to [place] since she made a comment about hurting herself.  informed Howland Prudent if she would speak to Dulce she can go to [employer], [employer], she can get a hotel, or Juan Romero will get her one. Plan:    will follow up with Juan Romero regarding count on 1/26.  will follow up with Dulce regarding housing.

## 2022-01-21 NOTE — PATIENT INSTRUCTIONS
-Bloodwork orders given to patient, they will have them done before their next visit.      Spoke with Himanshu Lopez (care coordinator) she is going to reach out to patient    Josefina Gregory

## 2022-01-21 NOTE — PROGRESS NOTES
Kam Guzman is a 74 y.o. female evaluated via telephone on 1/21/2022. Consent:  She and/or health care decision maker is aware that that she may receive a bill for this telephone service, which includes applicable co-pays, depending on her insurance coverage, and has provided verbal consent to proceed. Documentation:  I communicated with the patient and/or health care decision maker about her concern for urinary frequency and incontinence. She has a known history of urge incontinence. She was on mirabegron. She had seen neurologist almost a year ago. She has been having some social issues including homelessness. Recently she was staying at a homeless shelter but has lost her place there now. She says while she was at the homeless center they would not allow them to bring their medications with them. Now she is sleeping in her car. She was trying to stay with a friend but that friend got COVID so she could not go back. She went to the ER yesterday as she had no place to stay and and was depressed but since she was not suicidal she was released. Patient currently sleeping in her car and spending her days in the [facility]. We have a  and care coordinator trying to help her but they have had a hard time reaching out to her. Patient advised to stay by her phone and will have the  and care coordinator reach out to her now and see what help we can provide for her. Details of this discussion including any medical advice provided:   Patient to be contacted by  and care coordinator. We will  Restart Myrbetriq but will also get a UA and culture to make sure there is no infection. Advised to call us back if she does not hear from the  by later today.       I affirm this is a Patient Initiated Episode with a Patient who has not had a related appointment within my department in the past 7 days or scheduled within the next 24 hours. Patient identification was verified at the start of the visit: Yes    Total Time: minutes: 11-20 minutes    Dulce Serrano, was evaluated through a synchronous (real-time) audio-video encounter. The patient (or guardian if applicable) is aware that this is a billable service, which includes applicable co-pays. This Virtual Visit was conducted with patient's (and/or legal guardian's) consent. The visit was conducted pursuant to the emergency declaration under the [address] [place] [address] authority and the [employer] General Act. Patient identification was verified, and a caregiver was present when appropriate. The patient was located at home in a state where the provider was licensed to provide care.        Note: not billable if this call serves to triage the patient into an appointment for the relevant concern      Unknown MD Sean

## 2022-01-21 NOTE — ED PROVIDER NOTES
[address] ED  eMERGENCY dEPARTMENT eNCOUnter      Pt Name: Alma Delia Caballero  MRN: [id number]  YOB: 1955  Date of evaluation: 1/20/22  PCP: Lilliana Hemphill MD    CHIEF COMPLAINT:   Chief Complaint   Patient presents with    Depression     HISTORY OF PRESENT ILLNESS    Alma Delia Caballero is a 74 y.o. female who presents with chief complaint of depression. Patient states she was sent here by her 's office. She apparently was complaining about feeling very depressed. She denies any thoughts of suicide or homicide. She states that she is homeless and currently living in [address] [place] [address].  States she has been taking all medication as prescribed. She does have follow-up established with a psychiatrist.  She was told to come here by her . Symptoms are chronic. Symptoms are moderate. Nothing make symptoms better or worse. Patient has no other complaints at this time        REVIEW OF SYSTEMS       Constitutional: Denies recent fever, chills. Neck: No neck pain. Respiratory: Denies recent shortness of breath. Cardiac:  Denies recent chest pain. GI: Denies any recent abdominal pain nausea or vomiting. : Denies dysuria. Musculoskeletal: Denies focal weakness. Neurologic: Denies headache or focal weakness. Skin:  Denies any rash. Psychiatric: Denies suicidal or homicidal ideations. Positive for depression. Negative in 10 essential Systems except as mentioned above and in the HPI.         PAST MEDICAL HISTORY   PMH:  has a past medical history of Anxiety, Arrhythmia, Asthma, Bipolar 1 disorder ([address] Bipolar disorder ([address] Chronic diarrhea, COPD (chronic obstructive pulmonary disease) ([address] Depression, Essential hypertension, GERD (gastroesophageal reflux disease), GERD (gastroesophageal reflux disease), History of stroke, Hyperlipidemia, Hypertension, Hypothyroidism, Mild persistent asthma without complication, OAB (overactive bladder), Obesity (BMI 30-39.9), does have somewhere that she is able to go tonight. I advised her to be compliant with her follow-up, return if any symptoms worsen. She is agreeable plan will be discharged home to      FINAL IMPRESSION     1. Depression, unspecified depression type          DISPOSITION:  DISPOSITION Decision To Discharge 01/20/2022 08:27:12 PM        PATIENT REFERRED TO:  MD Jennifer [address] [facility] [address]  [phone]    Schedule an appointment as soon as possible for a visit       [facility] ED  [facility] [address]  [phone]    As needed, If symptoms worsen      DISCHARGE MEDICATIONS:  Current Discharge Medication List          The care is provided during an unprecedented national emergency due to the novel coronavirus, COVID-19. (Please note that portions of this note were completed with a voice recognition program. Efforts were made to edit the dictations but occasionally words are mis-transcribed.  Whenever words are used in this note in any gender, they shall be construed as though they were used in the gender appropriate to the circumstances; and whenever words are used in this note in the singular or plural form, they shall be construed as though they were used in the form appropriate to the circumstances.)    Nikki Cummings DO  Attending Emergency Medicine Physician        Nikki Cummings DO  01/20/22 2040
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
## 2022-01-21 NOTE — CARE COORDINATION
Ambulatory Care Coordination Note  1/21/2022  CM Risk Score: 3  Charlson 10 Year Mortality Risk Score: 79%     ACC: Michelle Muñoz RN    Summary Note: Stephanie Mercado had virtual visit with PCP today and voiced concerns about being homeless and has been sleeping in her car. She was instructed to stay by her phone so ACM/LSW could contact her. Attempted to reach her and no answer. Her mail box is still full. Unable to leave a message. Called and spoke to [facility], [facility]. Explained situation. Jermain Owens stated that she should be able to go back to the [employer], if they have any openings; and if Dulce is willing to follow the rules. Also discussed possibility of guardian paying for a hotel room so she doesn't have to sleep in her car. Attempted to reach her guardian and left a message on his voicemail requesting a return call. Contact number given. Will try to reach Debjami again. Jermain Owens was able to speak with [place]. See separate encounter from today. [facility] She asked how she could get her Dexascan rescheduled. Gave her number for centralized scheduling. Informed her of prescription that was sent to the [facility], for her urinary frequency. Also informed her that her provider ordered a urine test to check for any infection. She asked how to get botox injections to help with incontinence. Explained that she would need to see an urologist. She said she sees Dr. Lavinia Brady but forgot his number. Gave her the contact number for Dr. Alli Muniz office. Discussed her living arrangements and asked if she had a safe place to stay tonight. She said that she did. Could hear a man talking in the background, every time I asked her a question. She said it was the TV. She stated that she has stayed at the [employer], overnight, a couple of times last week. \"If you're putting money in the machines, they don't kick you out\". Discussed going to the [facility] or [facility] again.  She said she wasn't allowed back at the [facility]. She said the [facility] told her she couldn't come back for 30 days, which would be around 1/30/22. She goes to court on 1/26/22. She said she doesn't want to go back to the [employer]- \"I was sick after being there. They wouldn't let me eat my breakfast and I couldn't take my meds until lunch time because I had to take them with food. There was black mold on the wallpaper and it made me sick. I had trouble with my bladder and had no clean clothes to wear. I had to wear my nightgown to go eat dinner down the street. There was just too much activity for me. \" Discussed staying in a hotel; encouraged her to call Darinjacqui Chel to see if he could assist with getting her in a hotel. She used the restroom while on the phone with [facility], stating \"my friend let me come back to use her bathroom, even though she has Covid\". She said she had to go and thanked [facility] for calling. Will follow up next week on monica malik with urology, see if she's been following with psychiatry- Dr. Bryan Rodrigues ; lab work and if she got the medication for her bladder incontinence. Care Coordination Interventions    Program Enrollment: Complex Care  Referral from Primary Care Provider: Yes  Suggested Interventions and Community Resources  Social Work: Completed         Goals Addressed    None         Prior to Admission medications    Medication Sig Start Date End Date Taking?  Authorizing Provider   mirabegron (MYRBETRIQ) 50 MG TB24 Take 50 mg by mouth daily Pt gets samples 1/21/22   Dann Noel MD   albuterol sulfate  (90 Base) MCG/ACT inhaler INHALE 2 PUFFS BY MOUTH INTO LUNGS TWICE DAILY AS NEEDED FOR WHEEZING (NO  MORE  THAN  4  TIMES  DAILY) 1/11/22   Dann Noel MD   omeprazole (PRILOSEC) 20 MG delayed release capsule TAKE 1 CAPSULE BY MOUTH ONCE DAILY IN THE MORNING BEFORE BREAKFAST 10/29/21   Dann Noel MD   levothyroxine (SYNTHROID) 25 MCG tablet Once daily 10/29/21   \[facility]\"" Heather Rae MD   vitamin D3 (CHOLECALCIFEROL) 25 MCG (1000 UT) TABS tablet Take 1 tablet by mouth daily 6/29/21   Earl Levin MD   amLODIPine (NORVASC) 10 MG tablet Take 1 tablet by mouth once daily 4/14/21   Earl Levin MD   acetaminophen (TYLENOL) 325 MG tablet Take 2 tablets by mouth every 6 hours as needed for Pain 10/12/20   Earl Levin MD   QUEtiapine (SEROQUEL) 200 MG tablet Take 200 mg by mouth 3 times daily    Historical Provider, MD   budesonide-formoterol (SYMBICORT) 160-4.5 MCG/ACT AERO Inhale 2 puffs into the lungs 2 times daily 12/26/19   Earl Levin MD   mometasone (NASONEX) 50 MCG/ACT nasal spray 2 sprays by Nasal route daily 1 spray each nostril daily    Historical Provider, MD   montelukast (SINGULAIR) 10 MG tablet Take 10 mg by mouth nightly    Historical Provider, MD   FLUoxetine (PROZAC) 40 MG capsule Take 40 mg by mouth daily    Historical Provider, MD   ipratropium (ATROVENT) 0.03 % nasal spray 2 sprays by Nasal route 3 times daily as needed for Rhinitis     Historical Provider, MD   lithium 300 MG capsule Take 300 mg by mouth 2 times daily (with meals).     Historical Provider, MD       Future Appointments   Date Time Provider Ekta Tapia   1/26/2022  2:00 PM Earl Levin MD [facility]   2/8/2022  1:30 PM Earl Levin MD [facility]

## 2022-01-24 ENCOUNTER — CARE COORDINATION (OUTPATIENT)
Dept: CARE COORDINATION | Age: 67
End: 2022-01-24

## 2022-01-24 DIAGNOSIS — K21.9 GASTROESOPHAGEAL REFLUX DISEASE, UNSPECIFIED WHETHER ESOPHAGITIS PRESENT: ICD-10-CM
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
## 2022-01-24 NOTE — CARE COORDINATION
called and spoke with Nani Wilson. Dulce reports that she is still staying in her car. Naninelsy Polancodows reports that she is going to ask her friend if she can come back to her place and stay even if she has Covid. Dulce declined speaking with Shakila Scott or knowing if he was served paperwork. Dulce reports that she just wants to be home in her own house.  encouraged Dulce to continue working with Cullen Ramirez and court system and hopefully that will happen soon. Nani Wilson declined questions or concerns. Plan:    will follow up after court date on 1/26.

## 2022-01-25 ENCOUNTER — CARE COORDINATION (OUTPATIENT)
Dept: CARE COORDINATION | Age: 67
End: 2022-01-25

## 2022-01-25 RX ORDER — OMEPRAZOLE 20 MG/1
CAPSULE, DELAYED RELEASE ORAL
Qty: 90 CAPSULE | Refills: 3 | Status: SHIPPED | OUTPATIENT
Start: 2022-01-25 | End: 2022-03-10 | Stop reason: SDUPTHER
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
## 2022-01-25 NOTE — TELEPHONE ENCOUNTER
Request for omeprazole. Next Visit Date:  Future Appointments   Date Time Provider Ekta Tapia   1/26/2022  2:00 PM Garcia Raines MD [facility] [address]   2/8/2022  1:30 PM Garcia Raines MD [facility] IM [address] Maintenance   Topic Date Due    Pneumococcal 65+ years Vaccine (1 of 1 - PPSV23) Never done    Flu vaccine (1) Never done    COVID-19 Vaccine (3 - Booster for Moderna series) 11/14/2021    DTaP/Tdap/Td vaccine (1 - Tdap) 02/11/2022 (Originally 4/22/1974)    Shingles Vaccine (1 of 2) 01/21/2023 (Originally 4/22/2003)    TSH testing  08/03/2022    Breast cancer screen  08/24/2022    Potassium monitoring  08/31/2022    Creatinine monitoring  08/31/2022    Depression Monitoring  12/03/2022    Diabetes screen  08/31/2024    Lipid screen  08/31/2026    Colon cancer screen colonoscopy  09/25/2028    DEXA (modify frequency per FRAX score)  Completed    Hepatitis C screen  Completed    Hepatitis A vaccine  Aged Out    Hepatitis B vaccine  Aged Out    Hib vaccine  Aged Out    Meningococcal (ACWY) vaccine  Aged Out       Hemoglobin A1C (%)   Date Value   08/31/2021 5.5   12/21/2020 5.6   06/26/2019 5.2             ( goal A1C is < 7)   Microalb/Crt.  Ratio (mcg/mg creat)   Date Value   11/10/2016 9     LDL Cholesterol (mg/dL)   Date Value   08/31/2021 158 (H)       (goal LDL is <100)   AST (U/L)   Date Value   08/31/2021 43 (H)     ALT (U/L)   Date Value   08/31/2021 75 (H)     BUN (mg/dL)   Date Value   08/31/2021 15     BP Readings from Last 3 Encounters:   01/20/22 (!) 166/91   12/03/21 (!) 150/86   09/19/21 134/89          (goal 120/80)    All Future Testing planned in [facility]  Lab Frequency Next Occurrence   COVID-19 Once 02/09/2022   DEXA BONE DENSITY 2 SITES Once 02/15/2022   Urinalysis Reflex to Culture Once 01/21/2022         Patient Active Problem List:     Essential hypertension     Pure hypercholesterolemia     Urinary incontinence     Anxiety     Sleep apnea     GERD (gastroesophageal reflux disease)     Hypothyroidism     Obesity (BMI 30-39. 9)     Mild persistent asthma without complication     History of stroke     Chronic diarrhea     Bipolar 1 disorder ([facility] [address]

## 2022-01-26 ENCOUNTER — TELEPHONE (OUTPATIENT)
Dept: INTERNAL MEDICINE | Age: 67
End: 2022-01-26

## 2022-01-26 NOTE — TELEPHONE ENCOUNTER
----- Message from Arliss Phoenix sent at 1/26/2022 10:07 AM EST -----  Subject: Message to Provider    QUESTIONS  Information for Provider? Patient is calling because she would like to   know if she could get a order for a Crohn's test. Please advise  ---------------------------------------------------------------------------  --------------  CALL BACK INFO  What is the best way for the office to contact you? OK to leave message on   voicemail  Preferred Call Back Phone Number? [phone]  ---------------------------------------------------------------------------  --------------  SCRIPT ANSWERS  Relationship to Patient?  Self

## 2022-01-27 ENCOUNTER — CARE COORDINATION (OUTPATIENT)
Dept: CARE COORDINATION | Age: 67
End: 2022-01-27
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
## 2022-01-28 ENCOUNTER — CARE COORDINATION (OUTPATIENT)
Dept: CARE COORDINATION | Age: 67
End: 2022-01-28
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
## 2022-01-28 NOTE — CARE COORDINATION
Ambulatory Care Coordination Note  1/28/2022  CM Risk Score: 3  Charlson 10 Year Mortality Risk Score: 79%     ACC: Darlin Gonzalez, RN    Summary Note: Attempted to reach patient for follow up. No answer and her mail box is full. Unable to leave a message. Will try next week. Care Coordination Interventions    Program Enrollment: Complex Care  Referral from Primary Care Provider: Yes  Suggested Interventions and Community Resources  Social Work: Completed         Goals Addressed    None         Prior to Admission medications    Medication Sig Start Date End Date Taking?  Authorizing Provider   omeprazole (PRILOSEC) 20 MG delayed release capsule TAKE 1 CAPSULE BY MOUTH ONCE DAILY IN THE MORNING BEFORE BREAKFAST 1/25/22   Joe Blood MD   mirabegron (MYRBETRIQ) 50 MG TB24 Take 50 mg by mouth daily Pt gets samples 1/21/22   Joe Blood MD   albuterol sulfate  (90 Base) MCG/ACT inhaler INHALE 2 PUFFS BY MOUTH INTO LUNGS TWICE DAILY AS NEEDED FOR WHEEZING (NO  MORE  THAN  4  TIMES  DAILY) 1/11/22   Joe Blood MD   levothyroxine (SYNTHROID) 25 MCG tablet Once daily 10/29/21   Joe Blood MD   vitamin D3 (CHOLECALCIFEROL) 25 MCG (1000 UT) TABS tablet Take 1 tablet by mouth daily 6/29/21   Joe Blood MD   amLODIPine (NORVASC) 10 MG tablet Take 1 tablet by mouth once daily 4/14/21   Joe Blood MD   acetaminophen (TYLENOL) 325 MG tablet Take 2 tablets by mouth every 6 hours as needed for Pain 10/12/20   Joe Blood MD   QUEtiapine (SEROQUEL) 200 MG tablet Take 200 mg by mouth 3 times daily    Historical Provider, MD   budesonide-formoterol (SYMBICORT) 160-4.5 MCG/ACT AERO Inhale 2 puffs into the lungs 2 times daily 12/26/19   Joe Blood MD   mometasone (NASONEX) 50 MCG/ACT nasal spray 2 sprays by Nasal route daily 1 spray each nostril daily    Historical Provider, MD   montelukast (SINGULAIR) 10 MG tablet Take 10 mg by mouth nightly    Historical Provider, MD FLUoxetine (PROZAC) 40 MG capsule Take 40 mg by mouth daily    Historical Provider, MD   ipratropium (ATROVENT) 0.03 % nasal spray 2 sprays by Nasal route 3 times daily as needed for Rhinitis     Historical Provider, MD   lithium 300 MG capsule Take 300 mg by mouth 2 times daily (with meals).     Historical Provider, MD       Future Appointments   Date Time Provider Ekta Tapia   2/8/2022  1:30 PM Heather Mariscal MD [facility]

## 2022-01-31 ENCOUNTER — CARE COORDINATION (OUTPATIENT)
Dept: CARE COORDINATION | Age: 67
End: 2022-01-31

## 2022-01-31 NOTE — CARE COORDINATION
tablet Once daily 10/29/21   Natacha Jacobson MD   vitamin D3 (CHOLECALCIFEROL) 25 MCG (1000 UT) TABS tablet Take 1 tablet by mouth daily 6/29/21   Natacha Jacobson MD   amLODIPine (NORVASC) 10 MG tablet Take 1 tablet by mouth once daily 4/14/21   Natacha Jacobson MD   acetaminophen (TYLENOL) 325 MG tablet Take 2 tablets by mouth every 6 hours as needed for Pain 10/12/20   Natacha Jacobson MD   QUEtiapine (SEROQUEL) 200 MG tablet Take 200 mg by mouth 3 times daily    Historical Provider, MD   budesonide-formoterol (SYMBICORT) 160-4.5 MCG/ACT AERO Inhale 2 puffs into the lungs 2 times daily 12/26/19   Natacha Jacobson MD   mometasone (NASONEX) 50 MCG/ACT nasal spray 2 sprays by Nasal route daily 1 spray each nostril daily    Historical Provider, MD   montelukast (SINGULAIR) 10 MG tablet Take 10 mg by mouth nightly    Historical Provider, MD   FLUoxetine (PROZAC) 40 MG capsule Take 40 mg by mouth daily    Historical Provider, MD   ipratropium (ATROVENT) 0.03 % nasal spray 2 sprays by Nasal route 3 times daily as needed for Rhinitis     Historical Provider, MD   lithium 300 MG capsule Take 300 mg by mouth 2 times daily (with meals).     Historical Provider, MD       Future Appointments   Date Time Provider Ekta Tapia   2/8/2022  1:30 PM Natacha Jacobson MD [facility] EVELYN Steele

## 2022-02-02 ENCOUNTER — CARE COORDINATION (OUTPATIENT)
Dept: CARE COORDINATION | Age: 67
End: 2022-02-02

## 2022-02-03 ENCOUNTER — CARE COORDINATION (OUTPATIENT)
Dept: CARE COORDINATION | Age: 67
End: 2022-02-03

## 2022-02-03 NOTE — CARE COORDINATION
called and spoke to [employer].  and [employer] discussed [employer]. Dulce reports that she does not know where else she can stay. Dulce requested another referral be placed to [facility]. 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  was agreeable to pass this information along to Chris Hernandez.  contacted Chris Hernandez and provided his with Kari Strickland work location. Chris Hernandez reports that he will provide information to the  to have Kari Strickland served which then would have him removed from the house.       Plan:    will follow up with [facility] tomorrow to obtain another referral.

## 2022-02-04 ENCOUNTER — CARE COORDINATION (OUTPATIENT)
Dept: CARE COORDINATION | Age: 67
End: 2022-02-04

## 2022-02-04 NOTE — CARE COORDINATION
Ambulatory Care Coordination Note  2/4/2022  CM Risk Score: 3  Charlson 10 Year Mortality Risk Score: 79%     ACC: Ben Keane, ADEEL    Summary Note: Attempted to reach Isreal Bosworth for follow up and to remind her of appt next Tuesday, 2/8, with PCP. No answer and her mail box is full. Unable to leave a message. Will try to reach her on Monday. Care Coordination Interventions    Program Enrollment: Complex Care  Referral from Primary Care Provider: Yes  Suggested Interventions and Community Resources  Social Work: Completed         Goals Addressed    None         Prior to Admission medications    Medication Sig Start Date End Date Taking?  Authorizing Provider   omeprazole (PRILOSEC) 20 MG delayed release capsule TAKE 1 CAPSULE BY MOUTH ONCE DAILY IN THE MORNING BEFORE BREAKFAST 1/25/22   Sachin Tesfaye MD   mirabegron (MYRBETRIQ) 50 MG TB24 Take 50 mg by mouth daily Pt gets samples 1/21/22   Sachin Tesfaye MD   albuterol sulfate  (90 Base) MCG/ACT inhaler INHALE 2 PUFFS BY MOUTH INTO LUNGS TWICE DAILY AS NEEDED FOR WHEEZING (NO  MORE  THAN  4  TIMES  DAILY) 1/11/22   Sachin Tesfaye MD   levothyroxine (SYNTHROID) 25 MCG tablet Once daily 10/29/21   Sachin Tesfaye MD   vitamin D3 (CHOLECALCIFEROL) 25 MCG (1000 UT) TABS tablet Take 1 tablet by mouth daily 6/29/21   Sachin Tesfaye MD   amLODIPine (NORVASC) 10 MG tablet Take 1 tablet by mouth once daily 4/14/21   Sachin Tesfaye MD   acetaminophen (TYLENOL) 325 MG tablet Take 2 tablets by mouth every 6 hours as needed for Pain 10/12/20   Sachin Tesfaye MD   QUEtiapine (SEROQUEL) 200 MG tablet Take 200 mg by mouth 3 times daily    Historical Provider, MD   budesonide-formoterol (SYMBICORT) 160-4.5 MCG/ACT AERO Inhale 2 puffs into the lungs 2 times daily 12/26/19   Sachin Tesfaye MD   mometasone (NASONEX) 50 MCG/ACT nasal spray 2 sprays by Nasal route daily 1 spray each nostril daily    Historical Provider, MD   montelukast (SINGULAIR) 10 MG tablet Take 10 mg by mouth nightly    Historical Provider, MD   FLUoxetine (PROZAC) 40 MG capsule Take 40 mg by mouth daily    Historical Provider, MD   ipratropium (ATROVENT) 0.03 % nasal spray 2 sprays by Nasal route 3 times daily as needed for Rhinitis     Historical Provider, MD   lithium 300 MG capsule Take 300 mg by mouth 2 times daily (with meals).     Historical Provider, MD       Future Appointments   Date Time Provider [facility] [place]   2/8/2022  1:30 PM Alfonso Velarde MD [facility] Arthur Hoyt

## 2022-02-07 ENCOUNTER — CARE COORDINATION (OUTPATIENT)
Dept: CARE COORDINATION | Age: 67
End: 2022-02-07

## 2022-02-07 NOTE — CARE COORDINATION
Ambulatory Care Coordination Note  2/7/2022  CM Risk Score: 3  Charlson 10 Year Mortality Risk Score: 79%     ACC: Bere Coon RN    Summary Note: Tevin Che for follow up. Reminded her of her appt tomorrow with PCP. She said she remembered. She stated that she was waiting to hear back from Jenna Gann, about the [employer]. She said they have a waiting list. She mentioned that there was one in [place] but her guardian did not want her moving. She was jumping from one thought to the next. She stated that she was talking to her guardian. ACM could her the guardian talking in the background and stated that she would let her go to discuss things with him. Will follow. Care Coordination Interventions    Program Enrollment: Complex Care  Referral from Primary Care Provider: Yes  Suggested Interventions and Community Resources  Social Work: Completed         Goals Addressed    None         Prior to Admission medications    Medication Sig Start Date End Date Taking?  Authorizing Provider   omeprazole (PRILOSEC) 20 MG delayed release capsule TAKE 1 CAPSULE BY MOUTH ONCE DAILY IN THE MORNING BEFORE BREAKFAST 1/25/22   Garcia Raines MD   mirabegron (MYRBETRIQ) 50 MG TB24 Take 50 mg by mouth daily Pt gets samples 1/21/22   Garcia Raines MD   albuterol sulfate  (90 Base) MCG/ACT inhaler INHALE 2 PUFFS BY MOUTH INTO LUNGS TWICE DAILY AS NEEDED FOR WHEEZING (NO  MORE  THAN  4  TIMES  DAILY) 1/11/22   Garcia Raines MD   levothyroxine (SYNTHROID) 25 MCG tablet Once daily 10/29/21   Garcia Raines MD   vitamin D3 (CHOLECALCIFEROL) 25 MCG (1000 UT) TABS tablet Take 1 tablet by mouth daily 6/29/21   Garcia Raines MD   amLODIPine (NORVASC) 10 MG tablet Take 1 tablet by mouth once daily 4/14/21   Garcia Raines MD   acetaminophen (TYLENOL) 325 MG tablet Take 2 tablets by mouth every 6 hours as needed for Pain 10/12/20   Garcia Raines MD   QUEtiapine (SEROQUEL) 200 MG tablet Take 200 mg by mouth 3 times daily    Historical Provider, MD   budesonide-formoterol (SYMBICORT) 160-4.5 MCG/ACT AERO Inhale 2 puffs into the lungs 2 times daily 12/26/19   Natacha Jacobson MD   mometasone (NASONEX) 50 MCG/ACT nasal spray 2 sprays by Nasal route daily 1 spray each nostril daily    Historical Provider, MD   montelukast (SINGULAIR) 10 MG tablet Take 10 mg by mouth nightly    Historical Provider, MD   FLUoxetine (PROZAC) 40 MG capsule Take 40 mg by mouth daily    Historical Provider, MD   ipratropium (ATROVENT) 0.03 % nasal spray 2 sprays by Nasal route 3 times daily as needed for Rhinitis     Historical Provider, MD   lithium 300 MG capsule Take 300 mg by mouth 2 times daily (with meals).     Historical Provider, MD       Future Appointments   Date Time Provider Ekta Tapia   2/8/2022  1:30 PM Natacha Jacobson MD [address] [facility]

## 2022-02-08 ENCOUNTER — OFFICE VISIT (OUTPATIENT)
Dept: INTERNAL MEDICINE | Age: 67
End: 2022-02-08
Payer: MEDICARE

## 2022-02-08 ENCOUNTER — HOSPITAL ENCOUNTER (OUTPATIENT)
Age: 67
Setting detail: SPECIMEN
Discharge: HOME | End: 2022-02-08
Payer: MEDICARE

## 2022-02-08 VITALS
DIASTOLIC BLOOD PRESSURE: 89 MMHG | WEIGHT: 198 LBS | HEIGHT: 64 IN | SYSTOLIC BLOOD PRESSURE: 163 MMHG | TEMPERATURE: 98.4 F | HEART RATE: 84 BPM | BODY MASS INDEX: 33.8 KG/M2

## 2022-02-08 DIAGNOSIS — S82.891S CLOSED FRACTURE OF RIGHT ANKLE, SEQUELA: ICD-10-CM

## 2022-02-08 DIAGNOSIS — E03.9 HYPOTHYROIDISM, UNSPECIFIED TYPE: ICD-10-CM

## 2022-02-08 DIAGNOSIS — J45.30 MILD PERSISTENT ASTHMA WITHOUT COMPLICATION: ICD-10-CM

## 2022-02-08 DIAGNOSIS — Z91.81 AT HIGH RISK FOR FALLS: ICD-10-CM

## 2022-02-08 DIAGNOSIS — E78.00 PURE HYPERCHOLESTEROLEMIA: ICD-10-CM

## 2022-02-08 DIAGNOSIS — F31.9 BIPOLAR 1 DISORDER (HCC): ICD-10-CM

## 2022-02-08 DIAGNOSIS — N39.41 URGE INCONTINENCE OF URINE: ICD-10-CM

## 2022-02-08 DIAGNOSIS — I10 ESSENTIAL HYPERTENSION: Primary | ICD-10-CM

## 2022-02-08 DIAGNOSIS — I10 ESSENTIAL HYPERTENSION: ICD-10-CM

## 2022-02-08 DIAGNOSIS — Z59.00 HOMELESSNESS: ICD-10-CM

## 2022-02-08 LAB
ALBUMIN SERPL-MCNC: 4.4 G/DL (ref 3.5–5.2)
ALBUMIN/GLOBULIN RATIO: 1.4 (ref 1–2.5)
ALP BLD-CCNC: 115 U/L (ref 35–104)
ALT SERPL-CCNC: 30 U/L (ref 5–33)
ANION GAP SERPL CALCULATED.3IONS-SCNC: 14 MMOL/L (ref 9–17)
AST SERPL-CCNC: 29 U/L
BILIRUB SERPL-MCNC: 0.25 MG/DL (ref 0.3–1.2)
BUN BLDV-MCNC: 15 MG/DL (ref 8–23)
BUN/CREAT BLD: ABNORMAL (ref 9–20)
CALCIUM SERPL-MCNC: 10.5 MG/DL (ref 8.6–10.4)
CHLORIDE BLD-SCNC: 108 MMOL/L (ref 98–107)
CHOLESTEROL/HDL RATIO: 4.2
CHOLESTEROL: 204 MG/DL
CO2: 23 MMOL/L (ref 20–31)
CREAT SERPL-MCNC: 1.06 MG/DL (ref 0.5–0.9)
GFR AFRICAN AMERICAN: >60 ML/MIN
GFR NON-AFRICAN AMERICAN: 52 ML/MIN
GFR SERPL CREATININE-BSD FRML MDRD: ABNORMAL ML/MIN/{1.73_M2}
GFR SERPL CREATININE-BSD FRML MDRD: ABNORMAL ML/MIN/{1.73_M2}
GLUCOSE BLD-MCNC: 97 MG/DL (ref 70–99)
HDLC SERPL-MCNC: 49 MG/DL
LDL CHOLESTEROL: 123 MG/DL (ref 0–130)
POTASSIUM SERPL-SCNC: 4.5 MMOL/L (ref 3.7–5.3)
SODIUM BLD-SCNC: 145 MMOL/L (ref 135–144)
TOTAL PROTEIN: 7.5 G/DL (ref 6.4–8.3)
TRIGL SERPL-MCNC: 158 MG/DL
TSH SERPL DL<=0.05 MIU/L-ACNC: 2.53 MIU/L (ref 0.3–5)
VLDLC SERPL CALC-MCNC: ABNORMAL MG/DL (ref 1–30)

## 2022-02-08 PROCEDURE — 1123F ACP DISCUSS/DSCN MKR DOCD: CPT | Performed by: INTERNAL MEDICINE

## 2022-02-08 PROCEDURE — 80053 COMPREHEN METABOLIC PANEL: CPT

## 2022-02-08 PROCEDURE — G8427 DOCREV CUR MEDS BY ELIG CLIN: HCPCS | Performed by: INTERNAL MEDICINE

## 2022-02-08 PROCEDURE — 1036F TOBACCO NON-USER: CPT | Performed by: INTERNAL MEDICINE

## 2022-02-08 PROCEDURE — G8417 CALC BMI ABV UP PARAM F/U: HCPCS | Performed by: INTERNAL MEDICINE

## 2022-02-08 PROCEDURE — 1090F PRES/ABSN URINE INCON ASSESS: CPT | Performed by: INTERNAL MEDICINE

## 2022-02-08 PROCEDURE — G8399 PT W/DXA RESULTS DOCUMENT: HCPCS | Performed by: INTERNAL MEDICINE

## 2022-02-08 PROCEDURE — 4040F PNEUMOC VAC/ADMIN/RCVD: CPT | Performed by: INTERNAL MEDICINE

## 2022-02-08 PROCEDURE — 80061 LIPID PANEL: CPT

## 2022-02-08 PROCEDURE — 99211 OFF/OP EST MAY X REQ PHY/QHP: CPT | Performed by: INTERNAL MEDICINE

## 2022-02-08 PROCEDURE — 84443 ASSAY THYROID STIM HORMONE: CPT

## 2022-02-08 PROCEDURE — 36415 COLL VENOUS BLD VENIPUNCTURE: CPT

## 2022-02-08 PROCEDURE — 3017F COLORECTAL CA SCREEN DOC REV: CPT | Performed by: INTERNAL MEDICINE

## 2022-02-08 PROCEDURE — G8484 FLU IMMUNIZE NO ADMIN: HCPCS | Performed by: INTERNAL MEDICINE

## 2022-02-08 PROCEDURE — 0509F URINE INCON PLAN DOCD: CPT | Performed by: INTERNAL MEDICINE

## 2022-02-08 PROCEDURE — 99214 OFFICE O/P EST MOD 30 MIN: CPT | Performed by: INTERNAL MEDICINE

## 2022-02-08 RX ORDER — ALBUTEROL SULFATE 90 UG/1
AEROSOL, METERED RESPIRATORY (INHALATION)
Qty: 9 G | Refills: 5 | Status: SHIPPED | OUTPATIENT
Start: 2022-02-08

## 2022-02-08 RX ORDER — MOMETASONE FUROATE 50 UG/1
2 SPRAY, METERED NASAL DAILY
Qty: 1 EACH | Refills: 5 | OUTPATIENT
Start: 2022-02-08

## 2022-02-08 RX ORDER — AMLODIPINE BESYLATE 10 MG/1
TABLET ORAL
Qty: 90 TABLET | Refills: 3 | Status: SHIPPED | OUTPATIENT
Start: 2022-02-08 | End: 2022-06-15 | Stop reason: SDUPTHER

## 2022-02-08 RX ORDER — BUDESONIDE AND FORMOTEROL FUMARATE DIHYDRATE 160; 4.5 UG/1; UG/1
2 AEROSOL RESPIRATORY (INHALATION) 2 TIMES DAILY
Qty: 1 EACH | Refills: 5 | Status: SHIPPED | OUTPATIENT
Start: 2022-02-08 | End: 2022-06-15 | Stop reason: SDUPTHER

## 2022-02-08 RX ORDER — MOMETASONE FUROATE 50 UG/1
2 SPRAY, METERED NASAL DAILY
Qty: 1 EACH | Refills: 5 | Status: SHIPPED | OUTPATIENT
Start: 2022-02-08 | End: 2022-02-11 | Stop reason: SDUPTHER

## 2022-02-08 RX ORDER — MELATONIN
1000 DAILY
Qty: 30 TABLET | Refills: 5 | Status: SHIPPED | OUTPATIENT
Start: 2022-02-08 | End: 2022-03-10 | Stop reason: SDUPTHER

## 2022-02-08 RX ORDER — LEVOTHYROXINE SODIUM 0.03 MG/1
TABLET ORAL
Qty: 90 TABLET | Refills: 0 | Status: SHIPPED | OUTPATIENT
Start: 2022-02-08 | End: 2022-03-10 | Stop reason: SDUPTHER

## 2022-02-08 SDOH — ECONOMIC STABILITY - HOUSING INSECURITY: HOMELESSNESS UNSPECIFIED: Z59.00

## 2022-02-08 NOTE — TELEPHONE ENCOUNTER
Joshua calling the office for clarification on medication, SI spray by nasal route daily, 1 spray each nostril daily, pharmacy want to know which one is right
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
## 2022-02-08 NOTE — PATIENT INSTRUCTIONS
Return To Clinic 3/10/2022 for 4 week follow up. Please bring all of your medications with you to this appointment. After Visit Summary given and reviewed. The medication list included in this document is our record of what you are currently taking, including any changes that were made at today's visit. If you find any differences when compared to your medications at home, or have any questions that were not answered at your visit, please contact the office. It is very important for your care that you keep your appointment. If for some reason you are unable to keep your appointment, it is equally important that you call our office at [phone] to cancel your appointment and reschedule. Failure to do so may result in your termination from our practice. Medications have been e-scribed to pharmacy of patients choice. LABORATORY INSTRUCTIONS    Your doctor has ordered blood or urine testing. You can get this testing done at the Lab located on the first floor of the [facility], or at any other [employer]. Please stop at Main Registration, before going to the lab, as you must be registered first.     Please get this lab done before your next visit. You may NOT eat, drink, smoke, or chew anything before this test for 8-12 hours. You may still have water. Script for Handicap Placard was printed, signed, and given to patient. Patient was instructed to take script(s) to [facility] [employer] to obtain placard.      Dexa Scan reprinted and given to patient along with scheduling phone number and instructions.     -KASIA Gordon

## 2022-02-09 ENCOUNTER — TELEPHONE (OUTPATIENT)
Dept: INTERNAL MEDICINE | Age: 67
End: 2022-02-09

## 2022-02-09 ENCOUNTER — CARE COORDINATION (OUTPATIENT)
Dept: CARE COORDINATION | Age: 67
End: 2022-02-09

## 2022-02-09 DIAGNOSIS — J45.30 MILD PERSISTENT ASTHMA WITHOUT COMPLICATION: Primary | ICD-10-CM

## 2022-02-09 DIAGNOSIS — N32.81 OAB (OVERACTIVE BLADDER): Primary | ICD-10-CM

## 2022-02-09 ASSESSMENT — ENCOUNTER SYMPTOMS
SHORTNESS OF BREATH: 0
BACK PAIN: 0
CONSTIPATION: 0
WHEEZING: 0
ABDOMINAL PAIN: 0
COUGH: 0

## 2022-02-09 NOTE — TELEPHONE ENCOUNTER
ECC received call from ATIF Aly who is requesting Dr Kreg Merlin to prescribe an at home breathing treatment to help with asthma and runny nose.

## 2022-02-09 NOTE — TELEPHONE ENCOUNTER
Roxie, Can you look into this. Do you know how patietn can go about getting started of qualify for this I am unsure.

## 2022-02-09 NOTE — TELEPHONE ENCOUNTER
----- Message from Wellington Loza sent at 2/9/2022 11:21 AM EST -----  Subject: Referral Request    QUESTIONS   Reason for referral request? ECC received call from PT Radames Keys   who is requesting a referral to see Dr Gi Souza from [facility] regarding   overactive bladder. Pt stated she wets through her clothes and is trying   to get a part time job and would like to get this problem resolved. Pt   stated she would also be willing to try botox surgery. Pt is very   concerned of overactive bladder issue. Please return call to pt as well   regarding this   Has the physician seen you for this condition before? Yes  Select a date? 2022-02-08  Select the Provider the patient wants to be referred to, if known (PCP or   Specialist)? Shameka Cardoza   Preferred Specialist (if applicable)? Do you already have an appointment scheduled? No  Additional Information for Provider?   ---------------------------------------------------------------------------  --------------  CALL BACK INFO  What is the best way for the office to contact you? OK to leave message on   voicemail  Preferred Call Back Phone Number?  [phone]

## 2022-02-09 NOTE — TELEPHONE ENCOUNTER
Pt. Is requesting a Referral to see ( Lucinda Baptiste) at  [facility] clinic in regards to her overactive bladder .

## 2022-02-09 NOTE — TELEPHONE ENCOUNTER
----- Message from Lucia Pardo sent at 2/9/2022 11:26 AM EST -----  Subject: Message to Provider    QUESTIONS  Information for Provider? ECC received call from PT who is requesting call   back regarding meals on wheels. Please return call to pt  ---------------------------------------------------------------------------  --------------  CALL BACK INFO  What is the best way for the office to contact you? OK to leave message on   voicemail  Preferred Call Back Phone Number? [phone]  ---------------------------------------------------------------------------  --------------  SCRIPT ANSWERS  Relationship to Patient?  Self

## 2022-02-09 NOTE — TELEPHONE ENCOUNTER
----- Message from Pearle Gilford sent at 2/9/2022 11:25 AM EST -----  Subject: Refill Request    QUESTIONS  Name of Medication? albuterol sulfate  (90 Base) MCG/ACT inhaler  Patient-reported dosage and instructions? #PLEASE DISREGARD SELECTED   MEDICATION  How many days do you have left? 0  Preferred Pharmacy? [facility]  Pharmacy phone number (if available)? [phone]  Additional Information for Provider? #Please disregard selected   medication# ECC received call from PT Jaylin Aly who is requesting   Dr Kreg Merlin to prescribe an at home breathing treatment to help with   asthma and runny nose. Please return call to pt  ---------------------------------------------------------------------------  --------------  CALL BACK INFO  What is the best way for the office to contact you? OK to leave message on   voicemail  Preferred Call Back Phone Number?  [phone]

## 2022-02-09 NOTE — TELEPHONE ENCOUNTER
Is there a letter or a referral that needs to be placed? Please also check with  or care coordinator regarding meals as patient is homeless.

## 2022-02-10 ENCOUNTER — CARE COORDINATION (OUTPATIENT)
Dept: CARE COORDINATION | Age: 67
End: 2022-02-10

## 2022-02-10 ENCOUNTER — TELEPHONE (OUTPATIENT)
Dept: INTERNAL MEDICINE | Age: 67
End: 2022-02-10

## 2022-02-10 NOTE — TELEPHONE ENCOUNTER
Tito Llanes did speak with the patient on other issues. Will follow up with her on the meals on wheels. She might not qualify if it is income based. Will see what other options she might have.

## 2022-02-10 NOTE — CARE COORDINATION
spoke to [employer] briefly. [employer] reports that she was getting ready to go get her hair done and would call  [facility]. Plan:    will follow up with Dulce regarding housing.

## 2022-02-10 NOTE — TELEPHONE ENCOUNTER
Writer spoke to Dr. Elizabeth Lange office this morning and Mrs. Roxanne Tyler has a up-coming appt.

## 2022-02-10 NOTE — TELEPHONE ENCOUNTER
Pt called again requesting to speak with [facility] or Gloria Adhikari.  Please call patient back on [phone]

## 2022-02-10 NOTE — CARE COORDINATION
Cathleen from [address] [place] [address] [address] called . Ariela Macdonald reports that Dulce called the office stating she wanted to harm herself. Ariela Macdonald reports that Dick Nava would not provide her location for the office staff to call 911. Yamile requested  to contact Dulce and encourage her to call 911 due to her reporting thoughts of harming herself.  contacted Dick Elijah. Dick Nava reports that she wanted to call  however my number was removed from her phone.  inquired if Dick Nava was ok and was concerned due to her call to the [facility] office. Dulce replied \"I am not ok\". Dulce informed  that she needs a place to stay and is not willing to go to the [employer].      During conversation with Zoya Schwarz (Dulce's ) was screaming in the background and Dulce. Dulce and Dwight Hook engaged in yelling back and forth at one another.  inquired if Dulce felt afraid or unsafe? Dulce reports she did not feel safe and was afraid of Dwight Hook. While keeping Dulce on the phone,  contacted co-worker Randall Babcock to contact Earnestine Aguilar.  informed Andre Blue that Dick Nava has DV charges against Dwight Hook and has been trying to get a PT order served. Dulce and Yoselin Triana while on  was present on the phone.  heard the know on the door and Dwight Hook requested \"not to open it\". Debjami reports the police were at her door.  informed Dulce to open the door.  could hear Dick Elijah speaking with the police.  encouraged Jenniejami to speak with the police regarding current situation. Dulce informed  Dwight Hook was being arrested due to warrants.  contacted Irene Angel, guardian and informed him that Dick Nava had called  and her and Dwight Hook in a verbal altercation.  informed Irene Angel that he was arrested.   Irene Angel reports that he will follow up with Dulce today. Plan:   Nisreen Moser will follow up with Dulce. ben Petar will work with police and go to ER if still having thoughts of harming herself.  will follow up with Dulce and Nisreen Moser to discuss housing.

## 2022-02-10 NOTE — TELEPHONE ENCOUNTER
Pt has called office multiple times this morning needing to get in touch with either Win Keller or Josias Kerns. Pt was transferred to another MA at which time pt stated she was having suicidal thoughts and MA reached out to writer. Pt states she is unable to get to ED for evaluation d/t car trouble, did not provide location for office to call 911. Writer reached out to Josias Kerns, left message requesting call back. Win Keller is out on PTO this morning until noon, writer will alert Win Keller to situation ASAP. Received call back from Josias Kerns, explained situation and concerns. Josias Kerns states she will reach out to pt but that sometimes pt does not answer her calls. Writer will still reach out to Win Keller as well.

## 2022-02-11 ENCOUNTER — CARE COORDINATION (OUTPATIENT)
Dept: CARE COORDINATION | Age: 67
End: 2022-02-11

## 2022-02-11 RX ORDER — MOMETASONE FUROATE 50 UG/1
1 SPRAY, METERED NASAL DAILY
Qty: 1 EACH | Refills: 5 | Status: SHIPPED | OUTPATIENT
Start: 2022-02-11 | End: 2022-05-26

## 2022-02-11 NOTE — TELEPHONE ENCOUNTER
PC to patient, she stated she is asking for an Breathing Treatment (Nebulizer) it looks like 2 inhalers and medications was sent to pharmacy's the day of patient appt
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
## 2022-02-11 NOTE — TELEPHONE ENCOUNTER
[place] called to see if Dr. Duane Saltness has clarified the script for Mometasone? The script that was sent has 2 sets of directions. They need to know if the patient is supposed to do 2 sprays in each nostril or just 1 spray in each nostril? Please clarify so the pharmacy is able to fill the script.

## 2022-02-14 ENCOUNTER — CARE COORDINATION (OUTPATIENT)
Dept: CARE COORDINATION | Age: 67
End: 2022-02-14

## 2022-02-14 NOTE — CARE COORDINATION
Ambulatory Care Coordination Note  2/14/2022  CM Risk Score: 3  Charlson 10 Year Mortality Risk Score: 79%     ACC: Michelle Muñoz, RN    Summary Note: Called Dulce to discuss meal delivery programs. She would like information on ordering meals for home. ACM will see what programs are available to her, as she would likely have to pay for the meals based on her income. She is interested in seeing if the [facility] offers meals. Will gather information for available programs and call her. Care Coordination Interventions    Program Enrollment: Complex Care  Referral from Primary Care Provider: Yes  Suggested Interventions and [address]: In Process  Meals on Wheels: In Process  Social Work: Completed         Goals Addressed    None         Prior to Admission medications    Medication Sig Start Date End Date Taking?  Authorizing Provider   mometasone (NASONEX) 50 MCG/ACT nasal spray 1 spray by Nasal route daily 1 spray each nostril daily 2/11/22   Edson Cisneros MD   vitamin D3 (CHOLECALCIFEROL) 25 MCG (1000 UT) TABS tablet Take 1 tablet by mouth daily 2/8/22   Edson Cisneros MD   budesonide-formoterol [facility]) 160-4.5 MCG/ACT AERO Inhale 2 puffs into the lungs 2 times daily 2/8/22   Edson Cisneros MD   albuterol sulfate  (90 Base) MCG/ACT inhaler INHALE 2 PUFFS BY MOUTH INTO LUNGS TWICE DAILY AS NEEDED FOR WHEEZING (NO  MORE  THAN  4  TIMES  DAILY) 2/8/22   MD Berry Kenney by Does not apply route 2/8/22   Edson Cisneros MD   mirabegron (MYRBETRIQ) 50 MG TB24 Take 50 mg by mouth daily Pt gets samples 2/8/22   Edson Cisneros MD   amLODIPine (NORVASC) 10 MG tablet 1 tablet daily 2/8/22   Edson Cisneros MD   levothyroxine (SYNTHROID) 25 MCG tablet Once daily 2/8/22   MD Berry Kenney by Does not apply route Permanent - 5 years  Diagnosis- ankle fracture, OA 2/8/22   Edson Cisneros MD   omeprazole (PRILOSEC) 20 MG delayed release capsule TAKE 1 CAPSULE BY MOUTH ONCE DAILY IN THE MORNING BEFORE BREAKFAST 1/25/22   Rose Hill MD   acetaminophen (TYLENOL) 325 MG tablet Take 2 tablets by mouth every 6 hours as needed for Pain 10/12/20   Rose Hill MD   QUEtiapine (SEROQUEL) 200 MG tablet Take 200 mg by mouth 3 times daily    Historical Provider, MD   montelukast (SINGULAIR) 10 MG tablet Take 10 mg by mouth nightly  Patient not taking: Reported on 2/8/2022    Historical Provider, MD   FLUoxetine (PROZAC) 40 MG capsule Take 40 mg by mouth daily    Historical Provider, MD   ipratropium (ATROVENT) 0.03 % nasal spray 2 sprays by Nasal route 3 times daily as needed for Rhinitis     Historical Provider, MD   lithium 300 MG capsule Take 300 mg by mouth 2 times daily (with meals).     Historical Provider, MD       Future Appointments   Date Time Provider Ekta Tapia   3/10/2022  1:45 PM Rose Hill MD [facility] IM [id number]      and   COPD Assessment    Does the patient understand envrionmental exposure?: Yes  Is the patient able to verbalize Rescue vs. Long Acting medications?: No  Does the patient have a nebulizer?: No  Does the patient use a space with inhaled medications?: No     No patient-reported symptoms         Symptoms:  None: Yes      Symptom course: stable  Breathlessness: none  Increase use of rapid acting/rescue inhaled medications?: No  Change in chronic cough?: No/At Baseline  Change in sputum?: No/At Baseline  Self Monitoring - SaO2: No  Have you had a recent diagnosis of pneumonia either by PCP or at a hospital?: No

## 2022-02-16 ENCOUNTER — CARE COORDINATION (OUTPATIENT)
Dept: CARE COORDINATION | Age: 67
End: 2022-02-16

## 2022-02-16 NOTE — CARE COORDINATION
spoke with Jenna Obando. Dulce reports that Keagan Murillo is at [facility]. Dulce reports that she was informed that he has not been served yet.  inquired how Jenna Obando knew that and she would not answer. Jenna Obando reports that she has not been able to get in contact with someone at the rental car agency.  and Dulce called [employer]. Jenna Obando spoke to manager Rafaela Solano. Rafaela Solano reports they will have the  Towed since Dulce does not have keys.  informed Luiz Mays car will be towed this week. Luiz Mays reports that he called the [facility] and left message about getting her door fixed. Plan:   [facility] will fix door at home. Marco Antonio Bryant will have car towed back to agency.

## 2022-02-21 ENCOUNTER — CARE COORDINATION (OUTPATIENT)
Dept: CARE COORDINATION | Age: 67
End: 2022-02-21

## 2022-02-21 NOTE — CARE COORDINATION
called and spoke with Martínez Izquierdo. Dulce reports that Reji Mcarthur came home this weekend.  inquired why Martínez Izquierdo let him in the house? Dulce reports that he said there was not protection order served to him.  encouraged Dulce to speak to Paramjit Nina because Reji Mcarthur was supposed to be served while at [facility]. Dulce inquired if  could call Jesus Younger and asked if he was served?  informed Dulce that is can call or if she would like him gone she needs to call the [facility]. Dulce reports Christianne Almodovar is not being mean right now\".  reminded Martínez Izquierdo about the never ending [place] of them getting along and then fighting, she becomes in crisis mode and wants him gone.  also informed Martínez Izquierdo she is taking a chance of having to leave her home again and being homeless. Dulce reports she understood.  encouraged Dulce to call police if she wants him out of the house.  encouraged Dulce to call Paramjit Nina to inquire about protection order. Dulce reports the  is still at the house and wants it returned so she stops getting billed.  attempted to contact Paramjit Nina.  left message requesting a call back.  called [employer] the  is still at the home and was not towed.  was informed they are working on getting a tow truck to obtain truck. Plan:   Martínez Izquierdo will contact Paramjit Nina, guardian. Martínez Izquierdo will contact [place] police to have  Reji Mcarthur removed from the home. Chalfantjaclyn Chavez will have car picked up.

## 2022-02-22 ENCOUNTER — CARE COORDINATION (OUTPATIENT)
Dept: CARE COORDINATION | Age: 67
End: 2022-02-22

## 2022-02-22 NOTE — TELEPHONE ENCOUNTER
Advise patient to discuss Florien with her psychiatrist  Nebulizer printed but patient homeless and sleeping in car.  Therefore nebulizer was not ordered in past

## 2022-02-22 NOTE — TELEPHONE ENCOUNTER
Patient calling again in regards to getting nebulizer. Patient also thinks Lithium is causing nausea. Patient also states that when she takes Lithium she gets a \"salty\" taste in her mouth.

## 2022-02-22 NOTE — CARE COORDINATION
Ambulatory Care Coordination Note  2/22/2022  CM Risk Score: 3  Charlson 10 Year Mortality Risk Score: 79%     ACC: Arlyn Adams RN    Summary Note: Attempted to reach Elva Silva for follow up. No answer and her mail box is full. Unable to leave a message. Will try to reach her on Friday. Care Coordination Interventions    Program Enrollment: Complex Care  Referral from Primary Care Provider: Yes  Suggested Interventions and [address]: In Process  Meals on Wheels: In Process  Social Work: Completed         Goals Addressed    None         Prior to Admission medications    Medication Sig Start Date End Date Taking?  Authorizing Provider   mometasone (NASONEX) 50 MCG/ACT nasal spray 1 spray by Nasal route daily 1 spray each nostril daily 2/11/22   Dann Noel MD   vitamin D3 (CHOLECALCIFEROL) 25 MCG (1000 UT) TABS tablet Take 1 tablet by mouth daily 2/8/22   Dann Noel MD   budesonide-formoterol [facility]) 160-4.5 MCG/ACT AERO Inhale 2 puffs into the lungs 2 times daily 2/8/22   Dann Noel MD   albuterol sulfate  (90 Base) MCG/ACT inhaler INHALE 2 PUFFS BY MOUTH INTO LUNGS TWICE DAILY AS NEEDED FOR WHEEZING (NO  MORE  THAN  4  TIMES  DAILY) 2/8/22   MD Berry Benavides by Does not apply route 2/8/22   Dann Noel MD   mirabegron (MYRBETRIQ) 50 MG TB24 Take 50 mg by mouth daily Pt gets samples 2/8/22   Dann Noel MD   amLODIPine (NORVASC) 10 MG tablet 1 tablet daily 2/8/22   Dann Noel MD   levothyroxine (SYNTHROID) 25 MCG tablet Once daily 2/8/22   MD Berry Benavides by Does not apply route Permanent - 5 years  Diagnosis- ankle fracture, OA 2/8/22   Dann Noel MD   omeprazole (PRILOSEC) 20 MG delayed release capsule TAKE 1 CAPSULE BY MOUTH ONCE DAILY IN THE MORNING BEFORE BREAKFAST 1/25/22   Dann Noel MD   acetaminophen (TYLENOL) 325 MG tablet Take 2 tablets by mouth every 6 hours as needed for Pain 10/12/20   Garcia Raines MD   QUEtiapine (SEROQUEL) 200 MG tablet Take 200 mg by mouth 3 times daily    Historical Provider, MD   montelukast (SINGULAIR) 10 MG tablet Take 10 mg by mouth nightly  Patient not taking: Reported on 2/8/2022    Historical Provider, MD   FLUoxetine (PROZAC) 40 MG capsule Take 40 mg by mouth daily    Historical Provider, MD   ipratropium (ATROVENT) 0.03 % nasal spray 2 sprays by Nasal route 3 times daily as needed for Rhinitis     Historical Provider, MD   lithium 300 MG capsule Take 300 mg by mouth 2 times daily (with meals).     Historical Provider, MD       Future Appointments   Date Time Provider Ekta Regina   2/23/2022  9:00 AM STA PAT  2 [facility] [place]   3/10/2022  1:45 PM Garcia Raines MD [facility]

## 2022-02-22 NOTE — CARE COORDINATION
Dulce called  and informed her that she called the [facility]. Dulce reports that she was informed Conssonya Ferreira was served on 2/11. Dulce reports that Consuella Simmers denies being served. Dulce reports that the  came to her house and put a warrant out for his arrest due to breaking his protection order due to coming to the house. Elmer Chambers called  regarding Dorian Link. Elmer Chambers reports that he was going to try and follow up with Dulce today regarding Consuella Juanmers, the rental cars, and trying to get Dulce's door fixed and locks changed. Elmer Chambers report that he left two messages at the [facility] requesting assistance with fixing the door. Plan:   Dorian Link will continue to work with   and police. LORENA will fix locks and door.

## 2022-02-23 NOTE — TELEPHONE ENCOUNTER
PC to patient - informed patient that she needs to contact Psychiatry in regards to Lithium.      Order for nebulizer faxed to [facility] per patient request.

## 2022-02-24 ENCOUNTER — CARE COORDINATION (OUTPATIENT)
Dept: CARE COORDINATION | Age: 67
End: 2022-02-24

## 2022-02-24 DIAGNOSIS — J45.30 MILD PERSISTENT ASTHMA WITHOUT COMPLICATION: Primary | ICD-10-CM

## 2022-02-24 NOTE — CARE COORDINATION
called and spoke with Miley Martinez. Dulce reports that she is doing ok. Dulce reports that she did call the police but Kari So left. Dulce reports \"the police called him to tell him to stay away. \"       inquired if the  was towed yet? Dulce reports no that Kari Strickland has been driving it.  informed Dulce that she should follow up with Chris Hernandez to discuss plans and next step in her divorce. Dulce reports that she needs to call and get her money from Chris Hernandez. Miley Martinez reports that she has no money and has been going to a [employer] that provides food.  encouraged Dulce to contact [employer] cell phone.  informed Dulce to contact Police if Kari Strickland comes back to her house. Plan:   Miley Martinez will make contact with [place] police if Kari Strickland comes back to the house. Miley Martinez will contact Chris Hernandez regarding divorce and money for groceries.

## 2022-02-24 NOTE — TELEPHONE ENCOUNTER
Pt was sent in script for nebulizer, needs script for nebulizer treatment/medication. I have pended a script, please review and e-scribe.

## 2022-02-25 DIAGNOSIS — J45.30 MILD PERSISTENT ASTHMA WITHOUT COMPLICATION: ICD-10-CM

## 2022-02-25 RX ORDER — ALBUTEROL SULFATE 2.5 MG/3ML
2.5 SOLUTION RESPIRATORY (INHALATION) EVERY 6 HOURS PRN
Qty: 120 EACH | Refills: 3 | Status: SHIPPED | OUTPATIENT
Start: 2022-02-25

## 2022-02-28 ENCOUNTER — CARE COORDINATION (OUTPATIENT)
Dept: CARE COORDINATION | Age: 67
End: 2022-02-28

## 2022-02-28 NOTE — CARE COORDINATION
called and spoke with Miley Martinez. Dulce reports that she is in need of her new insurance card. Dulce reports that she has not received her new insurance card in order to have medication filled. Dulce reports she needs her Medicare card in order to fill her medication.  encouraged Dulce to call Leticia Bell and inquire if he has her insurance card. Dulce reports that she has not talked to him in over a month.  inquired if the  was towed away yet? Dulce reports that Kari Strickland is still driving it.  inquired if Kari Strickland was at the house. Dulce reports that he has been in and out a lot.  reminded Dulce of her PO and should call the police if he comes to the house. Dulce replied \"ok\".  contacted Leticia Bell to inform him that Miley Martinez is unable to fill her medications without her insurance card. Leticia Bell reports that he has her card and can give it to her when she receives her paycheck for the months. Chris Hernandez reports that he will make contact with Dulce. Plan:   Miley Martinez will call police if Kari Strickland comes to the house. Miley Martinez will work with Leticia Bell to get insurance card and cash.

## 2022-03-01 ENCOUNTER — CARE COORDINATION (OUTPATIENT)
Dept: CARE COORDINATION | Age: 67
End: 2022-03-01

## 2022-03-01 ASSESSMENT — ENCOUNTER SYMPTOMS: DYSPNEA ASSOCIATED WITH: EXERTION

## 2022-03-01 NOTE — CARE COORDINATION
Ambulatory Care Coordination Note  3/1/2022  CM Risk Score: 3  Charlson 10 Year Mortality Risk Score: 79%     ACC: Ennis Schwab, ADEEL    Summary Note: Olga Lidia Jamil for follow up. Discussed procedure planned for tomorrow. Reviewed pre-op instructions that were noted in chart. She states she has someone to drive her home. She said she got a nebulizer and medication delivered and was trying to figure out how to use it. Explained that there should be an instruction sheet inside. Tried to explain over the phone. Suggested she take it back to [facility] and have them show her how to use it, if she doesn't understand the instruction sheet. She verbalized understanding. Will follow up next week after her procedure. Will remind her of appt with PCP. If no other needs for ACM, will remove from panel. She continues to work with LSW on social needs. Care Coordination Interventions    Program Enrollment: Complex Care  Referral from Primary Care Provider: Yes  Suggested Interventions and [facility]: In Process  Meals on Wheels: In Process  Social Work: Completed         Goals Addressed    None         Prior to Admission medications    Medication Sig Start Date End Date Taking?  Authorizing Provider   albuterol (PROVENTIL) (2.5 MG/3ML) 0.083% nebulizer solution Take 3 mLs by nebulization every 6 hours as needed for Wheezing 2/25/22   Alfonso Velarde MD   mometasone (NASONEX) 50 MCG/ACT nasal spray 1 spray by Nasal route daily 1 spray each nostril daily 2/11/22   Alfonso Velarde MD   vitamin D3 (CHOLECALCIFEROL) 25 MCG (1000 UT) TABS tablet Take 1 tablet by mouth daily 2/8/22   Alfonso Velarde MD   budesonide-formoterol [facility]) 160-4.5 MCG/ACT AERO Inhale 2 puffs into the lungs 2 times daily 2/8/22   Alfonso Velarde MD   albuterol sulfate  (90 Base) MCG/ACT inhaler INHALE 2 PUFFS BY MOUTH INTO LUNGS TWICE DAILY AS NEEDED FOR WHEEZING (NO  MORE  THAN  4  TIMES  DAILY) 2/8/22 MD Berry Nelson MISC by Does not apply route 2/8/22   Alise Lucio MD   mirabegron [facility]) 50 MG TB24 Take 50 mg by mouth daily Pt gets samples 2/8/22   Alise Lucio MD   amLODIPine (NORVASC) 10 MG tablet 1 tablet daily 2/8/22   Alise Lucio MD   levothyroxine (SYNTHROID) 25 MCG tablet Once daily 2/8/22   MD Berry Nelson MISC by Does not apply route Permanent - 5 years  Diagnosis- ankle fracture, OA 2/8/22   Alise Lucio MD   omeprazole (PRILOSEC) 20 MG delayed release capsule TAKE 1 CAPSULE BY MOUTH ONCE DAILY IN THE MORNING BEFORE BREAKFAST 1/25/22   Alise Lucio MD   acetaminophen (TYLENOL) 325 MG tablet Take 2 tablets by mouth every 6 hours as needed for Pain 10/12/20   Alise Lucio MD   QUEtiapine (SEROQUEL) 200 MG tablet Take 200 mg by mouth 3 times daily    Historical Provider, MD   montelukast (SINGULAIR) 10 MG tablet Take 10 mg by mouth nightly  Patient not taking: Reported on 2/8/2022    Historical Provider, MD   FLUoxetine (PROZAC) 40 MG capsule Take 40 mg by mouth daily    Historical Provider, MD   ipratropium (ATROVENT) 0.03 % nasal spray 2 sprays by Nasal route 3 times daily as needed for Rhinitis     Historical Provider, MD   lithium 300 MG capsule Take 300 mg by mouth 2 times daily (with meals).     Historical Provider, MD       Future Appointments   Date Time Provider Ekta Tapia   3/10/2022  1:45 PM Alise Lucio MD [address] [place] [address]   3/17/2022  1:00 PM STA DEXA RM [facility] MAMMO STA Radiolog      and   COPD Assessment    Does the patient understand envrionmental exposure?: Yes  Is the patient able to verbalize Rescue vs. Long Acting medications?: No  Does the patient have a nebulizer?: No  Does the patient use a space with inhaled medications?: No     No patient-reported symptoms         Symptoms:  COPD associated increased fatigue: Pos      Symptom course: no change  Breathlessness: exertion  Increase use of rapid acting/rescue inhaled medications?: No  Change in chronic cough?: Increased  Change in sputum?: No/At Baseline  Self Monitoring - SaO2: No  Have you had a recent diagnosis of pneumonia either by PCP or at a hospital?: No

## 2022-03-02 ENCOUNTER — ANESTHESIA (OUTPATIENT)
Dept: OPERATING ROOM | Age: 67
End: 2022-03-02
Payer: MEDICARE

## 2022-03-02 ENCOUNTER — ANESTHESIA EVENT (OUTPATIENT)
Dept: OPERATING ROOM | Age: 67
End: 2022-03-02
Payer: MEDICARE

## 2022-03-02 ENCOUNTER — HOSPITAL ENCOUNTER (OUTPATIENT)
Age: 67
Setting detail: OUTPATIENT SURGERY
Discharge: HOME | End: 2022-03-02
Attending: UROLOGY | Admitting: UROLOGY
Payer: MEDICARE

## 2022-03-02 VITALS — OXYGEN SATURATION: 97 % | SYSTOLIC BLOOD PRESSURE: 151 MMHG | DIASTOLIC BLOOD PRESSURE: 81 MMHG | TEMPERATURE: 96.8 F

## 2022-03-02 VITALS
DIASTOLIC BLOOD PRESSURE: 84 MMHG | RESPIRATION RATE: 17 BRPM | SYSTOLIC BLOOD PRESSURE: 178 MMHG | OXYGEN SATURATION: 96 % | HEART RATE: 75 BPM | BODY MASS INDEX: 32.32 KG/M2 | HEIGHT: 65 IN | WEIGHT: 194 LBS | TEMPERATURE: 97.2 F

## 2022-03-02 LAB
BILIRUBIN URINE: NEGATIVE
COLOR: YELLOW
GLUCOSE URINE: NEGATIVE
KETONES, URINE: NEGATIVE
LEUKOCYTE ESTERASE, URINE: NEGATIVE
NITRITE, URINE: NEGATIVE
PH UA: 6 (ref 5–8)
PROTEIN UA: NEGATIVE
SPECIFIC GRAVITY UA: 1.02 (ref 1–1.03)
TURBIDITY: CLEAR
URINE HGB: NEGATIVE
UROBILINOGEN, URINE: NORMAL

## 2022-03-02 PROCEDURE — 3600000002 HC SURGERY LEVEL 2 BASE: Performed by: UROLOGY

## 2022-03-02 PROCEDURE — 2709999900 HC NON-CHARGEABLE SUPPLY: Performed by: UROLOGY

## 2022-03-02 PROCEDURE — 3700000000 HC ANESTHESIA ATTENDED CARE: Performed by: UROLOGY

## 2022-03-02 PROCEDURE — 2580000003 HC RX 258: Performed by: ANESTHESIOLOGY

## 2022-03-02 PROCEDURE — 7100000000 HC PACU RECOVERY - FIRST 15 MIN: Performed by: UROLOGY

## 2022-03-02 PROCEDURE — 81003 URINALYSIS AUTO W/O SCOPE: CPT

## 2022-03-02 PROCEDURE — 7100000001 HC PACU RECOVERY - ADDTL 15 MIN: Performed by: UROLOGY

## 2022-03-02 PROCEDURE — 7100000010 HC PHASE II RECOVERY - FIRST 15 MIN: Performed by: UROLOGY

## 2022-03-02 PROCEDURE — 6370000000 HC RX 637 (ALT 250 FOR IP): Performed by: UROLOGY

## 2022-03-02 PROCEDURE — 2500000003 HC RX 250 WO HCPCS: Performed by: NURSE ANESTHETIST, CERTIFIED REGISTERED

## 2022-03-02 PROCEDURE — 6360000002 HC RX W HCPCS: Performed by: UROLOGY

## 2022-03-02 PROCEDURE — 3600000012 HC SURGERY LEVEL 2 ADDTL 15MIN: Performed by: UROLOGY

## 2022-03-02 PROCEDURE — 7100000011 HC PHASE II RECOVERY - ADDTL 15 MIN: Performed by: UROLOGY

## 2022-03-02 PROCEDURE — 3700000001 HC ADD 15 MINUTES (ANESTHESIA): Performed by: UROLOGY

## 2022-03-02 PROCEDURE — 6360000002 HC RX W HCPCS: Performed by: NURSE ANESTHETIST, CERTIFIED REGISTERED

## 2022-03-02 RX ORDER — LIDOCAINE HYDROCHLORIDE 20 MG/ML
JELLY TOPICAL PRN
Status: DISCONTINUED | OUTPATIENT
Start: 2022-03-02 | End: 2022-03-02 | Stop reason: ALTCHOICE

## 2022-03-02 RX ORDER — ONDANSETRON 2 MG/ML
INJECTION INTRAMUSCULAR; INTRAVENOUS PRN
Status: DISCONTINUED | OUTPATIENT
Start: 2022-03-02 | End: 2022-03-02 | Stop reason: SDUPTHER

## 2022-03-02 RX ORDER — SODIUM CHLORIDE 0.9 % (FLUSH) 0.9 %
10 SYRINGE (ML) INJECTION EVERY 12 HOURS SCHEDULED
Status: DISCONTINUED | OUTPATIENT
Start: 2022-03-02 | End: 2022-03-02 | Stop reason: HOSPADM

## 2022-03-02 RX ORDER — PROPOFOL 10 MG/ML
INJECTION, EMULSION INTRAVENOUS PRN
Status: DISCONTINUED | OUTPATIENT
Start: 2022-03-02 | End: 2022-03-02 | Stop reason: SDUPTHER

## 2022-03-02 RX ORDER — LIDOCAINE HYDROCHLORIDE 20 MG/ML
INJECTION, SOLUTION EPIDURAL; INFILTRATION; INTRACAUDAL; PERINEURAL PRN
Status: DISCONTINUED | OUTPATIENT
Start: 2022-03-02 | End: 2022-03-02 | Stop reason: SDUPTHER

## 2022-03-02 RX ORDER — CEFAZOLIN SODIUM 1 G/3ML
INJECTION, POWDER, FOR SOLUTION INTRAMUSCULAR; INTRAVENOUS PRN
Status: DISCONTINUED | OUTPATIENT
Start: 2022-03-02 | End: 2022-03-02 | Stop reason: SDUPTHER

## 2022-03-02 RX ORDER — FENTANYL CITRATE 50 UG/ML
INJECTION, SOLUTION INTRAMUSCULAR; INTRAVENOUS PRN
Status: DISCONTINUED | OUTPATIENT
Start: 2022-03-02 | End: 2022-03-02 | Stop reason: SDUPTHER

## 2022-03-02 RX ORDER — MIDAZOLAM HYDROCHLORIDE 1 MG/ML
INJECTION INTRAMUSCULAR; INTRAVENOUS PRN
Status: DISCONTINUED | OUTPATIENT
Start: 2022-03-02 | End: 2022-03-02 | Stop reason: SDUPTHER

## 2022-03-02 RX ORDER — SODIUM CHLORIDE 0.9 % (FLUSH) 0.9 %
10 SYRINGE (ML) INJECTION PRN
Status: DISCONTINUED | OUTPATIENT
Start: 2022-03-02 | End: 2022-03-02 | Stop reason: HOSPADM

## 2022-03-02 RX ORDER — SODIUM CHLORIDE, SODIUM LACTATE, POTASSIUM CHLORIDE, CALCIUM CHLORIDE 600; 310; 30; 20 MG/100ML; MG/100ML; MG/100ML; MG/100ML
INJECTION, SOLUTION INTRAVENOUS CONTINUOUS
Status: DISCONTINUED | OUTPATIENT
Start: 2022-03-02 | End: 2022-03-02 | Stop reason: HOSPADM

## 2022-03-02 RX ORDER — LIDOCAINE HYDROCHLORIDE 10 MG/ML
1 INJECTION, SOLUTION EPIDURAL; INFILTRATION; INTRACAUDAL; PERINEURAL
Status: DISCONTINUED | OUTPATIENT
Start: 2022-03-02 | End: 2022-03-02 | Stop reason: HOSPADM

## 2022-03-02 RX ORDER — SODIUM CHLORIDE 9 MG/ML
25 INJECTION, SOLUTION INTRAVENOUS PRN
Status: DISCONTINUED | OUTPATIENT
Start: 2022-03-02 | End: 2022-03-02 | Stop reason: HOSPADM

## 2022-03-02 RX ORDER — SODIUM CHLORIDE 9 MG/ML
INJECTION, SOLUTION INTRAVENOUS CONTINUOUS
Status: DISCONTINUED | OUTPATIENT
Start: 2022-03-02 | End: 2022-03-02

## 2022-03-02 RX ADMIN — Medication 25 MCG: at 13:13

## 2022-03-02 RX ADMIN — LIDOCAINE HYDROCHLORIDE 50 MG: 20 INJECTION, SOLUTION EPIDURAL; INFILTRATION; INTRACAUDAL; PERINEURAL at 12:53

## 2022-03-02 RX ADMIN — ONDANSETRON 4 MG: 2 INJECTION INTRAMUSCULAR; INTRAVENOUS at 13:00

## 2022-03-02 RX ADMIN — CEFAZOLIN SODIUM 2000 MG: 1 INJECTION, POWDER, FOR SOLUTION INTRAMUSCULAR; INTRAVENOUS at 13:14

## 2022-03-02 RX ADMIN — PROPOFOL 50 MCG/KG/MIN: 10 INJECTION, EMULSION INTRAVENOUS at 12:57

## 2022-03-02 RX ADMIN — SODIUM CHLORIDE, POTASSIUM CHLORIDE, SODIUM LACTATE AND CALCIUM CHLORIDE: 600; 310; 30; 20 INJECTION, SOLUTION INTRAVENOUS at 11:41

## 2022-03-02 RX ADMIN — Medication 25 MCG: at 13:06

## 2022-03-02 RX ADMIN — MIDAZOLAM 1 MG: 1 INJECTION INTRAMUSCULAR; INTRAVENOUS at 12:53

## 2022-03-02 RX ADMIN — PROPOFOL 50 MG: 10 INJECTION, EMULSION INTRAVENOUS at 12:53

## 2022-03-02 RX ADMIN — Medication 50 MCG: at 12:53

## 2022-03-02 RX ADMIN — MIDAZOLAM 1 MG: 1 INJECTION INTRAMUSCULAR; INTRAVENOUS at 12:48

## 2022-03-02 ASSESSMENT — PULMONARY FUNCTION TESTS
PIF_VALUE: 0
PIF_VALUE: 1
PIF_VALUE: 0
PIF_VALUE: 1
PIF_VALUE: 0
PIF_VALUE: 1
PIF_VALUE: 0
PIF_VALUE: 1
PIF_VALUE: 1
PIF_VALUE: 0
PIF_VALUE: 1
PIF_VALUE: 0
PIF_VALUE: 1
PIF_VALUE: 0
PIF_VALUE: 1
PIF_VALUE: 0
PIF_VALUE: 1
PIF_VALUE: 0
PIF_VALUE: 1
PIF_VALUE: 1
PIF_VALUE: 0
PIF_VALUE: 0
PIF_VALUE: 1
PIF_VALUE: 1

## 2022-03-02 ASSESSMENT — PAIN - FUNCTIONAL ASSESSMENT: PAIN_FUNCTIONAL_ASSESSMENT: 0-10

## 2022-03-02 ASSESSMENT — PAIN SCALES - GENERAL
PAINLEVEL_OUTOF10: 0

## 2022-03-02 NOTE — H&P
History and Physical Service   [facility] [place] - [facility]PATIENT    HISTORY AND PHYSICAL EXAMINATION            Date of Evaluation: 3/2/2022  Patient name:  Neri Howard  MRN:   [id number]  YOB: 1955  PCP:    Heather Mariscal MD    History Obtained From:     Patient, medical records    History of Present Illness: This is Neri Howard a 74 y.o. female who presents today for a cystoscopy with Botox (100 units) by Dr. Irene Barron for overactive bladder. Patient c/o overactive bladder experienceing urinary urgency with incontinence. Patient follows with Dr Alisha Boucher  She has failed all prescribed oral medications, continued on mirabegron. Dr Alisha Boucher discussed doing a cystoscopy and intravesical Botox injections during her visit on 8/11/21. Patient agrees and today presents for the scheduled procedures. She denies dysuria, visible hematuria, frequency or abdominal pains. Hx Asthma/COPD Has antiasthmatics Symbicort, Singulair and albuterol inhaler Recently ordered nebulizer and put it together \"I'm npt sure if its right\" Advised to contact PCP and discuss with office. Denies fever, chills, cough, wheezing, increased SOB or HOUGH. Patient stated she is taking \"keflex for infection\" \"I'm suppose to take it when I have itching and sores\" \"Given to me by dermatologist\" Capsule in her medication box is Keflex 500mg Surgery RN will notify Dr Alisha Boucher. Advised her to discuss with her dermatologist to determine if she needs to take it everyday.     Past Medical History:     Past Medical History:   Diagnosis Date    Anxiety     Arrhythmia     Asthma     Bipolar 1 disorder ([facility] [address] 2/14/2019    Bipolar disorder ([facility] [address]     Chronic diarrhea 2/14/2019    COPD (chronic obstructive pulmonary disease) (HCC)     Depression     Essential hypertension     GERD (gastroesophageal reflux disease)     GERD (gastroesophageal reflux disease)     History of stroke 8/9/2018    Hyperlipidemia     Hypertension     MG delayed release capsule TAKE 1 CAPSULE BY MOUTH ONCE DAILY IN THE MORNING BEFORE BREAKFAST 1/25/22   Dann Noel MD   acetaminophen (TYLENOL) 325 MG tablet Take 2 tablets by mouth every 6 hours as needed for Pain 10/12/20   Dann Noel MD   QUEtiapine (SEROQUEL) 200 MG tablet Take 200 mg by mouth 3 times daily    Historical Provider, MD   montelukast (SINGULAIR) 10 MG tablet Take 10 mg by mouth nightly  Patient not taking: Reported on 2/8/2022    Historical Provider, MD   FLUoxetine (PROZAC) 40 MG capsule Take 40 mg by mouth daily    Historical Provider, MD   ipratropium (ATROVENT) 0.03 % nasal spray 2 sprays by Nasal route 3 times daily as needed for Rhinitis     Historical Provider, MD   lithium 300 MG capsule Take 300 mg by mouth 2 times daily (with meals). Historical Provider, MD        Allergies:     Motrin [ibuprofen], Aspirin, Blue dyes (parenteral), Hctz [hydrochlorothiazide], Sulfa antibiotics, and Tetracyclines & related    Social History:     Tobacco:    reports that she has never smoked. She has never used smokeless tobacco.  Alcohol:      reports no history of alcohol use. Drug Use:  reports no history of drug use. Family History:     Family History   Problem Relation Age of Onset    Arthritis Mother     Depression Mother     Heart Disease Mother     High Blood Pressure Father     High Cholesterol Father     Stroke Father     High Blood Pressure Brother        Review of Systems:     Positive and Negative as described in HPI. CONSTITUTIONAL:  negative for fevers, chills, sweats, fatigue, weight loss  HEENT: nasal congestion  negative for vision, hearing changes, runny nose, throat pain  RESPIRATORY:Hx Asthma/COPD Has antiasthmatics Symbicort, Singulair, albuterol inhaler and nebulizer but has not used them  Hx XAVIER on CPAP  negative for shortness of breath, cough, congestion, wheezing. CARDIOVASCULAR:  negative for chest pain, palpitations.   GASTROINTESTINAL: Acid reflux on omeprazole with last dose 2/25/22 negative for nausea, vomiting, diarrhea, constipation, change in bowel habits, abdominal pain   GENITOURINARY:  See HPI  INTEGUMENT: See HPI  negative for rash, skin lesions, easy bruising   HEMATOLOGIC/LYMPHATIC:  negative for swelling/edema   ALLERGIC/IMMUNOLOGIC:  negative for urticaria , itching  ENDOCRINE: hypothyroid on medication followed by PCP negative increase in drinking, increase in urination, hot or cold intolerance  MUSCULOSKELETAL:  negative joint pains, muscle aches, swelling of joints  NEUROLOGICAL:  negative for headaches, dizziness, lightheadedness, numbness, pain, tingling extremities  BEHAVIOR/PSYCH:  Hx bipolar disorder on medication negative for depression, anxiety    Physical Exam:   BP (!) 169/99   Pulse 77   Temp 96.2 °F (35.7 °C) (Temporal)   Resp 16   Ht 5' 5\" (1.651 m)   Wt 194 lb (88 kg)   SpO2 97%   BMI 32.28 kg/m²   No LMP recorded. Patient is postmenopausal.  No obstetric history on file. No results for input(s): POCGLU in the last 72 hours. General Appearance:  Obese female alert, well appearing, and in no acute distress  Mental status: oriented to person, place, and time with normal affect  Head:  normocephalic, atraumatic. Eye: no icterus, redness, pupils equal and reactive, conjunctiva clear  Ear: normal external ear, no discharge, hearing intact  Nose:  no drainage noted  Mouth: mucous membranes moist two upper teeth pulled yesterday Denies remaining loose teeth   Neck: supple, no carotid bruits, thyroid not palpable  Lungs: Bilateral equal air entry, diminished bibasilar, no wheezing, rales or rhonchi, normal effort  Cardiovascular: HR 77 distant heart sounds, normal rate, regular rhythm, no murmur, gallop, rub.   Abdomen: Soft,round, obese, nontender, nondistended, normal bowel sounds  Neurologic: There are no new focal motor or sensory deficits, normal muscle tone and bulk, no abnormal sensation, normal speech, cranial nerves II through XII grossly intact   Skin: No gross lesions, rashes, bruising or bleeding on exposed skin area  Extremities:  peripheral pulses palpable, no pedal edema or calf pain with palpation  Psych: normal affect     Investigations:      Laboratory Testing:  No results found for this or any previous visit (from the past 24 hour(s)). Recent Labs     02/08/22  1508   *   K 4.5   *   CO2 23   BUN 15   CREATININE 1.06*   GLUCOSE 97   AST 29   ALT 30   LABALBU 4.4       No results for input(s): COVID19 in the last 720 hours. Imaging/Diagnostics:    No results found    Diagnosis:      1. Overactive bladder    Plans:     1.  Cystoscopy with Botox (100u)      MANDEEP Mckeon CNP  3/2/2022  11:25 AM

## 2022-03-02 NOTE — OP NOTE
Operative Note      Patient: Yonathan Hurd  YOB: 1955  MRN: [id number]    Date of Procedure: 3/2/2022    Pre-Op Diagnosis: DX OVERACTIVE BLADDER    Post-Op Diagnosis: Same       Procedure(s):  CYSTOSCOPY WITH BOTOX  (100 units)    Surgeon(s):  Aimee Savage MD    Assistant:   * No surgical staff found *    Anesthesia: Monitor Anesthesia Care    Estimated Blood Loss (mL): Minimal    Complications: None    Specimens:   * No specimens in log *    Implants:  * No implants in log *      Drains: * No LDAs found *    Findings: Indications: This patient is 74years old, she has frequency nocturia, urgency incontinence, she has failed multiple treatments for overactive bladder. Discussed with the patient intravesical Botox injections risk versus benefits, the patient is agreeable    Detailed Description of Procedure:   She was brought to the operating room, positioned in dorsolithotomy, proper patient identification procedure identification prepping and draping in the usual sterile manner. We entered the bladder with the cystoscope, examination of the bladder revealed no evidence of tumors ulcers or stones, trigone is normal ureteral orifices effluxing clear urine. After completing the cystoscopy, we then proceeded with Botox injections. Botox 100 units was injected, the mixture was carried out with 10 mL of solution    The injections were carried out posterior to the trigone, injected in aliquot of 0.5 mL a total of 20 injection sites in parallel location behind the trigone this was completed without difficulty or complications. Patient tolerated the procedure well, she had no secondary symptoms after the treatment, the procedure was performed under monitored anesthesia,  Patient was then returned to recovery room in stable condition    Recommendations:  The patient will return to the office to check postvoid residual    Electronically signed by Aimee Savage MD on 3/2/2022 at 1:01 PM

## 2022-03-02 NOTE — ANESTHESIA PRE PROCEDURE
Department of Anesthesiology  Preprocedure Note       Name:  Omi Garcia   Age:  74 y.o.  :  1955                                          MRN:  [id number]         Date:  3/2/2022      Surgeon: Iftikhar Conrad):  Kathy Saba MD    Procedure: Procedure(s):  CYSTOSCOPY WITH BOTOX  (100 units)    Medications prior to admission:   Prior to Admission medications    Medication Sig Start Date End Date Taking? Authorizing Provider   mometasone (NASONEX) 50 MCG/ACT nasal spray 1 spray by Nasal route daily 1 spray each nostril daily 22  Yes Milvia Daniels MD   vitamin D3 (CHOLECALCIFEROL) 25 MCG (1000 UT) TABS tablet Take 1 tablet by mouth daily 22  Yes Milvia Daniels MD   QUEtiapine (SEROQUEL) 200 MG tablet Take 200 mg by mouth 3 times daily   Yes Historical Provider, MD   FLUoxetine (PROZAC) 40 MG capsule Take 40 mg by mouth daily   Yes Historical Provider, MD   ipratropium (ATROVENT) 0.03 % nasal spray 2 sprays by Nasal route 3 times daily as needed for Rhinitis    Yes Historical Provider, MD   lithium 300 MG capsule Take 300 mg by mouth 2 times daily (with meals).    Yes Historical Provider, MD   albuterol (PROVENTIL) (2.5 MG/3ML) 0.083% nebulizer solution Take 3 mLs by nebulization every 6 hours as needed for Wheezing 22   Milvia Daniels MD   budesonide-formoterol [facility]) 160-4.5 MCG/ACT AERO Inhale 2 puffs into the lungs 2 times daily 22   Milvia Daniels MD   albuterol sulfate  (90 Base) MCG/ACT inhaler INHALE 2 PUFFS BY MOUTH INTO LUNGS TWICE DAILY AS NEEDED FOR WHEEZING (NO  MORE  THAN  4  TIMES  DAILY) 22   Milvia Daniels MD   Handicap Placard MISC by Does not apply route 22   Milvia Daniels MD   mirabegron (MYRBETRIQ) 50 MG TB24 Take 50 mg by mouth daily Pt gets samples 22   Milvia Daniels MD   amLODIPine (NORVASC) 10 MG tablet 1 tablet daily 22   Milvia Daniels MD   levothyroxine (SYNTHROID) 25 MCG tablet Once daily 22   Milvia Daniels MD Handicap Placard MISC by Does not apply route Permanent - 5 years  Diagnosis- ankle fracture, OA 2/8/22   Fred Martines MD   omeprazole (PRILOSEC) 20 MG delayed release capsule TAKE 1 CAPSULE BY MOUTH ONCE DAILY IN THE MORNING BEFORE BREAKFAST 1/25/22   Fred Martines MD   acetaminophen (TYLENOL) 325 MG tablet Take 2 tablets by mouth every 6 hours as needed for Pain 10/12/20   Fred Martines MD   montelukast (SINGULAIR) 10 MG tablet Take 10 mg by mouth nightly  Patient not taking: Reported on 2/8/2022    Historical Provider, MD       Current medications:    Current Facility-Administered Medications   Medication Dose Route Frequency Provider Last Rate Last Admin    lidocaine PF 1 % injection 1 mL  1 mL IntraDERmal Once PRN Aminta Abdul MD        lactated ringers infusion   IntraVENous Continuous Aminta Abdul  mL/hr at 03/02/22 1248 Restarted at 03/02/22 1333    sodium chloride flush 0.9 % injection 10 mL  10 mL IntraVENous 2 times per day Aminta Abdul MD        sodium chloride flush 0.9 % injection 10 mL  10 mL IntraVENous PRN Aminta Abdul MD        0.9 % sodium chloride infusion  25 mL IntraVENous PRN Aminta Abdul MD        lidocaine (XYLOCAINE) 2 % uro-jet    PRN Ryan Guy MD   20 mg at 03/02/22 1325    onabotulinumtoxin A (BOTOX) injection    PRN Ryan Guy MD   100 Units at 03/02/22 1326       Allergies: Allergies   Allergen Reactions    Motrin [Ibuprofen]     Aspirin Rash    Blue Dyes (Parenteral) Rash    Hctz [Hydrochlorothiazide] Rash     Fixed drug reaction    Sulfa Antibiotics Rash    Tetracyclines & Related Rash       Problem List:    Patient Active Problem List   Diagnosis Code    Essential hypertension I10    Pure hypercholesterolemia E78.00    Urinary incontinence R32    Anxiety F41.9    Sleep apnea G47.30    GERD (gastroesophageal reflux disease) K21.9    Hypothyroidism E03.9    Obesity (BMI 30-39. 9) E66.9    Mild persistent asthma without complication O50.85    History of stroke Z86.73    Chronic diarrhea K52.9    Bipolar 1 disorder ([facility]) F31.9       Past Medical History:        Diagnosis Date    Anxiety     Arrhythmia     Asthma     Bipolar 1 disorder ([facility] [address] 2/14/2019    Bipolar disorder ([facility]     Chronic diarrhea 2/14/2019    COPD (chronic obstructive pulmonary disease) (HCC)     Depression     Essential hypertension     GERD (gastroesophageal reflux disease)     GERD (gastroesophageal reflux disease)     History of stroke 8/9/2018    Hyperlipidemia     Hypertension     Hypothyroidism     Mild persistent asthma without complication 0/68/5412    OAB (overactive bladder)     Obesity (BMI 30-39. 9) 8/19/2016    Osteoarthritis     Poor historian     Pulmonary fibrosis (HCC)     sjogrens syndrome    Pulmonary hypertension (HCC)     Pure hypercholesterolemia     Sleep apnea     cpap    Stroke ([facility]     Unspecified sleep apnea     on cpap    Urinary incontinence        Past Surgical History:        Procedure Laterality Date    ANKLE SURGERY Right 09/11/2019    COLONOSCOPY  04/2015    COLONOSCOPY  2018    Dr.Reddy García EYE SURGERY Left     Stye removal    TOOTH EXTRACTION      x 2       Social History:    Social History     Tobacco Use    Smoking status: Never Smoker    Smokeless tobacco: Never Used   Substance Use Topics    Alcohol use:  No                                Counseling given: Not Answered      Vital Signs (Current):   Vitals:    03/02/22 1137 03/02/22 1333 03/02/22 1345 03/02/22 1400   BP: (!) 161/86 (!) 161/87 (!) 152/83 (!) 165/91   Pulse: 70 74 73 73   Resp:  12 17 15   Temp:  97.2 °F (36.2 °C)     TempSrc:  Temporal     SpO2:  100% 98% 96%   Weight:       Height:                                                  BP Readings from Last 3 Encounters:   03/02/22 (!) 165/91   03/02/22 (!) 151/81   02/08/22 (!) 163/89       NPO Status: Time of last liquid consumption: 0006                        Time of last solid consumption: 0006                        Date of last liquid consumption: 03/02/22                        Date of last solid food consumption: 03/02/22    BMI:   Wt Readings from Last 3 Encounters:   03/02/22 194 lb (88 kg)   02/08/22 198 lb (89.8 kg)   01/20/22 192 lb (87.1 kg)     Body mass index is 32.28 kg/m². CBC:   Lab Results   Component Value Date    WBC 5.6 09/18/2018    RBC 4.06 09/18/2018    RBC 4.05 05/02/2012    HGB 11.5 09/18/2018    HCT 35.8 09/18/2018    MCV 88.3 09/18/2018    RDW 15.6 09/18/2018     09/18/2018     05/02/2012       CMP:   Lab Results   Component Value Date     02/08/2022    K 4.5 02/08/2022     02/08/2022    CO2 23 02/08/2022    BUN 15 02/08/2022    CREATININE 1.06 02/08/2022    GFRAA >60 02/08/2022    LABGLOM 52 02/08/2022    GLUCOSE 97 02/08/2022    GLUCOSE 119 05/02/2012    PROT 7.5 02/08/2022    CALCIUM 10.5 02/08/2022    BILITOT 0.25 02/08/2022    ALKPHOS 115 02/08/2022    AST 29 02/08/2022    ALT 30 02/08/2022       POC Tests: No results for input(s): POCGLU, POCNA, POCK, POCCL, POCBUN, POCHEMO, POCHCT in the last 72 hours.     Coags: No results found for: PROTIME, INR, APTT    HCG (If Applicable): No results found for: PREGTESTUR, PREGSERUM, HCG, HCGQUANT     ABGs: No results found for: PHART, PO2ART, KPL5YKS, MTP7QMS, BEART, L2DFZFHG     Type & Screen (If Applicable):  No results found for: LABABO, LABRH    Drug/Infectious Status (If Applicable):  Lab Results   Component Value Date    HEPCAB NONREACTIVE 11/06/2017       COVID-19 Screening (If Applicable):   Lab Results   Component Value Date    COVID19 Not Detected 12/21/2020           Anesthesia Evaluation  Patient summary reviewed and Nursing notes reviewed no history of anesthetic complications:   Airway: Mallampati: II  TM distance: >3 FB   Neck ROM: full  Mouth opening: > = 3 FB Dental:          Pulmonary:normal exam    (+) COPD:  sleep apnea:  asthma: Cardiovascular:    (+) hypertension:,           Rate: normal                    Neuro/Psych:   (+) CVA:, psychiatric history:            GI/Hepatic/Renal:   (+) GERD:,           Endo/Other:    (+) hypothyroidism::., .                 Abdominal:             Vascular: Other Findings:             Anesthesia Plan      MAC     ASA 3       Induction: intravenous. MIPS: Prophylactic antiemetics administered. Anesthetic plan and risks discussed with patient. Plan discussed with CRNA.     Attending anesthesiologist reviewed and agrees with Preprocedure content              Tammy Tam DO   3/2/2022

## 2022-03-02 NOTE — ANESTHESIA POSTPROCEDURE EVALUATION
Department of Anesthesiology  Postprocedure Note    Patient: Lyly Talbot  MRN: [id number]  YOB: 1955  Date of evaluation: 3/2/2022  Time:  2:21 PM     Procedure Summary     Date: 03/02/22 Room / Location: [facility] - INPATIENT    Anesthesia Start: 3563 Anesthesia Stop: 3833    Procedure: CYSTOSCOPY WITH BOTOX  (100 units) (N/A Bladder) Diagnosis: (DX OVERACTIVE BLADDER)    Surgeons: Chan Rogers MD Responsible Provider: Katya Hargrove DO    Anesthesia Type: MAC ASA Status: 3          Anesthesia Type: No value filed. Audie Phase I:      Audie Phase II:      Last vitals: Reviewed and per EMR flowsheets.        Anesthesia Post Evaluation    Patient location during evaluation: PACU  Patient participation: complete - patient participated  Level of consciousness: awake and alert  Airway patency: patent  Nausea & Vomiting: no nausea and no vomiting  Complications: no  Cardiovascular status: hemodynamically stable  Respiratory status: acceptable  Hydration status: stable

## 2022-03-04 ENCOUNTER — CARE COORDINATION (OUTPATIENT)
Dept: CARE COORDINATION | Age: 67
End: 2022-03-04

## 2022-03-04 NOTE — CARE COORDINATION
called Jenna Obando. Dulce reports that she is doing \"ok\". Dulce reports that she is out of medication and has no money to get her medication. Jenniejami reports that she needs her money from Vinjimmie Davon however has no ride to his office. Dulce and  3 way called Luiz Mays.  and Jenna Obando left message on VM stating that Jenna Obando is out of medication and needs assistance get money in order to fill meds. Luiz Shall called  back. The number Luiz Shall calls Jenna Obando on is stating her number is \"disconnected\".  provided Luiz Mays with [phone]. Luiz Shall reports that he did not have this number. Luiz Shall stated that he is going to call Dulce and get her money to her today. Luiz Shall inquired if Keagan Murillo was at the house.  was unsure however did informed Dulce if Keagan Murillo shows up she needs to call the police. Plan:   Luiz Shall will make contact with Jenniejami and provide her with her monthly money to purchase her medication.

## 2022-03-09 ENCOUNTER — CARE COORDINATION (OUTPATIENT)
Dept: CARE COORDINATION | Age: 67
End: 2022-03-09

## 2022-03-09 NOTE — CARE COORDINATION
attempted to contact Dulce.  was unable to leave a message due to VM full. Plan:    will attempt to follow up with Dulce regarding money for medication.

## 2022-03-10 ENCOUNTER — CARE COORDINATION (OUTPATIENT)
Dept: CARE COORDINATION | Age: 67
End: 2022-03-10

## 2022-03-10 ENCOUNTER — OFFICE VISIT (OUTPATIENT)
Dept: INTERNAL MEDICINE | Age: 67
End: 2022-03-10
Payer: MEDICARE

## 2022-03-10 VITALS
HEART RATE: 95 BPM | BODY MASS INDEX: 31.95 KG/M2 | WEIGHT: 192 LBS | DIASTOLIC BLOOD PRESSURE: 82 MMHG | SYSTOLIC BLOOD PRESSURE: 128 MMHG

## 2022-03-10 DIAGNOSIS — J45.30 MILD PERSISTENT ASTHMA WITHOUT COMPLICATION: ICD-10-CM

## 2022-03-10 DIAGNOSIS — K21.9 GASTROESOPHAGEAL REFLUX DISEASE, UNSPECIFIED WHETHER ESOPHAGITIS PRESENT: ICD-10-CM

## 2022-03-10 DIAGNOSIS — E03.9 HYPOTHYROIDISM, UNSPECIFIED TYPE: ICD-10-CM

## 2022-03-10 DIAGNOSIS — I10 ESSENTIAL HYPERTENSION: Primary | ICD-10-CM

## 2022-03-10 DIAGNOSIS — E78.00 PURE HYPERCHOLESTEROLEMIA: ICD-10-CM

## 2022-03-10 DIAGNOSIS — N39.41 URGE INCONTINENCE OF URINE: ICD-10-CM

## 2022-03-10 DIAGNOSIS — Z91.81 AT HIGH RISK FOR FALLS: ICD-10-CM

## 2022-03-10 PROCEDURE — 99211 OFF/OP EST MAY X REQ PHY/QHP: CPT | Performed by: INTERNAL MEDICINE

## 2022-03-10 PROCEDURE — 99213 OFFICE O/P EST LOW 20 MIN: CPT | Performed by: INTERNAL MEDICINE

## 2022-03-10 PROCEDURE — G8417 CALC BMI ABV UP PARAM F/U: HCPCS | Performed by: INTERNAL MEDICINE

## 2022-03-10 PROCEDURE — 1036F TOBACCO NON-USER: CPT | Performed by: INTERNAL MEDICINE

## 2022-03-10 PROCEDURE — 0509F URINE INCON PLAN DOCD: CPT | Performed by: INTERNAL MEDICINE

## 2022-03-10 PROCEDURE — 3017F COLORECTAL CA SCREEN DOC REV: CPT | Performed by: INTERNAL MEDICINE

## 2022-03-10 PROCEDURE — G8399 PT W/DXA RESULTS DOCUMENT: HCPCS | Performed by: INTERNAL MEDICINE

## 2022-03-10 PROCEDURE — G8427 DOCREV CUR MEDS BY ELIG CLIN: HCPCS | Performed by: INTERNAL MEDICINE

## 2022-03-10 PROCEDURE — 1123F ACP DISCUSS/DSCN MKR DOCD: CPT | Performed by: INTERNAL MEDICINE

## 2022-03-10 PROCEDURE — 4040F PNEUMOC VAC/ADMIN/RCVD: CPT | Performed by: INTERNAL MEDICINE

## 2022-03-10 PROCEDURE — 1090F PRES/ABSN URINE INCON ASSESS: CPT | Performed by: INTERNAL MEDICINE

## 2022-03-10 PROCEDURE — G8484 FLU IMMUNIZE NO ADMIN: HCPCS | Performed by: INTERNAL MEDICINE

## 2022-03-10 RX ORDER — MELATONIN
1000 DAILY
Qty: 28 TABLET | Refills: 3 | Status: SHIPPED | OUTPATIENT
Start: 2022-03-10 | End: 2022-07-12

## 2022-03-10 RX ORDER — LEVOTHYROXINE SODIUM 0.03 MG/1
TABLET ORAL
Qty: 28 TABLET | Refills: 3 | Status: SHIPPED | OUTPATIENT
Start: 2022-03-10 | End: 2022-06-15 | Stop reason: SDUPTHER

## 2022-03-10 RX ORDER — MONTELUKAST SODIUM 10 MG/1
10 TABLET ORAL NIGHTLY
Qty: 28 TABLET | Refills: 3 | Status: SHIPPED | OUTPATIENT
Start: 2022-03-10 | End: 2022-06-15 | Stop reason: SDUPTHER

## 2022-03-10 RX ORDER — OMEPRAZOLE 20 MG/1
CAPSULE, DELAYED RELEASE ORAL
Qty: 28 CAPSULE | Refills: 3 | Status: SHIPPED | OUTPATIENT
Start: 2022-03-10 | End: 2022-06-15 | Stop reason: SDUPTHER

## 2022-03-10 RX ORDER — AMLODIPINE BESYLATE 10 MG/1
TABLET ORAL
Qty: 28 TABLET | Refills: 3 | Status: CANCELLED | OUTPATIENT
Start: 2022-03-10

## 2022-03-10 SDOH — ECONOMIC STABILITY: FOOD INSECURITY: WITHIN THE PAST 12 MONTHS, YOU WORRIED THAT YOUR FOOD WOULD RUN OUT BEFORE YOU GOT MONEY TO BUY MORE.: SOMETIMES TRUE

## 2022-03-10 SDOH — ECONOMIC STABILITY: FOOD INSECURITY: WITHIN THE PAST 12 MONTHS, THE FOOD YOU BOUGHT JUST DIDN'T LAST AND YOU DIDN'T HAVE MONEY TO GET MORE.: SOMETIMES TRUE

## 2022-03-10 ASSESSMENT — ENCOUNTER SYMPTOMS: DYSPNEA ASSOCIATED WITH: EXERTION

## 2022-03-10 ASSESSMENT — SOCIAL DETERMINANTS OF HEALTH (SDOH): HOW HARD IS IT FOR YOU TO PAY FOR THE VERY BASICS LIKE FOOD, HOUSING, MEDICAL CARE, AND HEATING?: SOMEWHAT HARD

## 2022-03-10 NOTE — CARE COORDINATION
Ambulatory Care Coordination Note  3/10/2022  CM Risk Score: 3  Charlson 10 Year Mortality Risk Score: 79%     ACC: Ennis Schwab, RN    Summary Note: Attempted to reach Abhi Ibrahim for follow up. No answer and her mail box is full. Unable to leave a message. Dulce called back. She said she was on [place] on her way to her bone scan. Explained that her bone scan was next week on 3/17; and she had an appt today at 1:45 with Dr. Elijah Ramsey. She stated \"I have an appt with my family doctor today. I'm on my way there now\". She stated that she didn't have her medications but was on her way there to get them. [facility] stated that Tres Lopes was supposed to meet with her on Friday to giver her the money for her medications. Asked when the last time she took her blood pressure medications. She stated \"I think I took it this morning\". Discussed having her medications bubble packed and delivered to her so she doesn't run out. She was agreeable. Discussed area pharmacies that offer bubble packing and she agreed to use [facility]. Note added to appt notes. Will follow up with her next week. Called [facility] to have current med list faxed to office. They don't fax medication lists but spoke with the pharmacist to verify medications. The pharmacist stated that they have a lot of medications on hold because she hadn't picked them up. The last thing she picked up was Tylenol 500 mg on 1/31/22. She hasn't picked up her Norvasc or thyroid medication since September 2021. They haven't filled some of her medications in almost a year. French Morrow at the [facility] clinic and updated her about patient's medications, as she was rooming the patient. Also informed Jayshree that [facility] had a discussion, with Dulce, about having her medications bubble packed at [facility]; and she was agreeable. Attempted to reach her guardian, Tres Lopes, to inform him about her medications and plan for bubble packing.  Left a message on his voicemail with call back number. Spoke with LAXMI Chino about medication issue and plan for bubble packing. She had different contact number for Yuriy Stewart. Will try that number tomorrow if he doesn't call back. Will ask if he wants that number added to her chart. Spoke with her provider. She said Dinah Casey had stated that she has some old bottles of medications and had been taking those. She is not sure what she is really taking. She is going to have Debjami come in next week and meet with Twila to go over her medications. She was instructed to bring all of her medications with her. PLAN:  Will follow up with Yuriy Stewart regarding medications  Will follow up with Gilson Yomi Kandice with Aaron's contact information for billing              Care Coordination Interventions    Program Enrollment: Complex Care  Referral from Primary Care Provider: Yes  Suggested Interventions and [facility]: In Process  Meals on Wheels: In Process  Social Work: Completed         Goals Addressed    None         Prior to Admission medications    Medication Sig Start Date End Date Taking?  Authorizing Provider   albuterol (PROVENTIL) (2.5 MG/3ML) 0.083% nebulizer solution Take 3 mLs by nebulization every 6 hours as needed for Wheezing 2/25/22   Unknown MD Sean   mometasone (NASONEX) 50 MCG/ACT nasal spray 1 spray by Nasal route daily 1 spray each nostril daily 2/11/22   Unknown MD Sean   vitamin D3 (CHOLECALCIFEROL) 25 MCG (1000 UT) TABS tablet Take 1 tablet by mouth daily 2/8/22   Unknown MD Sean   budesonide-formoterol [facility]) 160-4.5 MCG/ACT AERO Inhale 2 puffs into the lungs 2 times daily 2/8/22   Unknown MD Sean   albuterol sulfate  (90 Base) MCG/ACT inhaler INHALE 2 PUFFS BY MOUTH INTO LUNGS TWICE DAILY AS NEEDED FOR WHEEZING (NO  MORE  THAN  4  TIMES  DAILY) 2/8/22   Unknown MD Sean   Handicap Placard MISC by Does not apply route 2/8/22   Unknown MD eSan   mirabegron (MYRBETRIQ) 50 MG TB24 Take 50 mg by mouth daily Pt gets samples 2/8/22   Edson Cisneros, MD   amLODIPine (NORVASC) 10 MG tablet 1 tablet daily 2/8/22   Edson Cisneros, MD   levothyroxine (SYNTHROID) 25 MCG tablet Once daily 2/8/22   Edson Cisneros, MD   Handicap Placard MISC by Does not apply route Permanent - 5 years  Diagnosis- ankle fracture, OA 2/8/22   Edson Cisneros MD   omeprazole (PRILOSEC) 20 MG delayed release capsule TAKE 1 CAPSULE BY MOUTH ONCE DAILY IN THE MORNING BEFORE BREAKFAST 1/25/22   Edson Cisneros MD   acetaminophen (TYLENOL) 325 MG tablet Take 2 tablets by mouth every 6 hours as needed for Pain 10/12/20   Edson Cisneros MD   QUEtiapine (SEROQUEL) 200 MG tablet Take 200 mg by mouth 3 times daily    Historical Provider, MD   montelukast (SINGULAIR) 10 MG tablet Take 10 mg by mouth nightly  Patient not taking: Reported on 2/8/2022    Historical Provider, MD   FLUoxetine (PROZAC) 40 MG capsule Take 40 mg by mouth daily    Historical Provider, MD   ipratropium (ATROVENT) 0.03 % nasal spray 2 sprays by Nasal route 3 times daily as needed for Rhinitis     Historical Provider, MD   lithium 300 MG capsule Take 300 mg by mouth 2 times daily (with meals).     Historical Provider, MD       Future Appointments   Date Time Provider Ekta Tapia   3/10/2022  1:45 PM Edson Cisneros MD [id number]   3/17/2022  1:00 PM JASS REGAN [facility]

## 2022-03-10 NOTE — PROGRESS NOTES
[facility]NAL MEDICINE [facility]OC[facility]    Progress Note    Date of patient's visit: 3/10/2022    Patient's Name:  Patricia Arango    YOB: 1955            Patient Care Team:  Anai Kirby MD as PCP - General  Anai Kirby MD as PCP - [facility]  Romero Dowell MD as Consulting Physician (Gastroenterology)  April Retana MD as Consulting Physician (Pulmonology)  Rian Burnett MD as Surgeon (Orthopedic Surgery)  Paula Norton MD as Consulting Physician (Infectious Diseases)  Harvey Boss RN as [facility] [place] as     REASON FOR VISIT: Routine outpatient follow     Chief Complaint   Patient presents with    Hypertension     has not been taking meds-- per [facility] last picked up meds in september.  Discuss Medications     Per note from [facility]-- pt is aggreeable to bubble packs [facility]--all meds pended          HISTORY OF PRESENT ILLNESS:    History was obtained from the patient. Patricia Arango is a 74 y.o. is here for aloe up. She did not bring her medications with her. Per her pharmacy she has not picked up her medications from September. Blood pressure is better today than last time and she says she found old bottles that she had received sometime last year and has been taking them. But she does not know what medication she has at home. She is agreeable to coming back next week with all her blood pressure medication so we can  Have this issue sorted out and also have her medications blister pack from her hospital pharmacy for her for her safety. Patient has very disorganized thinking and unable to focus on one topic and goes from 1 topic to another and is hard to pin her down. Social workers and care coordinators are already following her and trying to get help for her. Currently she has moved back home. Stress is less. She says she is taking lithium.     She just had a cystoscopy with Botox done with the urology. She has severe overactive bladder and incontinence        Past Medical History:   Diagnosis Date    Anxiety     Arrhythmia     Asthma     Bipolar 1 disorder ([facility] [address] 2/14/2019    Bipolar disorder ([facility] [address]     Chronic diarrhea 2/14/2019    COPD [facility] obstructive [facility]     Depression     Essential hypertension     GERD (gastroesophageal reflux disease)     GERD (gastroesophageal reflux disease)     History of stroke 8/9/2018    Hyperlipidemia     Hypertension     Hypothyroidism     Mild persistent asthma without complication 4/94/8972    OAB (overactive bladder)     Obesity (BMI 30-39. 9) 8/19/2016    Osteoarthritis     Poor historian     Pulmonary fibrosis (HCC)     sjogrens syndrome    Pulmonary hypertension (HCC)     Pure hypercholesterolemia     Sleep apnea     cpap    Stroke ([facility] [address]     Unspecified sleep apnea     on cpap    Urinary incontinence        Past Surgical History:   Procedure Laterality Date    ANKLE SURGERY Right 09/11/2019    COLONOSCOPY  04/2015    COLONOSCOPY  2018        CYSTOSCOPY N/A 3/2/2022    CYSTOSCOPY WITH BOTOX  (100 units) performed by Ina Lazar MD at [address] [place] [address] Left     Stye removal    TOOTH EXTRACTION      x 2         ALLERGIES      Allergies   Allergen Reactions    Motrin [Ibuprofen]     Aspirin Rash    Blue Dyes (Parenteral) Rash    Hctz [Hydrochlorothiazide] Rash     Fixed drug reaction    Sulfa Antibiotics Rash    Tetracyclines & Related Rash       MEDICATIONS:      Current Outpatient Medications on File Prior to Visit   Medication Sig Dispense Refill    albuterol (PROVENTIL) (2.5 MG/3ML) 0.083% nebulizer solution Take 3 mLs by nebulization every 6 hours as needed for Wheezing (Patient not taking: Reported on 3/10/2022) 120 each 3    mometasone (NASONEX) 50 MCG/ACT nasal spray 1 spray by Nasal route daily 1 spray each nostril daily 1 each 5    vitamin D3 (CHOLECALCIFEROL) 25 MCG (1000 UT) TABS tablet Take 1 tablet by mouth daily (Patient not taking: Reported on 3/10/2022) 30 tablet 5    budesonide-formoterol (SYMBICORT) 160-4.5 MCG/ACT AERO Inhale 2 puffs into the lungs 2 times daily (Patient not taking: Reported on 3/10/2022) 1 each 5    albuterol sulfate  (90 Base) MCG/ACT inhaler INHALE 2 PUFFS BY MOUTH INTO LUNGS TWICE DAILY AS NEEDED FOR WHEEZING (NO  MORE  THAN  4  TIMES  DAILY) (Patient not taking: Reported on 3/10/2022) 9 g 5    mirabegron (MYRBETRIQ) 50 MG TB24 Take 50 mg by mouth daily Pt gets samples (Patient not taking: Reported on 3/10/2022) 30 tablet 0    amLODIPine (NORVASC) 10 MG tablet 1 tablet daily (Patient not taking: Reported on 3/10/2022) 90 tablet 3    levothyroxine (SYNTHROID) 25 MCG tablet Once daily (Patient not taking: Reported on 3/10/2022) 90 tablet 0    Handicap Placard MISC by Does not apply route Permanent - 5 years  Diagnosis- ankle fracture, OA (Patient not taking: Reported on 3/10/2022) 1 each 0    omeprazole (PRILOSEC) 20 MG delayed release capsule TAKE 1 CAPSULE BY MOUTH ONCE DAILY IN THE MORNING BEFORE BREAKFAST (Patient not taking: Reported on 3/10/2022) 90 capsule 3    acetaminophen (TYLENOL) 325 MG tablet Take 2 tablets by mouth every 6 hours as needed for Pain 60 tablet 0    QUEtiapine (SEROQUEL) 200 MG tablet Take 200 mg by mouth 3 times daily (Patient not taking: Reported on 3/10/2022)      montelukast (SINGULAIR) 10 MG tablet Take 10 mg by mouth nightly (Patient not taking: Reported on 2/8/2022)      FLUoxetine (PROZAC) 40 MG capsule Take 40 mg by mouth daily (Patient not taking: Reported on 3/10/2022)      ipratropium (ATROVENT) 0.03 % nasal spray 2 sprays by Nasal route 3 times daily as needed for Rhinitis  (Patient not taking: Reported on 3/10/2022)      lithium 300 MG capsule Take 300 mg by mouth 2 times daily (with meals). No current facility-administered medications on file prior to visit. HISTORY    Reviewed and no change from previous record. Dulce  reports that she has never smoked. She has never used smokeless tobacco.    FAMILY HISTORY:    Reviewed and No change from previous visit    HEALTH MAINTENANCE DUE:      Health Maintenance Due   Topic Date Due    Annual Wellness Visit (AWV)  03/10/2022       REVIEW OF SYSTEMS:    12 point review of symptoms completed and found to be normal except noted in the HPI    Review of Systems     HENT: Positive for congestion. Respiratory: Negative for cough, shortness of breath and wheezing. Cardiovascular: Negative for chest pain, palpitations and leg swelling. Gastrointestinal: Negative for abdominal pain and constipation. Genitourinary: Positive for urgency. Negative for frequency. Musculoskeletal: Positive for arthralgias. Negative for back pain. Allergic/Immunologic: Positive for environmental allergies. Negative for immunocompromised state. Neurological: Negative for dizziness, syncope, weakness and headaches. Psychiatric/Behavioral: Positive for confusion and sleep disturbance. Negative for dysphoric mood, self-injury and suicidal ideas. The patient is not nervous/anxious.       PHYSICAL EXAM:     Vitals:    03/10/22 1333   BP: 135/84   Site: Left Upper Arm   Position: Sitting   Cuff Size: Medium Adult   Pulse: 95   Weight: 192 lb (87.1 kg)     Body mass index is 31.95 kg/m². BP Readings from Last 3 Encounters:   03/10/22 135/84   03/02/22 (!) 178/84   03/02/22 (!) 151/81        Wt Readings from Last 3 Encounters:   03/10/22 192 lb (87.1 kg)   03/02/22 194 lb (88 kg)   02/08/22 198 lb (89.8 kg)       Physical Exam    Vitals and nursing note reviewed. Constitutional:       General: She is not in acute distress. Appearance: Normal appearance. She is obese. She is not ill-appearing. HENT:      Head: Normocephalic and atraumatic. Eyes:      General: No scleral icterus.      Extraocular Movements: Extraocular movements intact. Conjunctiva/sclera: Conjunctivae normal.      Pupils: Pupils are equal, round, and reactive to light. Cardiovascular:      Rate and Rhythm: Normal rate and regular rhythm. Heart sounds: No murmur heard.       Pulmonary:      Effort: Pulmonary effort is normal.      Breath sounds: Normal breath sounds. No wheezing. Musculoskeletal:      Cervical back: Normal range of motion and neck supple. Right lower leg: No edema. Left lower leg: No edema. Neurological:      General: No focal deficit present. Mental Status: She is alert and oriented to person, place, and time. Psychiatric:         Behavior: Behavior normal.          LABORATORY FINDINGS:    CBC:  Lab Results   Component Value Date    WBC 5.6 09/18/2018    HGB 11.5 09/18/2018     09/18/2018     05/02/2012     BMP:    Lab Results   Component Value Date     02/08/2022    K 4.5 02/08/2022     02/08/2022    CO2 23 02/08/2022    BUN 15 02/08/2022    CREATININE 1.06 02/08/2022    GLUCOSE 97 02/08/2022    GLUCOSE 119 05/02/2012     HEMOGLOBIN A1C:   Lab Results   Component Value Date    LABA1C 5.5 08/31/2021     MICROALBUMIN URINE:   Lab Results   Component Value Date    MICROALBUR <12 11/10/2016     FASTING LIPID PANEL:  Lab Results   Component Value Date    CHOL 204 (H) 02/08/2022    HDL 49 02/08/2022    TRIG 158 (H) 02/08/2022     Lab Results   Component Value Date    LDLCHOLESTEROL 123 02/08/2022       LIVER PROFILE:  Lab Results   Component Value Date    ALT 30 02/08/2022    AST 29 02/08/2022    PROT 7.5 02/08/2022    BILITOT 0.25 02/08/2022    BILIDIR 0.14 05/21/2019    LABALBU 4.4 02/08/2022    LABALBU 4.8 05/02/2012      THYROID FUNCTION:   Lab Results   Component Value Date    TSH 2.53 02/08/2022      URINEANALYSIS: No results found for: LABURIN  ASSESSMENT AND PLAN:    1. Essential hypertension  Call in names of meds from home  Nurse visit for med check    2.  Hypothyroidism, unspecified type    - levothyroxine (SYNTHROID) 25 MCG tablet; Once daily  Dispense: 28 tablet; Refill: 3    3. Gastroesophageal reflux disease, unspecified whether esophagitis present    - omeprazole (PRILOSEC) 20 MG delayed release capsule; TAKE 1 CAPSULE BY MOUTH ONCE DAILY IN THE MORNING BEFORE BREAKFAST  Dispense: 28 capsule; Refill: 3    4. Urge incontinence of urine    - mirabegron (MYRBETRIQ) 50 MG TB24; Take 50 mg by mouth daily Pt gets samples  Dispense: 28 tablet; Refill: 3    5. Pure hypercholesterolemia  Labs     6. Mild persistent asthma without complication  stable    7. At high risk for falls  Has handicap placard           FOLLOW UP AND INSTRUCTIONS:   Return in about 3 months (around 6/10/2022). 1. Debere received counseling on the following healthy behaviors: nutrition, exercise and medication adherence    2. Reviewed prior labs and health maintenance. 3. Discussed use, benefit, and side effects of prescribed medications. Barriers to medication compliance addressed. All patient questions answered. Pt voiced understanding.      4. Patient given educational materials - see patient instructions    Toma Gaxiola  Attending Physician, [facility], Internal Medicine Residency Program  [address] [place] [address]  3/10/2022, 1:45 PM

## 2022-03-10 NOTE — PATIENT INSTRUCTIONS
AWV--3/29/2022 @ 130     -Pt due for 3 month f/u in June-- pt to call in May to set up an appt--reminder in [facility] to contact patient as well--AVS given to patient    NV scheduled for 3/15/2022 @ 11:15.  Pt advised to bring all medication bottles with her to appt.     -Hollie,CMA

## 2022-03-11 ENCOUNTER — CARE COORDINATION (OUTPATIENT)
Dept: CARE COORDINATION | Age: 67
End: 2022-03-11

## 2022-03-12 NOTE — CARE COORDINATION
Ambulatory Care Coordination Note  3/11/2022  CM Risk Score: 3  Charlson 10 Year Mortality Risk Score: 79%     ACC: Nabil Peña RN    Summary Note: Received a return call from jerome Quickan. Discussed issue with Dulce's medications. Explained that the plan was for Dulce to get her medications bubble packed and delivered to her. ACM stated that she would have the pharmacist call to discuss billing/payment of medications. Received good contact number for him and updated in chart. [facility] and spoke with Ashley Dial him Aaron's name and contact number. Explained that Maury Berry was supposed to be meeting with [employer], RN next Tuesday with all of her medications. Will follow up on 3/24 to see if bubble packing is in place and how it is working. Care Coordination Interventions    Program Enrollment: Complex Care  Referral from Primary Care Provider: Yes  Suggested Interventions and [facility] [address]: In Process  Meals on Wheels: In Process  Medi Set or Pill Pack: In Process  Social Work: Completed         Goals Addressed    None         Prior to Admission medications    Medication Sig Start Date End Date Taking?  Authorizing Provider   vitamin D3 (CHOLECALCIFEROL) 25 MCG (1000 UT) TABS tablet Take 1 tablet by mouth daily 3/10/22   Lilliana Hemphill MD   mirabegron (MYRBETRIQ) 50 MG TB24 Take 50 mg by mouth daily Pt gets samples 3/10/22   Lilliana Hemphill MD   levothyroxine (SYNTHROID) 25 MCG tablet Once daily 3/10/22   Lilliana Hemphill MD   omeprazole (PRILOSEC) 20 MG delayed release capsule TAKE 1 CAPSULE BY MOUTH ONCE DAILY IN THE MORNING BEFORE BREAKFAST 3/10/22   Lilliana Hemphill MD   montelukast (SINGULAIR) 10 MG tablet Take 1 tablet by mouth nightly 3/10/22   Lilliana Hemphill MD   albuterol (PROVENTIL) (2.5 MG/3ML) 0.083% nebulizer solution Take 3 mLs by nebulization every 6 hours as needed for Wheezing 2/25/22   Lilliana Hemphill MD   mometasone (NASONEX) 50 MCG/ACT nasal spray 1 spray by Nasal route daily 1 spray each nostril daily 2/11/22   Princess Garibay MD   budesonide-formoterol [facility]) 160-4.5 MCG/ACT AERO Inhale 2 puffs into the lungs 2 times daily 2/8/22   Princess Garibay MD   albuterol sulfate  (90 Base) MCG/ACT inhaler INHALE 2 PUFFS BY MOUTH INTO LUNGS TWICE DAILY AS NEEDED FOR WHEEZING (NO  MORE  THAN  4  TIMES  DAILY) 2/8/22   Princess Garibay MD   amLODIPine (NORVASC) 10 MG tablet 1 tablet daily 2/8/22   Princess Garibay MD   Handicap Placard MISC by Does not apply route Permanent - 5 years  Diagnosis- ankle fracture, OA 2/8/22   Princess Garibay MD   acetaminophen (TYLENOL) 325 MG tablet Take 2 tablets by mouth every 6 hours as needed for Pain 10/12/20   Princess Garibay MD   QUEtiapine (SEROQUEL) 200 MG tablet Take 200 mg by mouth 3 times daily    Historical Provider, MD   FLUoxetine (PROZAC) 40 MG capsule Take 40 mg by mouth daily    Historical Provider, MD   ipratropium (ATROVENT) 0.03 % nasal spray 2 sprays by Nasal route 3 times daily as needed for Rhinitis     Historical Provider, MD   lithium 300 MG capsule Take 300 mg by mouth 2 times daily (with meals).     Historical Provider, MD       Future Appointments   Date Time Provider Ekta Tapia   3/15/2022 11:15 AM SCHEDULE, [facility] IM NURSE [facility]  [id number]   3/17/2022  1:00 PM ST[facility]   3/29/2022  1:30 PM SCHEDULE, [facility] IM NURSE [facility] IM Mina Fajardo

## 2022-03-15 ENCOUNTER — NURSE ONLY (OUTPATIENT)
Dept: INTERNAL MEDICINE | Age: 67
End: 2022-03-15

## 2022-03-15 ENCOUNTER — CARE COORDINATION (OUTPATIENT)
Dept: CARE COORDINATION | Age: 67
End: 2022-03-15

## 2022-03-15 DIAGNOSIS — F31.9 BIPOLAR 1 DISORDER (HCC): Primary | ICD-10-CM

## 2022-03-15 PROCEDURE — 99999 PR OFFICE/OUTPT VISIT,PROCEDURE ONLY: CPT | Performed by: INTERNAL MEDICINE

## 2022-03-15 RX ORDER — FLUTICASONE PROPIONATE 50 MCG
1 SPRAY, SUSPENSION (ML) NASAL DAILY
COMMUNITY

## 2022-03-15 RX ORDER — CEPHALEXIN 500 MG/1
500 CAPSULE ORAL 4 TIMES DAILY
COMMUNITY
End: 2022-06-15

## 2022-03-15 RX ORDER — ACETAMINOPHEN 500 MG
TABLET ORAL
COMMUNITY
End: 2022-06-15 | Stop reason: SDUPTHER

## 2022-03-15 NOTE — CARE COORDINATION
called and spoke to Yury. Dulce reports that she is \"ok\"/  Dulce reports that she is still having issues with sleeping and her bladder. Dulce reports that she has not talked to Krissy Irby. Dulce reports that he now has a ankle monitor that will beep if he comes to the house. Dulce reports the door is not fixed yet and has not heard from the Kendrick. Dulce reports that she has plenty of food. Jinny Lemon reports that she went to the grocery store the other day and was able to get a lot of items. Dulce reports that the rental car has still not been picked up. Dulce reports that she has court tomorrow @ 9:00am for Krissy Irby. Dulce reports that Krissy Irby is asking her not to go to court. Dulce denied questions or concerns.  called and spoke with Chrystal Lorenz. R2G reports that he saw [employer] last week when she came to  her money.  informed Lulu Chapman having court tomorrow.  received a call from [facility].  was informed that [employer] came to her appointment today and Krissy Irby was present with her.  called and notified R2G that Yury is with Krissy Irby currently. Plan:    will follow up with Dulce to review social needs.  has assisted with getting Dulce back in her home.  will discuss signing off.

## 2022-03-15 NOTE — PROGRESS NOTES
PC to [employer], let her know that pt is not taking mirabegron d/t cost. Also let Otf Delacruz know that significant other Jazz Rios was with pt today at [facility]. Otf Delacruz states she will reach out to legal guardian as well as see if pt assistance with mirabegron is available.

## 2022-03-15 NOTE — PROGRESS NOTES
Pt and significant other Susan Vargas came to office today, pt brought medications with her. Pt also brought pill case with her, which have pills in it but not everything pt is supposed to be taking. Pt also admits she is not taking medications as prescribed, states she didn't know she was supposed to be taking it differently than what she has been. Pt would greatly benefit from bubble packing her medications. Pt just picked up amlodipine, levothyroxine, and cephalexin today. Writer will reach out to [facility], unsure if meds can be bubble packed now d/t pt having just filled these meds at [address] [facility].

## 2022-03-17 ENCOUNTER — HOSPITAL ENCOUNTER (OUTPATIENT)
Dept: MAMMOGRAPHY | Age: 67
Discharge: HOME | End: 2022-03-19
Payer: MEDICARE

## 2022-03-17 DIAGNOSIS — E34.9 ELEVATED PARATHYROID HORMONE: ICD-10-CM

## 2022-03-17 DIAGNOSIS — Z78.0 POSTMENOPAUSE: ICD-10-CM

## 2022-03-17 PROCEDURE — 77080 DXA BONE DENSITY AXIAL: CPT

## 2022-03-22 ENCOUNTER — CARE COORDINATION (OUTPATIENT)
Dept: CARE COORDINATION | Age: 67
End: 2022-03-22

## 2022-03-22 DIAGNOSIS — E03.9 HYPOTHYROIDISM, UNSPECIFIED TYPE: ICD-10-CM

## 2022-03-23 RX ORDER — LEVOTHYROXINE SODIUM 0.03 MG/1
TABLET ORAL
Qty: 90 TABLET | Refills: 0 | OUTPATIENT
Start: 2022-03-23

## 2022-03-23 NOTE — CARE COORDINATION
spoke with Theotis Lefort. Dulce reports that she did not go to court on Friday 3/18. Dulce reports that she did not feel up to going. Dulce reports court was for Andrea Bowens breaking the PO. Dulce reports that she received her money from Rafiq Pastor. Dulce denied being in contact with Andrea Bowens.  encouraged Dulce to be truthful with .  informed Dulce she can see Alma Paez PCP note and Andrea Bowens was present with her at appointment. Dulce reports that she still wants to follow through with divorce.  encouraged Dulce to work with Rafiq Pastor and .  reminded [address] [place] [address], Se she has against Andrea Puls and it can get removed if she continues to have contact with him over the phone and in person. Dulce reports that she understood. Plan:   Josie Jasonlaura will follow PO rules. Theotis Lefort will attend court. Theotis Lefort will be cooperative with Rafiq Pastor and  .

## 2022-03-28 ENCOUNTER — CARE COORDINATION (OUTPATIENT)
Dept: CARE COORDINATION | Age: 67
End: 2022-03-28

## 2022-03-28 NOTE — CARE COORDINATION
Interventions and [facility]: In Process  Meals on Wheels: In Process  Medi Set or Pill Pack: In Process  Social Work: Completed         Goals Addressed    None         Prior to Admission medications    Medication Sig Start Date End Date Taking?  Authorizing Provider   PSYLLIUM HUSK PO Take by mouth OTC    Historical Provider, MD   Probiotic, Lactobacillus, CAPS Take by mouth OTC    Historical Provider, MD   fluticasone (FLONASE) 50 MCG/ACT nasal spray 1 spray by Each Nostril route daily Taking PRN    Historical Provider, MD   cephALEXin (KEFLEX) 500 MG capsule Take 500 mg by mouth 4 times daily Prescribed by Gabbi Varela for skin infection  Reports she is taking one pill twice a day    Historical Provider, MD   triamcinolone (KENALOG) 0.1 % ointment Apply topically 2 times daily Using PRN    Historical Provider, MD   vitamin D3 (CHOLECALCIFEROL) 25 MCG (1000 UT) TABS tablet Take 1 tablet by mouth daily 3/10/22   Garcia Raines MD   mirabegron (MYRBETRIQ) 50 MG TB24 Take 50 mg by mouth daily Pt gets samples  Patient not taking: Reported on 3/15/2022 3/10/22   Garcia Raines MD   levothyroxine (SYNTHROID) 25 MCG tablet Once daily  Patient taking differently: Once daily   filled 10/29/2021--has 15 pills left  Filled 3/14/2022 3/10/22   Garcia Raines MD   omeprazole (PRILOSEC) 20 MG delayed release capsule TAKE 1 CAPSULE BY MOUTH ONCE DAILY IN THE MORNING BEFORE BREAKFAST 3/10/22   Garcia Raines MD   montelukast (SINGULAIR) 10 MG tablet Take 1 tablet by mouth nightly  Patient not taking: Reported on 3/15/2022 3/10/22   Garcia Raines MD   albuterol (PROVENTIL) (2.5 MG/3ML) 0.083% nebulizer solution Take 3 mLs by nebulization every 6 hours as needed for Wheezing 2/25/22   Garcia Raines MD   mometasone (NASONEX) 50 MCG/ACT nasal spray 1 spray by Nasal route daily 1 spray each nostril daily  Patient not taking: Reported on 3/15/2022 2/11/22   Garcia Raines MD

## 2022-03-29 ENCOUNTER — CARE COORDINATION (OUTPATIENT)
Dept: CARE COORDINATION | Age: 67
End: 2022-03-29

## 2022-03-29 NOTE — CARE COORDINATION
Ambulatory Care Coordination Note  3/29/2022  CM Risk Score: 3  Charlson 10 Year Mortality Risk Score: 79%     ACC: Sondra Escobar, RN    Summary Note: Completed applications for [employer] and [employer] per patient's request. Discussed with Jhon Carlinffer about the [employer] and possible meal delivery. She will check with them and get the info to Dulce if they do offer it in her area. Discussed Dulce wanting to get a service dog, but unlikely that she would qualify. Francisco Javier stated that Ebenezer Romero could check with her counselor about an Emotional Support Dog, as they would be able to help her with that process if she qualifies. Will discuss with Dulce at next encounter. Will follow up with Dulce on Friday. If no further needs for ACM, will discuss graduation from program.         Care Coordination Interventions    Program Enrollment: Complex Care  Referral from Primary Care Provider: Yes  Suggested Interventions and [address]: In Process  Meals on Wheels: In Process  Medi Set or Pill Pack: In Process  Social Work: Completed         Goals Addressed    None         Prior to Admission medications    Medication Sig Start Date End Date Taking?  Authorizing Provider   PSYLLIUM HUSK PO Take by mouth OTC    Historical Provider, MD   Probiotic, Lactobacillus, CAPS Take by mouth OTC    Historical Provider, MD   fluticasone (FLONASE) 50 MCG/ACT nasal spray 1 spray by Each Nostril route daily Taking PRN    Historical Provider, MD   cephALEXin (KEFLEX) 500 MG capsule Take 500 mg by mouth 4 times daily Prescribed by Aleksandar Ware for skin infection  Reports she is taking one pill twice a day    Historical Provider, MD   triamcinolone (KENALOG) 0.1 % ointment Apply topically 2 times daily Using PRN    Historical Provider, MD   vitamin D3 (CHOLECALCIFEROL) 25 MCG (1000 UT) TABS tablet Take 1 tablet by mouth daily 3/10/22   Jesse Worthington MD   mirabegron (MYRBETRIQ) 50 MG TB24 Take 50 mg by mouth daily Pt gets samples  Patient not taking: Reported on 3/15/2022 3/10/22   Hilton Wood MD   levothyroxine (SYNTHROID) 25 MCG tablet Once daily  Patient taking differently: Once daily   filled 10/29/2021--has 15 pills left  Filled 3/14/2022 3/10/22   Hilton Wood MD   omeprazole (PRILOSEC) 20 MG delayed release capsule TAKE 1 CAPSULE BY MOUTH ONCE DAILY IN THE MORNING BEFORE BREAKFAST 3/10/22   Hilton Wood MD   montelukast (SINGULAIR) 10 MG tablet Take 1 tablet by mouth nightly  Patient not taking: Reported on 3/15/2022 3/10/22   Hilton Wood MD   albuterol (PROVENTIL) (2.5 MG/3ML) 0.083% nebulizer solution Take 3 mLs by nebulization every 6 hours as needed for Wheezing 2/25/22   Hilton Wood MD   mometasone (NASONEX) 50 MCG/ACT nasal spray 1 spray by Nasal route daily 1 spray each nostril daily  Patient not taking: Reported on 3/15/2022 2/11/22   Hilton Wood MD   budesonide-formoterol [facility]) 160-4.5 MCG/ACT AERO Inhale 2 puffs into the lungs 2 times daily  Patient not taking: Reported on 3/15/2022 2/8/22   Hilton Wood MD   albuterol sulfate  (90 Base) MCG/ACT inhaler INHALE 2 PUFFS BY MOUTH INTO LUNGS TWICE DAILY AS NEEDED FOR WHEEZING (NO  MORE  THAN  4  TIMES  DAILY) 2/8/22   Hilton Wood MD   amLODIPine (NORVASC) 10 MG tablet 1 tablet daily 2/8/22   Hilton Wood MD   Handicap Placard MISC by Does not apply route Permanent - 5 years  Diagnosis- ankle fracture, OA  Patient not taking: Reported on 3/15/2022 2/8/22   Hilton Wood MD   acetaminophen (TYLENOL) 325 MG tablet Take 2 tablets by mouth every 6 hours as needed for Pain  Patient taking differently: Take 650 mg by mouth every 6 hours as needed for Pain Last filled 1/31/2022 10/12/20   Hilton Wood MD   QUEtiapine (SEROQUEL) 200 MG tablet Take 200 mg by mouth daily Last filled 11/8/2021 90 day supply,   Take 3 tablets once daily--600 mg dose    Historical Provider, MD FLUoxetine (PROZAC) 40 MG capsule Take 40 mg by mouth daily Last filled 1/24/2022    Historical Provider, MD   ipratropium (ATROVENT) 0.03 % nasal spray 2 sprays by Nasal route 3 times daily as needed for Rhinitis   Patient not taking: Reported on 3/15/2022    Historical Provider, MD   lithium 300 MG capsule Take 300 mg by mouth 2 times daily (with meals).     Historical Provider, MD       Future Appointments   Date Time Provider Ekta Tapia   3/29/2022  1:30 PM SCHEDULE[facility]

## 2022-03-30 ENCOUNTER — TELEPHONE (OUTPATIENT)
Dept: INTERNAL MEDICINE | Age: 67
End: 2022-03-30

## 2022-03-30 NOTE — CARE COORDINATION
contacted the 's.  was informed that the individual does not have to be [place] or a resident of [place] to obtain a box of food.  was encouraged to have client call [phone] to schedule an appointment to pick box of food up.  attempted to contact Dulce however no answer and VM full.  text Debere the information. Plan:    will follow up with Dulce regarding social needs.

## 2022-03-30 NOTE — TELEPHONE ENCOUNTER
PC to pt to reschedule AWV. No answer, unable to [facility] mailbox is full.

## 2022-04-05 ENCOUNTER — TELEPHONE (OUTPATIENT)
Dept: INTERNAL MEDICINE | Age: 67
End: 2022-04-05

## 2022-04-05 ENCOUNTER — CARE COORDINATION (OUTPATIENT)
Dept: CARE COORDINATION | Age: 67
End: 2022-04-05

## 2022-04-05 NOTE — TELEPHONE ENCOUNTER
Pt calling about her right ankle. Pt states she talked to the ortho surgeon office Dr Nitin Rodriguez. States that her pins might need to come out and she will need another surgery. She wants to know what your opinion is? She states she only wants to do the surgery if it is necessary. As she doesn't want to go through that surgery again and learn how to re walk. Office notes are already in [facility]. Please advise    Pt is also asking for a out door walker. She currently has one but it is for indoor use only. Writer tried to explain to pt that they are the same.

## 2022-04-06 ENCOUNTER — CARE COORDINATION (OUTPATIENT)
Dept: CARE COORDINATION | Age: 67
End: 2022-04-06

## 2022-04-06 ENCOUNTER — TELEPHONE (OUTPATIENT)
Dept: INTERNAL MEDICINE | Age: 67
End: 2022-04-06

## 2022-04-06 NOTE — CARE COORDINATION
Clifton Atkinson returned call to [facility][note 3]. She was concerned about getting her medications, from all of her specialists, to [facility] to add to the bubble packs. She wanted the address and phone number texted to her so she could give it to the specialists. [facility] texted requested information. Also informed her that she could have [address] and transfer everything over. Reminded her of tomorrow's AWV at 11 am. She verbalized understanding. Asked if she had received any calls from [employer] and she stated no. She said she really wanted meals from the [facility] center. [facility] stated that Micheal Greer had given her the number. She doesn't remember getting it and asked [facility] to text that information also. Sent the contact number to her for the [facility], to obtain a box of food.

## 2022-04-06 NOTE — TELEPHONE ENCOUNTER
PC to pt to try to remind of Medicare AWV that is scheduled for tomorrow in office. No answer, unable to [facility] mailbox is full.

## 2022-04-06 NOTE — CARE COORDINATION
called and spoke with Dante Chaudhary. Dulce reports that she is doing well. Dulce reports that the [facility] is unable to change her locks. Dulce reports the  has not been picked up yet and is still in the driveway. Dante Chaudhary reports that her court date was moved up to 4/13. Dulce reports that she wants to continue with the PO and divorce. Dulce reports that she has plenty of food. Dulce reports that she has all her medications. Dulce denied questions or concerns. Plan:   Dante Chaudhary will attend her court date on 4/13.

## 2022-04-06 NOTE — CARE COORDINATION
Ambulatory Care Coordination Note  4/6/2022  CM Risk Score: 3  Charlson 10 Year Mortality Risk Score: 79%     ACC: Rene Albrecht RN    Summary Note: Attempted to reach Ebony Vasques for follow up. No answer and her mail box is full. Will try to reach her on Friday. Care Coordination Interventions    Program Enrollment: Complex Care  Referral from Primary Care Provider: Yes  Suggested Interventions and [address]: In Process  Meals on Wheels: In Process  Medi Set or Pill Pack: In Process  Social Work: Completed         Goals Addressed    None         Prior to Admission medications    Medication Sig Start Date End Date Taking?  Authorizing Provider   PSYLLIUM HUSK PO Take by mouth OTC    Historical Provider, MD   Probiotic, Lactobacillus, CAPS Take by mouth OTC    Historical Provider, MD   fluticasone (FLONASE) 50 MCG/ACT nasal spray 1 spray by Each Nostril route daily Taking PRN    Historical Provider, MD   cephALEXin (KEFLEX) 500 MG capsule Take 500 mg by mouth 4 times daily Prescribed by Rock Mckee for skin infection  Reports she is taking one pill twice a day    Historical Provider, MD   triamcinolone (KENALOG) 0.1 % ointment Apply topically 2 times daily Using PRN    Historical Provider, MD   vitamin D3 (CHOLECALCIFEROL) 25 MCG (1000 UT) TABS tablet Take 1 tablet by mouth daily 3/10/22   Jayesh Forbes MD   mirabegron (MYRBETRIQ) 50 MG TB24 Take 50 mg by mouth daily Pt gets samples  Patient not taking: Reported on 3/15/2022 3/10/22   Jayesh Forbes MD   levothyroxine (SYNTHROID) 25 MCG tablet Once daily  Patient taking differently: Once daily   filled 10/29/2021--has 15 pills left  Filled 3/14/2022 3/10/22   Jayesh Forbes MD   omeprazole (PRILOSEC) 20 MG delayed release capsule TAKE 1 CAPSULE BY MOUTH ONCE DAILY IN THE MORNING BEFORE BREAKFAST 3/10/22   Jayesh Forbes MD   montelukast (SINGULAIR) 10 MG tablet Take 1 tablet by mouth nightly  Patient not taking: Reported on 3/15/2022 3/10/22   Jose Webster MD   albuterol (PROVENTIL) (2.5 MG/3ML) 0.083% nebulizer solution Take 3 mLs by nebulization every 6 hours as needed for Wheezing 2/25/22   Jose Webster MD   mometasone (NASONEX) 50 MCG/ACT nasal spray 1 spray by Nasal route daily 1 spray each nostril daily  Patient not taking: Reported on 3/15/2022 2/11/22   Jose Webster MD   budesonide-formoterol [facility]) 160-4.5 MCG/ACT AERO Inhale 2 puffs into the lungs 2 times daily  Patient not taking: Reported on 3/15/2022 2/8/22   Jose Webster MD   albuterol sulfate  (90 Base) MCG/ACT inhaler INHALE 2 PUFFS BY MOUTH INTO LUNGS TWICE DAILY AS NEEDED FOR WHEEZING (NO  MORE  THAN  4  TIMES  DAILY) 2/8/22   Jose Webster MD   amLODIPine (NORVASC) 10 MG tablet 1 tablet daily 2/8/22   Jose Webster MD   Handicap Placard MISC by Does not apply route Permanent - 5 years  Diagnosis- ankle fracture, OA  Patient not taking: Reported on 3/15/2022 2/8/22   Jose Webster MD   acetaminophen (TYLENOL) 325 MG tablet Take 2 tablets by mouth every 6 hours as needed for Pain  Patient taking differently: Take 650 mg by mouth every 6 hours as needed for Pain Last filled 1/31/2022 10/12/20   Jose Webster MD   QUEtiapine (SEROQUEL) 200 MG tablet Take 200 mg by mouth daily Last filled 11/8/2021 90 day supply,   Take 3 tablets once daily--600 mg dose    Historical Provider, MD   FLUoxetine (PROZAC) 40 MG capsule Take 40 mg by mouth daily Last filled 1/24/2022    Historical Provider, MD   ipratropium (ATROVENT) 0.03 % nasal spray 2 sprays by Nasal route 3 times daily as needed for Rhinitis   Patient not taking: Reported on 3/15/2022    Historical Provider, MD   lithium 300 MG capsule Take 300 mg by mouth 2 times daily (with meals).     Historical Provider, MD       Future Appointments   Date Time Provider Ekta Tapia   4/7/2022 11:00 AM SCHEDULE, [facility] IM NURSE [facility]

## 2022-04-07 ENCOUNTER — OFFICE VISIT (OUTPATIENT)
Dept: INTERNAL MEDICINE | Age: 67
End: 2022-04-07
Payer: MEDICARE

## 2022-04-07 VITALS
DIASTOLIC BLOOD PRESSURE: 89 MMHG | TEMPERATURE: 96.6 F | HEART RATE: 83 BPM | SYSTOLIC BLOOD PRESSURE: 149 MMHG | HEIGHT: 65 IN | WEIGHT: 196.6 LBS | BODY MASS INDEX: 32.76 KG/M2

## 2022-04-07 DIAGNOSIS — Z00.00 MEDICARE ANNUAL WELLNESS VISIT, SUBSEQUENT: Primary | ICD-10-CM

## 2022-04-07 PROCEDURE — 4040F PNEUMOC VAC/ADMIN/RCVD: CPT | Performed by: INTERNAL MEDICINE

## 2022-04-07 PROCEDURE — 1123F ACP DISCUSS/DSCN MKR DOCD: CPT | Performed by: INTERNAL MEDICINE

## 2022-04-07 PROCEDURE — 3017F COLORECTAL CA SCREEN DOC REV: CPT | Performed by: INTERNAL MEDICINE

## 2022-04-07 PROCEDURE — G0439 PPPS, SUBSEQ VISIT: HCPCS | Performed by: INTERNAL MEDICINE

## 2022-04-07 ASSESSMENT — PATIENT HEALTH QUESTIONNAIRE - PHQ9
SUM OF ALL RESPONSES TO PHQ QUESTIONS 1-9: 2
SUM OF ALL RESPONSES TO PHQ QUESTIONS 1-9: 2
2. FEELING DOWN, DEPRESSED OR HOPELESS: 1
SUM OF ALL RESPONSES TO PHQ9 QUESTIONS 1 & 2: 2
SUM OF ALL RESPONSES TO PHQ QUESTIONS 1-9: 2
SUM OF ALL RESPONSES TO PHQ QUESTIONS 1-9: 2
1. LITTLE INTEREST OR PLEASURE IN DOING THINGS: 1

## 2022-04-07 ASSESSMENT — LIFESTYLE VARIABLES: HOW OFTEN DO YOU HAVE A DRINK CONTAINING ALCOHOL: NEVER

## 2022-04-07 NOTE — PATIENT INSTRUCTIONS
Personalized Preventive Plan for Trixie Serrano - 4/7/2022  Medicare offers a range of preventive health benefits. Some of the tests and screenings are paid in full while other may be subject to a deductible, co-insurance, and/or copay. Some of these benefits include a comprehensive review of your medical history including lifestyle, illnesses that may run in your family, and various assessments and screenings as appropriate. After reviewing your medical record and screening and assessments performed today your provider may have ordered immunizations, labs, imaging, and/or referrals for you. A list of these orders (if applicable) as well as your Preventive Care list are included within your After Visit Summary for your review. Other Preventive Recommendations:    · A preventive eye exam performed by an eye specialist is recommended every 1-2 years to screen for glaucoma; cataracts, macular degeneration, and other eye disorders. · A preventive dental visit is recommended every 6 months. · Try to get at least 150 minutes of exercise per week or 10,000 steps per day on a pedometer . · Order or download the FREE \"Exercise & Physical Activity: Your Everyday Guide\" from [employer]. Call 1-607.311.3541 or search [employer] online. · You need 0481-4182 mg of calcium and 0844-2312 IU of vitamin D per day. It is possible to meet your calcium requirement with diet alone, but a vitamin D supplement is usually necessary to meet this goal.  · When exposed to the sun, use a sunscreen that protects against both UVA and UVB radiation with an SPF of 30 or greater. Reapply every 2 to 3 hours or after sweating, drying off with a towel, or swimming. · Always wear a seat belt when traveling in a car. Always wear a helmet when riding a bicycle or motorcycle.

## 2022-04-07 NOTE — CARE COORDINATION
Received a text message from [employer] stating : \"Thank you for the information on the [facility] number. Also, thanks again for the [facility] number and address. I will do my homework if possible. If I can't, then I will call you in the near future. my knowledge is limited on the computer.  \" numerical 0-10

## 2022-04-07 NOTE — PROGRESS NOTES
Medicare Annual Wellness Visit    Jefferey Collet is here for Medicare AWV    Assessment & Plan   Medicare annual wellness visit, subsequent      Recommendations for Preventive Services Due: see orders and patient instructions/AVS.  Recommended screening schedule for the next 5-10 years is provided to the patient in written form: see Patient Instructions/AVS.     No follow-ups on file. Subjective     Patient's complete Health Risk Assessment and screening values have been reviewed and are found in Flowsheets. The following problems were reviewed today and where indicated follow up appointments were made and/or referrals ordered.     Positive Risk Factor Screenings with Interventions:    Fall Risk:  Do you feel unsteady or are you worried about falling? : (!) yes (unsteady with stairs, reports balance issues)  2 or more falls in past year?: (!) yes (reports tripping up/down stairs)  Fall with injury in past year?: (!) yes (seeing Dr. Garry Huitron for ankle fracture (~1ears old))     Fall Risk Interventions:    · Home safety tips provided            General Health and ACP:  General  In general, how would you say your health is?: Good  In the past 7 days, have you experienced any of the following: New or Increased Pain, New or Increased Fatigue, Loneliness, Social Isolation, Stress or Anger?: (!) Yes  Select all that apply: (!) Stress (chronic pain in right ankle from fracture; stress from , needing to clean home/upkeep with  being out of house)  Do you get the social and emotional support that you need?: Yes (has open case management episode at this time, Rajat Claire RN and [employer] assisting pt.; close friend Ally Thompsons)  Do you have a Living Will?: (!) No    Advance Directives     Power of  Living Will ACP-Advance Directive ACP-Power of     Not on File Not on File Not on File Not on File      General Health Risk Interventions:  · Inadequate social/emotional support: patient's comments regarding inadequate social support: Feels lonely at times, has some friends in area. Currently working with RN/LSW for continued care management  · No Living Will: Patient declines ACP discussion/assistance    Health Habits/Nutrition:     Physical Activity: Insufficiently Active    Days of Exercise per Week: 7 days    Minutes of Exercise per Session: 20 min     Have you lost any weight without trying in the past 3 months?: No  Body mass index: (!) 33.22  Have you seen the dentist within the past year?: Yes (reports she goes every 6 weeks (confirmed she didn't mean months) to Dr. Yousuf Prado on [address] [place] [address] Habits/Nutrition Interventions:  · Inadequate physical activity:  educational materials provided to promote increased physical activity      ADLs:  In the past 7 days, did you need help from others to perform any of the following everyday activities: Eating, dressing, grooming, bathing, toileting, or walking/balance?: (!) Yes  Select all that apply: (!) Bathing (reports needing help getting out of tub when takes a bath, most often uses shower)  In the past 7 days, did you need help from others to take care of any of the following: Laundry, housekeeping, banking/finances, shopping, telephone use, food preparation, transportation, or taking medications?: (!) Yes  Select all that apply: (!) Transportation,Banking/Finances (uses [employer] for transportation; has legal guardian for finances)    ADL Interventions:  · Patient declines any further evaluation/treatment for this issue          Objective   Vitals:    04/07/22 1055 04/07/22 1128   BP: (!) 149/87 (!) 149/89   Site: Left Upper Arm Left Upper Arm   Position: Sitting Sitting   Cuff Size: Medium Adult Medium Adult   Pulse: 87 83   Temp: 96.6 °F (35.9 °C)    TempSrc: Infrared    Weight: 196 lb 9.6 oz (89.2 kg)    Height: 5' 4.5\" (1.638 m)       Body mass index is 33.23 kg/m².                Allergies   Allergen Reactions    Motrin [Ibuprofen]     Aspirin Rash    Blue Dyes (Parenteral) Rash    Hctz [Hydrochlorothiazide] Rash     Fixed drug reaction    Sulfa Antibiotics Rash    Tetracyclines & Related Rash     Prior to Visit Medications    Medication Sig Taking?  Authorizing Provider   PSYLLIUM HUSK PO Take by mouth OTC Yes Historical Provider, MD   Probiotic, Lactobacillus, CAPS Take by mouth OTC Yes Historical Provider, MD   fluticasone (FLONASE) 50 MCG/ACT nasal spray 1 spray by Each Nostril route daily Taking PRN Yes Historical Provider, MD   cephALEXin (KEFLEX) 500 MG capsule Take 500 mg by mouth 4 times daily Prescribed by Cheko Cortés for skin infection  Reports she is taking one pill twice a day Yes Historical Provider, MD   triamcinolone (KENALOG) 0.1 % ointment Apply topically 2 times daily Using PRN Yes Historical Provider, MD   vitamin D3 (CHOLECALCIFEROL) 25 MCG (1000 UT) TABS tablet Take 1 tablet by mouth daily Yes Jn Ashley MD   levothyroxine (SYNTHROID) 25 MCG tablet Once daily  Patient taking differently: Once daily   filled 10/29/2021--has 15 pills left  Filled 3/14/2022 Yes Jn Ashley MD   omeprazole (PRILOSEC) 20 MG delayed release capsule TAKE 1 CAPSULE BY MOUTH ONCE DAILY IN THE MORNING BEFORE BREAKFAST Yes Jn Ashley MD   montelukast (SINGULAIR) 10 MG tablet Take 1 tablet by mouth nightly Yes Jn Ashley MD   albuterol (PROVENTIL) (2.5 MG/3ML) 0.083% nebulizer solution Take 3 mLs by nebulization every 6 hours as needed for Wheezing Yes Jn Ashley MD   mometasone (NASONEX) 50 MCG/ACT nasal spray 1 spray by Nasal route daily 1 spray each nostril daily  Patient taking differently: 1 spray by Nasal route daily 1 spray each nostril daily --taking PRN Yes Jn Ashley MD   albuterol sulfate  (90 Base) MCG/ACT inhaler INHALE 2 PUFFS BY MOUTH INTO LUNGS TWICE DAILY AS NEEDED FOR WHEEZING (NO  MORE  THAN  4  TIMES  DAILY) Yes Jn Ashley MD   amLODIPine (NORVASC) 10 MG tablet 1 tablet daily Yes Tara Crowley MD   acetaminophen (TYLENOL) 325 MG tablet Take 2 tablets by mouth every 6 hours as needed for Pain  Patient taking differently: Take 650 mg by mouth every 6 hours as needed for Pain Last filled 1/31/2022 Yes Tara Crowley MD   QUEtiapine (SEROQUEL) 200 MG tablet Take 200 mg by mouth daily Last filled 11/8/2021 90 day supply,   Take 3 tablets once daily--600 mg dose Yes Historical Provider, MD   FLUoxetine (PROZAC) 40 MG capsule Take 40 mg by mouth daily Last filled 1/24/2022 Yes Historical Provider, MD   lithium 300 MG capsule Take 300 mg by mouth 2 times daily (with meals).  Yes Historical Provider, MD   mirabegron (MYRBETRIQ) 50 MG TB24 Take 50 mg by mouth daily Pt gets samples  Patient not taking: Reported on 3/15/2022  Tara Crowley MD   budesonide-formoterol [facility]) 160-4.5 MCG/ACT AERO Inhale 2 puffs into the lungs 2 times daily  Patient not taking: Reported on 3/15/2022  Tara Crowley MD   Handicap Placard MISC by Does not apply route Permanent - 5 years  Diagnosis- ankle fracture, OA  Patient not taking: Reported on 3/15/2022  Tara Crowley MD   ipratropium (ATROVENT) 0.03 % nasal spray 2 sprays by Nasal route 3 times daily as needed for Rhinitis   Patient not taking: Reported on 3/15/2022  Historical Provider, MD [facility] (Including outside providers/suppliers regularly involved in providing care):   Patient Care Team:  Tara Crowley MD as PCP - Lata Farr MD as PCP - [facility] Empaneled Provider  Galina Mon MD as Consulting Physician (Gastroenterology)  Lucie Guzman MD as Consulting Physician (Pulmonology)  Paul Shore MD as Surgeon (Orthopedic Surgery)  Arnoldo Claude, MD as Consulting Physician (Infectious Diseases)  Johan Prater RN as [address] [facility]     Reviewed and updated this visit:  Tobacco  Allergies  Meds  Med Hx  Surg Hx  Soc Hx  Fam Hx              I, Zenaida Tao Keenan RN, 4/7/2022, performed the documented evaluation under the direct supervision of the attending physician.

## 2022-04-11 ENCOUNTER — CARE COORDINATION (OUTPATIENT)
Dept: CARE COORDINATION | Age: 67
End: 2022-04-11

## 2022-04-14 ENCOUNTER — CARE COORDINATION (OUTPATIENT)
Dept: CARE COORDINATION | Age: 67
End: 2022-04-14

## 2022-04-15 NOTE — CARE COORDINATION
called and spoke with Pearl Dickson. Dulce reports that court went \"ok\". Dulce reports that she believes her PO is no longer active towards Tone Cardenas since she did not go to her court hearing last month.  inquired if they discussed her PO at count on 4/13? Dulce reports they did not talk about it. Dulce denied her door or locks being fixed. Dulce reports that she has not heard anything about getting a divorce. Pearl Dickson stated that she is worried if she does get a divorce, [employer] will place her in a home.  informed Dulce that she cannot just be placed in a \"home\" for no reason.  encouraged Dulce to speak to [employer] regarding her concerns.  attempted to contact [employer].  left message.  had missed call from [employer].  attempted to contact [employer] back however no answer. Plan:    will speak with [employer] regarding court, divorce, and Dulce's concerns.

## 2022-04-17 ENCOUNTER — HOSPITAL ENCOUNTER (EMERGENCY)
Age: 67
Discharge: HOME | End: 2022-04-18
Attending: EMERGENCY MEDICINE
Payer: MEDICARE

## 2022-04-17 VITALS
OXYGEN SATURATION: 97 % | TEMPERATURE: 98.2 F | SYSTOLIC BLOOD PRESSURE: 165 MMHG | RESPIRATION RATE: 18 BRPM | BODY MASS INDEX: 32.62 KG/M2 | DIASTOLIC BLOOD PRESSURE: 88 MMHG | HEART RATE: 88 BPM | WEIGHT: 193 LBS

## 2022-04-17 DIAGNOSIS — G89.29 CHRONIC PAIN OF RIGHT ANKLE: Primary | ICD-10-CM

## 2022-04-17 DIAGNOSIS — M25.571 CHRONIC PAIN OF RIGHT ANKLE: Primary | ICD-10-CM

## 2022-04-17 PROCEDURE — 99283 EMERGENCY DEPT VISIT LOW MDM: CPT

## 2022-04-17 ASSESSMENT — PAIN SCALES - GENERAL: PAINLEVEL_OUTOF10: 7

## 2022-04-17 ASSESSMENT — PAIN - FUNCTIONAL ASSESSMENT: PAIN_FUNCTIONAL_ASSESSMENT: 0-10

## 2022-04-18 ENCOUNTER — CARE COORDINATION (OUTPATIENT)
Dept: CARE COORDINATION | Age: 67
End: 2022-04-18

## 2022-04-18 ENCOUNTER — TELEPHONE (OUTPATIENT)
Dept: INTERNAL MEDICINE | Age: 67
End: 2022-04-18

## 2022-04-18 PROCEDURE — 6370000000 HC RX 637 (ALT 250 FOR IP): Performed by: EMERGENCY MEDICINE

## 2022-04-18 RX ORDER — ACETAMINOPHEN 500 MG
1000 TABLET ORAL ONCE
Status: COMPLETED | OUTPATIENT
Start: 2022-04-18 | End: 2022-04-18

## 2022-04-18 RX ADMIN — ACETAMINOPHEN 1000 MG: 500 TABLET ORAL at 00:17

## 2022-04-18 ASSESSMENT — PAIN SCALES - GENERAL: PAINLEVEL_OUTOF10: 7

## 2022-04-18 NOTE — TELEPHONE ENCOUNTER
----- Message from Noelle Lennox sent at 4/15/2022 12:57 PM EDT -----  Subject: Message to Provider    QUESTIONS  Information for Provider? Patient wants Mary Campos from social work to call her   back. She has something to tell her.   ---------------------------------------------------------------------------  --------------  CALL BACK INFO  What is the best way for the office to contact you? OK to leave message on   voicemail  Preferred Call Back Phone Number? [phone]  ---------------------------------------------------------------------------  --------------  SCRIPT ANSWERS  Relationship to Patient?  Self EKG was handed to [facility].

## 2022-04-18 NOTE — ED PROVIDER NOTES
EMERGENCY DEPARTMENT ENCOUNTER    Pt Name: Yuriy Covarrubias  MRN: [id number]  [place] 1955  Date of evaluation: 4/18/22  CHIEF COMPLAINT       Chief Complaint   Patient presents with    Ankle Pain     HISTORY OF PRESENT ILLNESS   This is a 26-year-old female that presents the emergency department with complaints of pain in the right ankle. Patient states that she is mainly here because she was told to call an ambulance by [employer] security, the patient did not have a ride home because the cab company only has 1 worker working for them and it was a 4-hour wait. Patient [place] the movie theater, she stopped and will be is today and states that when she was walking around she developed some pain in her ankle. Patient has a history of chronic ankle pain and states that when she walks significantly and when she is out in the cold it started hurting more. REVIEW OF SYSTEMS     Review of Systems   Musculoskeletal:        Ankle pain   All other systems reviewed and are negative. PASTMEDICAL HISTORY     Past Medical History:   Diagnosis Date    Anxiety     Arrhythmia     Asthma     Bipolar 1 disorder ([facility] [address] 2/14/2019    Bipolar disorder ([facility] [address]     Chronic diarrhea 2/14/2019    COPD (chronic obstructive pulmonary disease) (HCC)     Depression     Essential hypertension     GERD (gastroesophageal reflux disease)     GERD (gastroesophageal reflux disease)     History of stroke 8/9/2018    Hyperlipidemia     Hypertension     Hypothyroidism     Mild persistent asthma without complication 4/57/3156    OAB (overactive bladder)     Obesity (BMI 30-39. 9) 8/19/2016    Osteoarthritis     Poor historian     Pulmonary fibrosis (HCC)     sjogrens syndrome    Pulmonary hypertension (HCC)     Pure hypercholesterolemia     Sleep apnea     cpap    Stroke ([facility] [address]     Unspecified sleep apnea     on cpap    Urinary incontinence      Past Problem List  Patient Active Problem List   Diagnosis Code  Essential hypertension I10    Pure hypercholesterolemia E78.00    Urinary incontinence R32    Anxiety F41.9    Sleep apnea G47.30    GERD (gastroesophageal reflux disease) K21.9    Hypothyroidism E03.9    Obesity (BMI 30-39. 9) E66.9    Mild persistent asthma without complication G08.27    History of stroke Z86.73    Chronic diarrhea K52.9    Bipolar 1 disorder (HCC) F31.9     SURGICAL HISTORY       Past Surgical History:   Procedure Laterality Date    ANKLE SURGERY Right 09/11/2019    COLONOSCOPY  04/2015    COLONOSCOPY  2018        CYSTOSCOPY N/A 3/2/2022    CYSTOSCOPY WITH BOTOX  (100 units) performed by Jay Malloy MD at [address] [facility] Left     Stye removal    TOOTH EXTRACTION      x 2     CURRENT MEDICATIONS       Current Discharge Medication List      CONTINUE these medications which have NOT CHANGED    Details   PSYLLIUM HUSK PO Take by mouth OTC      Probiotic, Lactobacillus, CAPS Take by mouth OTC      fluticasone (FLONASE) 50 MCG/ACT nasal spray 1 spray by Each Nostril route daily Taking PRN      cephALEXin (KEFLEX) 500 MG capsule Take 500 mg by mouth 4 times daily Prescribed by Librado Ba for skin infection  Reports she is taking one pill twice a day      triamcinolone (KENALOG) 0.1 % ointment Apply topically 2 times daily Using PRN      vitamin D3 (CHOLECALCIFEROL) 25 MCG (1000 UT) TABS tablet Take 1 tablet by mouth daily  Qty: 28 tablet, Refills: 3      mirabegron (MYRBETRIQ) 50 MG TB24 Take 50 mg by mouth daily Pt gets samples  Qty: 28 tablet, Refills: 3    Associated Diagnoses: Urge incontinence of urine      levothyroxine (SYNTHROID) 25 MCG tablet Once daily  Qty: 28 tablet, Refills: 3    Associated Diagnoses: Hypothyroidism, unspecified type      omeprazole (PRILOSEC) 20 MG delayed release capsule TAKE 1 CAPSULE BY MOUTH ONCE DAILY IN THE MORNING BEFORE BREAKFAST  Qty: 28 capsule, Refills: 3    Associated Diagnoses: Gastroesophageal reflux disease, unspecified whether esophagitis present      montelukast (SINGULAIR) 10 MG tablet Take 1 tablet by mouth nightly  Qty: 28 tablet, Refills: 3      albuterol (PROVENTIL) (2.5 MG/3ML) 0.083% nebulizer solution Take 3 mLs by nebulization every 6 hours as needed for Wheezing  Qty: 120 each, Refills: 3    Associated Diagnoses: Mild persistent asthma without complication      mometasone (NASONEX) 50 MCG/ACT nasal spray 1 spray by Nasal route daily 1 spray each nostril daily  Qty: 1 each, Refills: 5      budesonide-formoterol (SYMBICORT) 160-4.5 MCG/ACT AERO Inhale 2 puffs into the lungs 2 times daily  Qty: 1 each, Refills: 5      albuterol sulfate  (90 Base) MCG/ACT inhaler INHALE 2 PUFFS BY MOUTH INTO LUNGS TWICE DAILY AS NEEDED FOR WHEEZING (NO  MORE  THAN  4  TIMES  DAILY)  Qty: 9 g, Refills: 5    Associated Diagnoses: Mild persistent asthma without complication      amLODIPine (NORVASC) 10 MG tablet 1 tablet daily  Qty: 90 tablet, Refills: 3    Associated Diagnoses: Essential hypertension      Handicap Placard MISC by Does not apply route Permanent - 5 years  Diagnosis- ankle fracture, OA  Qty: 1 each, Refills: 0    Associated Diagnoses: Closed fracture of right ankle, sequela      acetaminophen (TYLENOL) 325 MG tablet Take 2 tablets by mouth every 6 hours as needed for Pain  Qty: 60 tablet, Refills: 0      QUEtiapine (SEROQUEL) 200 MG tablet Take 200 mg by mouth daily Last filled 11/8/2021 90 day supply,   Take 3 tablets once daily--600 mg dose      FLUoxetine (PROZAC) 40 MG capsule Take 40 mg by mouth daily Last filled 1/24/2022      ipratropium (ATROVENT) 0.03 % nasal spray 2 sprays by Nasal route 3 times daily as needed for Rhinitis       lithium 300 MG capsule Take 300 mg by mouth 2 times daily (with meals). ALLERGIES     is allergic to motrin [ibuprofen], aspirin, blue dyes (parenteral), hctz [hydrochlorothiazide], sulfa antibiotics, and tetracyclines & related.   FAMILY HISTORY     She indicated that her mother is . She indicated that her father is . She indicated that her brother is alive. SOCIAL HISTORY       Social History     Tobacco Use    Smoking status: Never Smoker    Smokeless tobacco: Never Used   Vaping Use    Vaping Use: Never used   Substance Use Topics    Alcohol use: No    Drug use: No     PHYSICAL EXAM     INITIAL VITALS: BP (!) 165/88   Pulse 88   Temp 98.2 °F (36.8 °C) (Oral)   Resp 18   Wt 193 lb (87.5 kg)   SpO2 97%   BMI 32.62 kg/m²    Physical Exam  Constitutional:       Appearance: Normal appearance. HENT:      Head: Normocephalic and atraumatic. Eyes:      Extraocular Movements: Extraocular movements intact. Pupils: Pupils are equal, round, and reactive to light. Cardiovascular:      Rate and Rhythm: Normal rate and regular rhythm. Pulmonary:      Effort: Pulmonary effort is normal.      Breath sounds: Normal breath sounds. Abdominal:      General: Abdomen is flat. Palpations: Abdomen is soft. Tenderness: There is no abdominal tenderness. Musculoskeletal:        Legs:    Neurological:      Mental Status: She is alert. MEDICAL DECISION MAKIN-year-old female, chronic right ankle pain, this worsens with walking and cold exposure, this is what happened this evening. The patient did not have a fall or injury, I do not believe she requires any x-rays. Plan is discharge with outpatient follow-up. CRITICAL CARE:       PROCEDURES:    Procedures    DIAGNOSTIC RESULTS   EKG:All EKG's are interpreted by the Emergency Department Physician who either signs or Co-signs this chart in the absence of a cardiologist.        RADIOLOGY:All plain film, CT, MRI, and formal ultrasound images (except ED bedside ultrasound) are read by the radiologist, see reports below, unless otherwisenoted in MDM or here.   No orders to display     LABS: All lab results were reviewed by myself, and all abnormals are listed below. Labs Reviewed - No data to display    EMERGENCY DEPARTMENTCOURSE:         Vitals:    Vitals:    04/17/22 2352 04/17/22 2353 04/17/22 2354   BP:  (!) 165/88 (!) 165/88   Pulse: 88     Resp: 18     Temp: 98.2 °F (36.8 °C)     TempSrc: Oral     SpO2: 97%     Weight: 193 lb (87.5 kg)         The patient was given the following medications while in the emergency department:  Orders Placed This Encounter   Medications    acetaminophen (TYLENOL) tablet 1,000 mg     CONSULTS:  None    FINAL IMPRESSION      1. Chronic pain of right ankle          DISPOSITION/PLAN   DISPOSITION Decision To Discharge 04/18/2022 12:13:37 AM      PATIENT REFERRED TO:  Julia Hollins MD  [place] [address]. [facility] [address] [address]  [phone]    Schedule an appointment as soon as possible for a visit in 2 days      DISCHARGE MEDICATIONS:  Current Discharge Medication List        The care is provided during an unprecedented national emergency due to the novel coronavirus, COVID 19.   MD Dorie Houston MD  04/18/22 0869

## 2022-04-18 NOTE — CARE COORDINATION
Ambulatory Care Coordination Note  4/18/2022  CM Risk Score: 3  Charlson 10 Year Mortality Risk Score: 79%     ACC: Jolly Phoenix, RN    Summary Note: Attempted to reach Dulce for ED follow up. No answer and her mail box is full. Unable to leave a message. Will try to reach her tomorrow if she doesn't call back. Care Coordination Interventions    Program Enrollment: Complex Care  Referral from Primary Care Provider: Yes  Suggested Interventions and [address]: In Process  Meals on Wheels: In Process  Medi Set or Pill Pack: In Process  Social Work: Completed         Goals Addressed    None         Prior to Admission medications    Medication Sig Start Date End Date Taking?  Authorizing Provider   PSYLLIUM HUSK PO Take by mouth OTC    Historical Provider, MD   Probiotic, Lactobacillus, CAPS Take by mouth OTC    Historical Provider, MD   fluticasone (FLONASE) 50 MCG/ACT nasal spray 1 spray by Each Nostril route daily Taking PRN    Historical Provider, MD   cephALEXin (KEFLEX) 500 MG capsule Take 500 mg by mouth 4 times daily Prescribed by Shu Bautista for skin infection  Reports she is taking one pill twice a day    Historical Provider, MD   triamcinolone (KENALOG) 0.1 % ointment Apply topically 2 times daily Using PRN    Historical Provider, MD   vitamin D3 (CHOLECALCIFEROL) 25 MCG (1000 UT) TABS tablet Take 1 tablet by mouth daily 3/10/22   Saad Roper MD   mirabegron (MYRBETRIQ) 50 MG TB24 Take 50 mg by mouth daily Pt gets samples  Patient not taking: Reported on 3/15/2022 3/10/22   Saad Roper MD   levothyroxine (SYNTHROID) 25 MCG tablet Once daily  Patient taking differently: Once daily   filled 10/29/2021--has 15 pills left  Filled 3/14/2022 3/10/22   Saad Roper MD   omeprazole (PRILOSEC) 20 MG delayed release capsule TAKE 1 CAPSULE BY MOUTH ONCE DAILY IN THE MORNING BEFORE BREAKFAST 3/10/22   Saad Roper MD   montelukast (SINGULAIR) 10 MG tablet Take 1 tablet by mouth nightly 3/10/22   Codi Clemens MD   albuterol (PROVENTIL) (2.5 MG/3ML) 0.083% nebulizer solution Take 3 mLs by nebulization every 6 hours as needed for Wheezing 2/25/22   Codi Clemens MD   mometasone (NASONEX) 50 MCG/ACT nasal spray 1 spray by Nasal route daily 1 spray each nostril daily  Patient taking differently: 1 spray by Nasal route daily 1 spray each nostril daily --taking PRN 2/11/22   Codi Clemens MD   budesonide-formoterol [facility]) 160-4.5 MCG/ACT AERO Inhale 2 puffs into the lungs 2 times daily  Patient not taking: Reported on 3/15/2022 2/8/22   Codi Clemens MD   albuterol sulfate  (90 Base) MCG/ACT inhaler INHALE 2 PUFFS BY MOUTH INTO LUNGS TWICE DAILY AS NEEDED FOR WHEEZING (NO  MORE  THAN  4  TIMES  DAILY) 2/8/22   Codi Clemens MD   amLODIPine (NORVASC) 10 MG tablet 1 tablet daily 2/8/22   Codi Clemens MD   Handicap Placard MISC by Does not apply route Permanent - 5 years  Diagnosis- ankle fracture, OA  Patient not taking: Reported on 3/15/2022 2/8/22   Codi Clemens MD   acetaminophen (TYLENOL) 325 MG tablet Take 2 tablets by mouth every 6 hours as needed for Pain  Patient taking differently: Take 650 mg by mouth every 6 hours as needed for Pain Last filled 1/31/2022 10/12/20   Codi Clemens MD   QUEtiapine (SEROQUEL) 200 MG tablet Take 200 mg by mouth daily Last filled 11/8/2021 90 day supply,   Take 3 tablets once daily--600 mg dose    Historical Provider, MD   FLUoxetine (PROZAC) 40 MG capsule Take 40 mg by mouth daily Last filled 1/24/2022    Historical Provider, MD   ipratropium (ATROVENT) 0.03 % nasal spray 2 sprays by Nasal route 3 times daily as needed for Rhinitis   Patient not taking: Reported on 3/15/2022    Historical Provider, MD   lithium 300 MG capsule Take 300 mg by mouth 2 times daily (with meals).     Historical Provider, MD       Future Appointments   Date Time Provider Ekta Tapia   6/15/2022  1:30 PM Zena Marinelli MD [facility] Tommie Mendoza

## 2022-04-19 ENCOUNTER — CARE COORDINATION (OUTPATIENT)
Dept: CARE COORDINATION | Age: 67
End: 2022-04-19

## 2022-04-19 ENCOUNTER — OFFICE VISIT (OUTPATIENT)
Dept: INTERNAL MEDICINE | Age: 67
End: 2022-04-19
Payer: MEDICARE

## 2022-04-19 VITALS
OXYGEN SATURATION: 97 % | DIASTOLIC BLOOD PRESSURE: 89 MMHG | HEART RATE: 89 BPM | BODY MASS INDEX: 34.05 KG/M2 | WEIGHT: 204.4 LBS | SYSTOLIC BLOOD PRESSURE: 168 MMHG | HEIGHT: 65 IN

## 2022-04-19 DIAGNOSIS — S82.891D ANKLE FRACTURE, RIGHT, CLOSED, WITH ROUTINE HEALING, SUBSEQUENT ENCOUNTER: ICD-10-CM

## 2022-04-19 DIAGNOSIS — I10 ESSENTIAL HYPERTENSION: Primary | ICD-10-CM

## 2022-04-19 PROCEDURE — 1123F ACP DISCUSS/DSCN MKR DOCD: CPT | Performed by: STUDENT IN AN ORGANIZED HEALTH CARE EDUCATION/TRAINING PROGRAM

## 2022-04-19 PROCEDURE — 99211 OFF/OP EST MAY X REQ PHY/QHP: CPT | Performed by: INTERNAL MEDICINE

## 2022-04-19 PROCEDURE — 3017F COLORECTAL CA SCREEN DOC REV: CPT | Performed by: STUDENT IN AN ORGANIZED HEALTH CARE EDUCATION/TRAINING PROGRAM

## 2022-04-19 PROCEDURE — 4040F PNEUMOC VAC/ADMIN/RCVD: CPT | Performed by: STUDENT IN AN ORGANIZED HEALTH CARE EDUCATION/TRAINING PROGRAM

## 2022-04-19 PROCEDURE — G8427 DOCREV CUR MEDS BY ELIG CLIN: HCPCS | Performed by: STUDENT IN AN ORGANIZED HEALTH CARE EDUCATION/TRAINING PROGRAM

## 2022-04-19 PROCEDURE — G8399 PT W/DXA RESULTS DOCUMENT: HCPCS | Performed by: STUDENT IN AN ORGANIZED HEALTH CARE EDUCATION/TRAINING PROGRAM

## 2022-04-19 PROCEDURE — G8417 CALC BMI ABV UP PARAM F/U: HCPCS | Performed by: STUDENT IN AN ORGANIZED HEALTH CARE EDUCATION/TRAINING PROGRAM

## 2022-04-19 PROCEDURE — 1090F PRES/ABSN URINE INCON ASSESS: CPT | Performed by: STUDENT IN AN ORGANIZED HEALTH CARE EDUCATION/TRAINING PROGRAM

## 2022-04-19 PROCEDURE — 1036F TOBACCO NON-USER: CPT | Performed by: STUDENT IN AN ORGANIZED HEALTH CARE EDUCATION/TRAINING PROGRAM

## 2022-04-19 PROCEDURE — 99213 OFFICE O/P EST LOW 20 MIN: CPT | Performed by: STUDENT IN AN ORGANIZED HEALTH CARE EDUCATION/TRAINING PROGRAM

## 2022-04-19 RX ORDER — LISINOPRIL 5 MG/1
5 TABLET ORAL DAILY
Qty: 90 TABLET | Refills: 1 | Status: SHIPPED | OUTPATIENT
Start: 2022-04-19 | End: 2022-10-04 | Stop reason: SDUPTHER

## 2022-04-19 RX ORDER — LISINOPRIL 5 MG/1
5 TABLET ORAL DAILY
Qty: 30 TABLET | Refills: 0 | Status: CANCELLED | OUTPATIENT
Start: 2022-04-19

## 2022-04-19 NOTE — PROGRESS NOTES
[facility] [address] [address]  Dept: [phone]  Dept Fax: [phone]    Office Progress/Follow Up Note  Date of patient's visit: 4/19/2022  Patient's Name:  Leticia Sullivan YOB: 1955            Patient Care Team:  Gelacio Gurrola MD as PCP - General  Gelacio Gurrola MD as PCP - REHABILITATION [facility]  Marj Marquez MD as Consulting Physician (Gastroenterology)  Adriana Campbell MD as Consulting Physician (Pulmonology)  Marianne Aden MD as Surgeon (Orthopedic Surgery)  Cristiana Aguilera MD as Consulting Physician (Infectious Diseases)  Junaid Roper RN as Ambulatory Care Manager  LAXMI Ellis as   ________________________________________________________________________      Reason for Visit: Same day visit  ________________________________________________________________________  Chief Complaint:  Hypertension (pt took medication today) and ED Follow-up (d/c on 4/17/22, pt hurt the ankle)  ________________________________________________________________________  History of Presenting Illness:  History was obtained from: patient, electronic medical record. Leticia Sullivan is a 74 y.o. is here for:     For right ankle pain which is chronic. Patient had ankle fracture 3 years ago and needed ORIF. However it gets worse with prolonged standing. She denies any symptoms at this point.  Patient took her meds today. She is on norvasc 10. States she is stressed about what happened on Sunday. However patient's blood pressure has been high in the past.  Patient states that she is taking her medications. We will add lisinopril today. We will recommend following up with PCP in about a month for blood pressure.       Patient Active Problem List   Diagnosis    Essential hypertension    Pure hypercholesterolemia    Urinary incontinence    Anxiety    Sleep apnea    GERD (gastroesophageal reflux disease)    Hypothyroidism    Obesity (BMI 30-39. 9)    Mild persistent asthma without complication    History of stroke    Chronic diarrhea    Bipolar 1 disorder (HCC)       Allergies   Allergen Reactions    Motrin [Ibuprofen]     Aspirin Rash    Blue Dyes (Parenteral) Rash    Hctz [Hydrochlorothiazide] Rash     Fixed drug reaction    Sulfa Antibiotics Rash    Tetracyclines & Related Rash         Current Outpatient Medications   Medication Sig Dispense Refill    Probiotic, Lactobacillus, CAPS Take by mouth OTC      fluticasone (FLONASE) 50 MCG/ACT nasal spray 1 spray by Each Nostril route daily Taking PRN      cephALEXin (KEFLEX) 500 MG capsule Take 500 mg by mouth 4 times daily Prescribed by Shu Bautista for skin infection  Reports she is taking one pill twice a day      triamcinolone (KENALOG) 0.1 % ointment Apply topically 2 times daily Using PRN      vitamin D3 (CHOLECALCIFEROL) 25 MCG (1000 UT) TABS tablet Take 1 tablet by mouth daily 28 tablet 3    mirabegron (MYRBETRIQ) 50 MG TB24 Take 50 mg by mouth daily Pt gets samples 28 tablet 3    levothyroxine (SYNTHROID) 25 MCG tablet Once daily (Patient taking differently: Once daily   filled 10/29/2021--has 15 pills left  Filled 3/14/2022) 28 tablet 3    omeprazole (PRILOSEC) 20 MG delayed release capsule TAKE 1 CAPSULE BY MOUTH ONCE DAILY IN THE MORNING BEFORE BREAKFAST 28 capsule 3    montelukast (SINGULAIR) 10 MG tablet Take 1 tablet by mouth nightly 28 tablet 3    albuterol (PROVENTIL) (2.5 MG/3ML) 0.083% nebulizer solution Take 3 mLs by nebulization every 6 hours as needed for Wheezing 120 each 3    mometasone (NASONEX) 50 MCG/ACT nasal spray 1 spray by Nasal route daily 1 spray each nostril daily (Patient taking differently: 1 spray by Nasal route daily 1 spray each nostril daily --taking PRN) 1 each 5    budesonide-formoterol (SYMBICORT) 160-4.5 MCG/ACT AERO Inhale 2 puffs into the lungs 2 times daily 1 each 5  albuterol sulfate  (90 Base) MCG/ACT inhaler INHALE 2 PUFFS BY MOUTH INTO LUNGS TWICE DAILY AS NEEDED FOR WHEEZING (NO  MORE  THAN  4  TIMES  DAILY) 9 g 5    amLODIPine (NORVASC) 10 MG tablet 1 tablet daily 90 tablet 3    acetaminophen (TYLENOL) 325 MG tablet Take 2 tablets by mouth every 6 hours as needed for Pain (Patient taking differently: Take 650 mg by mouth every 6 hours as needed for Pain Last filled 1/31/2022) 60 tablet 0    QUEtiapine (SEROQUEL) 200 MG tablet Take 200 mg by mouth daily Last filled 11/8/2021 90 day supply,   Take 3 tablets once daily--600 mg dose      FLUoxetine (PROZAC) 40 MG capsule Take 40 mg by mouth daily Last filled 1/24/2022      ipratropium (ATROVENT) 0.03 % nasal spray 2 sprays by Nasal route 3 times daily as needed for Rhinitis       lithium 300 MG capsule Take 300 mg by mouth 2 times daily (with meals).  PSYLLIUM HUSK PO Take by mouth OTC      Handicap Placard MISC by Does not apply route Permanent - 5 years  Diagnosis- ankle fracture, OA (Patient not taking: Reported on 3/15/2022) 1 each 0     No current facility-administered medications for this visit.        Social History     Tobacco Use    Smoking status: Never Smoker    Smokeless tobacco: Never Used   Vaping Use    Vaping Use: Never used   Substance Use Topics    Alcohol use: No    Drug use: No       Family History   Problem Relation Age of Onset    Arthritis Mother     Depression Mother     Heart Disease Mother     High Blood Pressure Father     High Cholesterol Father     Stroke Father     High Blood Pressure Brother       ________________________________________________________________________  Review of Systems:  CONSTITUTIONAL: Denies: fever, chills  PSYCH: Denies: anxiety, depression  ALLERGIES: Denies: urticaria  EYES: Denies: blurry vision, decreased vision, photophobia  ENT: Denies: sore throat, nasal congestion  CARDIOVASCULAR: Denies: chest pain, dyspnea on exertion  RESPIRATORY: Denies: cough, hemoptysis, shortness of breath  GI: Denies: Denies: abdominal pain, flank pain  : Denies: Denies: dysuria, frequency/urgency  NEURO: Denies: dizzy/vertigo, headache  MUSCULOSKELETAL: Denies: back pain, joint pain  SKIN: Denies: rash, itching  ________________________________________________________________________  Physical Exam:  Vitals:    04/19/22 1458   BP: (!) 168/89   Pulse: 89   SpO2: 97%   Weight: 204 lb 6.4 oz (92.7 kg)   Height: 5' 5\" (1.651 m)     BP Readings from Last 3 Encounters:   04/19/22 (!) 168/89   04/17/22 (!) 165/88   04/07/22 (!) 149/89         General Examination    General Resting comfortably            Lungs Respirations unlabored, no wheezing       Heart RRR, no murmur   Abdomen Soft. Non-tender, non-distended   Extremities No cyanosis or edema or warmth. No pedal edema, no swelling or tenderness of the ankles   Pulses 2+ and symmetric   Skin: Skin  turgor normal, no rashes or lesions   ________________________________________________________________________  Diagnostic findings:  CBC:  Lab Results   Component Value Date    WBC 5.6 09/18/2018    HGB 11.5 09/18/2018     09/18/2018     05/02/2012       BMP:    Lab Results   Component Value Date     02/08/2022    K 4.5 02/08/2022     02/08/2022    CO2 23 02/08/2022    BUN 15 02/08/2022    CREATININE 1.06 02/08/2022    GLUCOSE 97 02/08/2022    GLUCOSE 119 05/02/2012       HEMOGLOBIN A1C:   Lab Results   Component Value Date    LABA1C 5.5 08/31/2021       FASTING LIPID PANEL:  Lab Results   Component Value Date    CHOL 204 (H) 02/08/2022    HDL 49 02/08/2022    TRIG 158 (H) 02/08/2022     ________________________________________________________________________  Health Maintenance:  Health Maintenance Due   Topic Date Due    DTaP/Tdap/Td vaccine (1 - Tdap) Never done     ________________________________________________________________________  Assessment and Plan:    1.  Essential hypertension  -Continue Norvasc  - We will add lisinopril  - Follow-up in a month with PCP    2. Right ankle fracture with routine healing-subsequent encounter  - Recommending follow-up with orthopedics as scheduled. ________________________________________________________________________  Follow up and instructions:  · Follow up in 1 month for hypertension follow-up    · Dulce received counseling on the following healthy behaviors: medication compliance, diet and exercise    · Discussed use, benefit, and side effects of prescribed medications. Barriers to medication compliance addressed. All patient questions answered. Pt voiced understanding. · Patient given educational materials - see patient instructions    Nelson Hurst MD  Internal Medicine Resident  [facility]  4/19/2022 3:43 PM        This note is created with the assistance of a speech-recognition program. While intending to generate a document that actually reflects the content of the visit, the document can still have some mistakes which may not have been identified and corrected by editing.

## 2022-04-19 NOTE — CARE COORDINATION
Ambulatory Care Coordination Note  4/19/2022  CM Risk Score: 3  Charlson 10 Year Mortality Risk Score: 79%     ACC: Jolly Phoenix, RN    Summary Note: Attempted to reach Dulce for ED follow up. No answer and her mail box is full. Unable to leave a message. Noted that she has an ED follow up appt this afternoon. Will follow up on Friday. Dulce called ACM while she was sitting in the waiting room at her PCP office. She stated that she had gone to the [place] Sunday and had waited too long for her ride, so she ended up going to the ED because her ankle/foot was sore. She plans to call her ortho at [facility] to schedule a follow up. She is asking for a rolling walker, with a seat, for outside when she has to walk distances. She states the one she has is older than 5 years. Instructed her to discuss with her provider during her appt. They need to document why she needs it and write a prescription. Suggested she ask them to fax to the DME provider of her choice. She verbalized understanding. She asked what [employer], LSW number was because she forgot to put it in her phone and can't remember it. Gave her [employer] contact number. Will follow up next week. Care Coordination Interventions    Program Enrollment: Complex Care  Referral from Primary Care Provider: Yes  Suggested Interventions and [facility]: In Process  Meals on Wheels: In Process  Medi Set or Pill Pack: In Process  Social Work: Completed         Goals Addressed    None         Prior to Admission medications    Medication Sig Start Date End Date Taking?  Authorizing Provider   PSYLLIUM HUSK PO Take by mouth OTC    Historical Provider, MD   Probiotic, Lactobacillus, CAPS Take by mouth OTC    Historical Provider, MD   fluticasone (FLONASE) 50 MCG/ACT nasal spray 1 spray by Each Nostril route daily Taking PRN    Historical Provider, MD   cephALEXin (KEFLEX) 500 MG capsule Take 500 mg by mouth 4 times daily Prescribed by Sergo Jefferson for skin infection  Reports she is taking one pill twice a day    Historical Provider, MD   triamcinolone (KENALOG) 0.1 % ointment Apply topically 2 times daily Using PRN    Historical Provider, MD   vitamin D3 (CHOLECALCIFEROL) 25 MCG (1000 UT) TABS tablet Take 1 tablet by mouth daily 3/10/22   Zena Marinelli MD   mirabegron (MYRBETRIQ) 50 MG TB24 Take 50 mg by mouth daily Pt gets samples  Patient not taking: Reported on 3/15/2022 3/10/22   Zena Marinelli MD   levothyroxine (SYNTHROID) 25 MCG tablet Once daily  Patient taking differently: Once daily   filled 10/29/2021--has 15 pills left  Filled 3/14/2022 3/10/22   Zena Marinelli MD   omeprazole (PRILOSEC) 20 MG delayed release capsule TAKE 1 CAPSULE BY MOUTH ONCE DAILY IN THE MORNING BEFORE BREAKFAST 3/10/22   Zena Marinelli MD   montelukast (SINGULAIR) 10 MG tablet Take 1 tablet by mouth nightly 3/10/22   Zena Marinelli MD   albuterol (PROVENTIL) (2.5 MG/3ML) 0.083% nebulizer solution Take 3 mLs by nebulization every 6 hours as needed for Wheezing 2/25/22   Zena Marinelli MD   mometasone (NASONEX) 50 MCG/ACT nasal spray 1 spray by Nasal route daily 1 spray each nostril daily  Patient taking differently: 1 spray by Nasal route daily 1 spray each nostril daily --taking PRN 2/11/22   Zena Marinelli MD   budesonide-formoterol (SYMBICORT) 160-4.5 MCG/ACT AERO Inhale 2 puffs into the lungs 2 times daily  Patient not taking: Reported on 3/15/2022 2/8/22   Zena Marinelli MD   albuterol sulfate  (90 Base) MCG/ACT inhaler INHALE 2 PUFFS BY MOUTH INTO LUNGS TWICE DAILY AS NEEDED FOR WHEEZING (NO  MORE  THAN  4  TIMES  DAILY) 2/8/22   Zena Marinelli MD   amLODIPine (NORVASC) 10 MG tablet 1 tablet daily 2/8/22   Zena Marinelli MD   Handicap Placard MISC by Does not apply route Permanent - 5 years  Diagnosis- ankle fracture, OA  Patient not taking: Reported on 3/15/2022 2/8/22   Zena Marinelli MD   acetaminophen (TYLENOL) 325 MG tablet Take 2 tablets by mouth every 6 hours as needed for Pain  Patient taking differently: Take 650 mg by mouth every 6 hours as needed for Pain Last filled 1/31/2022 10/12/20   Codi Clemens MD   QUEtiapine (SEROQUEL) 200 MG tablet Take 200 mg by mouth daily Last filled 11/8/2021 90 day supply,   Take 3 tablets once daily--600 mg dose    Historical Provider, MD   FLUoxetine (PROZAC) 40 MG capsule Take 40 mg by mouth daily Last filled 1/24/2022    Historical Provider, MD   ipratropium (ATROVENT) 0.03 % nasal spray 2 sprays by Nasal route 3 times daily as needed for Rhinitis   Patient not taking: Reported on 3/15/2022    Historical Provider, MD   lithium 300 MG capsule Take 300 mg by mouth 2 times daily (with meals).     Historical Provider, MD       Future Appointments   Date Time Provider Ekta Tapia   4/19/2022  2:20 PM Marcus Toribio MD [facility] Henrik Gastelum   6/15/2022  1:30 PM Codi Clemens MD [facility] Henrik Gastelum

## 2022-04-19 NOTE — PROGRESS NOTES
Attending Physician Statement  I have discussed the care of [address] [place] [address] including pertinent history and exam findings,  with the resident. I have reviewed the key elements of all parts of the encounter with the resident. I agree with the assessment, plan and orders as documented by the resident.   (GE Modifier)      MD DEMARIO Lester  Attending Physician, [address] [place] [address], Internal Medicine Residency Program  [address] [place] [address]  4/19/2022, 3:50 PM

## 2022-04-19 NOTE — PATIENT INSTRUCTIONS
Follow-up appointment scheduled for 6/15/22 at 1:30p, AVS given to patient. Medications e-scribe to pharmacy of pt's choice. Labs given to patient, they will have them done before their next visit.      jw

## 2022-04-20 NOTE — CARE COORDINATION
called and spoke with Jose Juan Sawyer. Dulce reports that she is doing \"ok\". Dulce reports that Sara Tuttle came into the house while she was gone and was still present when she returned home from the movies.  inquired if Dulce called the police? Jose Juan Sawyer reports that she did not and he informed her \"I have a key still to this house which means I am allowed in.\"   reassured her that he is not allowed to be there just because he has a key.  encouraged Dulce to speak with Jessica Howell about getting a new door/lock for her house.  spoke with Jessica Howell who reports that Jose Juan Sawyer has a 2 year restraining order against Sara Tuttle.  informed Jessica Howell about Aleatha Fess door/lock needing replaced to help prevent Sara Tuttle from coming to the house. Plan:   Jose Juan Sawyer will speak with Jessica Howell regarding a new door/lock. Dulce will call the police if Sara Tuttle shows up at the house.

## 2022-04-27 ENCOUNTER — CARE COORDINATION (OUTPATIENT)
Dept: CARE COORDINATION | Age: 67
End: 2022-04-27

## 2022-04-27 NOTE — CARE COORDINATION
Ambulatory Care Coordination Note  4/27/2022  CM Risk Score: 3  Charlson 10 Year Mortality Risk Score: 79%     ACC: Jasen Abrams, RN    Summary Note: Attempted to reach Nikki Warren for follow up. No answer and her mail box is full. Want to follow up and see if she started the lisinopril that was ordered at her last visit. Also want to see if she was able to get an appt with ortho for follow up on her ankle pain. Nikki Warren returned call to [place]. Asked about ortho appt for follow up on her ankle. She said she has an appt this Thursday. She said she's been having a lot of arthritis pain and has had to cancel appts because of the pain. She then said she was seeing her pain doctor on Thursday. She said Morris Sparrow was at the house and he wasn't supposed to be there. Instructed her to call the police, as she has a restraining order on him. She said he has a key and comes in at night, when she is sleeping. She said she is supposed to get new doors but hasn't got them yet. Explained that Carloz Velásquez had called Moe Henriquez about the doors/locks. Again, encouraged her to call the police. She stated \"oh, he's gone now. He heard me talking to you and left\". She said she needs to stay somewhere until the doors/locks get changed. She stated \"I called [facility] and they said I could stay there for a couple of days so that Morris Sparrow can't come in at night\". She said she was waiting on a call back from them. Asked if she started on the lisinopril that was ordered at the last visit. She said she hasn't been able to get to [facility] to pick it up. Reminded her that she had agreed to have her meds bubble packed at [facility] so they could be delivered. She stated \"well they wouldn't be able to do my inhalers\". Explained that they would be able to manage all of her medications and deliver everything. She asked that [facility] call them. Explained that they might try to call her and her voicemail is full. Suggested she empty her mailbox.    [address] and spoke with the pharmacist. They have her bubble pack all ready to go, but she has copay of $30.39. They have left messages with Lanny Newsome regarding the copay but haven't received a call back yet. Attempted to reach Lanny Newsome to notify him of medication copay. Left a message asking him to call [facility]. Contact number left, as well as number for ACM if any questions. Care Coordination Interventions    Program Enrollment: Complex Care  Referral from Primary Care Provider: Yes  Suggested Interventions and [address]: In Process  Meals on Wheels: In Process  Medi Set or Pill Pack: In Process  Social Work: Completed         Goals Addressed    None         Prior to Admission medications    Medication Sig Start Date End Date Taking?  Authorizing Provider   lisinopril (PRINIVIL;ZESTRIL) 5 MG tablet Take 1 tablet by mouth daily 4/19/22   Lelia Piedra MD   PSYLLIUM HUSK PO Take by mouth OTC    Historical Provider, MD   Probiotic, Lactobacillus, CAPS Take by mouth OTC    Historical Provider, MD   fluticasone (FLONASE) 50 MCG/ACT nasal spray 1 spray by Each Nostril route daily Taking PRN    Historical Provider, MD   cephALEXin (KEFLEX) 500 MG capsule Take 500 mg by mouth 4 times daily Prescribed by Vandana Conti for skin infection  Reports she is taking one pill twice a day    Historical Provider, MD   triamcinolone (KENALOG) 0.1 % ointment Apply topically 2 times daily Using PRN    Historical Provider, MD   vitamin D3 (CHOLECALCIFEROL) 25 MCG (1000 UT) TABS tablet Take 1 tablet by mouth daily 3/10/22   Mari Huber MD   mirabegron (MYRBETRIQ) 50 MG TB24 Take 50 mg by mouth daily Pt gets samples 3/10/22   Mari Huber MD   levothyroxine (SYNTHROID) 25 MCG tablet Once daily  Patient taking differently: Once daily   filled 10/29/2021--has 15 pills left  Filled 3/14/2022 3/10/22   Mari Huber MD   omeprazole (PRILOSEC) 20 MG delayed release capsule TAKE 1 CAPSULE BY MOUTH ONCE DAILY IN THE MORNING BEFORE BREAKFAST 3/10/22   David Hall MD   montelukast (SINGULAIR) 10 MG tablet Take 1 tablet by mouth nightly 3/10/22   David Hall MD   albuterol (PROVENTIL) (2.5 MG/3ML) 0.083% nebulizer solution Take 3 mLs by nebulization every 6 hours as needed for Wheezing 2/25/22   David Hall MD   mometasone (NASONEX) 50 MCG/ACT nasal spray 1 spray by Nasal route daily 1 spray each nostril daily  Patient taking differently: 1 spray by Nasal route daily 1 spray each nostril daily --taking PRN 2/11/22   David Hall MD   budesonide-formoterol [facility]) 160-4.5 MCG/ACT AERO Inhale 2 puffs into the lungs 2 times daily 2/8/22   David Hall MD   albuterol sulfate  (90 Base) MCG/ACT inhaler INHALE 2 PUFFS BY MOUTH INTO LUNGS TWICE DAILY AS NEEDED FOR WHEEZING (NO  MORE  THAN  4  TIMES  DAILY) 2/8/22   David Hall MD   amLODIPine (NORVASC) 10 MG tablet 1 tablet daily 2/8/22   David Hall MD   Handicap Placard MISC by Does not apply route Permanent - 5 years  Diagnosis- ankle fracture, OA  Patient not taking: Reported on 3/15/2022 2/8/22   David Hall MD   acetaminophen (TYLENOL) 325 MG tablet Take 2 tablets by mouth every 6 hours as needed for Pain  Patient taking differently: Take 650 mg by mouth every 6 hours as needed for Pain Last filled 1/31/2022 10/12/20   David Hall MD   QUEtiapine (SEROQUEL) 200 MG tablet Take 200 mg by mouth daily Last filled 11/8/2021 90 day supply,   Take 3 tablets once daily--600 mg dose    Historical Provider, MD   FLUoxetine (PROZAC) 40 MG capsule Take 40 mg by mouth daily Last filled 1/24/2022    Historical Provider, MD   ipratropium (ATROVENT) 0.03 % nasal spray 2 sprays by Nasal route 3 times daily as needed for Rhinitis     Historical Provider, MD   lithium 300 MG capsule Take 300 mg by mouth 2 times daily (with meals).     Historical Provider, MD       Future Appointments   Date Time Provider Ekta Tapia 5/19/2022  2:40 PM Cristy Mueller MD [id number]   6/15/2022  1:30 PM Hair Pedroza MD [facility] Brigitte Pierson

## 2022-04-28 ENCOUNTER — CARE COORDINATION (OUTPATIENT)
Dept: CARE COORDINATION | Age: 67
End: 2022-04-28

## 2022-04-29 NOTE — CARE COORDINATION
called and spoke with Theresa Lab. Dulce reports that she is doing well. Theresa Lab stated that she is at [facility] to have her ankle looked at. Dulce reports that she might have to have her pins removed from her ankle. Dulce reports that Josue Mahoney is not leaving her alone. Dulce reports that he comes home every night while she is sleeping. Dulce reports that she cannot hear anything because she wears Cpacp at night.  informed Dulce she needs to call the police. Dulce reports that he does not have anywhere else to sleep.  reminded Dulce of the PO. Dulce informed  that her bubble pack was ready for  but needed $30.     called Aisha Morrison and informed him that Dulce's medication is ready and copay for $30 dollars. Aisha Morrison was agreeable to pay for medications.  informed Aisha Morrison about Josue Mahoney coming in at night while Theresa Lab is sleeping. Aisha Morrison will work on getting door fixed. Plan:   Aisha Morrison will pay for medications. Aisha Morrison will get new door for Dulce. Theresa Lab will call police if Josue Mahoney comes over.

## 2022-05-03 ENCOUNTER — CARE COORDINATION (OUTPATIENT)
Dept: CARE COORDINATION | Age: 67
End: 2022-05-03

## 2022-05-03 NOTE — CARE COORDINATION
Ambulatory Care Coordination Note  5/3/2022  CM Risk Score: 3  Charlson 10 Year Mortality Risk Score: 79%     ACC: Alicia Bender, RN    Summary Note: Called patient's pharmacy and verified that she did receive the bubble packs. Attempted to call Dulce. No answer and her mail box is full. Unable to leave a message. Dulce called back. She states that she doesn't have the bubble pack. She thinks Dino Rosas may have it. Asked if she called him and she had not. She asked if she was going to get her inhaler also. Explained that the pharmacy should have called [facility] and transferred her prescriptions over to [facility]. She said she needed to get a hold of Micheal Greer to find out about the door. Informed her that Micheal Greer had talked to Dino Rosas about the door and he is supposed to be working on it. Asked if she had called him. She said he doesn't answer when she calls that's why she wants Micheal Greer to call him. Encouraged her to call Dino Rosas and leave a message on his voicemail if he doesn't answer. Called the pharmacy back to ask about the bubble pack. Was informed that it was picked up on 4/28, by Dino Rosas. Also discussed the inhaler and other meds at [place]. They will call and see if there are any refills available and have them transferred over. Received call back from the pharmacy. They transferred the Proair over. The nebulizer solution was sent to [facility] with the nebulizer order. Attempted to reach Dino Rosas regarding the bubble pack. Left a message on his voicemail with call back number. Will follow up on Friday if no return call before then. Care Coordination Interventions    Program Enrollment: Complex Care  Referral from Primary Care Provider: Yes  Suggested Interventions and [address]: In Process  Meals on Wheels: In Process  Medi Set or Pill Pack:  In Process  Social Work: Completed         Goals Addressed    None         Prior to Admission medications    Medication [employer] Date End Date Taking?  Authorizing Provider   lisinopril (PRINIVIL;ZESTRIL) 5 MG tablet Take 1 tablet by mouth daily 4/19/22   Suzy Almanza MD   PSYLLIUM HUSK PO Take by mouth OTC    Historical Provider, MD   Probiotic, Lactobacillus, CAPS Take by mouth OTC    Historical Provider, MD   fluticasone (FLONASE) 50 MCG/ACT nasal spray 1 spray by Each Nostril route daily Taking PRN    Historical Provider, MD   cephALEXin (KEFLEX) 500 MG capsule Take 500 mg by mouth 4 times daily Prescribed by Lew Fowler for skin infection  Reports she is taking one pill twice a day    Historical Provider, MD   triamcinolone (KENALOG) 0.1 % ointment Apply topically 2 times daily Using PRN    Historical Provider, MD   vitamin D3 (CHOLECALCIFEROL) 25 MCG (1000 UT) TABS tablet Take 1 tablet by mouth daily 3/10/22   Tess Conte MD   mirabegron (MYRBETRIQ) 50 MG TB24 Take 50 mg by mouth daily Pt gets samples 3/10/22   Tess Conte MD   levothyroxine (SYNTHROID) 25 MCG tablet Once daily  Patient taking differently: Once daily   filled 10/29/2021--has 15 pills left  Filled 3/14/2022 3/10/22   Tess Conte MD   omeprazole (PRILOSEC) 20 MG delayed release capsule TAKE 1 CAPSULE BY MOUTH ONCE DAILY IN THE MORNING BEFORE BREAKFAST 3/10/22   Tess Conte MD   montelukast (SINGULAIR) 10 MG tablet Take 1 tablet by mouth nightly 3/10/22   Tess Conte MD   albuterol (PROVENTIL) (2.5 MG/3ML) 0.083% nebulizer solution Take 3 mLs by nebulization every 6 hours as needed for Wheezing 2/25/22   Tess Conte MD   mometasone (NASONEX) 50 MCG/ACT nasal spray 1 spray by Nasal route daily 1 spray each nostril daily  Patient taking differently: 1 spray by Nasal route daily 1 spray each nostril daily --taking PRN 2/11/22   Tess Conte MD   budesonide-formoterol [facility]) 160-4.5 MCG/ACT AERO Inhale 2 puffs into the lungs 2 times daily 2/8/22   Tess Conte MD   albuterol sulfate  (90 Base) MCG/ACT inhaler INHALE 2 PUFFS BY MOUTH INTO LUNGS TWICE DAILY AS NEEDED FOR WHEEZING (NO  MORE  THAN  4  TIMES  DAILY) 2/8/22   Nisha Madden MD   amLODIPine (NORVASC) 10 MG tablet 1 tablet daily 2/8/22   Nisha Madden MD   Handicap Placard MISC by Does not apply route Permanent - 5 years  Diagnosis- ankle fracture, OA  Patient not taking: Reported on 3/15/2022 2/8/22   Nisha Madden MD   acetaminophen (TYLENOL) 325 MG tablet Take 2 tablets by mouth every 6 hours as needed for Pain  Patient taking differently: Take 650 mg by mouth every 6 hours as needed for Pain Last filled 1/31/2022 10/12/20   Nisha Madden MD   QUEtiapine (SEROQUEL) 200 MG tablet Take 200 mg by mouth daily Last filled 11/8/2021 90 day supply,   Take 3 tablets once daily--600 mg dose    Historical Provider, MD   FLUoxetine (PROZAC) 40 MG capsule Take 40 mg by mouth daily Last filled 1/24/2022    Historical Provider, MD   ipratropium (ATROVENT) 0.03 % nasal spray 2 sprays by Nasal route 3 times daily as needed for Rhinitis     Historical Provider, MD   lithium 300 MG capsule Take 300 mg by mouth 2 times daily (with meals).     Historical Provider, MD       Future Appointments   Date Time Provider Ekta Tapia   5/19/2022  2:40 PM Alhaji Beckett MD [facility] EVELYN King   6/15/2022  1:30 PM Nisha Madden MD [facility]

## 2022-05-04 ENCOUNTER — CARE COORDINATION (OUTPATIENT)
Dept: CARE COORDINATION | Age: 67
End: 2022-05-04

## 2022-05-05 ENCOUNTER — CARE COORDINATION (OUTPATIENT)
Dept: CARE COORDINATION | Age: 67
End: 2022-05-05

## 2022-05-05 NOTE — CARE COORDINATION
attempted to contact Dulce. No answer and VM full.  called and spoke with Nancy Pinon. Nancy Pinon reports that he was off sick Monday till today. Nancy Pinon reports that he has not been able to get in contact with Dulce. Nancy Kathrin reports that he is going to stop at her house today to drop off medications and give her money for the month of May. Nancy Pinon also reports that he made a copy of her protection order to keep to help remind her that [place] is not supposed to be in the home.  called and spoke with [id number]. Dulce reports that Phoenix is still coming in the house at night while she is sleeping and leaving in the morning. Dulce reports that Phoenix told her that he is allowed to come to the house even with the PO.  reminded Dulce that is not true and she should call the police if he comes to the home. Dulce denied questions or concerns. Nancy Pinon called  and informed her that he did drop of Dulce's medications and $450 for the month. Plan:   [employer] will continue to work with Nancy Pinon to follow through with divorce.

## 2022-05-05 NOTE — CARE COORDINATION
Ambulatory Care Coordination Note  5/4/2022  CM Risk Score: 3  Charlson 10 Year Mortality Risk Score: 79%     ACC: Louis Ulrich, RN    Summary Note: Received a call from [facility] stating that she was trying to get a hold of LAXMI Mcintyre about the door. She asked for [facility] contact number again. Gave her M[facility] number, and explained that Larayne Meckel was supposed to be taking care of the door. Also explained that Larayne Meckel had her bubble pack and was probably going to be contacting her to give her the medications. Encouraged her to call Hitesh Ricosebastian regarding both issues. Received a call from Larayne Meckel stating that he had the bubble pack. He said he also got a call from the pharmacy stating they had an inhaler for her. He said he is going to swing by and pick it up and drop the medications off to her. Received voicemail from [facility] stating that she hasn't heard from Francisco Javier. Sent a message to Francisco Javier notifying her that [facility] was trying to reach her. [facility] has been in CM for about 6 months. She has her medications bubble packed and set up for delivery. She has follow up scheduled with PCP. She does well in scheduling her transportation if needed. She has had 2 ED visits in the last 6 months. She is working with [facility] on social needs and has a guardian. She has contact number for [facility] for any future needs, questions, or concerns. Will remove from CM panel at this time. Care Coordination Interventions    Program Enrollment: Complex Care  Referral from Primary Care Provider: Yes  Suggested Interventions and [facility] [address]: In Process  Meals on Wheels: In Process  Medi Set or Pill Pack: In Process  Social Work: Completed         Goals Addressed    None         Prior to Admission medications    Medication Sig Start Date End Date Taking?  Authorizing Provider   lisinopril (PRINIVIL;ZESTRIL) 5 MG tablet Take 1 tablet by mouth daily 4/19/22   Carla Becker MD   PSYLLIUM HUSK PO Take by mouth OTC Historical Provider, MD   Probiotic, Lactobacillus, CAPS Take by mouth OTC    Historical Provider, MD   fluticasone (FLONASE) 50 MCG/ACT nasal spray 1 spray by Each Nostril route daily Taking PRN    Historical Provider, MD   cephALEXin (KEFLEX) 500 MG capsule Take 500 mg by mouth 4 times daily Prescribed by Nicolle Bernardo for skin infection  Reports she is taking one pill twice a day    Historical Provider, MD   triamcinolone (KENALOG) 0.1 % ointment Apply topically 2 times daily Using PRN    Historical Provider, MD   vitamin D3 (CHOLECALCIFEROL) 25 MCG (1000 UT) TABS tablet Take 1 tablet by mouth daily 3/10/22   Codi Clemens MD   mirabegron (MYRBETRIQ) 50 MG TB24 Take 50 mg by mouth daily Pt gets samples 3/10/22   Codi Clemens MD   levothyroxine (SYNTHROID) 25 MCG tablet Once daily  Patient taking differently: Once daily   filled 10/29/2021--has 15 pills left  Filled 3/14/2022 3/10/22   Codi Clemens MD   omeprazole (PRILOSEC) 20 MG delayed release capsule TAKE 1 CAPSULE BY MOUTH ONCE DAILY IN THE MORNING BEFORE BREAKFAST 3/10/22   Codi Clemens MD   montelukast (SINGULAIR) 10 MG tablet Take 1 tablet by mouth nightly 3/10/22   Codi Clemens MD   albuterol (PROVENTIL) (2.5 MG/3ML) 0.083% nebulizer solution Take 3 mLs by nebulization every 6 hours as needed for Wheezing 2/25/22   Codi Clemens MD   mometasone (NASONEX) 50 MCG/ACT nasal spray 1 spray by Nasal route daily 1 spray each nostril daily  Patient taking differently: 1 spray by Nasal route daily 1 spray each nostril daily --taking PRN 2/11/22   Codi Clemens MD   budesonide-formoterol (SYMBICORT) 160-4.5 MCG/ACT AERO Inhale 2 puffs into the lungs 2 times daily 2/8/22   Codi Clemens MD   albuterol sulfate  (90 Base) MCG/ACT inhaler INHALE 2 PUFFS BY MOUTH INTO LUNGS TWICE DAILY AS NEEDED FOR WHEEZING (NO  MORE  THAN  4  TIMES  DAILY) 2/8/22   Codi Clemens MD   amLODIPine (NORVASC) 10 MG tablet 1 tablet daily 2/8/22   Jes García MD   Handicap Placard MISC by Does not apply route Permanent - 5 years  Diagnosis- ankle fracture, OA  Patient not taking: Reported on 3/15/2022 2/8/22   Jes García MD   acetaminophen (TYLENOL) 325 MG tablet Take 2 tablets by mouth every 6 hours as needed for Pain  Patient taking differently: Take 650 mg by mouth every 6 hours as needed for Pain Last filled 1/31/2022 10/12/20   Jes García MD   QUEtiapine (SEROQUEL) 200 MG tablet Take 200 mg by mouth daily Last filled 11/8/2021 90 day supply,   Take 3 tablets once daily--600 mg dose    Historical Provider, MD   FLUoxetine (PROZAC) 40 MG capsule Take 40 mg by mouth daily Last filled 1/24/2022    Historical Provider, MD   ipratropium (ATROVENT) 0.03 % nasal spray 2 sprays by Nasal route 3 times daily as needed for Rhinitis     Historical Provider, MD   lithium 300 MG capsule Take 300 mg by mouth 2 times daily (with meals).     Historical Provider, MD       Future Appointments   Date Time Provider Ekta Tapia   5/19/2022  2:40 PM Jeff Malloy MD [facility] [address] [facility]   6/15/2022  1:30 PM Jes García MD [facility] [address] [facility]

## 2022-05-09 ENCOUNTER — CARE COORDINATION (OUTPATIENT)
Dept: CARE COORDINATION | Age: 67
End: 2022-05-09

## 2022-05-10 NOTE — CARE COORDINATION
Social work called and spoke to Alexsander-terry. Dulce reports that she is doing \"ok\". Dulce reports that she is still in a lot of pain and having a hard time getting around. Dulce reports that she did receive her pain medication from [facility]. Dulce reports that her door is not fixed and the quote to fix it was around $7,000 dollars. Dulce reports that [facility] still comes in at night to sleep and leaves in the morning.  encouraged Dulce to call police due to PO. Dulce reports that her [employer] is still helping deliver food. [employer] declined questions or concerns. Plan:   [employer] will continue with court and divorce. [employer] will follow recommendations by  PCP office and Vivian Dai. Darlin Fortune will contact police when [facility]  comes over.

## 2022-05-13 ENCOUNTER — CARE COORDINATION (OUTPATIENT)
Dept: CARE COORDINATION | Age: 67
End: 2022-05-13

## 2022-05-13 ENCOUNTER — TELEPHONE (OUTPATIENT)
Dept: INTERNAL MEDICINE | Age: 67
End: 2022-05-13

## 2022-05-13 NOTE — CARE COORDINATION
ACM contacted patient about her \"gel pack\" concern. ACM was able to determine she meant her bubble packaging. She stated that her inhalers and nasal spray were not included. ACM explained that they are not able to put them in the package. BENNIEM called [facility] and verified that she does have refills available on all 3 medications and they will fill today. ACM spoke with patient and encouraged her to  her medications today. Patient mentioned wanting a walker but has not seen provider recently. She was encouraged to call and make an appt to discuss equipment needs with PCP.

## 2022-05-16 ENCOUNTER — TELEPHONE (OUTPATIENT)
Dept: INTERNAL MEDICINE | Age: 67
End: 2022-05-16

## 2022-05-16 NOTE — TELEPHONE ENCOUNTER
Prior Authorization request received for Budesonide-Formoterol Fumarate 160-4. 5MCG/ACT aerosol    Prior Authorization processed and submitted to patient's insurance, waiting for response in regards to medication coverage

## 2022-05-19 ENCOUNTER — CARE COORDINATION (OUTPATIENT)
Dept: CARE COORDINATION | Age: 67
End: 2022-05-19

## 2022-05-19 NOTE — CARE COORDINATION
Ambulatory Care Coordination Note  5/19/2022  CM Risk Score: 7  Charlson 10 Year Mortality Risk Score: 79%     ACC: Junaid Roper RN    Summary Note: eMgan Abrams called concerned that she wasn't getting all of her BP meds in her bubble pack. Reviewed medications in her pack. She is missing the amlodipine. She said she had just filled the prescription prior to starting the bubble pack. She has about 10 pills left and wants to make sure they get included in the bubble pack. Called her pharmacy and was informed that it was set to be added to the bubble pack tomorrow, along with her 7 other medications. Megan Abrams stated that she was taking a vitamin for her memory and has questions about a probiotic. Encouraged her to discuss with her provider next week, at the appt. Instructed her to bring her pill bottles so the provider can check the ingredients and make sure it won't interfere with her medications. She verbalized understanding. Care Coordination Interventions    Program Enrollment: Complex Care  Referral from Primary Care Provider: Yes  Suggested Interventions and [facility] [address] [facility]: In Process  Meals on Wheels: In Process  Medi Set or Pill Pack: In Process  Social Work: Completed         Goals Addressed    None         Prior to Admission medications    Medication Sig Start Date End Date Taking?  Authorizing Provider   lisinopril (PRINIVIL;ZESTRIL) 5 MG tablet Take 1 tablet by mouth daily 4/19/22   Zeynep Bueno MD   PSYLLIUM HUSK PO Take by mouth OTC    Historical Provider, MD   Probiotic, Lactobacillus, CAPS Take by mouth OTC    Historical Provider, MD   fluticasone (FLONASE) 50 MCG/ACT nasal spray 1 spray by Each Nostril route daily Taking PRN    Historical Provider, MD   cephALEXin (KEFLEX) 500 MG capsule Take 500 mg by mouth 4 times daily Prescribed by Sherry Perea for skin infection  Reports she is taking one pill twice a day    Historical Provider, MD triamcinolone (KENALOG) 0.1 % ointment Apply topically 2 times daily Using PRN    Historical Provider, MD   vitamin D3 (CHOLECALCIFEROL) 25 MCG (1000 UT) TABS tablet Take 1 tablet by mouth daily 3/10/22   Nisha Madden MD   mirabegron (MYRBETRIQ) 50 MG TB24 Take 50 mg by mouth daily Pt gets samples 3/10/22   Nisha Madden MD   levothyroxine (SYNTHROID) 25 MCG tablet Once daily  Patient taking differently: Once daily   filled 10/29/2021--has 15 pills left  Filled 3/14/2022 3/10/22   Nisha Madden MD   omeprazole (PRILOSEC) 20 MG delayed release capsule TAKE 1 CAPSULE BY MOUTH ONCE DAILY IN THE MORNING BEFORE BREAKFAST 3/10/22   Nisha Madden MD   montelukast (SINGULAIR) 10 MG tablet Take 1 tablet by mouth nightly 3/10/22   Nisha Madden MD   albuterol (PROVENTIL) (2.5 MG/3ML) 0.083% nebulizer solution Take 3 mLs by nebulization every 6 hours as needed for Wheezing 2/25/22   Nisha Madden MD   mometasone (NASONEX) 50 MCG/ACT nasal spray 1 spray by Nasal route daily 1 spray each nostril daily  Patient taking differently: 1 spray by Nasal route daily 1 spray each nostril daily --taking PRN 2/11/22   Nisha Madden MD   budesonide-formoterol (SYMBICORT) 160-4.5 MCG/ACT AERO Inhale 2 puffs into the lungs 2 times daily 2/8/22   Nisha Madden MD   albuterol sulfate  (90 Base) MCG/ACT inhaler INHALE 2 PUFFS BY MOUTH INTO LUNGS TWICE DAILY AS NEEDED FOR WHEEZING (NO  MORE  THAN  4  TIMES  DAILY) 2/8/22   Nisha Madden MD   amLODIPine (NORVASC) 10 MG tablet 1 tablet daily 2/8/22   Nisha Madden MD   Handicap Placard MISC by Does not apply route Permanent - 5 years  Diagnosis- ankle fracture, OA  Patient not taking: Reported on 3/15/2022 2/8/22   Nisha Madden MD   acetaminophen (TYLENOL) 325 MG tablet Take 2 tablets by mouth every 6 hours as needed for Pain  Patient taking differently: Take 650 mg by mouth every 6 hours as needed for Pain Last filled 1/31/2022 10/12/20   Dustin Miranda Jyoti Estrada MD   QUEtiapine (SEROQUEL) 200 MG tablet Take 200 mg by mouth daily Last filled 11/8/2021 90 day supply,   Take 3 tablets once daily--600 mg dose    Historical Provider, MD   FLUoxetine (PROZAC) 40 MG capsule Take 40 mg by mouth daily Last filled 1/24/2022    Historical Provider, MD   ipratropium (ATROVENT) 0.03 % nasal spray 2 sprays by Nasal route 3 times daily as needed for Rhinitis     Historical Provider, MD   lithium 300 MG capsule Take 300 mg by mouth 2 times daily (with meals).     Historical Provider, MD       Future Appointments   Date Time Provider Ekta Tapia   5/26/2022  3:00 PM Cristy Mueller MD [facility] [place]   6/15/2022  1:30 PM [facility] [place]

## 2022-05-20 ENCOUNTER — CARE COORDINATION (OUTPATIENT)
Dept: CARE COORDINATION | Age: 67
End: 2022-05-20

## 2022-05-20 NOTE — TELEPHONE ENCOUNTER
More information request received from insurance. Form completed, signed and faxed back to company. Form scanned into media. Will update once response is received.

## 2022-05-20 NOTE — CARE COORDINATION
attempted to contact Debere. No answer and unable to leave a message due to VM full. Plan:    [facility]

## 2022-05-23 ENCOUNTER — CARE COORDINATION (OUTPATIENT)
Dept: CARE COORDINATION | Age: 67
End: 2022-05-23

## 2022-05-23 NOTE — CARE COORDINATION
Ambulatory Care Coordination Note  5/23/2022  CM Risk Score: 7  Charlson 10 Year Mortality Risk Score: 79%     ACC: Imani Bell RN    Summary Note: Received a phone call from [facility] stating she received a call from the pharmacy about her bubble pack and she wants [facility] to call Shayy Liriano to let him know it's ready. She stated \"he doesn't answer when I call ,but he will answer if you call\". Explained that the pharmacy has his number to call him when it's ready. She stated that she talked to her brother today and he reminded her to call and take care of her intermediate benefits that she says she has to do every year, or she will lose all her benefits. She said she doesn't get it in the mail anymore because Shayy Liriano gets the mail, \"but he doesn't take care of it\". She said she got it all taken care of. Called her pharmacy and verified that they had spoken with Shayy Liriano today, and he is picking up her medications later today, with the plan to deliver them to her tomorrow. Lab Results     None          Care Coordination Interventions    Program Enrollment: Complex Care  Referral from Primary Care Provider: Yes  Suggested Interventions and [address]: In Process  Meals on Wheels: In Process  Medi Set or Pill Pack: In Process  Social Work: Completed         Goals Addressed    None         Prior to Admission medications    Medication Sig Start Date End Date Taking?  Authorizing Provider   lisinopril (PRINIVIL;ZESTRIL) 5 MG tablet Take 1 tablet by mouth daily 4/19/22   Vamsi Johnson MD   PSYLLIUM HUSK PO Take by mouth OTC    Historical Provider, MD   Probiotic, Lactobacillus, CAPS Take by mouth OTC    Historical Provider, MD   fluticasone (FLONASE) 50 MCG/ACT nasal spray 1 spray by Each Nostril route daily Taking PRN    Historical Provider, MD   cephALEXin (KEFLEX) 500 MG capsule Take 500 mg by mouth 4 times daily Prescribed by Edda Gomez for skin infection  Reports she is taking one pill twice a day    Historical Provider, MD   triamcinolone (KENALOG) 0.1 % ointment Apply topically 2 times daily Using PRN    Historical Provider, MD   vitamin D3 (CHOLECALCIFEROL) 25 MCG (1000 UT) TABS tablet Take 1 tablet by mouth daily 3/10/22   Rosemary Stapleton MD   mirabegron (MYRBETRIQ) 50 MG TB24 Take 50 mg by mouth daily Pt gets samples 3/10/22   Rosemary Stapleton MD   levothyroxine (SYNTHROID) 25 MCG tablet Once daily  Patient taking differently: Once daily   filled 10/29/2021--has 15 pills left  Filled 3/14/2022 3/10/22   Rosemary Stapleton MD   omeprazole (PRILOSEC) 20 MG delayed release capsule TAKE 1 CAPSULE BY MOUTH ONCE DAILY IN THE MORNING BEFORE BREAKFAST 3/10/22   Rosemary Stapleton MD   montelukast (SINGULAIR) 10 MG tablet Take 1 tablet by mouth nightly 3/10/22   Rosemary Stapleton MD   albuterol (PROVENTIL) (2.5 MG/3ML) 0.083% nebulizer solution Take 3 mLs by nebulization every 6 hours as needed for Wheezing 2/25/22   Rosemary Stapleton MD   mometasone (NASONEX) 50 MCG/ACT nasal spray 1 spray by Nasal route daily 1 spray each nostril daily  Patient taking differently: 1 spray by Nasal route daily 1 spray each nostril daily --taking PRN 2/11/22   Rosemary Stapleton MD   budesonide-formoterol (SYMBICORT) 160-4.5 MCG/ACT AERO Inhale 2 puffs into the lungs 2 times daily 2/8/22   Rosemary Stapleton MD   albuterol sulfate  (90 Base) MCG/ACT inhaler INHALE 2 PUFFS BY MOUTH INTO LUNGS TWICE DAILY AS NEEDED FOR WHEEZING (NO  MORE  THAN  4  TIMES  DAILY) 2/8/22   Rosemary Stapleton MD   amLODIPine (NORVASC) 10 MG tablet 1 tablet daily 2/8/22   Rosemary Stapleton MD   Handicap Placard MISC by Does not apply route Permanent - 5 years  Diagnosis- ankle fracture, OA  Patient not taking: Reported on 3/15/2022 2/8/22   Rosemary Stapleton MD   acetaminophen (TYLENOL) 325 MG tablet Take 2 tablets by mouth every 6 hours as needed for Pain  Patient taking differently: Take 650 mg by mouth every 6 hours as needed for Pain Last filled 1/31/2022 10/12/20   Shelli Pereira MD   QUEtiapine (SEROQUEL) 200 MG tablet Take 200 mg by mouth daily Last filled 11/8/2021 90 day supply,   Take 3 tablets once daily--600 mg dose    Historical Provider, MD   FLUoxetine (PROZAC) 40 MG capsule Take 40 mg by mouth daily Last filled 1/24/2022    Historical Provider, MD   ipratropium (ATROVENT) 0.03 % nasal spray 2 sprays by Nasal route 3 times daily as needed for Rhinitis     Historical Provider, MD   lithium 300 MG capsule Take 300 mg by mouth 2 times daily (with meals).     Historical Provider, MD       Future Appointments   Date Time Provider Ekta Tapia   5/26/2022  3:00 PM Luis Finney MD [facility]   6/15/2022  1:30 PM Shelli Pereira MD [facility]

## 2022-05-25 ENCOUNTER — CARE COORDINATION (OUTPATIENT)
Dept: CARE COORDINATION | Age: 67
End: 2022-05-25

## 2022-05-25 NOTE — CARE COORDINATION
called and spoke with Clifton Atkinson. Dulce reports that she is doing \"ok\". Clifton Atkinson stated that she is \"confused by her guardianship\". Dulce reports that she feels like Dino Rosas is trying to make her decisions for her and \"be her\".  encouraged Dulce to setup a time and meet with Dino Rosas to discuss her concerns. Dulce reports that she has called Dino Rosas and she doesn't call her back.  reminded Dulce to call his cell number. Dulce reports that she will. Dluce state that she needs her medication paid for before they will drop off her bubble pack of medication.  contacted Dino Rosas.  informed Dino Rosas about Zandra's medication. Dino Rosas reports that he plans to pick it up today and take it to [facility]. Dion Rosas denied having any calls from Clifton Atkinson in over a month. Dino Rosas reports that she never answers when he calls, VM is always full, and she never calls back. Plan:   Dino Rosas will  medication and deliver it to Dulce. Clifton Atkinson will share with Dino Rosas her concerns regarding guardianship.

## 2022-05-26 ENCOUNTER — OFFICE VISIT (OUTPATIENT)
Dept: INTERNAL MEDICINE | Age: 67
End: 2022-05-26
Payer: MEDICARE

## 2022-05-26 VITALS
OXYGEN SATURATION: 95 % | DIASTOLIC BLOOD PRESSURE: 84 MMHG | WEIGHT: 202 LBS | TEMPERATURE: 98.1 F | HEIGHT: 65 IN | BODY MASS INDEX: 33.66 KG/M2 | SYSTOLIC BLOOD PRESSURE: 137 MMHG | HEART RATE: 86 BPM

## 2022-05-26 DIAGNOSIS — E66.9 OBESITY (BMI 30-39.9): ICD-10-CM

## 2022-05-26 DIAGNOSIS — E03.9 HYPOTHYROIDISM, UNSPECIFIED TYPE: ICD-10-CM

## 2022-05-26 DIAGNOSIS — J45.30 MILD PERSISTENT ASTHMA WITHOUT COMPLICATION: ICD-10-CM

## 2022-05-26 DIAGNOSIS — I10 ESSENTIAL HYPERTENSION: Primary | ICD-10-CM

## 2022-05-26 PROCEDURE — 1090F PRES/ABSN URINE INCON ASSESS: CPT | Performed by: STUDENT IN AN ORGANIZED HEALTH CARE EDUCATION/TRAINING PROGRAM

## 2022-05-26 PROCEDURE — G8399 PT W/DXA RESULTS DOCUMENT: HCPCS | Performed by: STUDENT IN AN ORGANIZED HEALTH CARE EDUCATION/TRAINING PROGRAM

## 2022-05-26 PROCEDURE — 3017F COLORECTAL CA SCREEN DOC REV: CPT | Performed by: STUDENT IN AN ORGANIZED HEALTH CARE EDUCATION/TRAINING PROGRAM

## 2022-05-26 PROCEDURE — 1123F ACP DISCUSS/DSCN MKR DOCD: CPT | Performed by: STUDENT IN AN ORGANIZED HEALTH CARE EDUCATION/TRAINING PROGRAM

## 2022-05-26 PROCEDURE — G8427 DOCREV CUR MEDS BY ELIG CLIN: HCPCS | Performed by: STUDENT IN AN ORGANIZED HEALTH CARE EDUCATION/TRAINING PROGRAM

## 2022-05-26 PROCEDURE — 99213 OFFICE O/P EST LOW 20 MIN: CPT | Performed by: STUDENT IN AN ORGANIZED HEALTH CARE EDUCATION/TRAINING PROGRAM

## 2022-05-26 PROCEDURE — G8417 CALC BMI ABV UP PARAM F/U: HCPCS | Performed by: STUDENT IN AN ORGANIZED HEALTH CARE EDUCATION/TRAINING PROGRAM

## 2022-05-26 PROCEDURE — 1036F TOBACCO NON-USER: CPT | Performed by: STUDENT IN AN ORGANIZED HEALTH CARE EDUCATION/TRAINING PROGRAM

## 2022-05-26 RX ORDER — BUDESONIDE AND FORMOTEROL FUMARATE DIHYDRATE 80; 4.5 UG/1; UG/1
2 AEROSOL RESPIRATORY (INHALATION) 2 TIMES DAILY
Qty: 10.2 G | Refills: 3 | Status: SHIPPED | OUTPATIENT
Start: 2022-05-26

## 2022-05-26 NOTE — PROGRESS NOTES
[facility] [address] [address]  Dept: [phone]  Dept Fax: [phone]    Office Progress/Follow Up Note  Date of patient's visit: 5/26/2022  Patient's Name:  Calribel Giraldo YOB: 1955            Patient Care Team:  Mari Huber MD as PCP - Yoon Haynes MD as PCP - [facility]  Shelbie Saleem MD as Consulting Physician (Gastroenterology)  Tad Redmond MD as Consulting Physician (Pulmonology)  Louisa Lr MD as Surgeon (Orthopedic Surgery)  Yoselin White MD as Consulting Physician (Infectious Diseases)  LAXMI Andrade as     REASON FOR VISIT: Outpatient follow-up visit    HISTORY OF PRESENT ILLNESS:      Chief Complaint   Patient presents with    Hypertension     medication taken today       History was obtained from the patient. Claribel Giraldo is a 69 y.o. is here for a follow up visit. Essential hypertension: BP today 134/80 7 mmHg. Patient currently on Norvasc 10 Mg daily and lisinopril 5 Mg daily. States that she takes her medications regularly. Denies any symptoms of hypo or hypertension. Mild intermittent asthma: Stable. No recent exacerbations. On albuterol nebulizer and Symbicort inhaler. Hypothyroidism: Last TSH was 2.53 on 2/8/2022. Currently on 25 mcg levothyroxine supplementation.     Pertinent screening:  Colon cancer: Last colonoscopy 2018-normal.  Osteoporosis: DEXA scan 3/2022 was normal.  Breast cancer: Last mammogram 2020 which was normal.  Due in 8/2022  Lung cancer screening: Does not qualify  Depression screening: Negative    Vaccinations: Up-to-date    Life-style:  Smoking: Denies  Alcohol: Denies      Patient Active Problem List   Diagnosis    Essential hypertension    Pure hypercholesterolemia    Urinary incontinence    Anxiety    Sleep apnea    GERD (gastroesophageal reflux disease)    Hypothyroidism    Obesity (BMI 30-39. 9)    Mild persistent asthma without complication    History of stroke    Chronic diarrhea    Bipolar 1 disorder (HCC)    Ankle fracture, right, closed, with routine healing, subsequent encounter         Health Maintenance Due   Topic Date Due    DTaP/Tdap/Td vaccine (1 - Tdap) Never done         Allergies   Allergen Reactions    Motrin [Ibuprofen]     Aspirin Rash    Blue Dyes (Parenteral) Rash    Hctz [Hydrochlorothiazide] Rash     Fixed drug reaction    Sulfa Antibiotics Rash    Tetracyclines & Related Rash         MEDICATIONS:      Current Outpatient Medications   Medication Sig Dispense Refill    lisinopril (PRINIVIL;ZESTRIL) 5 MG tablet Take 1 tablet by mouth daily 90 tablet 1    PSYLLIUM HUSK PO Take by mouth OTC      Probiotic, Lactobacillus, CAPS Take by mouth OTC      fluticasone (FLONASE) 50 MCG/ACT nasal spray 1 spray by Each Nostril route daily Taking PRN      cephALEXin (KEFLEX) 500 MG capsule Take 500 mg by mouth 4 times daily Prescribed by Cammy Vogt for skin infection  Reports she is taking one pill twice a day      triamcinolone (KENALOG) 0.1 % ointment Apply topically 2 times daily Using PRN      vitamin D3 (CHOLECALCIFEROL) 25 MCG (1000 UT) TABS tablet Take 1 tablet by mouth daily 28 tablet 3    mirabegron (MYRBETRIQ) 50 MG TB24 Take 50 mg by mouth daily Pt gets samples 28 tablet 3    levothyroxine (SYNTHROID) 25 MCG tablet Once daily (Patient taking differently: Once daily   filled 10/29/2021--has 15 pills left  Filled 3/14/2022) 28 tablet 3    omeprazole (PRILOSEC) 20 MG delayed release capsule TAKE 1 CAPSULE BY MOUTH ONCE DAILY IN THE MORNING BEFORE BREAKFAST 28 capsule 3    montelukast (SINGULAIR) 10 MG tablet Take 1 tablet by mouth nightly 28 tablet 3    albuterol (PROVENTIL) (2.5 MG/3ML) 0.083% nebulizer solution Take 3 mLs by nebulization every 6 hours as needed for Wheezing 120 each 3    mometasone (NASONEX) 50 MCG/ACT nasal spray 1 spray by Nasal route daily 1 spray each nostril daily (Patient taking differently: 1 spray by Nasal route daily 1 spray each nostril daily --taking PRN) 1 each 5    albuterol sulfate  (90 Base) MCG/ACT inhaler INHALE 2 PUFFS BY MOUTH INTO LUNGS TWICE DAILY AS NEEDED FOR WHEEZING (NO  MORE  THAN  4  TIMES  DAILY) 9 g 5    amLODIPine (NORVASC) 10 MG tablet 1 tablet daily 90 tablet 3    Handicap Placard MISC by Does not apply route Permanent - 5 years  Diagnosis- ankle fracture, OA 1 each 0    acetaminophen (TYLENOL) 325 MG tablet Take 2 tablets by mouth every 6 hours as needed for Pain (Patient taking differently: Take 650 mg by mouth every 6 hours as needed for Pain Last filled 1/31/2022) 60 tablet 0    QUEtiapine (SEROQUEL) 200 MG tablet Take 200 mg by mouth daily Last filled 11/8/2021 90 day supply,   Take 3 tablets once daily--600 mg dose      FLUoxetine (PROZAC) 40 MG capsule Take 40 mg by mouth daily Last filled 1/24/2022      ipratropium (ATROVENT) 0.03 % nasal spray 2 sprays by Nasal route 3 times daily as needed for Rhinitis       lithium 300 MG capsule Take 300 mg by mouth 2 times daily (with meals).  budesonide-formoterol (SYMBICORT) 160-4.5 MCG/ACT AERO Inhale 2 puffs into the lungs 2 times daily (Patient not taking: Reported on 5/26/2022) 1 each 5     No current facility-administered medications for this visit. SOCIAL HISTORY    Reviewed and no change from previous record. Dulce  reports that she has never smoked. She has never used smokeless tobacco.    FAMILY HISTORY:    Reviewed and No change from previous visit    REVIEW OF SYSTEMS:    12 point ROS Negative except as mentioned above.     Review of Systems    PHYSICAL EXAM:      Vitals:    05/26/22 1427   BP: 137/84   Pulse: 86   Temp: 98.1 °F (36.7 °C)   TempSrc: Temporal   SpO2: 95%   Weight: 202 lb (91.6 kg)   Height: 5' 5\" (1.651 m)     BP Readings from Last 3 Encounters:   05/26/22 137/84   04/19/22 (!) 168/89   04/17/22 (!) 165/88      General appearance - alert, well appearing, and in no distress  Mental status - alert, oriented to person, place, and time  Mouth - mucous membranes moist, pharynx normal without lesions  Neck - supple, no significant adenopathy  Chest - clear to auscultation, no wheezes, rales or rhonchi, symmetric air entry  Heart - normal rate, regular rhythm, normal S1, S2, no murmurs, rubs, clicks or gallops  Abdomen - soft, nontender, nondistended, no masses or organomegaly  Neurological - alert, oriented, normal speech, no focal findings or movement disorder noted  Extremities - peripheral pulses normal, no pedal edema, no clubbing or cyanosis  Skin - normal coloration and turgor, no rashes, no suspicious skin lesions noted    LABORATORY FINDINGS:    CBC:  Lab Results   Component Value Date    WBC 5.6 09/18/2018    HGB 11.5 09/18/2018     09/18/2018     05/02/2012       BMP:    Lab Results   Component Value Date     02/08/2022    K 4.5 02/08/2022     02/08/2022    CO2 23 02/08/2022    BUN 15 02/08/2022    CREATININE 1.06 02/08/2022    GLUCOSE 97 02/08/2022    GLUCOSE 119 05/02/2012       HEMOGLOBIN A1C:   Lab Results   Component Value Date    LABA1C 5.5 08/31/2021       FASTING LIPID PANEL:  Lab Results   Component Value Date    CHOL 204 (H) 02/08/2022    HDL 49 02/08/2022    TRIG 158 (H) 02/08/2022       ASSESSMENT AND PLAN:      1. Essential hypertension  - Continue Norvasc 10 Mg daily and lisinopril 5 Mg daily.  - Return to clinic in 3 months. 2. Hypothyroidism, unspecified type  -Continue levothyroxine 25 mcg daily. 3.  Mild intermittent asthma  - Stable. No recent exacerbations. - Continue albuterol nebulizer and Symbicort inhaler. Albuterol rescue inhaler. FOLLOW UP AND INSTRUCTIONS:   · No follow-ups on file.     · Jennieere received counseling on the following healthy behaviors: nutrition, exercise, medication adherence, tobacco cessation and decrease in alcohol consumption    · Discussed use, benefit, and side effects of prescribed medications. Barriers to medication compliance addressed. All patient questions answered. Pt voiced understanding. · Patient given educational materials - see patient instructions        Magda Quiñonez MD  Internal Medicine Resident, PGY-3  [address] [facility], [facility]  5/26/2022, 2:39 PM

## 2022-05-26 NOTE — PROGRESS NOTES
Attending Physician Statement  I have discussed the care of [address] [place] [address] including pertinent history and exam findings,  with the resident. I have reviewed the key elements of all parts of the encounter with the resident. I agree with the assessment, plan and orders as documented by the resident.   (GE Modifier)    MD DEMARIO Ramachandran  Attending Physician, [address] [place] [address], Internal Medicine Residency Program  [address] [place] [address]  5/26/2022, 3:08 PM

## 2022-05-26 NOTE — PATIENT INSTRUCTIONS
Return To Clinic 6/15/2022 for scheduled follow up. Please bring all of your medications with you to this appointment. After Visit Summary given and reviewed. The medication list included in this document is our record of what you are currently taking, including any changes that were made at today's visit. If you find any differences when compared to your medications at home, or have any questions that were not answered at your visit, please contact the office. It is very important for your care that you keep your appointment. If for some reason you are unable to keep your appointment, it is equally important that you call our office at [phone] to cancel your appointment and reschedule. Failure to do so may result in your termination from our practice. Medications have been e-scribed to pharmacy of patients choice.      -KASIA Gordon

## 2022-06-02 ENCOUNTER — CARE COORDINATION (OUTPATIENT)
Dept: CARE COORDINATION | Age: 67
End: 2022-06-02

## 2022-06-02 NOTE — CARE COORDINATION
Ambulatory Care Coordination Note  6/2/2022  CM Risk Score: 7  Charlson 10 Year Mortality Risk Score: 79%     ACC: Johan Prater, RN    Summary Note: Aldo Quintero called ACM asking about getting meals delivered from the [facility] or [facility]. Explained that ACM and LSW had already called about meals before and gave her the contact number for the [facility] and [facility]. Explained that an application had already been started at [facility]. She verbalized understanding and wrote down phone numbers to call them. Lab Results     None          Care Coordination Interventions    Program Enrollment: Complex Care  Referral from Primary Care Provider: Yes  Suggested Interventions and [address]: In Process  Meals on Wheels: In Process  Medi Set or Pill Pack: In Process  Social Work: Completed         Goals Addressed    None         Prior to Admission medications    Medication Sig Start Date End Date Taking?  Authorizing Provider   budesonide-formoterol (SYMBICORT) 80-4.5 MCG/ACT AERO Inhale 2 puffs into the lungs 2 times daily 5/26/22   Dorie Redmond MD   lisinopril (PRINIVIL;ZESTRIL) 5 MG tablet Take 1 tablet by mouth daily 4/19/22   Dorie Redmond MD   PSYLLIUM HUSK PO Take by mouth OTC    Historical Provider, MD   Probiotic, Lactobacillus, CAPS Take by mouth OTC    Historical Provider, MD   fluticasone (FLONASE) 50 MCG/ACT nasal spray 1 spray by Each Nostril route daily Taking PRN    Historical Provider, MD   cephALEXin (KEFLEX) 500 MG capsule Take 500 mg by mouth 4 times daily Prescribed by Joe Chapman for skin infection  Reports she is taking one pill twice a day    Historical Provider, MD   triamcinolone (KENALOG) 0.1 % ointment Apply topically 2 times daily Using PRN    Historical Provider, MD   vitamin D3 (CHOLECALCIFEROL) 25 MCG (1000 UT) TABS tablet Take 1 tablet by mouth daily 3/10/22   Tara Crowley MD   mirabegron (MYRBETRIQ) 50 MG TB24 Take 50 mg by mouth daily Pt gets samples 3/10/22   Gelacio Gurrola MD   levothyroxine (SYNTHROID) 25 MCG tablet Once daily  Patient taking differently: Once daily   filled 10/29/2021--has 15 pills left  Filled 3/14/2022 3/10/22   Gelacio Gurrola MD   omeprazole (PRILOSEC) 20 MG delayed release capsule TAKE 1 CAPSULE BY MOUTH ONCE DAILY IN THE MORNING BEFORE BREAKFAST 3/10/22   Gelacio Gurrola MD   montelukast (SINGULAIR) 10 MG tablet Take 1 tablet by mouth nightly 3/10/22   Gelacio Gurrola MD   albuterol (PROVENTIL) (2.5 MG/3ML) 0.083% nebulizer solution Take 3 mLs by nebulization every 6 hours as needed for Wheezing 2/25/22   Gelacio Gurrola MD   budesonide-formoterol [facility]) 160-4.5 MCG/ACT AERO Inhale 2 puffs into the lungs 2 times daily  Patient not taking: Reported on 5/26/2022 2/8/22   Gelacio Gurrola MD   albuterol sulfate  (90 Base) MCG/ACT inhaler INHALE 2 PUFFS BY MOUTH INTO LUNGS TWICE DAILY AS NEEDED FOR WHEEZING (NO  MORE  THAN  4  TIMES  DAILY) 2/8/22   Gelacio Gurrola MD   amLODIPine (NORVASC) 10 MG tablet 1 tablet daily 2/8/22   Gelacio Gurrola MD   Handicap Placard MISC by Does not apply route Permanent - 5 years  Diagnosis- ankle fracture, OA 2/8/22   Gelacio Gurrola MD   QUEtiapine (SEROQUEL) 200 MG tablet Take 200 mg by mouth daily Last filled 11/8/2021 90 day supply,   Take 3 tablets once daily--600 mg dose    Historical Provider, MD   FLUoxetine (PROZAC) 40 MG capsule Take 40 mg by mouth daily Last filled 1/24/2022    Historical Provider, MD   ipratropium (ATROVENT) 0.03 % nasal spray 2 sprays by Nasal route 3 times daily as needed for Rhinitis     Historical Provider, MD   lithium 300 MG capsule Take 300 mg by mouth 2 times daily (with meals).     Historical Provider, MD       Future Appointments   Date Time Provider Ekta Tapia   6/15/2022  1:30 PM Gelacio Gurrola MD [facility] Raz Rae

## 2022-06-07 ENCOUNTER — CARE COORDINATION (OUTPATIENT)
Dept: CARE COORDINATION | Age: 67
End: 2022-06-07

## 2022-06-07 NOTE — CARE COORDINATION
attempted to contact Dulce.  was unable to leave a message due to mailbox full. Plan:    will follow up with Dulce regarding social needs.

## 2022-06-08 ENCOUNTER — CARE COORDINATION (OUTPATIENT)
Dept: CARE COORDINATION | Age: 67
End: 2022-06-08

## 2022-06-09 NOTE — CARE COORDINATION
called and spoke to Yury. Dulce reports that she has not been feeling the best.  [employer] reports that with all the cotton wood her allergies have been bad. Dulce reports that Rosibel Clemons did bring her medication to her and her monthly money. Dulce reports that she received more then usual from Rosibel Clemons. Dulce reports that Isidro Cheese is still coming in at night and sleeping and then leaves in the morning. Dulce reports that she does not want him hear however unsure what to do.  encouraged Ducle to speak with the  whose number and card she has regarding this issue with Isidro Cheese. Dulce reports that she will call and tell him about Isidro Cheese stopping by at night. Dulce reports that she would like to start receiving senior meals. Dulce reports that it is to much for her to get groceries and prepare the meals.  informed Dulce that she can ask Rosibel Clemons to arrange that and pay for it. Dulce asked if she could receive meals for free.  explained due to Dulce's larger income she does not qualify. Dulce reports that she should because she only receives $400 a month.  explained that Rosibel Clemons has the other money and pays Dulce's bills with the rest of the income.  called Rosibel Clemons. Rosibel Clemons confirmed that he can arrange senior meals to be delivered to Yury daily. Rosibel Clemons also agreed that [facility] should call the  regarding Isidro Cheese still coming over with the PO. Plan:    will follow up with Dulce regarding senior meals.  will follow up with Dulce calling .

## 2022-06-10 ENCOUNTER — CARE COORDINATION (OUTPATIENT)
Dept: CARE COORDINATION | Age: 67
End: 2022-06-10

## 2022-06-10 NOTE — CARE COORDINATION
Ambulatory Care Coordination Note  6/10/2022  CM Risk Score: 7  Charlson 10 Year Mortality Risk Score: 79%     ACC: Yohan Keys RN    Summary Note: Received a call from Tanner Mcmullen asking for the phone numbers for the [facility] and [employer]. She said she forgot to put the numbers in her phone and lost them. ACM gave her the phone numbers for both. She asked about delivered meals that you can get different ethnic foods, such as chinese, etc. FRANSICO is not aware of any programs but will give her the information if she finds any. She verbalized understanding. Lab Results     None          Care Coordination Interventions    Program Enrollment: Complex Care  Referral from Primary Care Provider: Yes  Suggested Interventions and [address]: In Process  Meals on Wheels: In Process  Medi Set or Pill Pack: In Process  Social Work: Completed         Goals Addressed    None         Prior to Admission medications    Medication Sig Start Date End Date Taking?  Authorizing Provider   budesonide-formoterol (SYMBICORT) 80-4.5 MCG/ACT AERO Inhale 2 puffs into the lungs 2 times daily 5/26/22   Andrea Donovan MD   lisinopril (PRINIVIL;ZESTRIL) 5 MG tablet Take 1 tablet by mouth daily 4/19/22   Andrea Donovan MD   PSYLLIUM HUSK PO Take by mouth OTC    Historical Provider, MD   Probiotic, Lactobacillus, CAPS Take by mouth OTC    Historical Provider, MD   fluticasone (FLONASE) 50 MCG/ACT nasal spray 1 spray by Each Nostril route daily Taking PRN    Historical Provider, MD   cephALEXin (KEFLEX) 500 MG capsule Take 500 mg by mouth 4 times daily Prescribed by Wilfrid Verdugo for skin infection  Reports she is taking one pill twice a day    Historical Provider, MD   triamcinolone (KENALOG) 0.1 % ointment Apply topically 2 times daily Using PRN    Historical Provider, MD   vitamin D3 (CHOLECALCIFEROL) 25 MCG (1000 UT) TABS tablet Take 1 tablet by mouth daily 3/10/22   [facility] Timbo Ramsey MD   mirabegron (MYRBETRIQ) 50 MG TB24 Take 50 mg by mouth daily Pt gets samples 3/10/22   Jose Webster MD   levothyroxine (SYNTHROID) 25 MCG tablet Once daily  Patient taking differently: Once daily   filled 10/29/2021--has 15 pills left  Filled 3/14/2022 3/10/22   Jose Webster MD   omeprazole (PRILOSEC) 20 MG delayed release capsule TAKE 1 CAPSULE BY MOUTH ONCE DAILY IN THE MORNING BEFORE BREAKFAST 3/10/22   Jose Webster MD   montelukast (SINGULAIR) 10 MG tablet Take 1 tablet by mouth nightly 3/10/22   Jose Webster MD   albuterol (PROVENTIL) (2.5 MG/3ML) 0.083% nebulizer solution Take 3 mLs by nebulization every 6 hours as needed for Wheezing 2/25/22   Jose Webster MD   budesonide-formoterol [facility]) 160-4.5 MCG/ACT AERO Inhale 2 puffs into the lungs 2 times daily  Patient not taking: Reported on 5/26/2022 2/8/22   Jose Webster MD   albuterol sulfate  (90 Base) MCG/ACT inhaler INHALE 2 PUFFS BY MOUTH INTO LUNGS TWICE DAILY AS NEEDED FOR WHEEZING (NO  MORE  THAN  4  TIMES  DAILY) 2/8/22   Jose Webster MD   amLODIPine (NORVASC) 10 MG tablet 1 tablet daily 2/8/22   Jose Webster MD   Handicap Placard MISC by Does not apply route Permanent - 5 years  Diagnosis- ankle fracture, OA 2/8/22   Jose Webster MD   QUEtiapine (SEROQUEL) 200 MG tablet Take 200 mg by mouth daily Last filled 11/8/2021 90 day supply,   Take 3 tablets once daily--600 mg dose    Historical Provider, MD   FLUoxetine (PROZAC) 40 MG capsule Take 40 mg by mouth daily Last filled 1/24/2022    Historical Provider, MD   ipratropium (ATROVENT) 0.03 % nasal spray 2 sprays by Nasal route 3 times daily as needed for Rhinitis     Historical Provider, MD   lithium 300 MG capsule Take 300 mg by mouth 2 times daily (with meals).     Historical Provider, MD       Future Appointments   Date Time Provider Ekta Tapia   6/15/2022  1:30 PM Jose Webster MD [facility] Claudette Nani

## 2022-06-14 ENCOUNTER — CARE COORDINATION (OUTPATIENT)
Dept: CARE COORDINATION | Age: 67
End: 2022-06-14

## 2022-06-14 NOTE — CARE COORDINATION
attempted to contact Dulce.  was unable to leave message due to mailbox full. Plan:    will follow up with Dulce regarding senior meals.  will remind Megan Abrams to contact Marly Erickson to arrange and pay for meal delivery.

## 2022-06-15 ENCOUNTER — CARE COORDINATION (OUTPATIENT)
Dept: CARE COORDINATION | Age: 67
End: 2022-06-15

## 2022-06-15 ENCOUNTER — OFFICE VISIT (OUTPATIENT)
Dept: INTERNAL MEDICINE | Age: 67
End: 2022-06-15
Payer: MEDICARE

## 2022-06-15 VITALS
BODY MASS INDEX: 32.95 KG/M2 | DIASTOLIC BLOOD PRESSURE: 79 MMHG | TEMPERATURE: 98.1 F | WEIGHT: 198 LBS | SYSTOLIC BLOOD PRESSURE: 119 MMHG | HEART RATE: 93 BPM

## 2022-06-15 DIAGNOSIS — N32.81 OAB (OVERACTIVE BLADDER): ICD-10-CM

## 2022-06-15 DIAGNOSIS — E03.9 HYPOTHYROIDISM, UNSPECIFIED TYPE: Primary | ICD-10-CM

## 2022-06-15 DIAGNOSIS — J45.30 MILD PERSISTENT ASTHMA WITHOUT COMPLICATION: ICD-10-CM

## 2022-06-15 DIAGNOSIS — K76.0 FATTY LIVER: ICD-10-CM

## 2022-06-15 DIAGNOSIS — K21.9 GASTROESOPHAGEAL REFLUX DISEASE, UNSPECIFIED WHETHER ESOPHAGITIS PRESENT: ICD-10-CM

## 2022-06-15 DIAGNOSIS — Z12.31 ENCOUNTER FOR SCREENING MAMMOGRAM FOR MALIGNANT NEOPLASM OF BREAST: ICD-10-CM

## 2022-06-15 DIAGNOSIS — I10 ESSENTIAL HYPERTENSION: ICD-10-CM

## 2022-06-15 PROCEDURE — 1036F TOBACCO NON-USER: CPT | Performed by: INTERNAL MEDICINE

## 2022-06-15 PROCEDURE — G8399 PT W/DXA RESULTS DOCUMENT: HCPCS | Performed by: INTERNAL MEDICINE

## 2022-06-15 PROCEDURE — 99214 OFFICE O/P EST MOD 30 MIN: CPT | Performed by: INTERNAL MEDICINE

## 2022-06-15 PROCEDURE — G8417 CALC BMI ABV UP PARAM F/U: HCPCS | Performed by: INTERNAL MEDICINE

## 2022-06-15 PROCEDURE — 3017F COLORECTAL CA SCREEN DOC REV: CPT | Performed by: INTERNAL MEDICINE

## 2022-06-15 PROCEDURE — 1090F PRES/ABSN URINE INCON ASSESS: CPT | Performed by: INTERNAL MEDICINE

## 2022-06-15 PROCEDURE — G8427 DOCREV CUR MEDS BY ELIG CLIN: HCPCS | Performed by: INTERNAL MEDICINE

## 2022-06-15 PROCEDURE — 1123F ACP DISCUSS/DSCN MKR DOCD: CPT | Performed by: INTERNAL MEDICINE

## 2022-06-15 RX ORDER — AMLODIPINE BESYLATE 10 MG/1
TABLET ORAL
Qty: 90 TABLET | Refills: 3 | Status: SHIPPED | OUTPATIENT
Start: 2022-06-15

## 2022-06-15 RX ORDER — ACETAMINOPHEN 500 MG
1 TABLET ORAL DAILY
Qty: 30 CAPSULE | Refills: 6 | Status: SHIPPED | OUTPATIENT
Start: 2022-06-15

## 2022-06-15 RX ORDER — MONTELUKAST SODIUM 10 MG/1
10 TABLET ORAL NIGHTLY
Qty: 28 TABLET | Refills: 3 | Status: SHIPPED | OUTPATIENT
Start: 2022-06-15 | End: 2022-11-03

## 2022-06-15 RX ORDER — LEVOTHYROXINE SODIUM 0.03 MG/1
TABLET ORAL
Qty: 28 TABLET | Refills: 3 | Status: SHIPPED | OUTPATIENT
Start: 2022-06-15

## 2022-06-15 RX ORDER — OMEPRAZOLE 20 MG/1
CAPSULE, DELAYED RELEASE ORAL
Qty: 28 CAPSULE | Refills: 3 | Status: SHIPPED | OUTPATIENT
Start: 2022-06-15 | End: 2022-11-03

## 2022-06-15 ASSESSMENT — ENCOUNTER SYMPTOMS
COUGH: 0
CONSTIPATION: 0
WHEEZING: 0
BACK PAIN: 0
ABDOMINAL PAIN: 0
SINUS PAIN: 0
SORE THROAT: 0
PHOTOPHOBIA: 0
SHORTNESS OF BREATH: 0

## 2022-06-15 NOTE — PROGRESS NOTES
[facility]    Progress Note    Date of patient's visit: 6/15/2022    Patient's Name:  Sherice Means    YOB: 1955            Patient Care Team:  Saad Roper MD as PCP - Violeta Mckee MD as PCP - [facility]  Lani Siddiqui MD as Consulting Physician (Gastroenterology)  Rosa Mauricio MD as Consulting Physician (Pulmonology)  Erasmo Bansal MD as Surgeon (Orthopedic Surgery)  Bisi Jennings MD as Consulting Physician (Infectious Diseases)  LAXMI Tapia as     REASON FOR VISIT: Routine outpatient follow     Chief Complaint   Patient presents with    Hypertension         HISTORY OF PRESENT ILLNESS:    History was obtained from the patient. Sherice Means is a 69 y.o. is here for follow-up. She brought a couple of her blister packs with her but it does not contain levothyroxine in it. She is not sure how long she has been out. She should have refills per her med list.  She says she is scheduled to see an endocrinologist for her diabetes next week. She does not have diabetes though so I am not sure about her history. She states she had some surgery for her urge incontinence few months ago with her urologist.  She is requesting some booster pads to go in her underwear to help with occasional leaks. She states she is getting prescription for Myrbetriq from her urologist.  She has mild asthma. She has allergies. She is requesting a refill on her Singulair. She is no longer following up with the pulmonologist that she did in the past.  She has XAVIER and is supposedly on CPAP. Blood pressure is controlled on current medications. She had mild ALLEY a few months ago. Repeat BMP is pending  She had a normal DEXA few months ago. She is due for mammogram this year.       Dexa 2022  FINDINGS:   LUMBAR SPINE:       The bone mineral density in the lumbar spine including the L1-L4 levels is measured at 1.268 g/cm2, which corresponds to a T-score of 0.8 and a Z-score   of 1.0.  This is within the normal range by WHO criteria.  This reflects a   4.6% decrease compared to prior.       LEFT HIP:       The bone mineral density in the total hip is measured at 1.064 g/cm2   corresponding to a T-score of 0.4 and a Z-score of 0.3.  This is within the   normal range by [facility] criteria.       The bone mineral density of the femoral neck is measured at 0.945 g/cm2   corresponding to a T-score of -0.7 and a Z-score of -0.5.  This is within the   normal range by WHO criteria.       10 year probability major osteoporotic fracture 3.4%.  Hip 0.2%.           Impression   Normal by WHO criteria. Past Medical History:   Diagnosis Date    Anxiety     Arrhythmia     Asthma     Bipolar 1 disorder ([facility] 2/14/2019    Bipolar disorder ([facility]     Chronic diarrhea 2/14/2019    COPD (chronic obstructive pulmonary disease) (HCC)     Depression     Essential hypertension     GERD (gastroesophageal reflux disease)     GERD (gastroesophageal reflux disease)     History of stroke 8/9/2018    Hyperlipidemia     Hypertension     Hypothyroidism     Mild persistent asthma without complication 1/09/8054    OAB (overactive bladder)     Obesity (BMI 30-39. 9) 8/19/2016    Osteoarthritis     Poor historian     Pulmonary fibrosis (HCC)     sjogrens syndrome    Pulmonary hypertension (HCC)     Pure hypercholesterolemia     Sleep apnea     cpap    Stroke ([facility]     Unspecified sleep apnea     on cpap    Urinary incontinence        Past Surgical History:   Procedure Laterality Date    ANKLE SURGERY Right 09/11/2019    COLONOSCOPY  04/2015    COLONOSCOPY  2018        CYSTOSCOPY N/A 3/2/2022    CYSTOSCOPY WITH BOTOX  (100 units) performed by Saima Alvarenga MD at [facility] Left     Stye removal    TOOTH EXTRACTION      x 2         ALLERGIES      Allergies   Allergen Reactions    Motrin [Ibuprofen]     Aspirin Rash    Blue Dyes (Parenteral) Rash    Hctz [Hydrochlorothiazide] Rash     Fixed drug reaction    Sulfa Antibiotics Rash    Tetracyclines & Related Rash       MEDICATIONS:      Current Outpatient Medications on File Prior to Visit   Medication Sig Dispense Refill    budesonide-formoterol (SYMBICORT) 80-4.5 MCG/ACT AERO Inhale 2 puffs into the lungs 2 times daily 10.2 g 3    lisinopril (PRINIVIL;ZESTRIL) 5 MG tablet Take 1 tablet by mouth daily 90 tablet 1    PSYLLIUM HUSK PO Take by mouth OTC      Probiotic, Lactobacillus, CAPS Take by mouth OTC      fluticasone (FLONASE) 50 MCG/ACT nasal spray 1 spray by Each Nostril route daily Taking PRN      triamcinolone (KENALOG) 0.1 % ointment Apply topically 2 times daily Using PRN      vitamin D3 (CHOLECALCIFEROL) 25 MCG (1000 UT) TABS tablet Take 1 tablet by mouth daily 28 tablet 3    levothyroxine (SYNTHROID) 25 MCG tablet Once daily (Patient taking differently: Once daily   filled 10/29/2021--has 15 pills left  Filled 3/14/2022) 28 tablet 3    omeprazole (PRILOSEC) 20 MG delayed release capsule TAKE 1 CAPSULE BY MOUTH ONCE DAILY IN THE MORNING BEFORE BREAKFAST 28 capsule 3    montelukast (SINGULAIR) 10 MG tablet Take 1 tablet by mouth nightly 28 tablet 3    albuterol (PROVENTIL) (2.5 MG/3ML) 0.083% nebulizer solution Take 3 mLs by nebulization every 6 hours as needed for Wheezing 120 each 3    albuterol sulfate  (90 Base) MCG/ACT inhaler INHALE 2 PUFFS BY MOUTH INTO LUNGS TWICE DAILY AS NEEDED FOR WHEEZING (NO  MORE  THAN  4  TIMES  DAILY) 9 g 5    amLODIPine (NORVASC) 10 MG tablet 1 tablet daily 90 tablet 3    QUEtiapine (SEROQUEL) 200 MG tablet Take 200 mg by mouth daily Last filled 11/8/2021 90 day supply,   Take 3 tablets once daily--600 mg dose      FLUoxetine (PROZAC) 40 MG capsule Take 40 mg by mouth daily Last filled 1/24/2022      ipratropium (ATROVENT) 0.03 % nasal spray 2 sprays by Nasal route 3 times daily as needed for Rhinitis       lithium 300 MG capsule Take 300 mg by mouth 2 times daily (with meals).  cephALEXin (KEFLEX) 500 MG capsule Take 500 mg by mouth 4 times daily Prescribed by Jacek Luu for skin infection  Reports she is taking one pill twice a day      mirabegron (MYRBETRIQ) 50 MG TB24 Take 50 mg by mouth daily Pt gets samples (Patient not taking: Reported on 6/15/2022) 28 tablet 3     No current facility-administered medications on file prior to visit. HISTORY    Reviewed and no change from previous record. Dulce  reports that she has never smoked. She has never used smokeless tobacco.    FAMILY HISTORY:    Reviewed and No change from previous visit    HEALTH MAINTENANCE DUE:    There are no preventive care reminders to display for this patient. REVIEW OF SYSTEMS:    12 point review of symptoms completed and found to be normal except noted in the HPI    Review of Systems   Constitutional: Negative for fatigue and unexpected weight change. HENT: Positive for congestion. Negative for sinus pain, sneezing and sore throat. Eyes: Negative for photophobia and visual disturbance. Respiratory: Negative for cough, shortness of breath and wheezing. Cardiovascular: Negative for chest pain, palpitations and leg swelling. Gastrointestinal: Negative for abdominal pain and constipation. Endocrine: Negative for polydipsia and polyuria. Genitourinary: Positive for urgency. Negative for dysuria. Musculoskeletal: Positive for arthralgias. Negative for back pain. Allergic/Immunologic: Positive for environmental allergies. Negative for immunocompromised state. Neurological: Negative for dizziness, syncope, weakness, numbness and headaches.         PHYSICAL EXAM:     Vitals:    06/15/22 1327   BP: 119/79   Site: Left Upper Arm   Position: Sitting   Cuff Size: Large Adult   Pulse: 93   Temp: 98.1 °F (36.7 °C)   TempSrc: Infrared   Weight: 198 lb (89.8 kg)     Body mass index is 32.95 kg/m². BP Readings from Last 3 Encounters:   06/15/22 119/79   05/26/22 137/84   04/19/22 (!) 168/89        Wt Readings from Last 3 Encounters:   06/15/22 198 lb (89.8 kg)   05/26/22 202 lb (91.6 kg)   04/19/22 204 lb 6.4 oz (92.7 kg)       Physical Exam  Vitals and nursing note reviewed. Constitutional:       Appearance: Normal appearance. HENT:      Head: Normocephalic and atraumatic. Eyes:      Extraocular Movements: Extraocular movements intact. Conjunctiva/sclera: Conjunctivae normal.      Pupils: Pupils are equal, round, and reactive to light. Cardiovascular:      Rate and Rhythm: Normal rate and regular rhythm. Heart sounds: No murmur heard. Pulmonary:      Effort: Pulmonary effort is normal.      Breath sounds: Normal breath sounds. No wheezing. Musculoskeletal:      Right lower leg: No edema. Left lower leg: No edema. Neurological:      General: No focal deficit present. Mental Status: She is alert and oriented to person, place, and time.                LABORATORY FINDINGS:    CBC:  Lab Results   Component Value Date    WBC 5.6 09/18/2018    HGB 11.5 09/18/2018     09/18/2018     05/02/2012     BMP:    Lab Results   Component Value Date     02/08/2022    K 4.5 02/08/2022     02/08/2022    CO2 23 02/08/2022    BUN 15 02/08/2022    CREATININE 1.06 02/08/2022    GLUCOSE 97 02/08/2022    GLUCOSE 119 05/02/2012     HEMOGLOBIN A1C:   Lab Results   Component Value Date    LABA1C 5.5 08/31/2021     MICROALBUMIN URINE:   Lab Results   Component Value Date    MICROALBUR <12 11/10/2016     FASTING LIPID PANEL:  Lab Results   Component Value Date    CHOL 204 (H) 02/08/2022    HDL 49 02/08/2022    TRIG 158 (H) 02/08/2022     Lab Results   Component Value Date    LDLCHOLESTEROL 123 02/08/2022       LIVER PROFILE:  Lab Results   Component Value Date    ALT 30 02/08/2022    AST 29 02/08/2022    PROT 7.5 02/08/2022    BILITOT 0.25 02/08/2022 BILIDIR 0.14 05/21/2019    LABALBU 4.4 02/08/2022    LABALBU 4.8 05/02/2012      THYROID FUNCTION:   Lab Results   Component Value Date    TSH 2.53 02/08/2022      URINEANALYSIS: No results found for: LABURIN  ASSESSMENT AND PLAN:    1. Hypothyroidism, unspecified type  Get med list from pharmacy    - levothyroxine (SYNTHROID) 25 MCG tablet; Once daily  Dispense: 28 tablet; Refill: 3    2. Essential hypertension  Continue Lisinopril and Amlodipine    - amLODIPine (NORVASC) 10 MG tablet; 1 tablet daily  Dispense: 90 tablet; Refill: 3    3. Mild persistent asthma without complication  Albuterol prn    - montelukast (SINGULAIR) 10 MG tablet; Take 1 tablet by mouth nightly  Dispense: 28 tablet; Refill: 3    4. Fatty liver  Follow up with GI    5. Gastroesophageal reflux disease, unspecified whether esophagitis present    - omeprazole (PRILOSEC) 20 MG delayed release capsule; TAKE 1 CAPSULE BY MOUTH ONCE DAILY IN THE MORNING BEFORE BREAKFAST  Dispense: 28 capsule; Refill: 3    6. Encounter for screening mammogram for malignant neoplasm of breast    - [facility] DIGITAL SCREEN W OR WO CAD BILATERAL; Future    7. OAB (overactive bladder)    - Incontinence Supply Disposable (SELECT BOOSTER PADS) MISC; Use 3-4/day  Dispense: 100 each; Refill: 11          FOLLOW UP AND INSTRUCTIONS:   Return in about 6 months (around 12/15/2022). 1. Debere received counseling on the following healthy behaviors: nutrition, exercise and medication adherence    2. Reviewed prior labs and health maintenance. 3. Discussed use, benefit, and side effects of prescribed medications. Barriers to medication compliance addressed. All patient questions answered. Pt voiced understanding.        Eleni Fernandez  Attending Physician, [address] [place] [address], Internal Medicine Residency Program  [address] [place] [address]  6/15/2022, 1:43 PM

## 2022-06-15 NOTE — PATIENT INSTRUCTIONS
-Pt due for 6 month f/u in [place] [address]-- pt to call in [facility] to set up an appt--reminder in EPIC to contact patient as well--AVS given to patient    -Bloodwork orders given to patient, they will have them done before their next visit.     -Your doctor has ordered an Mammogram for you, please contact our scheduling department at [phone] to set up an appointment.    -Amari Canales

## 2022-06-16 NOTE — CARE COORDINATION
Ambulatory Care Coordination Note  6/15/2022  CM Risk Score: 7  Charlson 10 Year Mortality Risk Score: 79%     ACC: Jerry Seo, RN    Summary Note: Received voicemail and phone call from Anya. Francisco Mcmullen asking about getting some incontinence products that she had heard about at the [facility]. She stated that Jin Lorenzo did a presentation and was talking about [employer] Essential Boost liners- heavy. She said Ab Newman was with [employer]. She wants to know how she can get some. Explained that Medicare doesn't usually cover incontinence products, but will check to see if her [employer] [place] plan covers them. [address] and was informed that medicare, nor the managed medicare products, do not cover this product. It is available at [facility] for $13 for a package of 25. Called Dulce back and informed her that medicare does not cover the product. Informed her where she could get them. She verbalized understanding. She asked for the phone numbers for the food orozco that came to the [facility] but she wasn't sure which one. Gave her the numbers for the [employer] and the [employer] [facility]. Lab Results     None          Care Coordination Interventions    Program Enrollment: Complex Care  Referral from Primary Care Provider: Yes  Suggested Interventions and [address]: In Process  Meals on Wheels: In Process  Medi Set or Pill Pack: In Process  Social Work: Completed         Goals Addressed    None         Prior to Admission medications    Medication Sig Start Date End Date Taking?  Authorizing Provider   Probiotic, Lactobacillus, CAPS Take 1 tablet by mouth daily OTC 6/15/22   Yehuda Estrada MD   montelukast (SINGULAIR) 10 MG tablet Take 1 tablet by mouth nightly 6/15/22   Yehuda Estrada MD   levothyroxine (SYNTHROID) 25 MCG tablet Once daily 6/15/22   Yehuda Estrada MD   omeprazole (PRILOSEC) 20 MG delayed release capsule TAKE 1 CAPSULE BY MOUTH ONCE DAILY IN THE MORNING BEFORE BREAKFAST 6/15/22   Hair Pedroza MD   amLODIPine (NORVASC) 10 MG tablet 1 tablet daily 6/15/22   Hair Pedroza MD   Incontinence Supply Disposable (SELECT BOOSTER PADS) MISC Use 3-4/day 6/15/22   Hair Pedroza MD   budesonide-formoterol (SYMBICORT) 80-4.5 MCG/ACT AERO Inhale 2 puffs into the lungs 2 times daily 5/26/22   Lashae King MD   lisinopril (PRINIVIL;ZESTRIL) 5 MG tablet Take 1 tablet by mouth daily 4/19/22   Lashae King MD   PSYLLIUM HUSK PO Take by mouth OTC    Historical Provider, MD   fluticasone (FLONASE) 50 MCG/ACT nasal spray 1 spray by Each Nostril route daily Taking PRN    Historical Provider, MD   triamcinolone (KENALOG) 0.1 % ointment Apply topically 2 times daily Using PRN    Historical Provider, MD   vitamin D3 (CHOLECALCIFEROL) 25 MCG (1000 UT) TABS tablet Take 1 tablet by mouth daily 3/10/22   Hair Pedroza MD   mirabegron (MYRBETRIQ) 50 MG TB24 Take 50 mg by mouth daily Pt gets samples 3/10/22   Hair Pedroza MD   albuterol (PROVENTIL) (2.5 MG/3ML) 0.083% nebulizer solution Take 3 mLs by nebulization every 6 hours as needed for Wheezing 2/25/22   Hair Pedroza MD   albuterol sulfate  (90 Base) MCG/ACT inhaler INHALE 2 PUFFS BY MOUTH INTO LUNGS TWICE DAILY AS NEEDED FOR WHEEZING (NO  MORE  THAN  4  TIMES  DAILY) 2/8/22   Hair Pedroza MD   QUEtiapine (SEROQUEL) 200 MG tablet Take 200 mg by mouth daily Last filled 11/8/2021 90 day supply,   Take 3 tablets once daily--600 mg dose    Historical Provider, MD   FLUoxetine (PROZAC) 40 MG capsule Take 40 mg by mouth daily Last filled 1/24/2022    Historical Provider, MD   ipratropium (ATROVENT) 0.03 % nasal spray 2 sprays by Nasal route 3 times daily as needed for Rhinitis     Historical Provider, MD   lithium 300 MG capsule Take 300 mg by mouth 2 times daily (with meals). Historical Provider, MD       No future appointments.

## 2022-06-17 ENCOUNTER — CARE COORDINATION (OUTPATIENT)
Dept: CARE COORDINATION | Age: 67
End: 2022-06-17

## 2022-06-17 NOTE — CARE COORDINATION
called and spoke with Stephanie Coley. Dulce reports that she was at the [facility]. Dulce reports that she spoke to Trey Grissom about getting the her door fixed. Dulce reports that she has picked a place out for meals and needs to speak with Trey Grissom about setting up a payment plan. Dulce reports that she is considering mobile meals.  strongly encouraged Dulce to contact Trey Grissom to start senior meal delivery. Plan:   Stephanie Coley will talk to Trey Grissom about [employer] and have them setup for delivery.

## 2022-06-21 ENCOUNTER — CARE COORDINATION (OUTPATIENT)
Dept: CARE COORDINATION | Age: 67
End: 2022-06-21

## 2022-06-21 NOTE — CARE COORDINATION
Ambulatory Care Coordination Note  6/21/2022  CM Risk Score: 7  Charlson 10 Year Mortality Risk Score: 79%     ACC: David Vaca RN    Summary Note: Dante Chaudhary called stating she was still having trouble getting meal delivery set up. She said she forgot to save the numbers for Mobile Meals and the [facility]. She said she can't use the Internet because she forgot her password. She asked for the phone numbers again. This is the 6th time she has requested the numbers for meals. Encouraged her to call and place an order then have Shelby Gear take care of the payment. Lab Results     None          Care Coordination Interventions    Program Enrollment: Complex Care  Referral from Primary Care Provider: Yes  Suggested Interventions and [address]: In Process  Meals on Wheels: In Process  Medi Set or Pill Pack: In Process  Social Work: Completed         Goals Addressed    None         Prior to Admission medications    Medication Sig Start Date End Date Taking?  Authorizing Provider   Probiotic, Lactobacillus, CAPS Take 1 tablet by mouth daily OTC 6/15/22   Nisha Madden MD   montelukast (SINGULAIR) 10 MG tablet Take 1 tablet by mouth nightly 6/15/22   Nisha Madden MD   levothyroxine (SYNTHROID) 25 MCG tablet Once daily 6/15/22   Nisha Madden MD   omeprazole (PRILOSEC) 20 MG delayed release capsule TAKE 1 CAPSULE BY MOUTH ONCE DAILY IN THE MORNING BEFORE BREAKFAST 6/15/22   Nisha Madden MD   amLODIPine (NORVASC) 10 MG tablet 1 tablet daily 6/15/22   Nisha Madden MD   Incontinence Supply Disposable (SELECT BOOSTER PADS) MISC Use 3-4/day 6/15/22   Nisha Madden MD   budesonide-formoterol (SYMBICORT) 80-4.5 MCG/ACT AERO Inhale 2 puffs into the lungs 2 times daily 5/26/22   Camila Rey MD   lisinopril (PRINIVIL;ZESTRIL) 5 MG tablet Take 1 tablet by mouth daily 4/19/22   Camila Rey MD   PSYLLIUM HUSK PO Take by mouth OTC    Historical Provider, MD   fluticasone (FLONASE) 50 MCG/ACT nasal spray 1 spray by Each Nostril route daily Taking PRN    Historical Provider, MD   triamcinolone (KENALOG) 0.1 % ointment Apply topically 2 times daily Using PRN    Historical Provider, MD   vitamin D3 (CHOLECALCIFEROL) 25 MCG (1000 UT) TABS tablet Take 1 tablet by mouth daily 3/10/22   Andrew Eric MD   mirabegron (MYRBETRIQ) 50 MG TB24 Take 50 mg by mouth daily Pt gets samples 3/10/22   Andrew Eric MD   albuterol (PROVENTIL) (2.5 MG/3ML) 0.083% nebulizer solution Take 3 mLs by nebulization every 6 hours as needed for Wheezing 2/25/22   Andrew Eric MD   albuterol sulfate  (90 Base) MCG/ACT inhaler INHALE 2 PUFFS BY MOUTH INTO LUNGS TWICE DAILY AS NEEDED FOR WHEEZING (NO  MORE  THAN  4  TIMES  DAILY) 2/8/22   Andrew Eric MD   QUEtiapine (SEROQUEL) 200 MG tablet Take 200 mg by mouth daily Last filled 11/8/2021 90 day supply,   Take 3 tablets once daily--600 mg dose    Historical Provider, MD   FLUoxetine (PROZAC) 40 MG capsule Take 40 mg by mouth daily Last filled 1/24/2022    Historical Provider, MD   ipratropium (ATROVENT) 0.03 % nasal spray 2 sprays by Nasal route 3 times daily as needed for Rhinitis     Historical Provider, MD   lithium 300 MG capsule Take 300 mg by mouth 2 times daily (with meals). Historical Provider, MD       No future appointments.

## 2022-06-27 ENCOUNTER — CARE COORDINATION (OUTPATIENT)
Dept: CARE COORDINATION | Age: 67
End: 2022-06-27

## 2022-06-28 ENCOUNTER — CARE COORDINATION (OUTPATIENT)
Dept: CARE COORDINATION | Age: 67
End: 2022-06-28

## 2022-06-28 NOTE — CARE COORDINATION
attempted to contact Dulce. No answer and VM was full.  will make one more attempt to make contact with Dulce regarding senior meals. Plan:   Opal Arevalo will arrange senior meals to be delivered.

## 2022-06-28 NOTE — CARE COORDINATION
Ambulatory Care Coordination Note  6/28/2022  CM Risk Score: 7  Charlson 10 Year Mortality Risk Score: 79%     ACC: Valery Castro, ADEEL    Summary Note: Received a voicemail from [employer] stating that she was going to be out of medications Monday night. She is asking [facility] to call Jessica Howell about the meds. Dulce called again, stating that Jessica Howell picked up the meds 17 days ago and was supposed to bring them over on Sunday. She said she hasn't heard from him and she is out of her daily meds. Called her pharmacy and was informed that Jessica Howell did  the medications 11 days ago. Attempted to reach Jessica Howell and left a message on his voicemail, with call back number. Jessica Howell returned call. He said he got stuck at another client's home on Sunday, but plans to drop the medications off to [facility] today. Suggested that Jessica Howell talk to the pharmacy about payment options so they can deliver Dulce's medications on time and he could pay after the fact, if he had to. Jessica Howlel also stated that he had talked to [employer], last week, about the delivered meals. He said the Val[place] were less expensive and Dulce had enough to get them. He suggested she get a week of meals to see if she is going to like them. Lab Results     None          Care Coordination Interventions    Program Enrollment: Complex Care  Referral from Primary Care Provider: Yes  Suggested Interventions and [facility] [address]: In Process  Meals on Wheels: In Process  Medi Set or Pill Pack: In Process  Social Work: Completed         Goals Addressed    None         Prior to Admission medications    Medication Sig Start Date End Date Taking?  Authorizing Provider   Probiotic, Lactobacillus, CAPS Take 1 tablet by mouth daily OTC 6/15/22   Rosemary Stapleton MD   montelukast (SINGULAIR) 10 MG tablet Take 1 tablet by mouth nightly 6/15/22   Rosemary Stapleton MD   levothyroxine (SYNTHROID) 25 MCG tablet Once daily 6/15/22   Seema Gaming Sigrid Santizo MD   omeprazole (PRILOSEC) 20 MG delayed release capsule TAKE 1 CAPSULE BY MOUTH ONCE DAILY IN THE MORNING BEFORE BREAKFAST 6/15/22   Jonny Magaña MD   amLODIPine (NORVASC) 10 MG tablet 1 tablet daily 6/15/22   Jonny Magaña MD   Incontinence Supply Disposable (SELECT BOOSTER PADS) MISC Use 3-4/day 6/15/22   Jonny Magaña MD   budesonide-formoterol (SYMBICORT) 80-4.5 MCG/ACT AERO Inhale 2 puffs into the lungs 2 times daily 5/26/22   General Breaker, MD   lisinopril (PRINIVIL;ZESTRIL) 5 MG tablet Take 1 tablet by mouth daily 4/19/22   General BreakMD jesus   PSYLLIUM HUSK PO Take by mouth OTC    Historical Provider, MD   fluticasone (FLONASE) 50 MCG/ACT nasal spray 1 spray by Each Nostril route daily Taking PRN    Historical Provider, MD   triamcinolone (KENALOG) 0.1 % ointment Apply topically 2 times daily Using PRN    Historical Provider, MD   vitamin D3 (CHOLECALCIFEROL) 25 MCG (1000 UT) TABS tablet Take 1 tablet by mouth daily 3/10/22   Jonny Magaña MD   mirabegron (MYRBETRIQ) 50 MG TB24 Take 50 mg by mouth daily Pt gets samples 3/10/22   Jonny Magaña MD   albuterol (PROVENTIL) (2.5 MG/3ML) 0.083% nebulizer solution Take 3 mLs by nebulization every 6 hours as needed for Wheezing 2/25/22   Jonny Magaña MD   albuterol sulfate  (90 Base) MCG/ACT inhaler INHALE 2 PUFFS BY MOUTH INTO LUNGS TWICE DAILY AS NEEDED FOR WHEEZING (NO  MORE  THAN  4  TIMES  DAILY) 2/8/22   Jonny Magaña MD   QUEtiapine (SEROQUEL) 200 MG tablet Take 200 mg by mouth daily Last filled 11/8/2021 90 day supply,   Take 3 tablets once daily--600 mg dose    Historical Provider, MD   FLUoxetine (PROZAC) 40 MG capsule Take 40 mg by mouth daily Last filled 1/24/2022    Historical Provider, MD   ipratropium (ATROVENT) 0.03 % nasal spray 2 sprays by Nasal route 3 times daily as needed for Rhinitis     Historical Provider, MD   lithium 300 MG capsule Take 300 mg by mouth 2 times daily (with meals).     Historical Provider, MD       No future appointments.

## 2022-07-06 ENCOUNTER — CARE COORDINATION (OUTPATIENT)
Dept: CARE COORDINATION | Age: 67
End: 2022-07-06

## 2022-07-06 NOTE — CARE COORDINATION
attempted to contact Dulce.  was unable to leave a message due to mailbox full.  will attempt to make contact with Jakioris Beckie and guardian, Lilli Rodriguez.  will confirmed Lilli Rodriguez arranged meal delivery for Jakichina Campbellpily and has medications/bubble pack being delivered. Plan:   Lisa Butts will pick meal delivery program.   Lilli Rodriguez will arrange payment for meals. Lilli Rodriguez will setup delivery/payment plan with  . [employer] for medication.

## 2022-07-07 ENCOUNTER — CARE COORDINATION (OUTPATIENT)
Dept: CARE COORDINATION | Age: 67
End: 2022-07-07

## 2022-07-08 NOTE — CARE COORDINATION
spoke to [employer]. [employer] began to start complaining about Raciel Sweet, her guardian. Dulce reports that she has not been able to reach Raciel Sweet. Yury reports that she needs her depends, her monthly money, senior delivered meals, and her medication on time.  called Raciel Sweet on [address]. [employer], , and Raciel Sweet discussed the presenting issues. Depends are ordered. Raciel Sweet confirmed he mailed company paycheck for $104.00. Facility will mail depends once they received check. Raciel Sweet and Yury were agreeable for Dulce to have medication delivered by [facility]. [facility]. [employer] will have medication charged to her [employer] card. Racielcailin Sweet will then pay [employer] card. This will prevent having Racielcailin Ashbysiria drop check off check to [facility]. V's and dropping medications off to [facility]. Dulce was agreeable to be home after 4pm on 7/7 to receive check from Raciel Pillow. Plan:   Raciel Sweet will call [facility] and pay for 1 week of meals. Dulce will receive 4 month supply of depends. [employer] will contact [facility]'s and provide [employer] card information and medication will be delivered. Racielcailin Sweet will drop off monthly check 7/7. Racielcailin Sweet called  on 7/8 reports that he went to [place] [address] around [address] [facility] to deliver check and she was not home.

## 2022-07-11 NOTE — TELEPHONE ENCOUNTER
E-scribe request for Vit D . Please review and e-scribe if applicable. Next Visit Date:  No future appointments. Health Maintenance   Topic Date Due    DTaP/Tdap/Td vaccine (1 - Tdap) Never done    Pneumococcal 65+ years Vaccine (1 - PCV) 08/08/2022 (Originally 4/22/1961)    Shingles vaccine (1 of 2) 01/21/2023 (Originally 4/22/2003)    Breast cancer screen  08/24/2022    Flu vaccine (1) 09/01/2022    Depression Monitoring  04/07/2023    Annual Wellness Visit (AWV)  04/08/2023    Lipids  02/08/2027    Colorectal Cancer Screen  09/25/2028    DEXA (modify frequency per FRAX score)  Completed    COVID-19 Vaccine  Completed    Hepatitis C screen  Completed    Hepatitis A vaccine  Aged Out    Hepatitis B vaccine  Aged Out    Hib vaccine  Aged Out    Meningococcal (ACWY) vaccine  Aged Out               (applicable per patient's age: Cancer Screenings, Depression Screening, Fall Risk Screening, Immunizations)    Hemoglobin A1C (%)   Date Value   08/31/2021 5.5   12/21/2020 5.6   06/26/2019 5.2     Microalb/Crt. Ratio (mcg/mg creat)   Date Value   11/10/2016 9     LDL Cholesterol (mg/dL)   Date Value   02/08/2022 123     AST (U/L)   Date Value   02/08/2022 29     ALT (U/L)   Date Value   02/08/2022 30     BUN (mg/dL)   Date Value   02/08/2022 15      (goal A1C is < 7)   (goal LDL is <100) need 30-50% reduction from baseline     BP Readings from Last 3 Encounters:   06/15/22 119/79   05/26/22 137/84   04/19/22 (!) 168/89    (goal /80)      All Future Testing planned in [facility]:  Lab Frequency Next Occurrence   Basic Metabolic Panel Once 49/70/9154   AMOL DIGITAL SCREEN W OR WO CAD BILATERAL Once 06/15/2022   DEXA BONE DENSITY AXIAL SKELETON Once 03/17/2024            Patient Active Problem List:     Essential hypertension     Pure hypercholesterolemia     Urinary incontinence     Anxiety     Sleep apnea     GERD (gastroesophageal reflux disease)     Hypothyroidism     Obesity (BMI 30-39. 9)     Mild persistent asthma without complication     History of stroke     Chronic diarrhea     Bipolar 1 disorder (HCC)     Ankle fracture, right, closed, with routine healing, subsequent encounter

## 2022-07-12 RX ORDER — MELATONIN
Qty: 28 TABLET | Refills: 3 | Status: SHIPPED | OUTPATIENT
Start: 2022-07-12 | End: 2022-11-03

## 2022-07-19 ENCOUNTER — CARE COORDINATION (OUTPATIENT)
Dept: CARE COORDINATION | Age: 67
End: 2022-07-19

## 2022-07-25 ENCOUNTER — APPOINTMENT (OUTPATIENT)
Dept: GENERAL RADIOLOGY | Age: 67
End: 2022-07-25
Payer: MEDICARE

## 2022-07-25 ENCOUNTER — APPOINTMENT (OUTPATIENT)
Dept: CT IMAGING | Age: 67
End: 2022-07-25
Payer: MEDICARE

## 2022-07-25 ENCOUNTER — HOSPITAL ENCOUNTER (EMERGENCY)
Age: 67
Discharge: HOME | End: 2022-07-25
Attending: EMERGENCY MEDICINE
Payer: MEDICARE

## 2022-07-25 VITALS
WEIGHT: 199 LBS | DIASTOLIC BLOOD PRESSURE: 86 MMHG | TEMPERATURE: 98.1 F | OXYGEN SATURATION: 97 % | SYSTOLIC BLOOD PRESSURE: 131 MMHG | RESPIRATION RATE: 16 BRPM | HEIGHT: 65 IN | HEART RATE: 79 BPM | BODY MASS INDEX: 33.15 KG/M2

## 2022-07-25 DIAGNOSIS — G44.319 ACUTE POST-TRAUMATIC HEADACHE, NOT INTRACTABLE: ICD-10-CM

## 2022-07-25 DIAGNOSIS — M25.562 ACUTE PAIN OF LEFT KNEE: Primary | ICD-10-CM

## 2022-07-25 LAB
ABSOLUTE EOS #: 0.17 K/UL (ref 0–0.44)
ABSOLUTE IMMATURE GRANULOCYTE: 0.06 K/UL (ref 0–0.3)
ABSOLUTE LYMPH #: 2.04 K/UL (ref 1.1–3.7)
ABSOLUTE MONO #: 0.75 K/UL (ref 0.1–1.2)
ANION GAP SERPL CALCULATED.3IONS-SCNC: 10 MMOL/L (ref 9–17)
BASOPHILS # BLD: 1 % (ref 0–2)
BASOPHILS ABSOLUTE: 0.04 K/UL (ref 0–0.2)
BUN BLDV-MCNC: 14 MG/DL (ref 8–23)
CALCIUM SERPL-MCNC: 10.7 MG/DL (ref 8.6–10.4)
CHLORIDE BLD-SCNC: 108 MMOL/L (ref 98–107)
CO2: 23 MMOL/L (ref 20–31)
CREAT SERPL-MCNC: 1.03 MG/DL (ref 0.5–0.9)
EOSINOPHILS RELATIVE PERCENT: 3 % (ref 1–4)
GFR AFRICAN AMERICAN: >60 ML/MIN
GFR NON-AFRICAN AMERICAN: 53 ML/MIN
GFR SERPL CREATININE-BSD FRML MDRD: ABNORMAL ML/MIN/{1.73_M2}
GLUCOSE BLD-MCNC: 96 MG/DL (ref 70–99)
HCT VFR BLD CALC: 36.6 % (ref 36.3–47.1)
HEMOGLOBIN: 11.6 G/DL (ref 11.9–15.1)
IMMATURE GRANULOCYTES: 1 %
LYMPHOCYTES # BLD: 33 % (ref 24–43)
MCH RBC QN AUTO: 27.6 PG (ref 25.2–33.5)
MCHC RBC AUTO-ENTMCNC: 31.7 G/DL (ref 28.4–34.8)
MCV RBC AUTO: 86.9 FL (ref 82.6–102.9)
MONOCYTES # BLD: 12 % (ref 3–12)
NRBC AUTOMATED: 0 PER 100 WBC
PDW BLD-RTO: 14.9 % (ref 11.8–14.4)
PLATELET # BLD: 230 K/UL (ref 138–453)
PMV BLD AUTO: 10.6 FL (ref 8.1–13.5)
POTASSIUM SERPL-SCNC: 4 MMOL/L (ref 3.7–5.3)
RBC # BLD: 4.21 M/UL (ref 3.95–5.11)
RBC # BLD: ABNORMAL 10*6/UL
SEG NEUTROPHILS: 50 % (ref 36–65)
SEGMENTED NEUTROPHILS ABSOLUTE COUNT: 3.22 K/UL (ref 1.5–8.1)
SODIUM BLD-SCNC: 141 MMOL/L (ref 135–144)
WBC # BLD: 6.3 K/UL (ref 3.5–11.3)

## 2022-07-25 PROCEDURE — 93005 ELECTROCARDIOGRAM TRACING: CPT

## 2022-07-25 PROCEDURE — 73562 X-RAY EXAM OF KNEE 3: CPT

## 2022-07-25 PROCEDURE — 70450 CT HEAD/BRAIN W/O DYE: CPT

## 2022-07-25 PROCEDURE — 99285 EMERGENCY DEPT VISIT HI MDM: CPT

## 2022-07-25 PROCEDURE — 85025 COMPLETE CBC W/AUTO DIFF WBC: CPT

## 2022-07-25 PROCEDURE — 80048 BASIC METABOLIC PNL TOTAL CA: CPT

## 2022-07-25 ASSESSMENT — PAIN DESCRIPTION - LOCATION: LOCATION: HEAD;KNEE

## 2022-07-25 ASSESSMENT — PAIN SCALES - GENERAL: PAINLEVEL_OUTOF10: 9

## 2022-07-25 ASSESSMENT — PAIN - FUNCTIONAL ASSESSMENT: PAIN_FUNCTIONAL_ASSESSMENT: 0-10

## 2022-07-25 ASSESSMENT — PAIN DESCRIPTION - DESCRIPTORS: DESCRIPTORS: ACHING

## 2022-07-26 LAB
EKG ATRIAL RATE: 82 BPM
EKG P AXIS: 58 DEGREES
EKG P-R INTERVAL: 160 MS
EKG Q-T INTERVAL: 390 MS
EKG QRS DURATION: 84 MS
EKG QTC CALCULATION (BAZETT): 455 MS
EKG R AXIS: 35 DEGREES
EKG T AXIS: 40 DEGREES
EKG VENTRICULAR RATE: 82 BPM

## 2022-07-26 NOTE — ED NOTES
Pt reports to the ED after a fall last night. Pt states she was at a restaurant last night (7/24) around 1900 and tripped over a mat on the ground. Pt admits to hitting her head but denies LOC. Pt has complaints of HA and L knee pain. Pt denies being on blood thinners. PMS intact. Pt states she drove herself here. Pt does not have any other complaints at this time. Pt is A&O x4 and speaking in complete sentences. Pt is resting in bed comfortably, NAD noted. Pt denies chest pain, SOB, N/V/D. EKG obtained.  Pt placed on full cardiac monitor       Lorne Cooper RN  07/25/22 6427
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
## 2022-07-26 NOTE — ED PROVIDER NOTES
[facility] [place], [place]     Emergency Department     Faculty Note/ Attestation      Pt Name: Govind Mendez                                       MRN: [id number]  Lisagffabi 1955  Date of evaluation: 7/25/2022    Patients PCP:    Jasen Davis MD    Attestation  I performed a history and physical examination of the patient and discussed management with the resident. I reviewed the residents note and agree with the documented findings and plan of care. Any areas of disagreement are noted on the chart. I was personally present for the key portions of any procedures. I have documented in the chart those procedures where I was not present during the key portions. I have reviewed the emergency nurses triage note. I agree with the chief complaint, past medical history, past surgical history, allergies, medications, social and family history as documented unless otherwise noted below. For Physician Assistant/ Nurse Practitioner cases/documentation I have personally evaluated this patient and have completed at least one if not all key elements of the E/M (history, physical exam, and MDM). Additional findings are as noted. Initial Screens:             Vitals:    Vitals:    07/25/22 2004   BP: 129/84   Pulse: 91   Resp: 16   Temp: 98.1 °F (36.7 °C)   SpO2: 96%   Weight: 199 lb (90.3 kg)   Height: 5' 5\" (1.651 m)       CHIEF COMPLAINT       Chief Complaint   Patient presents with    Fall     +loc    Headache    Knee Pain       The pt is a 68 YO F with fall today with headache. The pt fell last night got sandals cought in rug fell and struck her head. The pt pt hit back of head notes that the pt Pos LOC noted. The pt needed help getting up still having severe head pain and knee pain. THe pt able to bear weight and.           EMERGENCY DEPARTMENT COURSE:     -------------------------  BP: 129/84, Temp: 98.1 °F (36.7 °C), Heart Rate: 91, Resp: 16  Physical Exam  Constitutional:       Appearance:

## 2022-07-26 NOTE — ED NOTES
The following labs labeled with pt sticker and tubed to lab:     [x] Blue     [x] Lavender   [] on ice  [x] Green/yellow  [] Green/black [] on ice  [] Yellow  [] Red  [] Pink      [] COVID-19 swab    [] Rapid  [] PCR  [] Flu swab  [] Peds Viral Panel     [] Urine Sample  [] Pelvic Cultures  [] Blood Cultures            Austyn Hemphill, ADEEL  07/25/22 0969

## 2022-07-26 NOTE — DISCHARGE INSTRUCTIONS
You have been seen in the ER today for headache, knee pain and work-up after a fall. At this time, life threatening pathology and pathology requiring further attention has been ruled out     While you were here, we did the following:  *CBC, BMP  EKG  CT of the head  Xrays of the left knee      If you begin to experience any symptoms such as chest pain shortness of breath nausea vomiting dizziness drowsiness abdominal pain loss of consciousness or any other symptoms you find concerning please return to the ED for follow-up evaluation. If you have been given medication please take them as prescribed. Do not take more medication than recommended at any given time. Finish all antibiotic    Please follow-up with your primary care provider within 5 to 7 days for continued care. Please feel free return to the hospital if your symptoms worsen or any new concerning symptoms develop. Follow-up with your primary care physician as needed for all other the concerns.

## 2022-07-28 ENCOUNTER — CARE COORDINATION (OUTPATIENT)
Dept: CARE COORDINATION | Age: 67
End: 2022-07-28

## 2022-07-29 NOTE — CARE COORDINATION
attempted to contact Dulce.  continued to try and make contact with Dulce regarding social issues.  left message requesting a call back to [phone]. Plan:   Aron Sosa will provide [facility] with [facility] number for medications to be delivered every month. Dulce will order meals from [facility] and [facility] will mail check. Dulce/Aaron will follow up on supplies.

## 2022-08-01 ENCOUNTER — HOSPITAL ENCOUNTER (OUTPATIENT)
Age: 67
Setting detail: SPECIMEN
Discharge: HOME | End: 2022-08-01

## 2022-08-01 LAB
CALCIUM URINE: 3.4 MG/DL
CALCIUM, URINE: 18 MG/24 H (ref 20–275)
CREAT SERPL-MCNC: 0.95 MG/DL (ref 0.5–0.9)
CREATININE CLEARANCE: 62.6 ML/MIN/BSA (ref 71–151)
CREATININE URINE: 129.6 MG/DL (ref 28–217)
HOURS COLLECTED: 24 H
LENGTH OF COLLECTION: 24 H
PATIENT HEIGHT: 65 CM
PATIENT WEIGHT: 199 KG
VOLUME: 536 ML
VOLUME: 536 ML

## 2022-08-02 ENCOUNTER — OFFICE VISIT (OUTPATIENT)
Dept: INTERNAL MEDICINE | Age: 67
End: 2022-08-02
Payer: MEDICARE

## 2022-08-02 VITALS
SYSTOLIC BLOOD PRESSURE: 129 MMHG | WEIGHT: 204 LBS | HEART RATE: 90 BPM | BODY MASS INDEX: 33.99 KG/M2 | TEMPERATURE: 98.3 F | OXYGEN SATURATION: 96 % | DIASTOLIC BLOOD PRESSURE: 75 MMHG | HEIGHT: 65 IN

## 2022-08-02 DIAGNOSIS — E03.9 HYPOTHYROIDISM, UNSPECIFIED TYPE: ICD-10-CM

## 2022-08-02 DIAGNOSIS — I10 ESSENTIAL HYPERTENSION: ICD-10-CM

## 2022-08-02 DIAGNOSIS — E83.52 HYPERCALCEMIA: ICD-10-CM

## 2022-08-02 DIAGNOSIS — Z23 NEED FOR PROPHYLACTIC VACCINATION AGAINST DIPHTHERIA-TETANUS-PERTUSSIS (DTP): ICD-10-CM
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
## 2022-08-02 PROCEDURE — 99211 OFF/OP EST MAY X REQ PHY/QHP: CPT | Performed by: INTERNAL MEDICINE

## 2022-08-02 ASSESSMENT — PATIENT HEALTH QUESTIONNAIRE - PHQ9
SUM OF ALL RESPONSES TO PHQ QUESTIONS 1-9: 1
SUM OF ALL RESPONSES TO PHQ QUESTIONS 1-9: 1
2. FEELING DOWN, DEPRESSED OR HOPELESS: 0
7. TROUBLE CONCENTRATING ON THINGS, SUCH AS READING THE NEWSPAPER OR WATCHING TELEVISION: 0
SUM OF ALL RESPONSES TO PHQ QUESTIONS 1-9: 1
3. TROUBLE FALLING OR STAYING ASLEEP: 0
6. FEELING BAD ABOUT YOURSELF - OR THAT YOU ARE A FAILURE OR HAVE LET YOURSELF OR YOUR FAMILY DOWN: 0
SUM OF ALL RESPONSES TO PHQ QUESTIONS 1-9: 1
4. FEELING TIRED OR HAVING LITTLE ENERGY: 1
8. MOVING OR SPEAKING SO SLOWLY THAT OTHER PEOPLE COULD HAVE NOTICED. OR THE OPPOSITE, BEING SO FIGETY OR RESTLESS THAT YOU HAVE BEEN MOVING AROUND A LOT MORE THAN USUAL: 0
1. LITTLE INTEREST OR PLEASURE IN DOING THINGS: 0
5. POOR APPETITE OR OVEREATING: 0
SUM OF ALL RESPONSES TO PHQ9 QUESTIONS 1 & 2: 0
10. IF YOU CHECKED OFF ANY PROBLEMS, HOW DIFFICULT HAVE THESE PROBLEMS MADE IT FOR YOU TO DO YOUR WORK, TAKE CARE OF THINGS AT HOME, OR GET ALONG WITH OTHER PEOPLE: 0
9. THOUGHTS THAT YOU WOULD BE BETTER OFF DEAD, OR OF HURTING YOURSELF: 0

## 2022-08-02 NOTE — PROGRESS NOTES
[facility]    Progress Note    Date of patient's visit: 8/2/2022    Patient's Name:  Joanna Rodriguez    YOB: 1955            Patient Care Team:  Poli Hernandez MD as PCP - Kristina Garay MD as PCP - [address] [place] [address] Dr Faye Provider  Mansoor Oates MD as Consulting Physician (Gastroenterology)  Nickolas Longo MD as Consulting Physician (Pulmonology)  Juliette Hinton MD as Surgeon (Orthopedic Surgery)  Hussein Elaine MD as Consulting Physician (Infectious Diseases)  LAXMI Holden as     REASON FOR VISIT: Routine outpatient follow     Chief Complaint   Patient presents with    Knee Pain     Pt fell, last Thursday. Left knee         HISTORY OF PRESENT ILLNESS:    History was obtained from the patient. Joanna Rodriguez is a 69 y.o. is here for follow-up after ER visit. She tripped and fell on her left knee a week ago. She was having acute pain. There was also questionable loss of consciousness for a few seconds. She went to the ER and had a CT head and left knee x-rays. No fractures. No subdural bleed. She was discharged home. She is feeling much better. She said she had a little headache that day after she fell but now has no headaches. No vision changes. No knee pain. She is ambulating well. She denies any dizziness. She is following up with endocrinology for her hypercalcemia. Her PTH was slightly elevated. She is undergoing work-up for hyperparathyroidism. She had a 24-hour urine done yesterday. She says she will follow-up with endocrinology when they call her. She had a DEXA scan earlier this year which was normal.    She is compliant with medications. Blood pressure is controlled. Recent labs show TSH is normal.      Left Knee xray 2022  Reason for Exam: fall       FINDINGS:   Three views of the left knee demonstrate a mild degree of tricompartment   degenerative change.   No evidence of an acute fracture or traumatic   malalignment is present. No joint effusion, or foreign body is noted. Impression   No acute osseous abnormality           Past Medical History:   Diagnosis Date    Anxiety     Arrhythmia     Asthma     Bipolar 1 disorder ([facility] 2/14/2019    Bipolar disorder ([facility]     Chronic diarrhea 2/14/2019    COPD (chronic obstructive pulmonary disease) (HCC)     Depression     Essential hypertension     GERD (gastroesophageal reflux disease)     GERD (gastroesophageal reflux disease)     History of stroke 8/9/2018    Hyperlipidemia     Hypertension     Hypothyroidism     Mild persistent asthma without complication 3/49/7002    OAB (overactive bladder)     Obesity (BMI 30-39. 9) 8/19/2016    Osteoarthritis     Poor historian     Pulmonary fibrosis (HCC)     sjogrens syndrome    Pulmonary hypertension (HCC)     Pure hypercholesterolemia     Sleep apnea     cpap    Stroke ([facility]     Unspecified sleep apnea     on cpap    Urinary incontinence        Past Surgical History:   Procedure Laterality Date    ANKLE SURGERY Right 09/11/2019    COLONOSCOPY  04/2015    COLONOSCOPY  2018        CYSTOSCOPY N/A 3/2/2022    CYSTOSCOPY WITH BOTOX  (100 units) performed by Sandra Alvarez MD at [address] Left     Stye removal    TOOTH EXTRACTION      x 2         ALLERGIES      Allergies   Allergen Reactions    Motrin [Ibuprofen]     Aspirin Rash    Blue Dyes (Parenteral) Rash    Hctz [Hydrochlorothiazide] Rash     Fixed drug reaction    Sulfa Antibiotics Rash    Tetracyclines & Related Rash       MEDICATIONS:      Current Outpatient Medications on File Prior to Visit   Medication Sig Dispense Refill    vitamin D3 (CHOLECALCIFEROL) 25 MCG (1000 UT) TABS tablet TAKE 1 TABLET BY MOUTH ONE TIME A DAY 28 tablet 3    Probiotic, Lactobacillus, CAPS Take 1 tablet by mouth daily OTC 30 capsule 6    levothyroxine (SYNTHROID) 25 MCG tablet Once daily 28 tablet 3    omeprazole (PRILOSEC) 20 MG delayed release capsule TAKE 1 CAPSULE BY MOUTH ONCE DAILY IN THE MORNING BEFORE BREAKFAST 28 capsule 3    amLODIPine (NORVASC) 10 MG tablet 1 tablet daily 90 tablet 3    Incontinence Supply Disposable (SELECT BOOSTER PADS) MISC Use 3-4/day 100 each 11    lisinopril (PRINIVIL;ZESTRIL) 5 MG tablet Take 1 tablet by mouth daily 90 tablet 1    PSYLLIUM HUSK PO Take by mouth OTC      fluticasone (FLONASE) 50 MCG/ACT nasal spray 1 spray by Each Nostril route daily Taking PRN      mirabegron (MYRBETRIQ) 50 MG TB24 Take 50 mg by mouth daily Pt gets samples 28 tablet 3    albuterol (PROVENTIL) (2.5 MG/3ML) 0.083% nebulizer solution Take 3 mLs by nebulization every 6 hours as needed for Wheezing 120 each 3    albuterol sulfate  (90 Base) MCG/ACT inhaler INHALE 2 PUFFS BY MOUTH INTO LUNGS TWICE DAILY AS NEEDED FOR WHEEZING (NO  MORE  THAN  4  TIMES  DAILY) 9 g 5    QUEtiapine (SEROQUEL) 200 MG tablet Take 200 mg by mouth daily Last filled 11/8/2021 90 day supply,   Take 3 tablets once daily--600 mg dose      FLUoxetine (PROZAC) 40 MG capsule Take 40 mg by mouth daily Last filled 1/24/2022      ipratropium (ATROVENT) 0.03 % nasal spray 2 sprays by Nasal route 3 times daily as needed for Rhinitis       lithium 300 MG capsule Take 300 mg by mouth 2 times daily (with meals). montelukast (SINGULAIR) 10 MG tablet Take 1 tablet by mouth nightly (Patient not taking: Reported on 8/2/2022) 28 tablet 3    budesonide-formoterol (SYMBICORT) 80-4.5 MCG/ACT AERO Inhale 2 puffs into the lungs 2 times daily (Patient not taking: Reported on 8/2/2022) 10.2 g 3    triamcinolone (KENALOG) 0.1 % ointment Apply topically 2 times daily Using PRN (Patient not taking: Reported on 8/2/2022)       No current facility-administered medications on file prior to visit. HISTORY    Reviewed and no change from previous record. Dulce  reports that she has never smoked.  She has never used smokeless tobacco.    FAMILY HISTORY:    Reviewed and No change from previous visit    HEALTH MAINTENANCE DUE:      Health Maintenance Due   Topic Date Due    DTaP/Tdap/Td vaccine (1 - Tdap) Never done    COVID-19 Vaccine (4 - Booster for Verle Heather series) 05/25/2022    Breast cancer screen  08/24/2022       REVIEW OF SYSTEMS:    12 point review of symptoms completed and found to be normal except noted in the HPI    Review of Systems   Constitutional:  Negative for chills and fever. Respiratory:  Negative for cough, shortness of breath and wheezing. Cardiovascular:  Negative for chest pain, palpitations and leg swelling. Musculoskeletal:  Positive for arthralgias. Negative for back pain and joint swelling. Neurological:  Positive for headaches. Negative for dizziness, syncope and weakness. Hematological:  Negative for adenopathy. Does not bruise/bleed easily. PHYSICAL EXAM:     Vitals:    08/02/22 1544   BP: 129/75   Pulse: 90   Temp: 98.3 °F (36.8 °C)   TempSrc: Infrared   SpO2: 96%   Weight: 204 lb (92.5 kg)   Height: 5' 5\" (1.651 m)     Body mass index is 33.95 kg/m². BP Readings from Last 3 Encounters:   08/02/22 129/75   07/25/22 131/86   06/15/22 119/79        Wt Readings from Last 3 Encounters:   08/02/22 204 lb (92.5 kg)   07/25/22 199 lb (90.3 kg)   06/15/22 198 lb (89.8 kg)       Physical Exam  Vitals and nursing note reviewed. Constitutional:       Appearance: Normal appearance. Cardiovascular:      Rate and Rhythm: Normal rate and regular rhythm. Heart sounds: No murmur heard. Pulmonary:      Effort: Pulmonary effort is normal.      Breath sounds: Normal breath sounds. No wheezing. Musculoskeletal:         General: Tenderness present. No swelling. Normal range of motion. Right lower leg: No edema. Left lower leg: No edema. Comments: Mild swelling. No effusion. Ambulating without discomfort   Neurological:      General: No focal deficit present. Mental Status: She is alert and oriented to person, place, and time. LABORATORY FINDINGS:    CBC:  Lab Results   Component Value Date/Time    WBC 6.3 07/25/2022 09:35 PM    HGB 11.6 07/25/2022 09:35 PM     07/25/2022 09:35 PM     05/02/2012 02:16 PM     BMP:    Lab Results   Component Value Date/Time     07/25/2022 09:35 PM    K 4.0 07/25/2022 09:35 PM     07/25/2022 09:35 PM    CO2 23 07/25/2022 09:35 PM    BUN 14 07/25/2022 09:35 PM    CREATININE 0.95 08/01/2022 09:46 AM    GLUCOSE 96 07/25/2022 09:35 PM    GLUCOSE 119 05/02/2012 02:16 PM     HEMOGLOBIN A1C:   Lab Results   Component Value Date/Time    LABA1C 5.5 08/31/2021 11:34 AM     MICROALBUMIN URINE:   Lab Results   Component Value Date/Time    MICROALBUR <12 11/10/2016 07:44 PM     FASTING LIPID PANEL:  Lab Results   Component Value Date    CHOL 204 (H) 02/08/2022    HDL 49 02/08/2022    TRIG 158 (H) 02/08/2022     Lab Results   Component Value Date    LDLCHOLESTEROL 123 02/08/2022       LIVER PROFILE:  Lab Results   Component Value Date/Time    ALT 30 02/08/2022 03:08 PM    AST 29 02/08/2022 03:08 PM    PROT 7.5 02/08/2022 03:08 PM    BILITOT 0.25 02/08/2022 03:08 PM    BILIDIR 0.14 05/21/2019 04:29 PM    LABALBU 4.4 02/08/2022 03:08 PM    LABALBU 4.8 05/02/2012 02:16 PM      THYROID FUNCTION:   Lab Results   Component Value Date/Time    TSH 2.53 02/08/2022 03:08 PM      URINEANALYSIS: No results found for: LABURIN  ASSESSMENT AND PLAN:    1. Fall, subsequent encounter  Ice, rest.    2. Essential hypertension  controlled    3. Hypercalcemia  Follow up with endo    4. Hypothyroidism, unspecified type  Same dose of replacement    5. Need for prophylactic vaccination against diphtheria-tetanus-pertussis (DTP)  Tdap        FOLLOW UP AND INSTRUCTIONS:   Return in about 6 months (around 2/2/2023). Dulce received counseling on the following healthy behaviors: nutrition, exercise, and medication adherence    Reviewed prior labs and health maintenance.       Discussed use, benefit, and side effects of prescribed medications. Barriers to medication compliance addressed. All patient questions answered. Pt voiced understanding.        Emilio Smith  Attending Physician, [address] [place] [address], Internal Medicine Residency Program  [address] [place] [address]  8/2/2022, 4:04 PM

## 2022-08-02 NOTE — PATIENT INSTRUCTIONS
Pt was added to wait list for  6 months. An After Visit Summary was printed and given to the patient.    GENO

## 2022-08-03 ENCOUNTER — CARE COORDINATION (OUTPATIENT)
Dept: CARE COORDINATION | Age: 67
End: 2022-08-03

## 2022-08-03 NOTE — CARE COORDINATION
Ambulatory Care Coordination Note  8/3/2022    ACC: Naveed Melo, RN    Summary Note: call from patient, states she was provided my number at [address] Coordination Protocol  Referral from Primary Care Provider: No  Week 1 - Initial Assessment     Do you have all of your prescriptions and are they filled?: Yes  Barriers to medication adherence: None  Are you able to afford your medications?: Yes  How often do you have trouble taking your medications the way you have been told to take them?: I always take them as prescribed. Do you have Home O2 Therapy?: No      Ability to seek help/take action for Emergent Urgent situations i.e. fire, crime, inclement weather or health crisis. : Independent  Ability to ambulate to restroom: Independent  Ability handle personal hygeine needs (bathing/dressing/grooming): Independent  Ability to manage Medications: Independent  Ability to prepare Food Preparation: Independent  Ability to maintain home (clean home, laundry): Independent  Ability to drive and/or has transportation: Needs Assistance  Ability to do shopping: Needs Assistance  Ability to manage finances: Needs Assistance  Is patient able to live independently?: Yes     Current Housing: Private Residence        Per the Fall Risk Screening, did the patient have 2 or more falls or 1 fall with injury in the past year?: No     Frequent urination at night?: No  Do you use rails/bars?: Yes  Do you have a non-slip tub mat?: No     Are you experiencing loss of meaning?: No  Are you experiencing loss of hope and peace?: No     Suggested Interventions and Community Resources                    Prior to Admission medications    Medication Sig Start Date End Date Taking?  Authorizing Provider   vitamin D3 (CHOLECALCIFEROL) 25 MCG (1000 UT) TABS tablet TAKE 1 TABLET BY MOUTH ONE TIME A DAY 7/12/22   Kin Moran MD   Probiotic, Lactobacillus, CAPS Take 1 tablet by mouth daily OTC 6/15/22   Danilo Meeks MD   montelukast (SINGULAIR) 10 MG tablet Take 1 tablet by mouth nightly  Patient not taking: Reported on 8/2/2022 6/15/22   Danilo Meeks MD   levothyroxine (SYNTHROID) 25 MCG tablet Once daily 6/15/22   Danilo Meeks MD   omeprazole (PRILOSEC) 20 MG delayed release capsule TAKE 1 CAPSULE BY MOUTH ONCE DAILY IN THE MORNING BEFORE BREAKFAST 6/15/22   Danilo Meeks MD   amLODIPine (NORVASC) 10 MG tablet 1 tablet daily 6/15/22   Danilo Meeks MD   Incontinence Supply Disposable (SELECT BOOSTER PADS) MISC Use 3-4/day 6/15/22   Dainlo Meeks MD   budesonide-formoterol (SYMBICORT) 80-4.5 MCG/ACT AERO Inhale 2 puffs into the lungs 2 times daily  Patient not taking: Reported on 8/2/2022 5/26/22   Humberto MD Gregg   lisinopril (PRINIVIL;ZESTRIL) 5 MG tablet Take 1 tablet by mouth daily 4/19/22   Humberto Pack, MD   PSYLLIUM HUSK PO Take by mouth OTC    Historical Provider, MD   fluticasone (FLONASE) 50 MCG/ACT nasal spray 1 spray by Each Nostril route daily Taking PRN    Historical Provider, MD   triamcinolone (KENALOG) 0.1 % ointment Apply topically 2 times daily Using PRN  Patient not taking: Reported on 8/2/2022    Historical Provider, MD   mirabegron (MYRBETRIQ) 50 MG TB24 Take 50 mg by mouth daily Pt gets samples 3/10/22   Danilo Meeks MD   albuterol (PROVENTIL) (2.5 MG/3ML) 0.083% nebulizer solution Take 3 mLs by nebulization every 6 hours as needed for Wheezing 2/25/22   Danilo Meeks MD   albuterol sulfate  (90 Base) MCG/ACT inhaler INHALE 2 PUFFS BY MOUTH INTO LUNGS TWICE DAILY AS NEEDED FOR WHEEZING (NO  MORE  THAN  4  TIMES  DAILY) 2/8/22   Danilo Meeks MD   QUEtiapine (SEROQUEL) 200 MG tablet Take 200 mg by mouth daily Last filled 11/8/2021 90 day supply,   Take 3 tablets once daily--600 mg dose    Historical Provider, MD   FLUoxetine (PROZAC) 40 MG capsule Take 40 mg by mouth daily Last filled 1/24/2022    Historical Provider, MD   ipratropium (ATROVENT) 0.03 % nasal spray 2 sprays by Nasal route 3 times daily as needed for Rhinitis     Historical Provider, MD   lithium 300 MG capsule Take 300 mg by mouth 2 times daily (with meals). Historical Provider, MD       No future appointments.

## 2022-08-04 DIAGNOSIS — N32.81 OAB (OVERACTIVE BLADDER): ICD-10-CM

## 2022-08-04 RX ORDER — UNDERPADS 23" X 36"
EACH MISCELLANEOUS
Qty: 100 EACH | Refills: 11 | Status: CANCELLED | OUTPATIENT
Start: 2022-08-04

## 2022-08-04 NOTE — CARE COORDINATION
attempted to contact Dulce x2.  left message with name and number.   Social will attempt to follow up with Dulce regarding:    Home delivery medication  Meals from the [facility]  Door fixed  Depends delivered

## 2022-08-04 NOTE — TELEPHONE ENCOUNTER
E-scribe request for Incontinence SUpplies. Please review and e-scribe if applicable. Next Visit Date:  No future appointments. Health Maintenance   Topic Date Due    DTaP/Tdap/Td vaccine (1 - Tdap) Never done    COVID-19 Vaccine (4 - Booster for Moderna series) 05/25/2022    Breast cancer screen  08/24/2022    Pneumococcal 65+ years Vaccine (1 - PCV) 08/08/2022 (Originally 4/22/1961)    Shingles vaccine (1 of 2) 01/21/2023 (Originally 4/22/2003)    Flu vaccine (1) 09/01/2022    Annual Wellness Visit (AWV)  04/08/2023    Depression Monitoring  08/02/2023    Lipids  02/08/2027    Colorectal Cancer Screen  09/25/2028    DEXA (modify frequency per FRAX score)  Completed    Hepatitis C screen  Completed    Hepatitis A vaccine  Aged Out    Hepatitis B vaccine  Aged Out    Hib vaccine  Aged Out    Meningococcal (ACWY) vaccine  Aged Out               (applicable per patient's age: Cancer Screenings, Depression Screening, Fall Risk Screening, Immunizations)    Hemoglobin A1C (%)   Date Value   08/31/2021 5.5   12/21/2020 5.6   06/26/2019 5.2     Microalb/Crt.  Ratio (mcg/mg creat)   Date Value   11/10/2016 9     LDL Cholesterol (mg/dL)   Date Value   02/08/2022 123     AST (U/L)   Date Value   02/08/2022 29     ALT (U/L)   Date Value   02/08/2022 30     BUN (mg/dL)   Date Value   07/25/2022 14      (goal A1C is < 7)   (goal LDL is <100) need 30-50% reduction from baseline     BP Readings from Last 3 Encounters:   08/02/22 129/75   07/25/22 131/86   06/15/22 119/79    (goal /80)      All Future Testing planned in [facility]:  Lab Frequency Next Occurrence   Basic Metabolic Panel Once 83/86/5084   AMOL DIGITAL SCREEN W OR WO CAD BILATERAL Once 06/15/2022   DEXA BONE DENSITY AXIAL SKELETON Once 03/17/2024            Patient Active Problem List:     Essential hypertension     Pure hypercholesterolemia     Urinary incontinence     Anxiety     Sleep apnea     GERD (gastroesophageal reflux disease)     Hypothyroidism Obesity (BMI 30-39. 9)     Mild persistent asthma without complication     History of stroke     Chronic diarrhea     Bipolar 1 disorder (HCC)     Ankle fracture, right, closed, with routine healing, subsequent encounter     OAB (overactive bladder)

## 2022-08-08 ENCOUNTER — TELEPHONE (OUTPATIENT)
Dept: INTERNAL MEDICINE | Age: 67
End: 2022-08-08

## 2022-08-08 DIAGNOSIS — Z23 NEED FOR PROPHYLACTIC VACCINATION AGAINST DIPHTHERIA-TETANUS-PERTUSSIS (DTP): ICD-10-CM

## 2022-08-09 ENCOUNTER — CARE COORDINATION (OUTPATIENT)
Dept: CARE COORDINATION | Age: 67
End: 2022-08-09

## 2022-08-15 ENCOUNTER — TELEPHONE (OUTPATIENT)
Dept: INTERNAL MEDICINE | Age: 67
End: 2022-08-15

## 2022-08-15 NOTE — TELEPHONE ENCOUNTER
Patient came to office and dropped off an form for an Service Dog from [employer]. Would you like the patient to come in office for this form to be completed?

## 2022-08-15 NOTE — TELEPHONE ENCOUNTER
Script signed     PC to patient - informed patient that script is ready for pickup. Script placed in front office envelope for pickup.

## 2022-08-16 ENCOUNTER — CARE COORDINATION (OUTPATIENT)
Dept: CARE COORDINATION | Age: 67
End: 2022-08-16

## 2022-08-16 NOTE — CARE COORDINATION
attempted to contact Pio.  has been unable to make contact with Ramos Andino or Ric Patterson, guardian.  left messages requesting call back. Plan:    will continued try and contact Dulce and Ric Patterson regarding [place], Medications, and incontinent supplies.

## 2022-08-18 ENCOUNTER — CARE COORDINATION (OUTPATIENT)
Dept: CARE COORDINATION | Age: 67
End: 2022-08-18

## 2022-08-19 NOTE — CARE COORDINATION
called and spoke to Yury. Yury was talking and conrad was telling her what to say in the background.  inquired if that was [employer] Id[employer]? Dulce replied \"no\" however could not name who it was. Dulce reports that she has not received her medications and only has 3 days left.  inquired about Dulce charging her medicine to her credit card and Braxton Ritchie paying if off each month? Dulce reports that he did not put any money on the card.  reminded [employer] she was supposed to use her [employer] Card to charge it. [employer] acted surprised as if we did not talk about her charging her medications. Dulce reports that she never received the briefs Braxton Ritchie was supposed to order and pay for. Dulce reports that she has not signed up for meals from the [facility] either because she must sign up and has not.  encouraged Dulce to speak with Braxton Ritchie regarding these concerns. [employer] reports that she has not spoke to him and reports Susana Lloyd is too busy. Social called and spoke to Braxton Ritchie. Braxton Necessary reports that [employer] has not followed up with any of the items he has requested. Braxton Necessary reports it appears that she is sabotage her progress. Braxton Necessary reports that the check was never cashed for the incontinent supplies. Braxton Necessary has requested phone number to call and inquired about paying for supplies over the phone. Braxton Necessary reports that [employer] has never supplied [facility]. DONOVAN's with CC so he is still picking up medications and will drop it off tomorrow 8/19. Braxton Necessary reports that she is back to hanging out with [employer] and she maxed out a credit card on [employer] and gas.  called Dulce and informed her that Braxton Ritchie will stop by tomorrow with her medication.  talked to [employer] about following through with recommendations.    encouraged Dulce to talk with Braxton Ritchie and get everything figured out regarding supplies, medication, and meals. Dulce was agreeable. Plan:   Grant Adame will drop medication off tomorrow to [employer]. [facility]. V's to have medication delivered. Dulce will order meals from [facility].

## 2022-08-22 NOTE — TELEPHONE ENCOUNTER
Per Dr Heather Barber, after reviewing forms she states that this will need to be completed by pts psych doctor (Adriano Green). Unable to LM VM is full. Form is in from desk drawer for patient to .

## 2022-08-24 ENCOUNTER — CARE COORDINATION (OUTPATIENT)
Dept: CARE COORDINATION | Age: 67
End: 2022-08-24

## 2022-08-25 NOTE — CARE COORDINATION
Ambulatory Care Coordination Note  8/25/2022    ACC: Richard Herrera RN    Summary Note: Called, message left on voicemail with my contact information and reason for call. Will attempt to contact in 3-5 days if no return call today. In note from  it's indicated that the patient is no following up with phone calls to different assistance programs      Lab Results       None            Care Coordination Interventions    Referral from Primary Care Provider: No  Suggested Interventions and Community Resources          Goals Addressed    None         Prior to Admission medications    Medication Sig Start Date End Date Taking?  Authorizing Provider   Incontinence Supply Disposable (SELECT BOOSTER PADS) MISC Use 3-4/day 8/5/22   Milli Loco MD   vitamin D3 (CHOLECALCIFEROL) 25 MCG (1000 UT) TABS tablet TAKE 1 TABLET BY MOUTH ONE TIME A DAY 7/12/22   Milli Loco MD   Probiotic, Lactobacillus, CAPS Take 1 tablet by mouth daily OTC 6/15/22   Milli Loco MD   montelukast (SINGULAIR) 10 MG tablet Take 1 tablet by mouth nightly  Patient not taking: Reported on 8/2/2022 6/15/22   Milli Loco MD   levothyroxine (SYNTHROID) 25 MCG tablet Once daily 6/15/22   Milli Loco MD   omeprazole (PRILOSEC) 20 MG delayed release capsule TAKE 1 CAPSULE BY MOUTH ONCE DAILY IN THE MORNING BEFORE BREAKFAST 6/15/22   Milli Loco MD   amLODIPine (NORVASC) 10 MG tablet 1 tablet daily 6/15/22   Milli Loco MD   budesonide-formoterol (SYMBICORT) 80-4.5 MCG/ACT AERO Inhale 2 puffs into the lungs 2 times daily  Patient not taking: Reported on 8/2/2022 5/26/22   Yousif Santos MD   lisinopril (PRINIVIL;ZESTRIL) 5 MG tablet Take 1 tablet by mouth daily 4/19/22   Yousif Santos MD   PSYLLIUM HUSK PO Take by mouth OTC    Historical Provider, MD   fluticasone (FLONASE) 50 MCG/ACT nasal spray 1 spray by Each Nostril route daily Taking PRN    Historical Provider, MD   triamcinolone (KENALOG) 0.1 % ointment Apply topically 2 times daily Using PRN  Patient not taking: Reported on 8/2/2022    Historical Provider, MD   mirabegron (MYRBETRIQ) 50 MG TB24 Take 50 mg by mouth daily Pt gets samples 3/10/22   Leno Castanon MD   albuterol (PROVENTIL) (2.5 MG/3ML) 0.083% nebulizer solution Take 3 mLs by nebulization every 6 hours as needed for Wheezing 2/25/22   Leno Castanon MD   albuterol sulfate  (90 Base) MCG/ACT inhaler INHALE 2 PUFFS BY MOUTH INTO LUNGS TWICE DAILY AS NEEDED FOR WHEEZING (NO  MORE  THAN  4  TIMES  DAILY) 2/8/22   Leno Castanon MD   QUEtiapine (SEROQUEL) 200 MG tablet Take 200 mg by mouth daily Last filled 11/8/2021 90 day supply,   Take 3 tablets once daily--600 mg dose    Historical Provider, MD   FLUoxetine (PROZAC) 40 MG capsule Take 40 mg by mouth daily Last filled 1/24/2022    Historical Provider, MD   ipratropium (ATROVENT) 0.03 % nasal spray 2 sprays by Nasal route 3 times daily as needed for Rhinitis     Historical Provider, MD   lithium 300 MG capsule Take 300 mg by mouth 2 times daily (with meals). Historical Provider, MD       No future appointments.

## 2022-08-29 ENCOUNTER — CARE COORDINATION (OUTPATIENT)
Dept: CARE COORDINATION | Age: 67
End: 2022-08-29

## 2022-08-29 NOTE — CARE COORDINATION
attempted to contact Dulce. No answer and unable to leave a message due to VM full.  will attempt to make contact with Suki Bucio  in 1 week to review social needs. The patient is a 54-year-old  male with a history of a VSD repair at age 11 presents for a f/u visit. Patient has Cholecystectomy without any problem. Patient denies any chest pain, dyspnea, palpitations, syncope. Patient has no complaints except for symptoms related to his gallbladder.    Patient has no history of diabetes, no history of hypertension, no history of prior coronary artery disease, no history of CHF. No history of cardiac arrhythmias.  Patients echo showed Dilated aortic root and MVP with MR.

## 2022-08-31 ENCOUNTER — CARE COORDINATION (OUTPATIENT)
Dept: CARE COORDINATION | Age: 67
End: 2022-08-31

## 2022-08-31 NOTE — CARE COORDINATION
Ambulatory Care Coordination Note  8/31/2022    ACC: Quincy Chavez LPN    Summary Note: Unable to reach patient for CC outreach follow up. Voice mail box is full and cannot accept any messages at this time. Follow up: Has patient Followed up with calls that she was supposed to do Deniz Melvina, Bridget Gonzalez  to pay for medications by CC). Did she get her incomitance supplies Ask about B/P and if she is doing daily   Will inform ACM Caryn Estelle that writer was unable to contact, will have ACM follow up in 1 week. Lab Results       None            Care Coordination Interventions    Referral from Primary Care Provider: No  Suggested Interventions and Community Resources          Goals Addressed    None         Prior to Admission medications    Medication Sig Start Date End Date Taking?  Authorizing Provider   Incontinence Supply Disposable (SELECT BOOSTER PADS) MISC Use 3-4/day 8/5/22   Juan Desir MD   vitamin D3 (CHOLECALCIFEROL) 25 MCG (1000 UT) TABS tablet TAKE 1 TABLET BY MOUTH ONE TIME A DAY 7/12/22   Juan Desir MD   Probiotic, Lactobacillus, CAPS Take 1 tablet by mouth daily OTC 6/15/22   Juan Desir MD   montelukast (SINGULAIR) 10 MG tablet Take 1 tablet by mouth nightly  Patient not taking: Reported on 8/2/2022 6/15/22   Juan Desir MD   levothyroxine (SYNTHROID) 25 MCG tablet Once daily 6/15/22   Juan Desir MD   omeprazole (PRILOSEC) 20 MG delayed release capsule TAKE 1 CAPSULE BY MOUTH ONCE DAILY IN THE MORNING BEFORE BREAKFAST 6/15/22   Juan Desir MD   amLODIPine (NORVASC) 10 MG tablet 1 tablet daily 6/15/22   Juan Desir MD   budesonide-formoterol (SYMBICORT) 80-4.5 MCG/ACT AERO Inhale 2 puffs into the lungs 2 times daily  Patient not taking: Reported on 8/2/2022 5/26/22   Karoline Dakin, MD   lisinopril (PRINIVIL;ZESTRIL) 5 MG tablet Take 1 tablet by mouth daily 4/19/22   Karoline Dakin, MD   PSYLLIUM HUSK PO Take by mouth OTC    Historical Provider, MD fluticasone (FLONASE) 50 MCG/ACT nasal spray 1 spray by Each Nostril route daily Taking PRN    Historical Provider, MD   triamcinolone (KENALOG) 0.1 % ointment Apply topically 2 times daily Using PRN  Patient not taking: Reported on 8/2/2022    Historical Provider, MD   mirabegron (MYRBETRIQ) 50 MG TB24 Take 50 mg by mouth daily Pt gets samples 3/10/22   Ethel Mcgowan MD   albuterol (PROVENTIL) (2.5 MG/3ML) 0.083% nebulizer solution Take 3 mLs by nebulization every 6 hours as needed for Wheezing 2/25/22   Ethel Mcgowan MD   albuterol sulfate  (90 Base) MCG/ACT inhaler INHALE 2 PUFFS BY MOUTH INTO LUNGS TWICE DAILY AS NEEDED FOR WHEEZING (NO  MORE  THAN  4  TIMES  DAILY) 2/8/22   Ethel Mcgowan MD   QUEtiapine (SEROQUEL) 200 MG tablet Take 200 mg by mouth daily Last filled 11/8/2021 90 day supply,   Take 3 tablets once daily--600 mg dose    Historical Provider, MD   FLUoxetine (PROZAC) 40 MG capsule Take 40 mg by mouth daily Last filled 1/24/2022    Historical Provider, MD   ipratropium (ATROVENT) 0.03 % nasal spray 2 sprays by Nasal route 3 times daily as needed for Rhinitis     Historical Provider, MD   lithium 300 MG capsule Take 300 mg by mouth 2 times daily (with meals). Historical Provider, MD       No future appointments.

## 2022-09-06 ENCOUNTER — CARE COORDINATION (OUTPATIENT)
Dept: CARE COORDINATION | Age: 67
End: 2022-09-06

## 2022-09-06 SDOH — ECONOMIC STABILITY: HOUSING INSECURITY: IN THE LAST 12 MONTHS, HOW MANY PLACES HAVE YOU LIVED?: 1

## 2022-09-06 SDOH — ECONOMIC STABILITY: INCOME INSECURITY: IN THE LAST 12 MONTHS, WAS THERE A TIME WHEN YOU WERE NOT ABLE TO PAY THE MORTGAGE OR RENT ON TIME?: NO

## 2022-09-06 SDOH — ECONOMIC STABILITY: HOUSING INSECURITY
IN THE LAST 12 MONTHS, WAS THERE A TIME WHEN YOU DID NOT HAVE A STEADY PLACE TO SLEEP OR SLEPT IN A SHELTER (INCLUDING NOW)?: NO

## 2022-09-06 SDOH — ECONOMIC STABILITY: HOUSING INSECURITY: IN THE LAST 12 MONTHS, HOW MANY PLACES HAVE YOU LIVED?: 3

## 2022-09-06 SDOH — ECONOMIC STABILITY: INCOME INSECURITY: IN THE LAST 12 MONTHS, WAS THERE A TIME WHEN YOU WERE NOT ABLE TO PAY THE MORTGAGE OR RENT ON TIME?: YES

## 2022-09-06 SDOH — ECONOMIC STABILITY: HOUSING INSECURITY
IN THE LAST 12 MONTHS, WAS THERE A TIME WHEN YOU DID NOT HAVE A STEADY PLACE TO SLEEP OR SLEPT IN A SHELTER (INCLUDING NOW)?: YES

## 2022-09-06 ASSESSMENT — SOCIAL DETERMINANTS OF HEALTH (SDOH)
DO YOU BELONG TO ANY CLUBS OR ORGANIZATIONS SUCH AS CHURCH GROUPS UNIONS, FRATERNAL OR ATHLETIC GROUPS, OR SCHOOL GROUPS?: NO
HOW OFTEN DO YOU GET TOGETHER WITH FRIENDS OR RELATIVES?: THREE TIMES A WEEK
IN A TYPICAL WEEK, HOW MANY TIMES DO YOU TALK ON THE PHONE WITH FAMILY, FRIENDS, OR NEIGHBORS?: THREE TIMES A WEEK
HOW OFTEN DO YOU ATTEND CHURCH OR RELIGIOUS SERVICES?: NEVER
HOW OFTEN DO YOU ATTENT MEETINGS OF THE CLUB OR ORGANIZATION YOU BELONG TO?: NEVER

## 2022-09-07 ENCOUNTER — CARE COORDINATION (OUTPATIENT)
Dept: CARE COORDINATION | Age: 67
End: 2022-09-07

## 2022-09-07 NOTE — CARE COORDINATION
Ambulatory Care Coordination Note  9/7/2022    ACC: Jennie Kang, RN    Summary Note: call from patient, states that she hasn't received her money for September from guardian Raciel Micki. Patient requested that I call Raciel Sweet and ask for her money. Called guardichandni Robison and left a message requesting a return call. Contact information provided. Plan  Food pantries  Incontinence supplies  CC to pay for co-pays  B/P review    ACM Start date 8/3/22    Lab Results       None            Care Coordination Interventions    Referral from Primary Care Provider: No  Suggested Interventions and [address] [place] [address] [place]: In Process          Goals Addressed    None         Prior to Admission medications    Medication Sig Start Date End Date Taking?  Authorizing Provider   Incontinence Supply Disposable (SELECT BOOSTER PADS) MISC Use 3-4/day 8/5/22   Coty Verma MD   vitamin D3 (CHOLECALCIFEROL) 25 MCG (1000 UT) TABS tablet TAKE 1 TABLET BY MOUTH ONE TIME A DAY 7/12/22   Coty Verma MD   Probiotic, Lactobacillus, CAPS Take 1 tablet by mouth daily OTC 6/15/22   Coty Verma MD   montelukast (SINGULAIR) 10 MG tablet Take 1 tablet by mouth nightly  Patient not taking: Reported on 8/2/2022 6/15/22   Coty Verma MD   levothyroxine (SYNTHROID) 25 MCG tablet Once daily 6/15/22   Coty Verma MD   omeprazole (PRILOSEC) 20 MG delayed release capsule TAKE 1 CAPSULE BY MOUTH ONCE DAILY IN THE MORNING BEFORE BREAKFAST 6/15/22   Coty Verma MD   amLODIPine (NORVASC) 10 MG tablet 1 tablet daily 6/15/22   Coty Verma MD   budesonide-formoterol (SYMBICORT) 80-4.5 MCG/ACT AERO Inhale 2 puffs into the lungs 2 times daily  Patient not taking: Reported on 8/2/2022 5/26/22   Ivonne Harris MD   lisinopril (PRINIVIL;ZESTRIL) 5 MG tablet Take 1 tablet by mouth daily 4/19/22   Ivonne Harris MD   PSYLLIUM HUSK PO Take by mouth OTC    Historical Provider, MD   fluticasone (FLONASE) 50 MCG/ACT nasal spray 1 spray by Each Nostril route daily Taking PRN    Historical Provider, MD   triamcinolone (KENALOG) 0.1 % ointment Apply topically 2 times daily Using PRN  Patient not taking: Reported on 8/2/2022    Historical Provider, MD   mirabegron (MYRBETRIQ) 50 MG TB24 Take 50 mg by mouth daily Pt gets samples 3/10/22   Jasen Davis MD   albuterol (PROVENTIL) (2.5 MG/3ML) 0.083% nebulizer solution Take 3 mLs by nebulization every 6 hours as needed for Wheezing 2/25/22   Jasen Davis MD   albuterol sulfate  (90 Base) MCG/ACT inhaler INHALE 2 PUFFS BY MOUTH INTO LUNGS TWICE DAILY AS NEEDED FOR WHEEZING (NO  MORE  THAN  4  TIMES  DAILY) 2/8/22   Jasen Davis MD   QUEtiapine (SEROQUEL) 200 MG tablet Take 200 mg by mouth daily Last filled 11/8/2021 90 day supply,   Take 3 tablets once daily--600 mg dose    Historical Provider, MD   FLUoxetine (PROZAC) 40 MG capsule Take 40 mg by mouth daily Last filled 1/24/2022    Historical Provider, MD   ipratropium (ATROVENT) 0.03 % nasal spray 2 sprays by Nasal route 3 times daily as needed for Rhinitis     Historical Provider, MD   lithium 300 MG capsule Take 300 mg by mouth 2 times daily (with meals). Historical Provider, MD       No future appointments.

## 2022-09-07 NOTE — CARE COORDINATION
Ambulatory Care Coordination Note  2022    ACC: Nessa Blum, ADEEL    Summary Note: called Amy Kingston for patient. States that the patient is not following thru with tasks assigned. She has not supplied CC information to pharmacy so she can get medications delivered. Patient has no F/U with [facility] for food assistance. Sonido Meza explained that he has not dropped off her check because she isn't home when he goes there. He states that he tells her what time but is not home or Kezia Carreno is there which he is not comfortable interacting with due to previous run ins with him. He states that he is planing on going out on Saturday to deliver her money. Lab Results       None            Care Coordination Interventions    Referral from Primary Care Provider: No  Suggested Interventions and Community Resources  Behavorial Health: In Process          Goals Addressed    None         Prior to Admission medications    Medication Sig Start Date End Date Taking?  Authorizing Provider   Incontinence Supply Disposable (SELECT BOOSTER PADS) MISC Use 3-4/day 22   Arnold Murillo MD   vitamin D3 (CHOLECALCIFEROL) 25 MCG (1000 UT) TABS tablet TAKE 1 TABLET BY MOUTH ONE TIME A DAY 22   Arnold Murillo MD   Probiotic, Lactobacillus, CAPS Take 1 tablet by mouth daily OTC 6/15/22   Arnold Murillo MD   montelukast (SINGULAIR) 10 MG tablet Take 1 tablet by mouth nightly  Patient not taking: Reported on 2022 6/15/22   Arnold Murillo MD   levothyroxine (SYNTHROID) 25 MCG tablet Once daily 6/15/22   Arnold Murillo MD   omeprazole (PRILOSEC) 20 MG delayed release capsule TAKE 1 CAPSULE BY MOUTH ONCE DAILY IN THE MORNING BEFORE BREAKFAST 6/15/22   Arnold Murillo MD   amLODIPine (NORVASC) 10 MG tablet 1 tablet daily 6/15/22   Arnold Murillo MD   budesonide-formoterol (SYMBICORT) 80-4.5 MCG/ACT AERO Inhale 2 puffs into the lungs 2 times daily  Patient not taking: Reported on 2022   Concepcion MD June   lisinopril (PRINIVIL;ZESTRIL) 5 MG tablet Take 1 tablet by mouth daily 4/19/22   Berry Hook MD   PSYLLIUM HUSK PO Take by mouth OTC    Historical Provider, MD   fluticasone (FLONASE) 50 MCG/ACT nasal spray 1 spray by Each Nostril route daily Taking PRN    Historical Provider, MD   triamcinolone (KENALOG) 0.1 % ointment Apply topically 2 times daily Using PRN  Patient not taking: Reported on 8/2/2022    Historical Provider, MD   mirabegron (MYRBETRIQ) 50 MG TB24 Take 50 mg by mouth daily Pt gets samples 3/10/22   Clearance MD Huyen   albuterol (PROVENTIL) (2.5 MG/3ML) 0.083% nebulizer solution Take 3 mLs by nebulization every 6 hours as needed for Wheezing 2/25/22   Clearance MD Huyen   albuterol sulfate  (90 Base) MCG/ACT inhaler INHALE 2 PUFFS BY MOUTH INTO LUNGS TWICE DAILY AS NEEDED FOR WHEEZING (NO  MORE  THAN  4  TIMES  DAILY) 2/8/22   Clearance MD Huyen   QUEtiapine (SEROQUEL) 200 MG tablet Take 200 mg by mouth daily Last filled 11/8/2021 90 day supply,   Take 3 tablets once daily--600 mg dose    Historical Provider, MD   FLUoxetine (PROZAC) 40 MG capsule Take 40 mg by mouth daily Last filled 1/24/2022    Historical Provider, MD   ipratropium (ATROVENT) 0.03 % nasal spray 2 sprays by Nasal route 3 times daily as needed for Rhinitis     Historical Provider, MD   lithium 300 MG capsule Take 300 mg by mouth 2 times daily (with meals). Historical Provider, MD       No future appointments.

## 2022-09-08 ENCOUNTER — SOCIAL WORK (OUTPATIENT)
Dept: CARE COORDINATION | Age: 67
End: 2022-09-08

## 2022-09-08 ENCOUNTER — CARE COORDINATION (OUTPATIENT)
Dept: CARE COORDINATION | Age: 67
End: 2022-09-08

## 2022-09-08 NOTE — CARE COORDINATION
attempted to contact Dulce. No answer and VM full and unable to leave a message.  will attempt 1 more time to follow up with Mary Reis and Ronnie Lanza, legal guardian to discuss social needs.

## 2022-09-09 ENCOUNTER — CARE COORDINATION (OUTPATIENT)
Dept: CARE COORDINATION | Age: 67
End: 2022-09-09

## 2022-09-09 NOTE — CARE COORDINATION
Ambulatory Care Coordination Note  9/9/2022    ACC: Dino Peña, RN    Summary Note: Incoming call from patient, states that she has not received a call from [address] agreed to contact felecia and ask him about her monthly stipend. Spoke with Mel Núñez, he states that he has not given her the money because she is not home when he comes over. He is planning on going out on Saturday and give her the money. We discussed the use of a prepaid CC for her prescription Co-Pays. He states that she does have a card but she has not given the information to the pharmacy. Once this is completed, her medications will be delivered. I agreed to contact the patient and let her know. Call to patient, unable to leave a message. Will attempt to contact 9/13/22    Lab Results       None            Care Coordination Interventions    Referral from Primary Care Provider: No  Suggested Interventions and [address] [place] [address] [place]: In Process          Goals Addressed    None         Prior to Admission medications    Medication Sig Start Date End Date Taking?  Authorizing Provider   Incontinence Supply Disposable (SELECT BOOSTER PADS) MISC Use 3-4/day 8/5/22   Braden Santana MD   vitamin D3 (CHOLECALCIFEROL) 25 MCG (1000 UT) TABS tablet TAKE 1 TABLET BY MOUTH ONE TIME A DAY 7/12/22   Braden Santana MD   Probiotic, Lactobacillus, CAPS Take 1 tablet by mouth daily OTC 6/15/22   Braden Santana MD   montelukast (SINGULAIR) 10 MG tablet Take 1 tablet by mouth nightly  Patient not taking: Reported on 8/2/2022 6/15/22   Braden Santana MD   levothyroxine (SYNTHROID) 25 MCG tablet Once daily 6/15/22   Braden Santana MD   omeprazole (PRILOSEC) 20 MG delayed release capsule TAKE 1 CAPSULE BY MOUTH ONCE DAILY IN THE MORNING BEFORE BREAKFAST 6/15/22   Braden Santana MD   amLODIPine (NORVASC) 10 MG tablet 1 tablet daily 6/15/22   Braden Santana MD   budesonide-formoterol (SYMBICORT) 80-4.5 MCG/ACT AERO Inhale 2 puffs into the lungs 2 times daily  Patient not taking: Reported on 8/2/2022 5/26/22   Roman Salazar MD   lisinopril (PRINIVIL;ZESTRIL) 5 MG tablet Take 1 tablet by mouth daily 4/19/22   Roman Salazar MD   PSYLLIUM HUSK PO Take by mouth OTC    Historical Provider, MD   fluticasone (FLONASE) 50 MCG/ACT nasal spray 1 spray by Each Nostril route daily Taking PRN    Historical Provider, MD   triamcinolone (KENALOG) 0.1 % ointment Apply topically 2 times daily Using PRN  Patient not taking: Reported on 8/2/2022    Historical Provider, MD   mirabegron (MYRBETRIQ) 50 MG TB24 Take 50 mg by mouth daily Pt gets samples 3/10/22   Arnulfo Rodriguez MD   albuterol (PROVENTIL) (2.5 MG/3ML) 0.083% nebulizer solution Take 3 mLs by nebulization every 6 hours as needed for Wheezing 2/25/22   Arnulfo Rodriguez MD   albuterol sulfate  (90 Base) MCG/ACT inhaler INHALE 2 PUFFS BY MOUTH INTO LUNGS TWICE DAILY AS NEEDED FOR WHEEZING (NO  MORE  THAN  4  TIMES  DAILY) 2/8/22   Arnulfo Rodriguez MD   QUEtiapine (SEROQUEL) 200 MG tablet Take 200 mg by mouth daily Last filled 11/8/2021 90 day supply,   Take 3 tablets once daily--600 mg dose    Historical Provider, MD   FLUoxetine (PROZAC) 40 MG capsule Take 40 mg by mouth daily Last filled 1/24/2022    Historical Provider, MD   ipratropium (ATROVENT) 0.03 % nasal spray 2 sprays by Nasal route 3 times daily as needed for Rhinitis     Historical Provider, MD   lithium 300 MG capsule Take 300 mg by mouth 2 times daily (with meals). Historical Provider, MD       No future appointments.

## 2022-09-13 ENCOUNTER — CARE COORDINATION (OUTPATIENT)
Dept: CARE COORDINATION | Age: 67
End: 2022-09-13

## 2022-09-14 ENCOUNTER — CARE COORDINATION (OUTPATIENT)
Dept: CARE COORDINATION | Age: 67
End: 2022-09-14

## 2022-09-14 NOTE — CARE COORDINATION
Ambulatory Care Coordination Note  9/14/2022    ACC: Jaciel Meza RN    Summary Note: Patient called and stated that her electric was cut off today. She states that she did call and they will have it turned back on tomorrow. When questioned about the disconnect, she states that she got a new service and \"Mustapha hasn't paid for it\". She continued to say that it costs $3000 a month. I asked to explain why her electric was so much and she states that's the program. Patient then continued to talk about her hair weave that she had done and it cost $200. Patient also states that she has not received her money for the month. of September. Patient did state that she received her medications, and they were delivered. I asked if she supplied the pharmacy with her CC information and she confirmed that she did. As I was talking to the patient I could hear a male's voice in the background. When asked she stated it was a friend. She denied it being Cecily Villa. Patient also asked about [facility]. They were supposed to send her an email about what food she wants to have. I encouraged  her to contact [facility] and have them send it to her again or just tell them over the phone. Patient verbalized understanding. I agreed to contact Justino Becerril tomorrow to inquire about utilities and monthly check. Lab Results       None            Care Coordination Interventions    Referral from Primary Care Provider: No  Suggested Interventions and Community Resources  [facility]          Goals Addressed    None         Prior to Admission medications    Medication Sig Start Date End Date Taking?  Authorizing Provider   Incontinence Supply Disposable (SELECT BOOSTER PADS) MISC Use 3-4/day 8/5/22   Hayder Alvarez MD   vitamin D3 (CHOLECALCIFEROL) 25 MCG (1000 UT) TABS tablet TAKE 1 TABLET BY MOUTH ONE TIME A DAY 7/12/22   Hayder Alvarez MD   Probiotic, Lactobacillus, CAPS Take 1 tablet by mouth daily OTC 6/15/22 Poli Hernandez MD   montelukast (SINGULAIR) 10 MG tablet Take 1 tablet by mouth nightly  Patient not taking: Reported on 8/2/2022 6/15/22   Poli Hernandez MD   levothyroxine (SYNTHROID) 25 MCG tablet Once daily 6/15/22   Poli Hernandez MD   omeprazole (PRILOSEC) 20 MG delayed release capsule TAKE 1 CAPSULE BY MOUTH ONCE DAILY IN THE MORNING BEFORE BREAKFAST 6/15/22   Poli Hernandez MD   amLODIPine (NORVASC) 10 MG tablet 1 tablet daily 6/15/22   Poli Hernandez MD   budesonide-formoterol (SYMBICORT) 80-4.5 MCG/ACT AERO Inhale 2 puffs into the lungs 2 times daily  Patient not taking: Reported on 8/2/2022 5/26/22   Maria Teresa Baptiste MD   lisinopril (PRINIVIL;ZESTRIL) 5 MG tablet Take 1 tablet by mouth daily 4/19/22   Maria Teresa Baptiste MD   PSYLLIUM HUSK PO Take by mouth OTC    Historical Provider, MD   fluticasone (FLONASE) 50 MCG/ACT nasal spray 1 spray by Each Nostril route daily Taking PRN    Historical Provider, MD   triamcinolone (KENALOG) 0.1 % ointment Apply topically 2 times daily Using PRN  Patient not taking: Reported on 8/2/2022    Historical Provider, MD   mirabegron (MYRBETRIQ) 50 MG TB24 Take 50 mg by mouth daily Pt gets samples 3/10/22   Poli Hernandez MD   albuterol (PROVENTIL) (2.5 MG/3ML) 0.083% nebulizer solution Take 3 mLs by nebulization every 6 hours as needed for Wheezing 2/25/22   Poli Hernandez MD   albuterol sulfate  (90 Base) MCG/ACT inhaler INHALE 2 PUFFS BY MOUTH INTO LUNGS TWICE DAILY AS NEEDED FOR WHEEZING (NO  MORE  THAN  4  TIMES  DAILY) 2/8/22   Poli Hernandez MD   QUEtiapine (SEROQUEL) 200 MG tablet Take 200 mg by mouth daily Last filled 11/8/2021 90 day supply,   Take 3 tablets once daily--600 mg dose    Historical Provider, MD   FLUoxetine (PROZAC) 40 MG capsule Take 40 mg by mouth daily Last filled 1/24/2022    Historical Provider, MD   ipratropium (ATROVENT) 0.03 % nasal spray 2 sprays by Nasal route 3 times daily as needed for Rhinitis     Historical Provider, MD   lithium 300 MG capsule Take 300 mg by mouth 2 times daily (with meals). Historical Provider, MD       No future appointments.

## 2022-09-14 NOTE — CARE COORDINATION
called and spoke to Nadya Finch. Nadya Finch reports that he has not spoken to Yury. Nadya Finch did state that he tried to drop her money off. Nadya Finch reports that he knocked on the door continuously and waited. Nadya Finch reports there were 9 car in her driveway/lawn and no one came to the door. Nadya Finch reports that he has attempted to call her but unable to leave a message due to mailbox full.  informed Nadya Finch that she has attempted to contact [employer] and has not been able to reach her or receive a call back. Nadya Finch reports that he will attempt to contact her today 9/13.  called and spoke with Yury. Dulce reports that she is doing \"ok\". Dulce reports that josi feels she has a lot of appointments coming up and needs to arrange TARPS or receive her allowance for gas.  informed Dulce that Nadya Finch attempt to drop of check however she was not home. Dulce just replied \"oh\". Dulce did confirm Elida Vanegas has \"be around\". Dulce confirmed that she has provided [facility]. 's with CC in order to have medications to be delivered. Dulce reports that she charges it and then Nadya Finch is supposed to pay the balance on it. Dulce also reports that she is receiving meals from the [employer] ([phone]). Dulce was agreeable to contact Nadya Finch regarding September's check.  contacted Nadya Finch informing him meals are arranged and medications are being charged on CC. Plan:   [employer] will contact Nadya Finch to arrange time for picking up check.

## 2022-09-16 ENCOUNTER — CARE COORDINATION (OUTPATIENT)
Dept: CARE COORDINATION | Age: 67
End: 2022-09-16

## 2022-09-16 NOTE — CARE COORDINATION
Ambulatory Care Coordination Note  9/16/2022    ACC: Orinda Cabot, RN    Summary Note: Incoming call from patient. She states that Noelle King her guardian has not given her her check for September yet I asked if she called Timalore Fernando her guardian and she states no. I explained that I can not be the middle man for this situation and she will need to call Noelle King directly. I agreed to contact Noelle King today. Patient also states That the [employer] has not contacted her about food deliveries. She states that she was supposed to get an email. We had discussed this on her last call and told her that she needs to call them, if she has not received the email. Patient states that she has difficulty with emails and doesn't want it that way. I told her that she needs to make the effort to contact them. Phone number provided ([phone]). Call to Noelle King, Patient's guardian. He states that they were supposed to meet yesterday after the patient's appointment at [facility]. He states that she supposed to call him after her appointment was finished. Braxton Ritchie said that the patient called office number and left a message that she was done. Noelle King said that he did find the message until this morning        Lab Results       None            Care Coordination Interventions    Referral from Primary Care Provider: No  Suggested Interventions and [facility] [address] [place]: In Process          Goals Addressed    None         Prior to Admission medications    Medication Sig Start Date End Date Taking?  Authorizing Provider   Incontinence Supply Disposable (SELECT BOOSTER PADS) MISC Use 3-4/day 8/5/22   Yovany Weir MD   vitamin D3 (CHOLECALCIFEROL) 25 MCG (1000 UT) TABS tablet TAKE 1 TABLET BY MOUTH ONE TIME A DAY 7/12/22   Yovany Weir MD   Probiotic, Lactobacillus, CAPS Take 1 tablet by mouth daily OTC 6/15/22   Yovany Weir MD   montelukast (SINGULAIR) 10 MG tablet Take 1 tablet by mouth nightly  Patient not taking: Reported on 8/2/2022 6/15/22   Jasen Davis MD   levothyroxine (SYNTHROID) 25 MCG tablet Once daily 6/15/22   Jasen Davis MD   omeprazole (PRILOSEC) 20 MG delayed release capsule TAKE 1 CAPSULE BY MOUTH ONCE DAILY IN THE MORNING BEFORE BREAKFAST 6/15/22   Jasen Davis MD   amLODIPine (NORVASC) 10 MG tablet 1 tablet daily 6/15/22   Jasen Davis MD   budesonide-formoterol (SYMBICORT) 80-4.5 MCG/ACT AERO Inhale 2 puffs into the lungs 2 times daily  Patient not taking: Reported on 8/2/2022 5/26/22   Mary Grace Savage MD   lisinopril (PRINIVIL;ZESTRIL) 5 MG tablet Take 1 tablet by mouth daily 4/19/22   Mary Grace Savage MD   PSYLLIUM HUSK PO Take by mouth OTC    Historical Provider, MD   fluticasone (FLONASE) 50 MCG/ACT nasal spray 1 spray by Each Nostril route daily Taking PRN    Historical Provider, MD   triamcinolone (KENALOG) 0.1 % ointment Apply topically 2 times daily Using PRN  Patient not taking: Reported on 8/2/2022    Historical Provider, MD   mirabegron (MYRBETRIQ) 50 MG TB24 Take 50 mg by mouth daily Pt gets samples 3/10/22   Jasen Davis MD   albuterol (PROVENTIL) (2.5 MG/3ML) 0.083% nebulizer solution Take 3 mLs by nebulization every 6 hours as needed for Wheezing 2/25/22   Jasen Davis MD   albuterol sulfate  (90 Base) MCG/ACT inhaler INHALE 2 PUFFS BY MOUTH INTO LUNGS TWICE DAILY AS NEEDED FOR WHEEZING (NO  MORE  THAN  4  TIMES  DAILY) 2/8/22   Jasen Davis MD   QUEtiapine (SEROQUEL) 200 MG tablet Take 200 mg by mouth daily Last filled 11/8/2021 90 day supply,   Take 3 tablets once daily--600 mg dose    Historical Provider, MD   FLUoxetine (PROZAC) 40 MG capsule Take 40 mg by mouth daily Last filled 1/24/2022    Historical Provider, MD   ipratropium (ATROVENT) 0.03 % nasal spray 2 sprays by Nasal route 3 times daily as needed for Rhinitis     Historical Provider, MD   lithium 300 MG capsule Take 300 mg by mouth 2 times daily (with meals).     Historical Provider, MD       No future appointments.

## 2022-09-19 ENCOUNTER — CARE COORDINATION (OUTPATIENT)
Dept: CARE COORDINATION | Age: 67
End: 2022-09-19

## 2022-09-19 ENCOUNTER — TELEPHONE (OUTPATIENT)
Dept: INTERNAL MEDICINE | Age: 67
End: 2022-09-19

## 2022-09-19 NOTE — TELEPHONE ENCOUNTER
Patient calling stating she tried to contact her dermatologist Dr Anand Parker about her skin itching and the phone number is no longer a working number. Patient states that she has itching on her thighs and her dermatologist gave her antibiotics. Patient can't remember the name other than it started with a \"C\". Writer can only find Cephalexin 500 mg.

## 2022-09-19 NOTE — CARE COORDINATION
Ambulatory Care Coordination Note  9/19/2022    ACC: Alexis Almendarez, RN    Summary Note: incoming call from [facility] and she states that she still has not received her money for the month of September. I asked if she has Amairani De Guzman (her guardian) about this. Dulce states no, I instructed her to call him on his cell number and speak to him. Return call from [facility] and states that she has tried to call his office number, cell number and sent him an e-mail but has not receives a response. Jenniejami asked this writer if I would contact Shalonda Almazan agreed to send a text message to him but it is her responsibility to talk to him about this current situation  Text Message sent to guardian Leonor Srivastava requesting that he contact the patient about her monthly check. I requested that he text me back to confirm the receipt of the text  Text message received from Leonor Srivastava, He indicates that the check was just delivered to the patient. Lab Results       None            Care Coordination Interventions    Referral from Primary Care Provider: No  Suggested Interventions and Community Resources  Behavorial Health: In Process          Goals Addressed    None         Prior to Admission medications    Medication Sig Start Date End Date Taking?  Authorizing Provider   Incontinence Supply Disposable (SELECT BOOSTER PADS) MISC Use 3-4/day 8/5/22   Tequila Anaya MD   vitamin D3 (CHOLECALCIFEROL) 25 MCG (1000 UT) TABS tablet TAKE 1 TABLET BY MOUTH ONE TIME A DAY 7/12/22   Tequila Anaya MD   Probiotic, Lactobacillus, CAPS Take 1 tablet by mouth daily OTC 6/15/22   Tequila Anaya MD   montelukast (SINGULAIR) 10 MG tablet Take 1 tablet by mouth nightly  Patient not taking: Reported on 8/2/2022 6/15/22   Tequila Anaya MD   levothyroxine (SYNTHROID) 25 MCG tablet Once daily 6/15/22   Tequila Anaya MD   omeprazole (PRILOSEC) 20 MG delayed release capsule TAKE 1 CAPSULE BY MOUTH ONCE DAILY IN THE MORNING BEFORE BREAKFAST 6/15/22   [facility] Susan Meza MD   amLODIPine (NORVASC) 10 MG tablet 1 tablet daily 6/15/22   Sary Lira MD   budesonide-formoterol [facility]) 80-4.5 MCG/ACT AERO Inhale 2 puffs into the lungs 2 times daily  Patient not taking: Reported on 8/2/2022 5/26/22   Mauro Wiley MD   lisinopril (PRINIVIL;ZESTRIL) 5 MG tablet Take 1 tablet by mouth daily 4/19/22   Mauro Wiley MD   PSYLLIUM HUSK PO Take by mouth OTC    Historical Provider, MD   fluticasone (FLONASE) 50 MCG/ACT nasal spray 1 spray by Each Nostril route daily Taking PRN    Historical Provider, MD   triamcinolone (KENALOG) 0.1 % ointment Apply topically 2 times daily Using PRN  Patient not taking: Reported on 8/2/2022    Historical Provider, MD   mirabegron (MYRBETRIQ) 50 MG TB24 Take 50 mg by mouth daily Pt gets samples 3/10/22   Sary Lira MD   albuterol (PROVENTIL) (2.5 MG/3ML) 0.083% nebulizer solution Take 3 mLs by nebulization every 6 hours as needed for Wheezing 2/25/22   Sary Lira MD   albuterol sulfate  (90 Base) MCG/ACT inhaler INHALE 2 PUFFS BY MOUTH INTO LUNGS TWICE DAILY AS NEEDED FOR WHEEZING (NO  MORE  THAN  4  TIMES  DAILY) 2/8/22   Sary Lira MD   QUEtiapine (SEROQUEL) 200 MG tablet Take 200 mg by mouth daily Last filled 11/8/2021 90 day supply,   Take 3 tablets once daily--600 mg dose    Historical Provider, MD   FLUoxetine (PROZAC) 40 MG capsule Take 40 mg by mouth daily Last filled 1/24/2022    Historical Provider, MD   ipratropium (ATROVENT) 0.03 % nasal spray 2 sprays by Nasal route 3 times daily as needed for Rhinitis     Historical Provider, MD   lithium 300 MG capsule Take 300 mg by mouth 2 times daily (with meals). Historical Provider, MD       No future appointments.

## 2022-09-21 ENCOUNTER — CARE COORDINATION (OUTPATIENT)
Dept: CARE COORDINATION | Age: 67
End: 2022-09-21

## 2022-09-22 NOTE — CARE COORDINATION
attempted to contact Dulce to follow up on senior meals from the [facility]. Dulce reported that she received 1 week of food.  was unable to leave a message due to VM full. Plan:   Bam Joe will contact Ruby Nugent regarding check. Dulce will contact [facility] to continue ordering food.

## 2022-09-26 ENCOUNTER — CARE COORDINATION (OUTPATIENT)
Dept: CARE COORDINATION | Age: 67
End: 2022-09-26

## 2022-09-26 NOTE — CARE COORDINATION
Ambulatory Care Coordination Note  9/26/2022    ACC: Lashell Naqvi, RN    Summary Note: call from patient and she states that [employer] came out and put a red tag on her meter. They told her that there is a gas leak and it's not recommended that she stay at the property until it is fixed. She is current on her bill but they told her that she will need to get a  to fix it. She states that she has no idea how to do that and is woried about the cost. I agreed to contact SW and have them call her. Message sent to BENJAMIN Musa about the situation  [employer] pantries  Incontinence supplies  CC to pay for co-pays  B/P review     ACM Start date 8/3/22    Lab Results       None            Care Coordination Interventions    Referral from Primary Care Provider: No  Suggested Interventions and [facility] [address] [place]: In Process          Goals Addressed    None         Prior to Admission medications    Medication Sig Start Date End Date Taking?  Authorizing Provider   Incontinence Supply Disposable (SELECT BOOSTER PADS) MISC Use 3-4/day 8/5/22   Scot Mahoney MD   vitamin D3 (CHOLECALCIFEROL) 25 MCG (1000 UT) TABS tablet TAKE 1 TABLET BY MOUTH ONE TIME A DAY 7/12/22   Scot Mahoney MD   Probiotic, Lactobacillus, CAPS Take 1 tablet by mouth daily OTC 6/15/22   Scot Mahoney MD   montelukast (SINGULAIR) 10 MG tablet Take 1 tablet by mouth nightly  Patient not taking: Reported on 8/2/2022 6/15/22   Scot Mahoney MD   levothyroxine (SYNTHROID) 25 MCG tablet Once daily 6/15/22   Scot Mahoney MD   omeprazole (PRILOSEC) 20 MG delayed release capsule TAKE 1 CAPSULE BY MOUTH ONCE DAILY IN THE MORNING BEFORE BREAKFAST 6/15/22   Scot Mahoney MD   amLODIPine (NORVASC) 10 MG tablet 1 tablet daily 6/15/22   Scot Mahoney MD   budesonide-formoterol (SYMBICORT) 80-4.5 MCG/ACT AERO Inhale 2 puffs into the lungs 2 times daily  Patient not taking: Reported on 8/2/2022 5/26/22   Concepcion MD June   lisinopril (PRINIVIL;ZESTRIL) 5 MG tablet Take 1 tablet by mouth daily 4/19/22   Berta Neely MD   PSYLLIUM HUSK PO Take by mouth OTC    Historical Provider, MD   fluticasone (FLONASE) 50 MCG/ACT nasal spray 1 spray by Each Nostril route daily Taking PRN    Historical Provider, MD   triamcinolone (KENALOG) 0.1 % ointment Apply topically 2 times daily Using PRN  Patient not taking: Reported on 8/2/2022    Historical Provider, MD   mirabegron (MYRBETRIQ) 50 MG TB24 Take 50 mg by mouth daily Pt gets samples 3/10/22   Yovany Weir MD   albuterol (PROVENTIL) (2.5 MG/3ML) 0.083% nebulizer solution Take 3 mLs by nebulization every 6 hours as needed for Wheezing 2/25/22   Yovany Weir MD   albuterol sulfate  (90 Base) MCG/ACT inhaler INHALE 2 PUFFS BY MOUTH INTO LUNGS TWICE DAILY AS NEEDED FOR WHEEZING (NO  MORE  THAN  4  TIMES  DAILY) 2/8/22   Yovany Weir MD   QUEtiapine (SEROQUEL) 200 MG tablet Take 200 mg by mouth daily Last filled 11/8/2021 90 day supply,   Take 3 tablets once daily--600 mg dose    Historical Provider, MD   FLUoxetine (PROZAC) 40 MG capsule Take 40 mg by mouth daily Last filled 1/24/2022    Historical Provider, MD   ipratropium (ATROVENT) 0.03 % nasal spray 2 sprays by Nasal route 3 times daily as needed for Rhinitis     Historical Provider, MD   lithium 300 MG capsule Take 300 mg by mouth 2 times daily (with meals). Historical Provider, MD       No future appointments.

## 2022-09-27 ENCOUNTER — CARE COORDINATION (OUTPATIENT)
Dept: CARE COORDINATION | Age: 67
End: 2022-09-27

## 2022-09-28 ENCOUNTER — CARE COORDINATION (OUTPATIENT)
Dept: CARE COORDINATION | Age: 67
End: 2022-09-28

## 2022-09-28 NOTE — CARE COORDINATION
received message from [place], [place] stating Ramos Andino has a gas leak and is not supposed to be at her house.  attempted to contact Debere. No answer and VM full.  contacted Ric Patterson on [employer] regarding issue. Ric Patterson reports that Ramos Andino has not called or informed him of this issue. Ric Patterson reports last time he spoke with her Jadamarybeth Sandygeneva paid the gas bill and the gas was turned back on. Ric Patterson reports that he attempted to drop off Debere's check a few times however no one came to the door. Ric Patterson reports that Ramos Andino did come to the office and pick it up however did not mention a gas leak. Ric Patterson reports that he will attempt to contact her however stated that she never answers the phone when he calls. Ric Patterson confirmed Ramos Andino is getting medication delivered and he is paying the CC bill that it is charged too. Ric Patterson reports that Ramos Andino is getting meals from [facility] and is supposed to be providing Ric Patterson with number/address to pay for food. Plan:   Ramos Andino will speak with Ric Patterson regarding gas leak.

## 2022-09-28 NOTE — CARE COORDINATION
Ambulatory Care Coordination Note  9/28/2022    ACC: Dino Peña, RN    Provider is not currently participating in JENNY/Derek Perdomo patient enrollment in the Remote Patient Monitoring (RPM) program for in-home monitoring: NA.  Call from patient states that her has not got a call back from . I explained that BENJAMIN Lindsey did call her this morning but couldn't leave a message because her mail box is full. Héctor Block tells me that she has to get someone out to fix her gas because they \"red tagged it\". I instructed her to call her guardian Indiana Ready and tell him about the situation because he will be the one to make arrangement for payment. She agrees to reach out to him. Also suggested that she clean out her voicemail so that a message can be left on it in the future  Plan  Food pantries  Incontinence supplies  CC to pay for co-pays  B/P review     ACM Start date 8/3/22    Lab Results       None            Care Coordination Interventions    Referral from Primary Care Provider: No  Suggested Interventions and [address] [place] [address]: In Process          Goals Addressed    None         Prior to Admission medications    Medication Sig Start Date End Date Taking?  Authorizing Provider   Incontinence Supply Disposable (SELECT BOOSTER PADS) MISC Use 3-4/day 8/5/22   Braden Santana MD   vitamin D3 (CHOLECALCIFEROL) 25 MCG (1000 UT) TABS tablet TAKE 1 TABLET BY MOUTH ONE TIME A DAY 7/12/22   Braden Santana MD   Probiotic, Lactobacillus, CAPS Take 1 tablet by mouth daily OTC 6/15/22   Braden Santana MD   montelukast (SINGULAIR) 10 MG tablet Take 1 tablet by mouth nightly  Patient not taking: Reported on 8/2/2022 6/15/22   Braden Santana MD   levothyroxine (SYNTHROID) 25 MCG tablet Once daily 6/15/22   Braden Santana MD   omeprazole (PRILOSEC) 20 MG delayed release capsule TAKE 1 CAPSULE BY MOUTH ONCE DAILY IN THE MORNING BEFORE BREAKFAST 6/15/22   Braden Santana MD   amLODIPine (NORVASC) 10 MG tablet 1 tablet daily 6/15/22   Mati Damon MD   budesonide-formoterol [facility]) 80-4.5 MCG/ACT AERO Inhale 2 puffs into the lungs 2 times daily  Patient not taking: Reported on 8/2/2022 5/26/22   Nisreen Matos, MD   lisinopril (PRINIVIL;ZESTRIL) 5 MG tablet Take 1 tablet by mouth daily 4/19/22   Nisreen Matos, MD   PSYLLIUM HUSK PO Take by mouth OTC    Historical Provider, MD   fluticasone (FLONASE) 50 MCG/ACT nasal spray 1 spray by Each Nostril route daily Taking PRN    Historical Provider, MD   triamcinolone (KENALOG) 0.1 % ointment Apply topically 2 times daily Using PRN  Patient not taking: Reported on 8/2/2022    Historical Provider, MD   mirabegron (MYRBETRIQ) 50 MG TB24 Take 50 mg by mouth daily Pt gets samples 3/10/22   Mati Damon MD   albuterol (PROVENTIL) (2.5 MG/3ML) 0.083% nebulizer solution Take 3 mLs by nebulization every 6 hours as needed for Wheezing 2/25/22   Mati Damon MD   albuterol sulfate  (90 Base) MCG/ACT inhaler INHALE 2 PUFFS BY MOUTH INTO LUNGS TWICE DAILY AS NEEDED FOR WHEEZING (NO  MORE  THAN  4  TIMES  DAILY) 2/8/22   Mati Damon MD   QUEtiapine (SEROQUEL) 200 MG tablet Take 200 mg by mouth daily Last filled 11/8/2021 90 day supply,   Take 3 tablets once daily--600 mg dose    Historical Provider, MD   FLUoxetine (PROZAC) 40 MG capsule Take 40 mg by mouth daily Last filled 1/24/2022    Historical Provider, MD   ipratropium (ATROVENT) 0.03 % nasal spray 2 sprays by Nasal route 3 times daily as needed for Rhinitis     Historical Provider, MD   lithium 300 MG capsule Take 300 mg by mouth 2 times daily (with meals). Historical Provider, MD       No future appointments.

## 2022-09-28 NOTE — TELEPHONE ENCOUNTER
Per reviewing derm note, it is unclear why she has the rash and also unclear why patient was given Keflex previously. Does she still have a rash? If so I suggest she make a visit to evaluate it or follow up with her dermatologist.     Given that I am covering for Dr. Renaldo Murillo, without evaluating her rash personally or having a clear diagnosis I don't feel prescribing abx is appropriate at this time. Thanks!

## 2022-09-28 NOTE — TELEPHONE ENCOUNTER
Patient still has rash. Patient was informed to call Dr Anupama Hopper to be evaluated and to get a refill on the medicine.

## 2022-10-03 ENCOUNTER — CARE COORDINATION (OUTPATIENT)
Dept: CARE COORDINATION | Age: 67
End: 2022-10-03

## 2022-10-04 DIAGNOSIS — I10 ESSENTIAL HYPERTENSION: ICD-10-CM

## 2022-10-04 RX ORDER — LISINOPRIL 5 MG/1
TABLET ORAL
Qty: 90 TABLET | Refills: 1 | Status: SHIPPED | OUTPATIENT
Start: 2022-10-04

## 2022-10-04 NOTE — TELEPHONE ENCOUNTER
E-scribe request for Lisinopril . Please review and e-scribe if applicable. Next Visit Date:  No future appointments. Health Maintenance   Topic Date Due    Pneumococcal 65+ years Vaccine (1 - PCV) Never done    DTaP/Tdap/Td vaccine (1 - Tdap) Never done    COVID-19 Vaccine (4 - Booster for Moderna series) 05/25/2022    Flu vaccine (1) Never done    Breast cancer screen  08/24/2022    Shingles vaccine (1 of 2) 01/21/2023 (Originally 4/22/2003)    Annual Wellness Visit (AWV)  04/08/2023    Depression Monitoring  08/02/2023    Lipids  02/08/2027    Colorectal Cancer Screen  09/25/2028    DEXA (modify frequency per FRAX score)  Completed    Hepatitis C screen  Completed    Hepatitis A vaccine  Aged Out    Hepatitis B vaccine  Aged Out    Hib vaccine  Aged Out    Meningococcal (ACWY) vaccine  Aged Out               (applicable per patient's age: Cancer Screenings, Depression Screening, Fall Risk Screening, Immunizations)    Hemoglobin A1C (%)   Date Value   08/31/2021 5.5   12/21/2020 5.6   06/26/2019 5.2     Microalb/Crt. Ratio (mcg/mg creat)   Date Value   11/10/2016 9     LDL Cholesterol (mg/dL)   Date Value   02/08/2022 123     AST (U/L)   Date Value   02/08/2022 29     ALT (U/L)   Date Value   02/08/2022 30     BUN (mg/dL)   Date Value   07/25/2022 14      (goal A1C is < 7)   (goal LDL is <100) need 30-50% reduction from baseline     BP Readings from Last 3 Encounters:   08/02/22 129/75   07/25/22 131/86   06/15/22 119/79    (goal /80)      All Future Testing planned in [facility]:  Lab Frequency Next Occurrence   Basic Metabolic Panel Once 79/76/7368   AMOL DIGITAL SCREEN W OR WO CAD BILATERAL Once 06/15/2022   DEXA BONE DENSITY AXIAL SKELETON Once 03/17/2024            Patient Active Problem List:     Essential hypertension     Pure hypercholesterolemia     Urinary incontinence     Anxiety     Sleep apnea     GERD (gastroesophageal reflux disease)     Hypothyroidism     Obesity (BMI 30-39. 9)     Mild persistent asthma without complication     History of stroke     Chronic diarrhea     Bipolar 1 disorder (HCC)     Ankle fracture, right, closed, with routine healing, subsequent encounter     OAB (overactive bladder)

## 2022-10-04 NOTE — CARE COORDINATION
called and spoke with Idalmis Alek.  informed Idalmishellen Gaston that she has been trying to contact her but unable to leave a message due to  full. Dulce reports that she was out getting [employer].  struggled to hear Idalmis Gaston due to Gregory Soler on his phone talking over Idalmis Gaston. Dulce confirmed she has her medications. Dulce reports that she never received the pads/depends in the mail. Idalmis Gaston is still receiving senior meals. Dulce informed  she still has a gas leak.  inquired if Dulce spoke to Jonatan Lopez regarding pads and gas leak at the home. Dulce stated \" 1 minute\" and hung up.  attempted to call back but went straight to . Plan:   Idalmis Alek will talk to Jonatan Lopez regarding gas leak. Idalmis Gaston will talk to Jonatan Lopez about pads/depends.

## 2022-10-06 ENCOUNTER — CARE COORDINATION (OUTPATIENT)
Dept: CARE COORDINATION | Age: 67
End: 2022-10-06

## 2022-10-10 NOTE — CARE COORDINATION
spoke with Marla Luong. Dulce reports that she is doing ok. Dulce confirmed she is still receiving meals from the [facility]. Dulce reports that her medications are still being delivered by [facility]. Jak Cardona reports that her gas is still off and the gas leak has not been fixed.  inquired if Marla Luong has spoken to Sylvia Smith? Dulce reports that has not spoken to Sylvia Smith.  called and spoke with Sylvia Smith. Sylvia Smith reports that he has left a few messages for 2 different plumbers. Sylvia Smith reports that he plans to have one to the house to fix by Monday. Plan:   Gas leak will get fixed by 10/10. Marla Luong will continue to work with Sylvia Smith. Marla Luong will follow through with Divorce.

## 2022-10-12 NOTE — CARE COORDINATION
Ambulatory Care Coordination Note  10/12/2022    ACC: Sue Arriaga RN    Incoming call from patient, states that she has not received monthly check from guardian Crescencio Sagastume. She also states that she needs money to fix gas line. I explained that she will need to contact Amna Filmakas to make arrangements for gas line repair and also to get her monthly check. Patient agrees to contact "[employer]s about the above needs. Offered patient enrollment in the Remote Patient Monitoring (RPM) program for in-home monitoring: NA. Lab Results       None            Care Coordination Interventions    Referral from Primary Care Provider: No  Suggested Interventions and Community Resources  Behavorial Health: In Process          Goals Addressed    None         Prior to Admission medications    Medication Sig Start Date End Date Taking?  Authorizing Provider   lisinopril (PRINIVIL;ZESTRIL) 5 MG tablet TAKE 1 TABLET BY MOUTH ONE TIME A DAY 10/4/22   Colton Rod MD   Incontinence Supply Disposable (SELECT BOOSTER PADS) MISC Use 3-4/day 8/5/22   Colton Rod MD   vitamin D3 (CHOLECALCIFEROL) 25 MCG (1000 UT) TABS tablet TAKE 1 TABLET BY MOUTH ONE TIME A DAY 7/12/22   Colton Rod MD   Probiotic, Lactobacillus, CAPS Take 1 tablet by mouth daily OTC 6/15/22   Colton Rod MD   montelukast (SINGULAIR) 10 MG tablet Take 1 tablet by mouth nightly  Patient not taking: Reported on 8/2/2022 6/15/22   Colton Rod MD   levothyroxine (SYNTHROID) 25 MCG tablet Once daily 6/15/22   Colton Rod MD   omeprazole (PRILOSEC) 20 MG delayed release capsule TAKE 1 CAPSULE BY MOUTH ONCE DAILY IN THE MORNING BEFORE BREAKFAST 6/15/22   Colton Rod MD   amLODIPine (NORVASC) 10 MG tablet 1 tablet daily 6/15/22   Colton Rod MD   budesonide-formoterol (SYMBICORT) 80-4.5 MCG/ACT AERO Inhale 2 puffs into the lungs 2 times daily  Patient not taking: Reported on 8/2/2022 5/26/22   Susy Musa MD   PSYLLIUM HUSK PO Take by mouth OTC Historical Provider, MD   fluticasone (FLONASE) 50 MCG/ACT nasal spray 1 spray by Each Nostril route daily Taking PRN    Historical Provider, MD   triamcinolone (KENALOG) 0.1 % ointment Apply topically 2 times daily Using PRN  Patient not taking: Reported on 8/2/2022    Historical Provider, MD   mirabegron (MYRBETRIQ) 50 MG TB24 Take 50 mg by mouth daily Pt gets samples 3/10/22   Coleman Charles MD   albuterol (PROVENTIL) (2.5 MG/3ML) 0.083% nebulizer solution Take 3 mLs by nebulization every 6 hours as needed for Wheezing 2/25/22   Coleman Charles MD   albuterol sulfate  (90 Base) MCG/ACT inhaler INHALE 2 PUFFS BY MOUTH INTO LUNGS TWICE DAILY AS NEEDED FOR WHEEZING (NO  MORE  THAN  4  TIMES  DAILY) 2/8/22   Coleman Charles MD   QUEtiapine (SEROQUEL) 200 MG tablet Take 200 mg by mouth daily Last filled 11/8/2021 90 day supply,   Take 3 tablets once daily--600 mg dose    Historical Provider, MD   FLUoxetine (PROZAC) 40 MG capsule Take 40 mg by mouth daily Last filled 1/24/2022    Historical Provider, MD   ipratropium (ATROVENT) 0.03 % nasal spray 2 sprays by Nasal route 3 times daily as needed for Rhinitis     Historical Provider, MD   lithium 300 MG capsule Take 300 mg by mouth 2 times daily (with meals). Historical Provider, MD       No future appointments.

## 2022-10-17 ENCOUNTER — CARE COORDINATION (OUTPATIENT)
Dept: CARE COORDINATION | Age: 67
End: 2022-10-17

## 2022-10-19 NOTE — CARE COORDINATION
called and spoke to Yury. Dulce reports that she has not spoken to Ching. Dulce reports that the gas is still shut off. Dulce reports that she made a complaint at the [facility] regarding having no gas and they contacted MARYBEL. Dulce reports that she is still staying at home because she has nowhere else to go.  contacted Ching. Ching reports that he did get a call from Mauro Peguero and they provided him with some plumbers that are certified and able to work on the gas pipe/line. Ching reports that he is trying to nail down a time/day with Dulce for her to be home in order to have the  come out. Ching stated that he has tried to call [employer] however unable to leave a message due to VM full.  attempted to contact Dulce to encouraged her to answer Aaron's calls in order to arrange a  to come to the home. Dulce did not answer and VM full. Plan:   Afshan Barnett will arrange a time for a  to come to the home to fix gas leak.

## 2022-10-20 ENCOUNTER — CARE COORDINATION (OUTPATIENT)
Dept: CARE COORDINATION | Age: 67
End: 2022-10-20

## 2022-10-20 NOTE — CARE COORDINATION
Ambulatory Care Coordination Note  10/20/2022    ACC: Tamara Alvarez, RN    Spoke with Dulce and she informed me that the furnace repair man was there and has a quote for the cost of the repair. She also states that her electric is back on. She repeatedly wanted me to talk with the furnace repair person about the quote. I explained to her repeatedly that I can not approve the quote or authorize payment. Patient asked repeatedly for me to call her guardian Juanita Prajapati and tell him about the situation. I explained that this would cause a delay in getting the quote approved and paid. She agrees to contact dereje today and provide him with the information  Will F/U with the patient in about 10 days    Offered patient enrollment in the Remote Patient Monitoring (RPM) program for in-home monitoring: NA. Lab Results       None            Care Coordination Interventions    Referral from Primary Care Provider: No  Suggested Interventions and Community Resources  [facility] In Process          Goals Addressed    None         Prior to Admission medications    Medication Sig Start Date End Date Taking?  Authorizing Provider   lisinopril (PRINIVIL;ZESTRIL) 5 MG tablet TAKE 1 TABLET BY MOUTH ONE TIME A DAY 10/4/22   Marvin Corley MD   Incontinence Supply Disposable (SELECT BOOSTER PADS) MISC Use 3-4/day 8/5/22   Marvin Corley MD   vitamin D3 (CHOLECALCIFEROL) 25 MCG (1000 UT) TABS tablet TAKE 1 TABLET BY MOUTH ONE TIME A DAY 7/12/22   Marvin Corley MD   Probiotic, Lactobacillus, CAPS Take 1 tablet by mouth daily OTC 6/15/22   Marvin Corley MD   montelukast (SINGULAIR) 10 MG tablet Take 1 tablet by mouth nightly  Patient not taking: Reported on 8/2/2022 6/15/22   Marvin Corley MD   levothyroxine (SYNTHROID) 25 MCG tablet Once daily 6/15/22   Marvin Corley MD   omeprazole (PRILOSEC) 20 MG delayed release capsule TAKE 1 CAPSULE BY MOUTH ONCE DAILY IN THE MORNING BEFORE BREAKFAST 6/15/22   Marvin Corley MD amLODIPine (NORVASC) 10 MG tablet 1 tablet daily 6/15/22   Mario Sparks MD   budesonide-formoterol [facility]) 80-4.5 MCG/ACT AERO Inhale 2 puffs into the lungs 2 times daily  Patient not taking: Reported on 8/2/2022 5/26/22   Lois Carreno MD   PSYLLIUM HUSK PO Take by mouth OTC    Historical Provider, MD   fluticasone (FLONASE) 50 MCG/ACT nasal spray 1 spray by Each Nostril route daily Taking PRN    Historical Provider, MD   triamcinolone (KENALOG) 0.1 % ointment Apply topically 2 times daily Using PRN  Patient not taking: Reported on 8/2/2022    Historical Provider, MD   mirabegron (MYRBETRIQ) 50 MG TB24 Take 50 mg by mouth daily Pt gets samples 3/10/22   Mario Sparks MD   albuterol (PROVENTIL) (2.5 MG/3ML) 0.083% nebulizer solution Take 3 mLs by nebulization every 6 hours as needed for Wheezing 2/25/22   Mario Sparks MD   albuterol sulfate  (90 Base) MCG/ACT inhaler INHALE 2 PUFFS BY MOUTH INTO LUNGS TWICE DAILY AS NEEDED FOR WHEEZING (NO  MORE  THAN  4  TIMES  DAILY) 2/8/22   Mario Sparks MD   QUEtiapine (SEROQUEL) 200 MG tablet Take 200 mg by mouth daily Last filled 11/8/2021 90 day supply,   Take 3 tablets once daily--600 mg dose    Historical Provider, MD   FLUoxetine (PROZAC) 40 MG capsule Take 40 mg by mouth daily Last filled 1/24/2022    Luna Provider, MD   ipratropium (ATROVENT) 0.03 % nasal spray 2 sprays by Nasal route 3 times daily as needed for Rhinitis     Historical Provider, MD   lithium 300 MG capsule Take 300 mg by mouth 2 times daily (with meals). Historical Provider, MD       No future appointments.

## 2022-10-21 ENCOUNTER — CARE COORDINATION (OUTPATIENT)
Dept: CARE COORDINATION | Age: 67
End: 2022-10-21

## 2022-10-21 NOTE — CARE COORDINATION
Ambulatory Care Coordination Note  10/21/2022    ACC: Raheel Tena RN    Call from patient and she states that furnace repair has been completed. She states that Lara Coleman needs to put $120 on her card to pay for it. She also informed me that Lara Coleman has her pull ups and has not been abl receive a return call from [place] to pick them up. She states that she leaves messages but does not receive a return call. . I agreed to contact Lara Coleman and either leave a VM or talk to him directly. Call to dereje cell number has a VM that is full. Called office number and left a detailed message and a request to call Debere about the above mention of issues. After phone call, patient did not hang up. Writer overheard patient talking to Elenita Fernandez. ([employer]) He stated that he needed $60 to buy 30 of something. They also discussed people that are involved in her care. Patient stated that when  called and asked if Elenita Fernandez was there she stated no, That is was Lawerencmarybeth Rodgersdt brother when it was [employer]. Both laughed about the deception. Offered patient enrollment in the Remote Patient Monitoring (RPM) program for in-home monitoring: NA. Lab Results       None            Care Coordination Interventions    Referral from Primary Care Provider: No  Suggested Interventions and Community Resources  Behavorial Health: In Process          Goals Addressed    None         Prior to Admission medications    Medication Sig Start Date End Date Taking?  Authorizing Provider   lisinopril (PRINIVIL;ZESTRIL) 5 MG tablet TAKE 1 TABLET BY MOUTH ONE TIME A DAY 10/4/22   Elizabeth Mi MD   Incontinence Supply Disposable (SELECT BOOSTER PADS) MISC Use 3-4/day 8/5/22   Elizabeth Mi MD   vitamin D3 (CHOLECALCIFEROL) 25 MCG (1000 UT) TABS tablet TAKE 1 TABLET BY MOUTH ONE TIME A DAY 7/12/22   Elizabeth Mi MD   Probiotic, Lactobacillus, CAPS Take 1 tablet by mouth daily OTC 6/15/22   Elizabeth Mi MD   montelukast (SINGULAIR) 10 MG tablet Take 1 tablet by mouth nightly  Patient not taking: Reported on 8/2/2022 6/15/22   Ciara Solares MD   levothyroxine (SYNTHROID) 25 MCG tablet Once daily 6/15/22   Ciara Solares MD   omeprazole (PRILOSEC) 20 MG delayed release capsule TAKE 1 CAPSULE BY MOUTH ONCE DAILY IN THE MORNING BEFORE BREAKFAST 6/15/22   Ciara Solares MD   amLODIPine (NORVASC) 10 MG tablet 1 tablet daily 6/15/22   Ciara Solares MD   budesonide-formoterol (SYMBICORT) 80-4.5 MCG/ACT AERO Inhale 2 puffs into the lungs 2 times daily  Patient not taking: Reported on 8/2/2022 5/26/22   Ridge Keita MD   PSYLLIUM HUSK PO Take by mouth OTC    Historical Provider, MD   fluticasone (FLONASE) 50 MCG/ACT nasal spray 1 spray by Each Nostril route daily Taking PRN    Historical Provider, MD   triamcinolone (KENALOG) 0.1 % ointment Apply topically 2 times daily Using PRN  Patient not taking: Reported on 8/2/2022    Historical Provider, MD   mirabegron (MYRBETRIQ) 50 MG TB24 Take 50 mg by mouth daily Pt gets samples 3/10/22   Ciara Solares MD   albuterol (PROVENTIL) (2.5 MG/3ML) 0.083% nebulizer solution Take 3 mLs by nebulization every 6 hours as needed for Wheezing 2/25/22   Ciara Solares MD   albuterol sulfate  (90 Base) MCG/ACT inhaler INHALE 2 PUFFS BY MOUTH INTO LUNGS TWICE DAILY AS NEEDED FOR WHEEZING (NO  MORE  THAN  4  TIMES  DAILY) 2/8/22   Ciara Solares MD   QUEtiapine (SEROQUEL) 200 MG tablet Take 200 mg by mouth daily Last filled 11/8/2021 90 day supply,   Take 3 tablets once daily--600 mg dose    Historical Provider, MD   FLUoxetine (PROZAC) 40 MG capsule Take 40 mg by mouth daily Last filled 1/24/2022    Historical Provider, MD   ipratropium (ATROVENT) 0.03 % nasal spray 2 sprays by Nasal route 3 times daily as needed for Rhinitis     Historical Provider, MD   lithium 300 MG capsule Take 300 mg by mouth 2 times daily (with meals).     Historical Provider, MD       Future Appointments   Date Time Provider Department Center   10/27/2022  3:00 PM Linda Rocha [facility]

## 2022-10-24 ENCOUNTER — CARE COORDINATION (OUTPATIENT)
Dept: CARE COORDINATION | Age: 67
End: 2022-10-24

## 2022-10-24 NOTE — CARE COORDINATION
Ambulatory Care Coordination Note  10/24/2022    ACC: Raheel Tena, ADEEL    Incoming call from [employer]. She states that she has not talked to Lara Coleman, her guardian. She voiced concern about the bill from furnace repair, pull ups and wants to get a new guardian. I reviewed our last conversation and she was tasked with contacting Lara Coleman about the pull ups and payment of furnace repair above. She states that she tried but she couldn't  leave a voicemail. I asked if she called his office number and she did not. I explained that buy involving me it causes a delay in getting her needs met Patient talked about getting a new guardian and asked me to contact the courts. I explained that this is something that she needs to do because she is one that is requesting the change and involving me would cause confusion. She has the number for the court appointed . I instructed her to call and will not be involved in that. I suggested that she call [id number] [facility] office and cell number daily until she reaches him. I agreed to attempt to contact him today only and call her back   Call to Lara Coleman and spoke with him about the issues with Dulce, e states that she has the invoice for the furnace repair and that h has the pull ups at his office. Dulce needs to contact him to  the pull ups. He has tried multiple time but is not able to leave a message because her voice mail is full. I will reach out in 1 week to F/U   Called patient to inform here of the abouve but was not able to contact he because VM was full    Offered patient enrollment in the Remote Patient Monitoring (RPM) program for in-home monitoring: NA. Lab Results       None            Care Coordination Interventions    Referral from Primary Care Provider: No  Suggested Interventions and Community Resources  [facility] In Process          Goals Addressed    None         Prior to Admission medications    Medication Sig Start Date End Date Taking?  Authorizing Provider   lisinopril (PRINIVIL;ZESTRIL) 5 MG tablet TAKE 1 TABLET BY MOUTH ONE TIME A DAY 10/4/22   Mario Sparks MD   Incontinence Supply Disposable (SELECT BOOSTER PADS) MISC Use 3-4/day 8/5/22   Mario Sparks MD   vitamin D3 (CHOLECALCIFEROL) 25 MCG (1000 UT) TABS tablet TAKE 1 TABLET BY MOUTH ONE TIME A DAY 7/12/22   Mario Sparks MD   Probiotic, Lactobacillus, CAPS Take 1 tablet by mouth daily OTC 6/15/22   Mario Sparks MD   montelukast (SINGULAIR) 10 MG tablet Take 1 tablet by mouth nightly  Patient not taking: Reported on 8/2/2022 6/15/22   Mario Sparks MD   levothyroxine (SYNTHROID) 25 MCG tablet Once daily 6/15/22   Mario Sparks MD   omeprazole (PRILOSEC) 20 MG delayed release capsule TAKE 1 CAPSULE BY MOUTH ONCE DAILY IN THE MORNING BEFORE BREAKFAST 6/15/22   Mario Sparks MD   amLODIPine (NORVASC) 10 MG tablet 1 tablet daily 6/15/22   Mario Sparks MD   budesonide-formoterol (SYMBICORT) 80-4.5 MCG/ACT AERO Inhale 2 puffs into the lungs 2 times daily  Patient not taking: Reported on 8/2/2022 5/26/22   Lois Carreno MD   PSYLLIUM HUSK PO Take by mouth OTC    Historical Provider, MD   fluticasone (FLONASE) 50 MCG/ACT nasal spray 1 spray by Each Nostril route daily Taking PRN    Historical Provider, MD   triamcinolone (KENALOG) 0.1 % ointment Apply topically 2 times daily Using PRN  Patient not taking: Reported on 8/2/2022    Historical Provider, MD   mirabegron (MYRBETRIQ) 50 MG TB24 Take 50 mg by mouth daily Pt gets samples 3/10/22   Mario Sparks MD   albuterol (PROVENTIL) (2.5 MG/3ML) 0.083% nebulizer solution Take 3 mLs by nebulization every 6 hours as needed for Wheezing 2/25/22   aMrio Sparks MD   albuterol sulfate  (90 Base) MCG/ACT inhaler INHALE 2 PUFFS BY MOUTH INTO LUNGS TWICE DAILY AS NEEDED FOR WHEEZING (NO  MORE  THAN  4  TIMES  DAILY) 2/8/22   Mario Sparks MD   QUEtiapine (SEROQUEL) 200 MG tablet Take 200 mg by mouth daily Last filled 11/8/2021 90 day supply,   Take 3 tablets once daily--600 mg dose    Historical Provider, MD   FLUoxetine (PROZAC) 40 MG capsule Take 40 mg by mouth daily Last filled 1/24/2022    Historical Provider, MD   ipratropium (ATROVENT) 0.03 % nasal spray 2 sprays by Nasal route 3 times daily as needed for Rhinitis     Historical Provider, MD   lithium 300 MG capsule Take 300 mg by mouth 2 times daily (with meals).     Historical Provider, MD       Future Appointments   Date Time Provider Ekta Tapia   10/27/2022  3:00 PM Floyd Harris [facility] [address] [facility]

## 2022-10-25 NOTE — CARE COORDINATION
spoke with Laura Beckford who informed  that he heard Dulce and Gregory Soler talking in the background about lying to  and Phan.  has spoken to [employer] with a gentleman in the background. [employer] declined it was Gregory Soler as she giggled.  has informed [employer] she is unable to help her if she continues to lie to .  has encouraged Debere to follow up with Jonatan Lopez with questions/concerns.  called and spoke with Jonatan Lopez. Jonatan Lopez reports that Dulce's gas leak is fixed and gas is back on. Jonatan Lopez informed  that he too feels Gregory Soler has been around because [employer] has not been answering the door when he tries to stop by to drop her checks off. Jonatan Lopez thanked  for helping for set up delivered medications. Jonatan Lopez denied questions or concerns. Plan:    will sign off.

## 2022-10-26 ENCOUNTER — CARE COORDINATION (OUTPATIENT)
Dept: CARE COORDINATION | Age: 67
End: 2022-10-26

## 2022-10-26 NOTE — CARE COORDINATION
Ambulatory Care Coordination Note  10/26/2022    ACC: Dk Manning, RN    Call from patient stating that she can't go to scheduled appointment for Endo on 10/31/22    Offered patient enrollment in the Remote Patient Monitoring (RPM) program for in-home monitoring: NA. Lab Results       None            Care Coordination Interventions    Referral from Primary Care Provider: No  Suggested Interventions and Community Resources  [facility] In Process          Goals Addressed    None         Prior to Admission medications    Medication Sig Start Date End Date Taking?  Authorizing Provider   lisinopril (PRINIVIL;ZESTRIL) 5 MG tablet TAKE 1 TABLET BY MOUTH ONE TIME A DAY 10/4/22   Ciara Solares MD   Incontinence Supply Disposable (SELECT BOOSTER PADS) MISC Use 3-4/day 8/5/22   Ciara Solares MD   vitamin D3 (CHOLECALCIFEROL) 25 MCG (1000 UT) TABS tablet TAKE 1 TABLET BY MOUTH ONE TIME A DAY 7/12/22   Ciara Solares MD   Probiotic, Lactobacillus, CAPS Take 1 tablet by mouth daily OTC 6/15/22   Ciara Solares MD   montelukast (SINGULAIR) 10 MG tablet Take 1 tablet by mouth nightly  Patient not taking: Reported on 8/2/2022 6/15/22   Ciara Solares MD   levothyroxine (SYNTHROID) 25 MCG tablet Once daily 6/15/22   Ciara Solares MD   omeprazole (PRILOSEC) 20 MG delayed release capsule TAKE 1 CAPSULE BY MOUTH ONCE DAILY IN THE MORNING BEFORE BREAKFAST 6/15/22   Ciara Solares MD   amLODIPine (NORVASC) 10 MG tablet 1 tablet daily 6/15/22   Ciara Solares MD   budesonide-formoterol (SYMBICORT) 80-4.5 MCG/ACT AERO Inhale 2 puffs into the lungs 2 times daily  Patient not taking: Reported on 8/2/2022 5/26/22   Ridge Keita MD   PSYLLIUM HUSK PO Take by mouth OTC    Historical Provider, MD   fluticasone (FLONASE) 50 MCG/ACT nasal spray 1 spray by Each Nostril route daily Taking PRN    Historical Provider, MD   triamcinolone (KENALOG) 0.1 % ointment Apply topically 2 times daily Using PRN  Patient not taking: Reported on 8/2/2022    Historical Provider, MD   mirabegron (MYRBETRIQ) 50 MG TB24 Take 50 mg by mouth daily Pt gets samples 3/10/22   Colton StageMD thaddeus   albuterol (PROVENTIL) (2.5 MG/3ML) 0.083% nebulizer solution Take 3 mLs by nebulization every 6 hours as needed for Wheezing 2/25/22   Colton StageMD thaddeus   albuterol sulfate  (90 Base) MCG/ACT inhaler INHALE 2 PUFFS BY MOUTH INTO LUNGS TWICE DAILY AS NEEDED FOR WHEEZING (NO  MORE  THAN  4  TIMES  DAILY) 2/8/22   Colton StageMD thaddeus   QUEtiapine (SEROQUEL) 200 MG tablet Take 200 mg by mouth daily Last filled 11/8/2021 90 day supply,   Take 3 tablets once daily--600 mg dose    Historical Provider, MD   FLUoxetine (PROZAC) 40 MG capsule Take 40 mg by mouth daily Last filled 1/24/2022    Historical Provider, MD   ipratropium (ATROVENT) 0.03 % nasal spray 2 sprays by Nasal route 3 times daily as needed for Rhinitis     Historical Provider, MD   lithium 300 MG capsule Take 300 mg by mouth 2 times daily (with meals).     Historical Provider, MD       Future Appointments   Date Time Provider Ekta Tapia   10/28/2022  9:30 AM Haseeb Lopez DO [facility]

## 2022-10-28 ENCOUNTER — TELEPHONE (OUTPATIENT)
Dept: INTERNAL MEDICINE | Age: 67
End: 2022-10-28

## 2022-10-28 ENCOUNTER — CARE COORDINATION (OUTPATIENT)
Dept: CARE COORDINATION | Age: 67
End: 2022-10-28

## 2022-10-28 NOTE — CARE COORDINATION
Ambulatory Care Coordination Note  10/28/2022    ACC: Akila Estrada, ADEEL    Patient called me and wanted to tell me what her Psychiatrist told her during her visit. That a meeting has been set up 12/1/22. . to discuss assisted living  Patient went on to describe how she wants to get new guardian because she feels Florence Specter is keeping her money and won't pay her bills. She asked if I could get her the number for her  but she could not recall the name. I instructed her to call the courts and explain her situation and they can contact she can contact them. She asked if I could call the  but could not recall the name. I explained that I. do not have any records of the court proceeding, so she will need to contact the courts to explain her situation. Patient feels that [employer] is trying to rip her off, getting a share of her income and may be forcing her to sell her house which she does not want to do. Patient launched into a \"flight\" of thoughts that some times contradicted previous statements       Offered patient enrollment in the Remote Patient Monitoring (RPM) program for in-home monitoring: NA. Lab Results       None            Care Coordination Interventions    Referral from Primary Care Provider: No  Suggested Interventions and Community Resources  [facility] In Process          Goals Addressed    None         Prior to Admission medications    Medication Sig Start Date End Date Taking?  Authorizing Provider   lisinopril (PRINIVIL;ZESTRIL) 5 MG tablet TAKE 1 TABLET BY MOUTH ONE TIME A DAY 10/4/22   Blaise Soria MD   Incontinence Supply Disposable (SELECT BOOSTER PADS) MISC Use 3-4/day 8/5/22   Blaise Soria MD   vitamin D3 (CHOLECALCIFEROL) 25 MCG (1000 UT) TABS tablet TAKE 1 TABLET BY MOUTH ONE TIME A DAY 7/12/22   Blaise Soria MD   Probiotic, Lactobacillus, CAPS Take 1 tablet by mouth daily OTC 6/15/22   Blaise Soria MD   montelukast (SINGULAIR) 10 MG tablet Take 1 tablet by mouth nightly  Patient not taking: Reported on 8/2/2022 6/15/22   Israel Olivas MD   levothyroxine (SYNTHROID) 25 MCG tablet Once daily 6/15/22   Israel Olivas MD   omeprazole (PRILOSEC) 20 MG delayed release capsule TAKE 1 CAPSULE BY MOUTH ONCE DAILY IN THE MORNING BEFORE BREAKFAST 6/15/22   Israel Olivas MD   amLODIPine (NORVASC) 10 MG tablet 1 tablet daily 6/15/22   Israel Olivas MD   budesonide-formoterol (SYMBICORT) 80-4.5 MCG/ACT AERO Inhale 2 puffs into the lungs 2 times daily  Patient not taking: Reported on 8/2/2022 5/26/22   Rasta Clemens MD   PSYLLIUM HUSK PO Take by mouth OTC    Historical Provider, MD   fluticasone (FLONASE) 50 MCG/ACT nasal spray 1 spray by Each Nostril route daily Taking PRN    Historical Provider, MD   triamcinolone (KENALOG) 0.1 % ointment Apply topically 2 times daily Using PRN  Patient not taking: Reported on 8/2/2022    Historical Provider, MD   mirabegron (MYRBETRIQ) 50 MG TB24 Take 50 mg by mouth daily Pt gets samples 3/10/22   Israel Olivas MD   albuterol (PROVENTIL) (2.5 MG/3ML) 0.083% nebulizer solution Take 3 mLs by nebulization every 6 hours as needed for Wheezing 2/25/22   Israel Olivas MD   albuterol sulfate  (90 Base) MCG/ACT inhaler INHALE 2 PUFFS BY MOUTH INTO LUNGS TWICE DAILY AS NEEDED FOR WHEEZING (NO  MORE  THAN  4  TIMES  DAILY) 2/8/22   Israel Olivas MD   QUEtiapine (SEROQUEL) 200 MG tablet Take 200 mg by mouth daily Last filled 11/8/2021 90 day supply,   Take 3 tablets once daily--600 mg dose    Historical Provider, MD   FLUoxetine (PROZAC) 40 MG capsule Take 40 mg by mouth daily Last filled 1/24/2022    Historical Provider, MD   ipratropium (ATROVENT) 0.03 % nasal spray 2 sprays by Nasal route 3 times daily as needed for Rhinitis     Historical Provider, MD   lithium 300 MG capsule Take 300 mg by mouth 2 times daily (with meals).     Historical Provider, MD       Future Appointments   Date Time Provider Ekta Tapia 11/4/2022  2:30 PM Maykel Reyes MD [facility]   11/9/2022  9:00 AM [facility] [address]

## 2022-10-28 NOTE — TELEPHONE ENCOUNTER
Patient was called and 11/04/22 appointment has been cancelled due to our physicians do not complete for for service animals , patient stated her psychologist doesn't do this paperwork patient is asking for us to aid her in helping her find some one who does , please advise if anyone knows of a place or person we can refer patient to.

## 2022-10-31 NOTE — TELEPHONE ENCOUNTER
The animal in question is an emotional support dog. Unfortunately in the [place], emotional support animals are not recognized as service dogs. Dr. Sky Soliz has refused to complete this letter previously and deferred to psychiatrist. If psychiatrist is also refusing to provide this letter John Pérez is unaware of anyone else that might be able to provide what pt is asking for.

## 2022-11-01 ENCOUNTER — CARE COORDINATION (OUTPATIENT)
Dept: CARE COORDINATION | Age: 67
End: 2022-11-01

## 2022-11-02 ENCOUNTER — CARE COORDINATION (OUTPATIENT)
Dept: CARE COORDINATION | Age: 67
End: 2022-11-02

## 2022-11-02 NOTE — CARE COORDINATION
Ambulatory Care Coordination Note  2022    ACC: Murphy Underwood RN    Incoming call from [place], patient's guardian. He informed me that Dulce left a voicemail on his office phone last night that lasted a little over 5 minuets. He informed me that the message was rambling, talking about being a slave and people are after her. She also stated that she wants to get a passport and disappear. Name of Psychiatrist obtained   Dr. Nicole Borja  [address] [id number]. [phone]  I agreed to try and contact Dulce today. Called Dulce, unable to leave a message. Will reach out  if unable to contact today  Call from Tanner Singh Benedict, states that she was stopped by police yesterday because of  tags. She was given a warning and today she went to [facility] and paid for her new tags. Dulce also informed me that the police searched her car because she is black. Tanner Singh Benedict is stating that everyone is trying to get her committed and take her house and possessions. \"I don't want to be put in a cell. I attempted to explain that people are trying to help her and not trying to do anything to harm her or coerce her. Tanner Singh Benedict states that the meeting she has on  is to force her to sell her house and possessions. I encourage her to go and voice her concerns. By not going she will make her current situation worse. She agees to a call on 22 to discuss this again. Offered patient enrollment in the Remote Patient Monitoring (RPM) program for in-home monitoring: NA. Lab Results       None            Care Coordination Interventions    Referral from Primary Care Provider: No  Suggested Interventions and Community Resources  [facility]          Goals Addressed    None         Prior to Admission medications    Medication Sig Start Date End Date Taking?  Authorizing Provider   lisinopril (PRINIVIL;ZESTRIL) 5 MG tablet TAKE 1 TABLET BY MOUTH ONE TIME A DAY 10/4/22   Sade Guillen MD   Incontinence Supply Disposable (SELECT BOOSTER PADS) MISC Use 3-4/day 8/5/22   Garret Almeida MD   vitamin D3 (CHOLECALCIFEROL) 25 MCG (1000 UT) TABS tablet TAKE 1 TABLET BY MOUTH ONE TIME A DAY 7/12/22   Garret Almeida MD   Probiotic, Lactobacillus, CAPS Take 1 tablet by mouth daily OTC 6/15/22   Garret Almeida MD   montelukast (SINGULAIR) 10 MG tablet Take 1 tablet by mouth nightly  Patient not taking: Reported on 8/2/2022 6/15/22   Garret Almeida MD   levothyroxine (SYNTHROID) 25 MCG tablet Once daily 6/15/22   Garret Almeida MD   omeprazole (PRILOSEC) 20 MG delayed release capsule TAKE 1 CAPSULE BY MOUTH ONCE DAILY IN THE MORNING BEFORE BREAKFAST 6/15/22   Garret Almeida MD   amLODIPine (NORVASC) 10 MG tablet 1 tablet daily 6/15/22   Garret Almeida MD   budesonide-formoterol (SYMBICORT) 80-4.5 MCG/ACT AERO Inhale 2 puffs into the lungs 2 times daily  Patient not taking: Reported on 8/2/2022 5/26/22   Jael ChallengeMD thaddeus   PSYLLIUM HUSK PO Take by mouth OTC    Historical Provider, MD   fluticasone (FLONASE) 50 MCG/ACT nasal spray 1 spray by Each Nostril route daily Taking PRN    Historical Provider, MD   triamcinolone (KENALOG) 0.1 % ointment Apply topically 2 times daily Using PRN  Patient not taking: Reported on 8/2/2022    Historical Provider, MD   mirabegron (MYRBETRIQ) 50 MG TB24 Take 50 mg by mouth daily Pt gets samples 3/10/22   Garret Almeida MD   albuterol (PROVENTIL) (2.5 MG/3ML) 0.083% nebulizer solution Take 3 mLs by nebulization every 6 hours as needed for Wheezing 2/25/22   Garret Almeida MD   albuterol sulfate  (90 Base) MCG/ACT inhaler INHALE 2 PUFFS BY MOUTH INTO LUNGS TWICE DAILY AS NEEDED FOR WHEEZING (NO  MORE  THAN  4  TIMES  DAILY) 2/8/22   Garret Almeida MD   QUEtiapine (SEROQUEL) 200 MG tablet Take 200 mg by mouth daily Last filled 11/8/2021 90 day supply,   Take 3 tablets once daily--600 mg dose    Historical Provider, MD   FLUoxetine (PROZAC) 40 MG capsule Take 40 mg by mouth daily Last filled 1/24/2022    Historical Provider, MD   ipratropium (ATROVENT) 0.03 % nasal spray 2 sprays by Nasal route 3 times daily as needed for Rhinitis     Historical Provider, MD   lithium 300 MG capsule Take 300 mg by mouth 2 times daily (with meals).     Historical Provider, MD       Future Appointments   Date Time Provider Ekta Tapia   11/4/2022  2:30 PM Jasmin Lehman MD [facility]   11/9/2022  9:00 AM [facility] 2 BRENNEN Lamb

## 2022-11-03 DIAGNOSIS — J45.30 MILD PERSISTENT ASTHMA WITHOUT COMPLICATION: ICD-10-CM

## 2022-11-03 DIAGNOSIS — K21.9 GASTROESOPHAGEAL REFLUX DISEASE, UNSPECIFIED WHETHER ESOPHAGITIS PRESENT: ICD-10-CM

## 2022-11-03 RX ORDER — OMEPRAZOLE 20 MG/1
CAPSULE, DELAYED RELEASE ORAL
Qty: 28 CAPSULE | Refills: 3 | Status: SHIPPED | OUTPATIENT
Start: 2022-11-03

## 2022-11-03 RX ORDER — MELATONIN
Qty: 28 TABLET | Refills: 3 | Status: SHIPPED | OUTPATIENT
Start: 2022-11-03

## 2022-11-03 RX ORDER — MONTELUKAST SODIUM 10 MG/1
TABLET ORAL
Qty: 28 TABLET | Refills: 3 | Status: SHIPPED | OUTPATIENT
Start: 2022-11-03

## 2022-11-03 NOTE — TELEPHONE ENCOUNTER
E-scribe request for Singulair, Vit D, Prilosec . Please review and e-scribe if applicable. Next Visit Date:  Future Appointments   Date Time Provider Ekta Tapia   11/4/2022  2:30 PM Jessee Low MD [facility] IM [facility]   11/9/2022  9:00 AM STA PAT RM 2 [facility] PAT [address] Maintenance   Topic Date Due    Pneumococcal 65+ years Vaccine (1 - PCV) Never done    DTaP/Tdap/Td vaccine (1 - Tdap) Never done    COVID-19 Vaccine (4 - Booster for Moderna series) 03/22/2022    Flu vaccine (1) Never done    Breast cancer screen  08/24/2022    Shingles vaccine (1 of 2) 01/21/2023 (Originally 4/22/2003)    Annual Wellness Visit (AWV)  04/08/2023    Depression Monitoring  08/02/2023    Lipids  02/08/2027    Colorectal Cancer Screen  09/25/2028    DEXA (modify frequency per FRAX score)  Completed    Hepatitis C screen  Completed    Hepatitis A vaccine  Aged Out    Hib vaccine  Aged Out    Meningococcal (ACWY) vaccine  Aged Out               (applicable per patient's age: Cancer Screenings, Depression Screening, Fall Risk Screening, Immunizations)    Hemoglobin A1C (%)   Date Value   08/31/2021 5.5   12/21/2020 5.6   06/26/2019 5.2     Microalb/Crt.  Ratio (mcg/mg creat)   Date Value   11/10/2016 9     LDL Cholesterol (mg/dL)   Date Value   02/08/2022 123     AST (U/L)   Date Value   02/08/2022 29     ALT (U/L)   Date Value   02/08/2022 30     BUN (mg/dL)   Date Value   07/25/2022 14      (goal A1C is < 7)   (goal LDL is <100) need 30-50% reduction from baseline     BP Readings from Last 3 Encounters:   08/02/22 129/75   07/25/22 131/86   06/15/22 119/79    (goal /80)      All Future Testing planned in [facility]:  Lab Frequency Next Occurrence   Basic Metabolic Panel Once 31/67/1980   AMOL DIGITAL SCREEN W OR WO CAD BILATERAL Once 06/15/2022   DEXA BONE DENSITY AXIAL SKELETON Once 03/17/2024            Patient Active Problem List:     Essential hypertension     Pure hypercholesterolemia     Urinary incontinence Anxiety     Sleep apnea     GERD (gastroesophageal reflux disease)     Hypothyroidism     Obesity (BMI 30-39. 9)     Mild persistent asthma without complication     History of stroke     Chronic diarrhea     Bipolar 1 disorder (HCC)     Ankle fracture, right, closed, with routine healing, subsequent encounter     OAB (overactive bladder)

## 2022-11-04 NOTE — TELEPHONE ENCOUNTER
Spoke to patient, explained that her appt is canceled today because her provider is not in the office. Writer also explained that we do not do paperwork for service or emotional support animals. Patient states that her POA which is her  is trying to put her in assistant living because he feels like she needs the extra help due to medical conditions. Patient is having a hard accepting that she is being forced to go to assistant living and give up her personal belongings and dog. Patient can only keep dog if she becomes a certified service dog. Writer advised patient that she will call patient back with information on how to get service dog.

## 2022-11-09 ENCOUNTER — CARE COORDINATION (OUTPATIENT)
Dept: CARE COORDINATION | Age: 67
End: 2022-11-09

## 2022-11-09 NOTE — CARE COORDINATION
Ambulatory Care Coordination Note  11/9/2022    ACC: Juanjo White RN    Called, unable to leave a message on home or cell phone. Will attempt to contact in 5-7 days    Offered patient enrollment in the Remote Patient Monitoring (RPM) program for in-home monitoring:  unable to contact . Lab Results       None            Care Coordination Interventions    Referral from Primary Care Provider: No  Suggested Interventions and Community Resources  [facility] In Process          Goals Addressed    None         Prior to Admission medications    Medication Sig Start Date End Date Taking? Authorizing Provider   montelukast (SINGULAIR) 10 MG tablet TAKE 1 TABLET BY MOUTH ONCE DAILY AT NIGHT. 11/3/22   Jm Ventura MD   vitamin D3 (CHOLECALCIFEROL) 25 MCG (1000 UT) TABS tablet TAKE 1 TABLET BY MOUTH ONE TIME A DAY 11/3/22   Jm Ventura MD   omeprazole (PRILOSEC) 20 MG delayed release capsule TAKE 1 CAPSULE BY MOUTH ONCE DAILY IN THE MORNING BEFORE BREAKFAST.  11/3/22   Jm Ventura MD   lisinopril (PRINIVIL;ZESTRIL) 5 MG tablet TAKE 1 TABLET BY MOUTH ONE TIME A DAY 10/4/22   Jm Ventura MD   Incontinence Supply Disposable (SELECT BOOSTER PADS) MISC Use 3-4/day 8/5/22   Jm Ventura MD   Probiotic, Lactobacillus, CAPS Take 1 tablet by mouth daily OTC 6/15/22   Jm Ventura MD   levothyroxine (SYNTHROID) 25 MCG tablet Once daily 6/15/22   Jm Ventura MD   amLODIPine (NORVASC) 10 MG tablet 1 tablet daily 6/15/22   Jm Ventura MD   budesonide-formoterol (SYMBICORT) 80-4.5 MCG/ACT AERO Inhale 2 puffs into the lungs 2 times daily  Patient not taking: Reported on 8/2/2022 5/26/22   Jacqueline Gonzalez MD   PSYLLIUM HUSK PO Take by mouth OTC    Historical Provider, MD   fluticasone (FLONASE) 50 MCG/ACT nasal spray 1 spray by Each Nostril route daily Taking PRN    Historical Provider, MD   triamcinolone (KENALOG) 0.1 % ointment Apply topically 2 times daily Using PRN  Patient not taking: Reported on 8/2/2022    Historical Provider, MD   mirabegron (MYRBETRIQ) 50 MG TB24 Take 50 mg by mouth daily Pt gets samples 3/10/22   Genie Palmer MD   albuterol (PROVENTIL) (2.5 MG/3ML) 0.083% nebulizer solution Take 3 mLs by nebulization every 6 hours as needed for Wheezing 2/25/22   Genie Palmer MD   albuterol sulfate  (90 Base) MCG/ACT inhaler INHALE 2 PUFFS BY MOUTH INTO LUNGS TWICE DAILY AS NEEDED FOR WHEEZING (NO  MORE  THAN  4  TIMES  DAILY) 2/8/22   Genie Palmer MD   QUEtiapine (SEROQUEL) 200 MG tablet Take 200 mg by mouth daily Last filled 11/8/2021 90 day supply,   Take 3 tablets once daily--600 mg dose    Historical Provider, MD   FLUoxetine (PROZAC) 40 MG capsule Take 40 mg by mouth daily Last filled 1/24/2022    Historical Provider, MD   ipratropium (ATROVENT) 0.03 % nasal spray 2 sprays by Nasal route 3 times daily as needed for Rhinitis     Historical Provider, MD   lithium 300 MG capsule Take 300 mg by mouth 2 times daily (with meals). Historical Provider, MD       No future appointments.

## 2022-11-15 ENCOUNTER — TELEPHONE (OUTPATIENT)
Dept: INTERNAL MEDICINE | Age: 67
End: 2022-11-15

## 2022-11-15 NOTE — TELEPHONE ENCOUNTER
Patient wants a new referral to psychiatry the one she goes to she not liking how she treating her. Wants new referral somewhere else.

## 2022-11-16 NOTE — TELEPHONE ENCOUNTER
Writer was asked by Alessandra Delgado to contact patient to let her know we are unable to figure out how pt gets her dog certified as a service animal rather than an emotional support animal. Writer's suggestion is to contact [employer] at [phone] for further assistance. PC to pt to discuss, pt states she was already in touch with [employer] and they gave her paperwork to get filled out. Pt states she dropped it off to office already. Discussed with Rachid Mccain who is familiar with this situation. Jayshree states Dulce brought paperwork from [employer] months ago which would have signed the dog off as an Emotional Support Animal. D/t reasoning behind [facility] being psychiatric in nature, Dorcas De Leon was told her psychiatrist would need to be the one that filled out the paperwork. Per Rachid Mccain, psychiatrist also declined to fill out paperwork. Jayshree states conversations have been had with management at [facility] (AL facility pt is looking at moving into) and for Dulce to have the dog in their facility it must be a Certified Service Animal---an [address] is not permissible. Writer unable to find how to certify a dog through training but did find information regarding assisted living facilities and acceptance of service animals including emotional support animals. PC to [employer], unfortunately both people that could answer writer's questions are out of the office today. Writer left message on Mellisa Rolle's VM asking for call back.

## 2022-11-22 ENCOUNTER — CARE COORDINATION (OUTPATIENT)
Dept: CARE COORDINATION | Age: 67
End: 2022-11-22

## 2022-11-22 NOTE — TELEPHONE ENCOUNTER
Writer and Gina Ghosh at [employer] have left alternating VM's for each other over the last week.  Will try again to speak directly with her re: pt's request.

## 2022-11-28 ENCOUNTER — NURSE TRIAGE (OUTPATIENT)
Dept: OTHER | Facility: CLINIC | Age: 67
End: 2022-11-28

## 2022-11-28 NOTE — TELEPHONE ENCOUNTER
Location of patient: [address] call from Elayne De La Rosa at [facility] with [email or web address]. Subjective: Caller states \"Since Thanksgiving it hurts. \"     Current Symptoms: generalized lower abd pain. Onset: 4 days ago; improving    Associated Symptoms: diarrhea 4 days ago, none since. No vomiting. Pain Severity: 0/10; when sitting. Pain \"it gets bad\" when standing intermittent    Temperature: \"I don't know\"     What has been tried: Tylenol 500mg. LMP: NA Pregnant: NA    Recommended disposition: See in Office Today     Care advice provided, patient verbalizes understanding; denies any other questions or concerns; instructed to call back for any new or worsening symptoms. Patient/Caller agrees with recommended disposition; writer provided warm transfer to Holaira at [facility] for appointment scheduling    Attention Provider: Thank you for allowing me to participate in the care of your patient. The patient was connected to triage in response to information provided to the ECC/PSC. Please do not respond through this encounter as the response is not directed to a shared pool.         Reason for Disposition   Age > 60 years    Protocols used: Abdominal Pain - Female-ADULT-[place]

## 2022-12-07 NOTE — CARE COORDINATION
Ambulatory Care Coordination Note  11/22/2022    ACC: Travis Barron, RN    Call from [facility]. She states that her water was turned off. I explained That she needs to contact her guardian \"Mustapha\" to get it paid. I told her that we have talked about this before. and that I can not be the middle man for issues like this. Dulce also states that she wants to get a service dog. I instructed her to call the ability center and see if she qualifies for one before starting the process. I agreed to get the number and call her back   Number for the ability center provided [phone]. Patient has no other needs at this time. F/U in two weeks    Offered patient enrollment in the Remote Patient Monitoring (RPM) program for in-home monitoring: Patient is not eligible for RPM program.    Lab Results       None            Care Coordination Interventions    Referral from Primary Care Provider: No  Suggested Interventions and [facility]: In Process          Goals Addressed    None         Prior to Admission medications    Medication Sig Start Date End Date Taking? Authorizing Provider   montelukast (SINGULAIR) 10 MG tablet TAKE 1 TABLET BY MOUTH ONCE DAILY AT NIGHT. 11/3/22   Barber Rose MD   vitamin D3 (CHOLECALCIFEROL) 25 MCG (1000 UT) TABS tablet TAKE 1 TABLET BY MOUTH ONE TIME A DAY 11/3/22   Barber Rose MD   omeprazole (PRILOSEC) 20 MG delayed release capsule TAKE 1 CAPSULE BY MOUTH ONCE DAILY IN THE MORNING BEFORE BREAKFAST.  11/3/22   Barber Rose MD   lisinopril (PRINIVIL;ZESTRIL) 5 MG tablet TAKE 1 TABLET BY MOUTH ONE TIME A DAY 10/4/22   Barber Rose MD   Incontinence Supply Disposable (SELECT BOOSTER PADS) MISC Use 3-4/day 8/5/22   Barber Rose MD   Probiotic, Lactobacillus, CAPS Take 1 tablet by mouth daily OTC 6/15/22   Barber Rose MD   levothyroxine (SYNTHROID) 25 MCG tablet Once daily 6/15/22   Barber oRse MD   amLODIPine (NORVASC) 10 MG tablet 1 tablet Moreno Kendall was seen and examined by me with NP student Kevin Villalobos. I concur with her history, evaluation,exam. Treatment plan and documentation. daily 6/15/22   Ed MD Shruthi   budesonide-formoterol (SYMBICORT) 80-4.5 MCG/ACT AERO Inhale 2 puffs into the lungs 2 times daily  Patient not taking: Reported on 8/2/2022 5/26/22   Ilir Reyna MD   PSYLLIUM HUSK PO Take by mouth OTC    Historical Provider, MD   fluticasone (FLONASE) 50 MCG/ACT nasal spray 1 spray by Each Nostril route daily Taking PRN    Historical Provider, MD   triamcinolone (KENALOG) 0.1 % ointment Apply topically 2 times daily Using PRN  Patient not taking: Reported on 8/2/2022    Historical Provider, MD   mirabegron (MYRBETRIQ) 50 MG TB24 Take 50 mg by mouth daily Pt gets samples 3/10/22   Ed MD Shruthi   albuterol (PROVENTIL) (2.5 MG/3ML) 0.083% nebulizer solution Take 3 mLs by nebulization every 6 hours as needed for Wheezing 2/25/22   Ed MD Shruthi   albuterol sulfate  (90 Base) MCG/ACT inhaler INHALE 2 PUFFS BY MOUTH INTO LUNGS TWICE DAILY AS NEEDED FOR WHEEZING (NO  MORE  THAN  4  TIMES  DAILY) 2/8/22   Ed MD Shruthi   QUEtiapine (SEROQUEL) 200 MG tablet Take 200 mg by mouth daily Last filled 11/8/2021 90 day supply,   Take 3 tablets once daily--600 mg dose    Historical Provider, MD   FLUoxetine (PROZAC) 40 MG capsule Take 40 mg by mouth daily Last filled 1/24/2022    Historical Provider, MD   ipratropium (ATROVENT) 0.03 % nasal spray 2 sprays by Nasal route 3 times daily as needed for Rhinitis     Historical Provider, MD   lithium 300 MG capsule Take 300 mg by mouth 2 times daily (with meals). Historical Provider, MD       No future appointments.

## 2022-12-19 ENCOUNTER — TELEPHONE (OUTPATIENT)
Dept: INTERNAL MEDICINE | Age: 67
End: 2022-12-19

## 2022-12-19 NOTE — TELEPHONE ENCOUNTER
----- Message from Mick Souza sent at 12/15/2022  3:45 PM EST -----  Subject: Message to Provider    QUESTIONS  Information for Provider? Patient would like to know the number of her   , Natacha Smith. Used to be [employer]. Lost everything out of her phone. If   you cannot leave a vm message on her phone please call the home. Wants to   know if someone can also fill out the paperwork service dog. Nothing can   be done at the center until this is filled out. Paperwork is at office and   wants it back but says the office says they cannot give it back. Says when   she goes somewhere cannot drive and needs a service dog to ride. Please   call to advise.  ---------------------------------------------------------------------------  --------------  Elisa Chiang [facility]  [phone]; OK to leave message on voicemail  ---------------------------------------------------------------------------  --------------  SCRIPT ANSWERS  Relationship to Patient?  Self

## 2022-12-20 ENCOUNTER — ANESTHESIA EVENT (OUTPATIENT)
Dept: OPERATING ROOM | Age: 67
End: 2022-12-20
Payer: MEDICARE

## 2022-12-21 ENCOUNTER — HOSPITAL ENCOUNTER (OUTPATIENT)
Age: 67
Setting detail: OUTPATIENT SURGERY
Discharge: HOME | End: 2022-12-21
Attending: UROLOGY | Admitting: UROLOGY
Payer: MEDICARE

## 2022-12-21 ENCOUNTER — ANESTHESIA (OUTPATIENT)
Dept: OPERATING ROOM | Age: 67
End: 2022-12-21
Payer: MEDICARE

## 2022-12-21 VITALS
OXYGEN SATURATION: 96 % | BODY MASS INDEX: 33.15 KG/M2 | RESPIRATION RATE: 18 BRPM | HEIGHT: 65 IN | HEART RATE: 76 BPM | SYSTOLIC BLOOD PRESSURE: 137 MMHG | DIASTOLIC BLOOD PRESSURE: 96 MMHG | TEMPERATURE: 97.5 F | WEIGHT: 199 LBS

## 2022-12-21 DIAGNOSIS — N32.81 OVERACTIVE BLADDER: ICD-10-CM

## 2022-12-21 PROCEDURE — 2500000003 HC RX 250 WO HCPCS: Performed by: NURSE ANESTHETIST, CERTIFIED REGISTERED

## 2022-12-21 PROCEDURE — 3600000002 HC SURGERY LEVEL 2 BASE: Performed by: UROLOGY

## 2022-12-21 PROCEDURE — 81003 URINALYSIS AUTO W/O SCOPE: CPT

## 2022-12-21 PROCEDURE — 7100000010 HC PHASE II RECOVERY - FIRST 15 MIN: Performed by: UROLOGY

## 2022-12-21 PROCEDURE — 6360000002 HC RX W HCPCS: Performed by: NURSE ANESTHETIST, CERTIFIED REGISTERED

## 2022-12-21 PROCEDURE — 6370000000 HC RX 637 (ALT 250 FOR IP): Performed by: UROLOGY

## 2022-12-21 PROCEDURE — 6360000002 HC RX W HCPCS: Performed by: UROLOGY

## 2022-12-21 PROCEDURE — 3700000001 HC ADD 15 MINUTES (ANESTHESIA): Performed by: UROLOGY

## 2022-12-21 PROCEDURE — 2709999900 HC NON-CHARGEABLE SUPPLY: Performed by: UROLOGY

## 2022-12-21 PROCEDURE — 3600000012 HC SURGERY LEVEL 2 ADDTL 15MIN: Performed by: UROLOGY

## 2022-12-21 PROCEDURE — 7100000011 HC PHASE II RECOVERY - ADDTL 15 MIN: Performed by: UROLOGY

## 2022-12-21 PROCEDURE — 3700000000 HC ANESTHESIA ATTENDED CARE: Performed by: UROLOGY

## 2022-12-21 PROCEDURE — 2580000003 HC RX 258: Performed by: STUDENT IN AN ORGANIZED HEALTH CARE EDUCATION/TRAINING PROGRAM

## 2022-12-21 PROCEDURE — 2580000003 HC RX 258: Performed by: NURSE ANESTHETIST, CERTIFIED REGISTERED

## 2022-12-21 RX ORDER — ONDANSETRON 2 MG/ML
4 INJECTION INTRAMUSCULAR; INTRAVENOUS
Status: DISCONTINUED | OUTPATIENT
Start: 2022-12-21 | End: 2022-12-21 | Stop reason: HOSPADM

## 2022-12-21 RX ORDER — LIDOCAINE HYDROCHLORIDE 20 MG/ML
INJECTION, SOLUTION EPIDURAL; INFILTRATION; INTRACAUDAL; PERINEURAL PRN
Status: DISCONTINUED | OUTPATIENT
Start: 2022-12-21 | End: 2022-12-21 | Stop reason: SDUPTHER

## 2022-12-21 RX ORDER — HYDROMORPHONE HYDROCHLORIDE 1 MG/ML
0.5 INJECTION, SOLUTION INTRAMUSCULAR; INTRAVENOUS; SUBCUTANEOUS EVERY 5 MIN PRN
Status: DISCONTINUED | OUTPATIENT
Start: 2022-12-21 | End: 2022-12-21 | Stop reason: HOSPADM

## 2022-12-21 RX ORDER — LIDOCAINE HYDROCHLORIDE 10 MG/ML
1 INJECTION, SOLUTION EPIDURAL; INFILTRATION; INTRACAUDAL; PERINEURAL
Status: DISCONTINUED | OUTPATIENT
Start: 2022-12-21 | End: 2022-12-21 | Stop reason: HOSPADM

## 2022-12-21 RX ORDER — SODIUM CHLORIDE 0.9 % (FLUSH) 0.9 %
5-40 SYRINGE (ML) INJECTION PRN
Status: DISCONTINUED | OUTPATIENT
Start: 2022-12-21 | End: 2022-12-21 | Stop reason: HOSPADM

## 2022-12-21 RX ORDER — CEPHALEXIN 500 MG/1
500 CAPSULE ORAL 3 TIMES DAILY
Qty: 15 CAPSULE | Refills: 0 | Status: SHIPPED | OUTPATIENT
Start: 2022-12-21 | End: 2022-12-26

## 2022-12-21 RX ORDER — FENTANYL CITRATE 50 UG/ML
INJECTION, SOLUTION INTRAMUSCULAR; INTRAVENOUS PRN
Status: DISCONTINUED | OUTPATIENT
Start: 2022-12-21 | End: 2022-12-21 | Stop reason: SDUPTHER

## 2022-12-21 RX ORDER — OXYCODONE HYDROCHLORIDE 5 MG/1
5 TABLET ORAL
Status: DISCONTINUED | OUTPATIENT
Start: 2022-12-21 | End: 2022-12-21 | Stop reason: HOSPADM

## 2022-12-21 RX ORDER — SODIUM CHLORIDE 9 MG/ML
INJECTION, SOLUTION INTRAVENOUS PRN
Status: DISCONTINUED | OUTPATIENT
Start: 2022-12-21 | End: 2022-12-21 | Stop reason: HOSPADM

## 2022-12-21 RX ORDER — SODIUM CHLORIDE 9 MG/ML
INJECTION, SOLUTION INTRAVENOUS CONTINUOUS
Status: DISCONTINUED | OUTPATIENT
Start: 2022-12-21 | End: 2022-12-21 | Stop reason: HOSPADM

## 2022-12-21 RX ORDER — SODIUM CHLORIDE 0.9 % (FLUSH) 0.9 %
5-40 SYRINGE (ML) INJECTION EVERY 12 HOURS SCHEDULED
Status: DISCONTINUED | OUTPATIENT
Start: 2022-12-21 | End: 2022-12-21 | Stop reason: HOSPADM

## 2022-12-21 RX ORDER — DIPHENHYDRAMINE HYDROCHLORIDE 50 MG/ML
12.5 INJECTION INTRAMUSCULAR; INTRAVENOUS
Status: DISCONTINUED | OUTPATIENT
Start: 2022-12-21 | End: 2022-12-21 | Stop reason: HOSPADM

## 2022-12-21 RX ORDER — SODIUM CHLORIDE, SODIUM LACTATE, POTASSIUM CHLORIDE, CALCIUM CHLORIDE 600; 310; 30; 20 MG/100ML; MG/100ML; MG/100ML; MG/100ML
INJECTION, SOLUTION INTRAVENOUS CONTINUOUS
Status: DISCONTINUED | OUTPATIENT
Start: 2022-12-21 | End: 2022-12-21 | Stop reason: HOSPADM

## 2022-12-21 RX ORDER — MIDAZOLAM HYDROCHLORIDE 1 MG/ML
INJECTION INTRAMUSCULAR; INTRAVENOUS PRN
Status: DISCONTINUED | OUTPATIENT
Start: 2022-12-21 | End: 2022-12-21 | Stop reason: SDUPTHER

## 2022-12-21 RX ORDER — HYDROMORPHONE HYDROCHLORIDE 1 MG/ML
0.25 INJECTION, SOLUTION INTRAMUSCULAR; INTRAVENOUS; SUBCUTANEOUS EVERY 5 MIN PRN
Status: DISCONTINUED | OUTPATIENT
Start: 2022-12-21 | End: 2022-12-21 | Stop reason: HOSPADM

## 2022-12-21 RX ORDER — LIDOCAINE HYDROCHLORIDE 20 MG/ML
JELLY TOPICAL PRN
Status: DISCONTINUED | OUTPATIENT
Start: 2022-12-21 | End: 2022-12-21 | Stop reason: ALTCHOICE

## 2022-12-21 RX ORDER — PROPOFOL 10 MG/ML
INJECTION, EMULSION INTRAVENOUS PRN
Status: DISCONTINUED | OUTPATIENT
Start: 2022-12-21 | End: 2022-12-21 | Stop reason: SDUPTHER

## 2022-12-21 RX ORDER — SODIUM CHLORIDE, SODIUM LACTATE, POTASSIUM CHLORIDE, CALCIUM CHLORIDE 600; 310; 30; 20 MG/100ML; MG/100ML; MG/100ML; MG/100ML
INJECTION, SOLUTION INTRAVENOUS CONTINUOUS PRN
Status: DISCONTINUED | OUTPATIENT
Start: 2022-12-21 | End: 2022-12-21 | Stop reason: SDUPTHER

## 2022-12-21 RX ADMIN — PROPOFOL 50 MG: 10 INJECTION, EMULSION INTRAVENOUS at 13:38

## 2022-12-21 RX ADMIN — PROPOFOL 20 MG: 10 INJECTION, EMULSION INTRAVENOUS at 13:51

## 2022-12-21 RX ADMIN — PROPOFOL 30 MG: 10 INJECTION, EMULSION INTRAVENOUS at 13:54

## 2022-12-21 RX ADMIN — PROPOFOL 20 MG: 10 INJECTION, EMULSION INTRAVENOUS at 14:00

## 2022-12-21 RX ADMIN — CEFAZOLIN 2000 MG: 10 INJECTION, POWDER, FOR SOLUTION INTRAVENOUS at 13:45

## 2022-12-21 RX ADMIN — SODIUM CHLORIDE, POTASSIUM CHLORIDE, SODIUM LACTATE AND CALCIUM CHLORIDE: 600; 310; 30; 20 INJECTION, SOLUTION INTRAVENOUS at 13:30

## 2022-12-21 RX ADMIN — Medication 25 MCG: at 13:38

## 2022-12-21 RX ADMIN — SODIUM CHLORIDE, POTASSIUM CHLORIDE, SODIUM LACTATE AND CALCIUM CHLORIDE: 600; 310; 30; 20 INJECTION, SOLUTION INTRAVENOUS at 11:41

## 2022-12-21 RX ADMIN — MIDAZOLAM 2 MG: 1 INJECTION INTRAMUSCULAR; INTRAVENOUS at 13:30

## 2022-12-21 RX ADMIN — PROPOFOL 20 MG: 10 INJECTION, EMULSION INTRAVENOUS at 13:44

## 2022-12-21 RX ADMIN — LIDOCAINE HYDROCHLORIDE 20 MG: 20 INJECTION, SOLUTION EPIDURAL; INFILTRATION; INTRACAUDAL; PERINEURAL at 13:38

## 2022-12-21 RX ADMIN — PROPOFOL 20 MG: 10 INJECTION, EMULSION INTRAVENOUS at 13:56

## 2022-12-21 ASSESSMENT — ENCOUNTER SYMPTOMS: SHORTNESS OF BREATH: 0

## 2022-12-21 ASSESSMENT — PAIN - FUNCTIONAL ASSESSMENT: PAIN_FUNCTIONAL_ASSESSMENT: 0-10

## 2022-12-21 NOTE — ANESTHESIA POSTPROCEDURE EVALUATION
Department of Anesthesiology  Postprocedure Note    Patient: Cecilel Bella  MRN: [id number]  YOB: 1955  Date of evaluation: 12/21/2022      Procedure Summary     Date: 12/21/22 Room / Location: [facility] - INPATIENT    Anesthesia Start: 1330 Anesthesia Stop: 5328    Procedure: CYSTOSCOPY WITH BOTOX 100 UNITS Diagnosis:       Overactive bladder      (Overactive bladder [N32.81])    Surgeons: Hector Webb MD Responsible Provider: Phyllis Nguyễn MD    Anesthesia Type: MAC ASA Status: 3          Anesthesia Type: MAC    Audie Phase I:      Audie Phase II: Audie Score: 8      Anesthesia Post Evaluation    Complications: no

## 2022-12-21 NOTE — ANESTHESIA PRE PROCEDURE
Department of Anesthesiology  Preprocedure Note       Name:  Aydee Hartman   Age:  69 y.o.  :  1955                                          MRN:  [id number]         Date:  2022      Surgeon: Ana De Oliveira):  Robert Harding MD    Procedure: Procedure(s):  CYSTOSCOPY WITH BOTOX 200    Medications prior to admission:   Prior to Admission medications    Medication Sig Start Date End Date Taking? Authorizing Provider   montelukast (SINGULAIR) 10 MG tablet TAKE 1 TABLET BY MOUTH ONCE DAILY AT NIGHT. 11/3/22   Onesimo Brownlee MD   vitamin D3 (CHOLECALCIFEROL) 25 MCG (1000 UT) TABS tablet TAKE 1 TABLET BY MOUTH ONE TIME A DAY 11/3/22   Onesimo Brownlee MD   omeprazole (PRILOSEC) 20 MG delayed release capsule TAKE 1 CAPSULE BY MOUTH ONCE DAILY IN THE MORNING BEFORE BREAKFAST.  11/3/22   Onesimo Brownlee MD   lisinopril (PRINIVIL;ZESTRIL) 5 MG tablet TAKE 1 TABLET BY MOUTH ONE TIME A DAY 10/4/22   Onesimo Brownlee MD   Incontinence Supply Disposable (SELECT BOOSTER PADS) MISC Use 3-4/day 22   Onesimo Brownlee MD   Probiotic, Lactobacillus, CAPS Take 1 tablet by mouth daily OTC 6/15/22   Onesimo Brownlee MD   levothyroxine (SYNTHROID) 25 MCG tablet Once daily 6/15/22   Onesimo Brownlee MD   amLODIPine (NORVASC) 10 MG tablet 1 tablet daily 6/15/22   Onesimo Brownlee MD   budesonide-formoterol (SYMBICORT) 80-4.5 MCG/ACT AERO Inhale 2 puffs into the lungs 2 times daily  Patient not taking: Reported on 2022   Vijay Perez MD   PSYLLIUM HUSK PO Take by mouth OTC    Historical Provider, MD   fluticasone (FLONASE) 50 MCG/ACT nasal spray 1 spray by Each Nostril route daily Taking PRN    Historical Provider, MD   triamcinolone (KENALOG) 0.1 % ointment Apply topically 2 times daily Using PRN  Patient not taking: No sig reported    Historical Provider, MD   mirabegron (MYRBETRIQ) 50 MG TB24 Take 50 mg by mouth daily Pt gets samples 3/10/22   Onesimo Brownlee MD   albuterol (PROVENTIL) (2.5 MG/3ML) 0.083% nebulizer solution Take 3 mLs by nebulization every 6 hours as needed for Wheezing 2/25/22   Marvin Corley MD   albuterol sulfate  (90 Base) MCG/ACT inhaler INHALE 2 PUFFS BY MOUTH INTO LUNGS TWICE DAILY AS NEEDED FOR WHEEZING (NO  MORE  THAN  4  TIMES  DAILY) 2/8/22   Marvin Corley MD   QUEtiapine (SEROQUEL) 200 MG tablet Take 200 mg by mouth daily Last filled 11/8/2021 90 day supply,   Take 3 tablets once daily--600 mg dose    Historical Provider, MD   FLUoxetine (PROZAC) 40 MG capsule Take 40 mg by mouth daily Last filled 1/24/2022    Historical Provider, MD   ipratropium (ATROVENT) 0.03 % nasal spray 2 sprays by Nasal route 3 times daily as needed for Rhinitis     Historical Provider, MD   lithium 300 MG capsule Take 300 mg by mouth 2 times daily (with meals). Historical Provider, MD       Current medications:    Current Facility-Administered Medications   Medication Dose Route Frequency Provider Last Rate Last Admin    lidocaine PF 1 % injection 1 mL  1 mL IntraDERmal Once PRN Evans Michael MD        0.9 % sodium chloride infusion   IntraVENous Continuous Evans Michael MD        lactated ringers infusion   IntraVENous Continuous Evans Michael  mL/hr at 12/21/22 1141 New Bag at 12/21/22 1141    sodium chloride flush 0.9 % injection 5-40 mL  5-40 mL IntraVENous 2 times per day Evans Michael MD        sodium chloride flush 0.9 % injection 5-40 mL  5-40 mL IntraVENous PRN Evans Michael MD        0.9 % sodium chloride infusion   IntraVENous PRN Evans Michael MD        ceFAZolin (ANCEF) 2000 mg in dextrose 5 % 50 mL IVPB  2,000 mg IntraVENous Once Derry Klinefelter, MD           Allergies:     Allergies   Allergen Reactions    Motrin [Ibuprofen]     Aspirin Rash    Blue Dyes (Parenteral) Rash    Hctz [Hydrochlorothiazide] Rash     Fixed drug reaction    Sulfa Antibiotics Rash    Tetracyclines & Related Rash       Problem List:    Patient Active Problem List   Diagnosis Code    Essential hypertension I10    Pure hypercholesterolemia E78.00    Urinary incontinence R32    Anxiety F41.9    Sleep apnea G47.30    GERD (gastroesophageal reflux disease) K21.9    Hypothyroidism E03.9    Obesity (BMI 30-39. 9) E66.9    Mild persistent asthma without complication T77.66    History of stroke Z86.73    Chronic diarrhea K52.9    Bipolar 1 disorder (HCC) F31.9    Ankle fracture, right, closed, with routine healing, subsequent encounter S82.891D    OAB (overactive bladder) N32.81       Past Medical History:        Diagnosis Date    Anxiety     Arrhythmia     Asthma     Bipolar [facility] disorder ([facility] [address] 2/14/2019    Bipolar disorder ([facility] [address] [facility]     Chronic diarrhea 2/14/2019    COPD [facility]ch[facility] obstructive pulmonary [facility]) [facility]     Depression     Essential hypertension     GERD (gastroesophageal reflux disease)     GERD (gastroesophageal reflux disease)     History of stroke 8/9/2018    Hyperlipidemia     Hypertension     Hypothyroidism     Mild persistent asthma without complication 0/05/8514    OAB (overactive bladder)     Obesity (BMI 30-39. 9) 8/19/2016    Osteoarthritis     Poor historian     Pulmonary fibrosis (HCC)     sjogrens syndrome    Pulmonary hypertension (HCC)     Pure hypercholesterolemia     Sleep apnea     cpap    Stroke ([facility] [address]     Unspecified sleep apnea     on cpap    Urinary incontinence        Past Surgical History:        Procedure Laterality Date    ANKLE SURGERY Right 09/11/2019    COLONOSCOPY  04/2015    COLONOSCOPY  2018        CYSTOSCOPY N/A 3/2/2022    CYSTOSCOPY WITH BOTOX  (100 units) performed by [facility]     Stye removal    TOOTH EXTRACTION      x 2       Social History:    Social History     Tobacco Use    Smoking status: Never    Smokeless tobacco: Never   Substance Use Topics    Alcohol use:  No                                Counseling given: Not Answered      Vital Signs (Current): Vitals:    12/21/22 1106 12/21/22 1107   BP:  92/78   Pulse:  81   Resp:  20   Temp: 97.5 °F (36.4 °C)    TempSrc: Temporal    SpO2:  97%   Weight: 199 lb (90.3 kg)    Height: 5' 5\" (1.651 m)                                               BP Readings from Last 3 Encounters:   12/21/22 92/78   08/02/22 129/75   07/25/22 131/86       NPO Status: Time of last liquid consumption: 2345                        Time of last solid consumption: 2200                        Date of last liquid consumption: 12/20/22                        Date of last solid food consumption: 12/20/22    BMI:   Wt Readings from Last 3 Encounters:   12/21/22 199 lb (90.3 kg)   08/02/22 204 lb (92.5 kg)   07/25/22 199 lb (90.3 kg)     Body mass index is 33.12 kg/m². CBC:   Lab Results   Component Value Date/Time    WBC 6.3 07/25/2022 09:35 PM    RBC 4.21 07/25/2022 09:35 PM    RBC 4.05 05/02/2012 02:16 PM    HGB 11.6 07/25/2022 09:35 PM    HCT 36.6 07/25/2022 09:35 PM    MCV 86.9 07/25/2022 09:35 PM    RDW 14.9 07/25/2022 09:35 PM     07/25/2022 09:35 PM     05/02/2012 02:16 PM       CMP:   Lab Results   Component Value Date/Time     07/25/2022 09:35 PM    K 4.0 07/25/2022 09:35 PM     07/25/2022 09:35 PM    CO2 23 07/25/2022 09:35 PM    BUN 14 07/25/2022 09:35 PM    CREATININE 0.95 08/01/2022 09:46 AM    GFRAA >60 07/25/2022 09:35 PM    LABGLOM 53 07/25/2022 09:35 PM    GLUCOSE 96 07/25/2022 09:35 PM    GLUCOSE 119 05/02/2012 02:16 PM    PROT 7.5 02/08/2022 03:08 PM    CALCIUM 10.7 07/25/2022 09:35 PM    BILITOT 0.25 02/08/2022 03:08 PM    ALKPHOS 115 02/08/2022 03:08 PM    AST 29 02/08/2022 03:08 PM    ALT 30 02/08/2022 03:08 PM       POC Tests: No results for input(s): POCGLU, POCNA, POCK, POCCL, POCBUN, POCHEMO, POCHCT in the last 72 hours.     Coags: No results found for: PROTIME, INR, APTT    HCG (If Applicable): No results found for: PREGTESTUR, PREGSERUM, HCG, HCGQUANT     ABGs: No results found for: PHART, PO2ART, IPQ8DKB, OJA9HJB, BEART, P3QCPEZT     Type & Screen (If Applicable):  No results found for: LABABO, LABRH    Drug/Infectious Status (If Applicable):  Lab Results   Component Value Date/Time    HEPCAB NONREACTIVE 11/06/2017 02:32 PM       COVID-19 Screening (If Applicable):   Lab Results   Component Value Date/Time    COVID19 Not Detected 12/21/2020 07:21 PM           Anesthesia Evaluation    Airway: Mallampati: I  TM distance: >3 FB   Neck ROM: full  Mouth opening: > = 3 FB   Dental:          Pulmonary:   (+) COPD:  sleep apnea:      (-) shortness of breath                           Cardiovascular:    (+) hypertension:,                   Neuro/Psych:   (+) neuromuscular disease:,             GI/Hepatic/Renal:             Endo/Other:                     Abdominal:             Vascular:           Other Findings:           Anesthesia Plan      MAC     ASA 3                                   Vik Cheung MD   12/21/2022

## 2022-12-21 NOTE — H&P
History and Physical Service   [facility] [place] - INPATIENT    HISTORY AND PHYSICAL EXAMINATION            Date of Evaluation: 12/21/2022  Patient name:  Jada Gonzalez  MRN:   [id number]  YOB: 1955  PCP:    Ciara Solares MD    History Obtained From:     Patient, medical records    History of Present Illness: This is Jada Gonzalez a 69 y.o. female who presents today for a CYSTOSCOPY WITH BOTOX 200 by Maria C Casarez MD for Overactive bladder [N32.81]THE PATIENT DENIES ANY DYSURIA OR HEMATURIA. SHE HAS HAD URINARY INCONTINENCE FOR SEVERAL YEARS. Dony Burroughs Denies fever, chills, shortness of breath, cough, congestion, wheezing, chest pain, open sores or wounds. SHE DOES NOT TAKE BLOOD THINNERS, SHE DOES NOT HAVE DIABETES. Past Medical History:     Past Medical History:   Diagnosis Date    Anxiety     Arrhythmia     Asthma     Bipolar 1 disorder ([facility] [address] 2/14/2019    Bipolar disorder ([facility] [address]     Chronic diarrhea 2/14/2019    COPD (chronic obstructive pulmonary disease) (HCC)     Depression     Essential hypertension     GERD (gastroesophageal reflux disease)     GERD (gastroesophageal reflux disease)     History of stroke 8/9/2018    Hyperlipidemia     Hypertension     Hypothyroidism     Mild persistent asthma without complication 5/16/1685    OAB (overactive bladder)     Obesity (BMI 30-39. 9) 8/19/2016    Osteoarthritis     Poor historian     Pulmonary fibrosis (HCC)     sjogrens syndrome    Pulmonary hypertension (HCC)     Pure hypercholesterolemia     Sleep apnea     cpap    Stroke ([facility] [address]     Unspecified sleep apnea     on cpap    Urinary incontinence         Past Surgical History:     Past Surgical History:   Procedure Laterality Date    ANKLE SURGERY Right 09/11/2019    COLONOSCOPY  04/2015    COLONOSCOPY  2018        CYSTOSCOPY N/A 3/2/2022    CYSTOSCOPY WITH BOTOX  (100 units) performed by Maria C Casarez MD at [address] [place] [address] Left     Stye removal    TOOTH EXTRACTION x 2        Medications Prior to Admission:     Prior to Admission medications    Medication Sig Start Date End Date Taking? Authorizing Provider   montelukast (SINGULAIR) 10 MG tablet TAKE 1 TABLET BY MOUTH ONCE DAILY AT NIGHT. 11/3/22   Steffen Arndt MD   vitamin D3 (CHOLECALCIFEROL) 25 MCG (1000 UT) TABS tablet TAKE 1 TABLET BY MOUTH ONE TIME A DAY 11/3/22   Steffen Arndt MD   omeprazole (PRILOSEC) 20 MG delayed release capsule TAKE 1 CAPSULE BY MOUTH ONCE DAILY IN THE MORNING BEFORE BREAKFAST.  11/3/22   Steffen Arndt MD   lisinopril (PRINIVIL;ZESTRIL) 5 MG tablet TAKE 1 TABLET BY MOUTH ONE TIME A DAY 10/4/22   Steffen Arndt MD   Incontinence Supply Disposable (SELECT BOOSTER PADS) MISC Use 3-4/day 8/5/22   Steffen Arndt MD   Probiotic, Lactobacillus, CAPS Take 1 tablet by mouth daily OTC 6/15/22   Steffen Arndt MD   levothyroxine (SYNTHROID) 25 MCG tablet Once daily 6/15/22   Steffen Arndt MD   amLODIPine (NORVASC) 10 MG tablet 1 tablet daily 6/15/22   Steffen Arndt MD   budesonide-formoterol (SYMBICORT) 80-4.5 MCG/ACT AERO Inhale 2 puffs into the lungs 2 times daily  Patient not taking: Reported on 8/2/2022 5/26/22   Mauricio Collins MD   PSYLLIUM HUSK PO Take by mouth OTC    Historical Provider, MD   fluticasone (FLONASE) 50 MCG/ACT nasal spray 1 spray by Each Nostril route daily Taking PRN    Historical Provider, MD   triamcinolone (KENALOG) 0.1 % ointment Apply topically 2 times daily Using PRN  Patient not taking: Reported on 8/2/2022    Historical Provider, MD   mirabegron (MYRBETRIQ) 50 MG TB24 Take 50 mg by mouth daily Pt gets samples 3/10/22   Steffen Arndt MD   albuterol (PROVENTIL) (2.5 MG/3ML) 0.083% nebulizer solution Take 3 mLs by nebulization every 6 hours as needed for Wheezing 2/25/22   Steffen Arndt MD   albuterol sulfate  (90 Base) MCG/ACT inhaler INHALE 2 PUFFS BY MOUTH INTO LUNGS TWICE DAILY AS NEEDED FOR WHEEZING (NO  MORE  THAN  4  TIMES  DAILY) 2/8/22 Colton Rod MD   QUEtiapine (SEROQUEL) 200 MG tablet Take 200 mg by mouth daily Last filled 11/8/2021 90 day supply,   Take 3 tablets once daily--600 mg dose    Historical Provider, MD   FLUoxetine (PROZAC) 40 MG capsule Take 40 mg by mouth daily Last filled 1/24/2022    Historical Provider, MD   ipratropium (ATROVENT) 0.03 % nasal spray 2 sprays by Nasal route 3 times daily as needed for Rhinitis     Historical Provider, MD   lithium 300 MG capsule Take 300 mg by mouth 2 times daily (with meals). Historical Provider, MD        Allergies:     Motrin [ibuprofen], Aspirin, Blue dyes (parenteral), Hctz [hydrochlorothiazide], Sulfa antibiotics, and Tetracyclines & related    Social History:     Tobacco:    reports that she has never smoked. She has never used smokeless tobacco.  Alcohol:      reports no history of alcohol use. Drug Use:  reports no history of drug use. Family History:     Family History   Problem Relation Age of Onset    Arthritis Mother     Depression Mother     Heart Disease Mother     High Blood Pressure Father     High Cholesterol Father     Stroke Father     High Blood Pressure Brother        Review of Systems:     Positive and Negative as described in HPI. CONSTITUTIONAL:  negative for fevers, chills, sweats, fatigue, weight loss  HEENT:  negative for vision, hearing changes, runny nose, throat pain  RESPIRATORY:  HAS ASTHMA WITH OCCASIONAL  shortness of breath, cough, congestion, wheezing. CARDIOVASCULAR:  negative for chest pain, palpitations.   GASTROINTESTINAL:  negative for nausea, vomiting, diarrhea, constipation, change in bowel habits, abdominal pain   GENITOURINARY: HAS URINARY INCONTINENCE  negative for difficulty of urination, burning with urination, frequency   INTEGUMENT: HAS SKIN PIGMENT CHANGES. negative for rash, skin lesions, easy bruising   HEMATOLOGIC/LYMPHATIC:  negative for swelling/edema   ALLERGIC/IMMUNOLOGIC:  negative for urticaria , itching  ENDOCRINE: negative increase in drinking, increase in urination, hot or cold intolerance  MUSCULOSKELETAL: HAS ARTHRITIC  joint pains, muscle aches, swelling of joints  NEUROLOGICAL:  negative for headaches, dizziness, lightheadedness, numbness, pain, tingling extremities  BEHAVIOR/PSYCH: HAS BIPOLAR 1 DISORDER WELL CONTROLLED WITH MEDICATIONS    Physical Exam:   BP 92/78   Pulse 81   Temp 97.5 °F (36.4 °C) (Temporal)   Resp 20   Ht 5' 5\" (1.651 m)   Wt 199 lb (90.3 kg)   SpO2 97%   BMI 33.12 kg/m²   No LMP recorded. Patient is postmenopausal.  No obstetric history on file. No results for input(s): POCGLU in the last 72 hours. General Appearance:  alert, well appearing, and in no acute distress  Mental status: oriented to person, place, and time with normal affect  Head:  normocephalic, atraumatic. Eye: no icterus, redness, pupils equal and reactive, extraocular eye movements intact, conjunctiva clear  Ear: normal external ear, no discharge, hearing intact  Nose:  no drainage noted  Mouth: mucous membranes moist  Neck: supple, no carotid bruits, thyroid not palpable  Lungs: Bilateral equal air entry, clear to ausculation, no wheezing, rales or rhonchi, normal effort  Cardiovascular: HR 78  normal rate, regular rhythm, no murmur, gallop, rub. Abdomen: Soft, nontender, nondistended, normal bowel sounds  Neurologic: There are no new focal motor or sensory deficits, normal muscle tone and bulk, no abnormal sensation, normal speech, cranial nerves II through XII grossly intact  Skin: No gross lesions, rashes, bruising or bleeding on exposed skin area  Extremities:  peripheral pulses palpable, no pedal edema or calf pain with palpation  Psych: normal affect     Investigations:      Laboratory Testing:  No results found for this or any previous visit (from the past 24 hour(s)).     No results for input(s): HGB, HCT, WBC, MCV, PLATELET, NA, K, CL, CO2, BUN, CREATININE, GLUCOSE, INR, PROTIME, APTT, AST, ALT, LABALBU, HCG in the last 720 hours. No results for input(s): COVID19 in the last 720 hours. Imaging/Diagnostics:    No results found.       Diagnosis:      Overactive bladder [N32.81]    Plans:     1. CYSTOSCOPY WITH BOTOX MANDEEP Polanco CNP  12/21/2022  11:17 AM

## 2022-12-21 NOTE — OP NOTE
Operative Note      Patient: Leena Parra  YOB: 1955  MRN: [id number]    Date of Procedure: 12/21/2022    Pre-Op Diagnosis: Overactive bladder [N32.81]    Post-Op Diagnosis: Same       Procedure(s):  CYSTOSCOPY WITH BOTOX 100 UNITS    Surgeon(s):  Derry Klinefelter, MD    Assistant:   * No surgical staff found *    Anesthesia: Monitor Anesthesia Care    Estimated Blood Loss (mL): Minimal    Complications: None    Specimens:   ID Type Source Tests Collected by Time Destination   1 : urine Urine Urine, Cystoscopic URINALYSIS WITH REFLEX TO CULTURE Derry Klinefelter, MD 12/21/2022 1351        Implants:  * No implants in log *      Drains: * No LDAs found *    Findings: indications , this patient is 69years old the patient has a history of overactive bladder the patient has responded to Botox injections the patient has significant urgency incontinence but she carries also a high residual, the patient has responded well to 100 units, we are limiting the injection to 100 units to make sure the patient doesn't end up with urinary retention    Detailed Description of Procedure:   Patient was brought to the operating room, positioned in dorsal lithotomy, proper patient identification procedure identification prepping and draping in the usual sterile manner. We entered the bladder with the cystoscope, we examined the bladder carefully there is evidence of pelvic floor relaxation no vaginal bleeding or discharge. A urine specimen was obtained for culture and sensitivity. After completing the cystoscopy we then proceeded with the Botox injection 100 units of Botox was mixed in 10 ML of injectable saline, we injected aliquots  of 0.5 ML each posterior to the interureteric ridge we also injected the total amount of 10 ML in 4 parallel rows this was very well tolerated.     At the completion the bladder was emptied the scope removed the patient returned to recovery room in stable condition    Recommendations we discussed with the patient and her caregiver a follow-up visit at the office and a bladder scan to check residual in 2 weeks, prescription for antibiotic therapy provided    Electronically signed by Clara Dickinson MD on 12/21/2022 at 2:26 PM

## 2022-12-29 NOTE — TELEPHONE ENCOUNTER
Request for acidophilus. Please review and e-scribe to pharmacy listed in chart if appropriate. Thank you. Next Visit Date: PC to pt to schedule, no answer. Left HIPAA compliant message identifying self and nature of call, requested call back to office to schedule appt, phone number given. Last Visit Date: 8/2/2022    No future appointments. Health Maintenance   Topic Date Due    Pneumococcal 65+ years Vaccine (1 - PCV) Never done    DTaP/Tdap/Td vaccine (1 - Tdap) Never done    COVID-19 Vaccine (4 - Booster for Moderna series) 03/22/2022    Flu vaccine (1) Never done    Breast cancer screen  08/24/2022    Shingles vaccine (1 of 2) 01/21/2023 (Originally 4/22/2003)    Annual Wellness Visit (AWV)  04/08/2023    Depression Monitoring  08/02/2023    Lipids  02/08/2027    Colorectal Cancer Screen  09/25/2028    DEXA (modify frequency per FRAX score)  Completed    Hepatitis C screen  Completed    Hepatitis A vaccine  Aged Out    Hib vaccine  Aged Out    Meningococcal (ACWY) vaccine  Aged Out       Hemoglobin A1C (%)   Date Value   08/31/2021 5.5   12/21/2020 5.6   06/26/2019 5.2             ( goal A1C is < 7)   Microalb/Crt.  Ratio (mcg/mg creat)   Date Value   11/10/2016 9     LDL Cholesterol (mg/dL)   Date Value   02/08/2022 123       (goal LDL is <100)   AST (U/L)   Date Value   02/08/2022 29     ALT (U/L)   Date Value   02/08/2022 30     BUN (mg/dL)   Date Value   07/25/2022 14     BP Readings from Last 3 Encounters:   12/21/22 (!) 137/96   08/02/22 129/75   07/25/22 131/86          (goal 120/80)    All Future Testing planned in [facility]  Lab Frequency Next Occurrence   Basic Metabolic Panel Once 27/26/2255   AMOL DIGITAL SCREEN W OR WO CAD BILATERAL Once 06/15/2022   DEXA BONE DENSITY AXIAL SKELETON Once 03/17/2024         Patient Active Problem List:     Essential hypertension     Pure hypercholesterolemia     Urinary incontinence     Anxiety     Sleep apnea     GERD (gastroesophageal reflux disease) Hypothyroidism     Obesity (BMI 30-39. 9)     Mild persistent asthma without complication     History of stroke     Chronic diarrhea     Bipolar 1 disorder (HCC)     Ankle fracture, right, closed, with routine healing, subsequent encounter     OAB (overactive bladder)

## 2022-12-30 RX ORDER — GARLIC EXTRACT 500 MG
CAPSULE ORAL
Qty: 30 CAPSULE | Refills: 6 | Status: SHIPPED | OUTPATIENT
Start: 2022-12-30

## 2023-01-25 ENCOUNTER — CARE COORDINATION (OUTPATIENT)
Dept: CARE COORDINATION | Age: 68
End: 2023-01-25

## 2023-01-25 ENCOUNTER — TELEPHONE (OUTPATIENT)
Dept: INTERNAL MEDICINE | Age: 68
End: 2023-01-25

## 2023-01-25 DIAGNOSIS — I10 ESSENTIAL HYPERTENSION: ICD-10-CM

## 2023-01-25 RX ORDER — AMLODIPINE BESYLATE 10 MG/1
TABLET ORAL
Qty: 90 TABLET | Refills: 2 | OUTPATIENT
Start: 2023-01-25

## 2023-01-25 NOTE — TELEPHONE ENCOUNTER
----- Message from Bj Ng sent at 1/24/2023  3:15 PM EST -----  Subject: Appointment Request    Reason for Call: Established Patient Appointment needed: Routine Existing   Condition Follow Up    QUESTIONS    Reason for appointment request? No appointments available during search     Additional Information for Provider? would like to know if the screws can   come out of right ankle  ---------------------------------------------------------------------------  --------------  [address]  [phone]; OK to leave message on voicemail  ---------------------------------------------------------------------------  --------------  SCRIPT ANSWERS  COVID Screen: Elba Luis Eduardo

## 2023-01-25 NOTE — TELEPHONE ENCOUNTER
----- Message from Zofia Ward sent at 1/24/2023  3:11 PM EST -----  Subject: Message to Provider    QUESTIONS  Information for Provider? would like to speak with Kimmy Loza or Nneka Castellanos  ---------------------------------------------------------------------------  --------------  [address]  [phone]; OK to leave message on voicemail  ---------------------------------------------------------------------------  --------------  SCRIPT ANSWERS  Relationship to Patient?  Self

## 2023-01-25 NOTE — CARE COORDINATION
Ambulatory Care Coordination Note  1/25/2023    ACC: Naveed Melo, RN    Spoke with Aylin Padilla, she is asking about a service dog and I wants PCP to write an order. In previous conversation on 10/28/22 she was told the PCP does not do that. Patient has also ask [facility] specialist but they will not complete anything for her. We discussed the need for a service dog and she states she needs one due to anxiety when she goes places. I question her about any physical disabilities and she states no. We discussed the difference between a service dog and a support animal. She agreed that it is a support animal that she wants. I suggested she contact the ability center and find out what she needs to do. Aylin Padilla states that she is already in contact with the [facility] and is working with \"Mellisa\"  During our conversation, Aylin Padilla   was listening to a male in the background and having a conversation with him. Writer could here the man telling her to get off the phone. She denies that it is Jamaal Thomas informs me that she has lost all her contacts in her phone and requested that I text her my number. Text sent. F/U in 1 week    Offered patient enrollment in the Remote Patient Monitoring (RPM) program for in-home monitoring: Patient is not eligible for RPM program.    Lab Results       None            Care Coordination Interventions    Referral from Primary Care Provider: No  Suggested Interventions and [address] [place] [address]: Completed  Life Skills Coaching: Completed  Specialty Services Referral: In Process (Comment: ability center)          Goals Addressed                      This Visit's Progress       Patient Stated      Patient Stated (pt-stated)   On track      Aylin Padilla will receive senior delivered meals. Barriers: financial, lack of support, and stress  Plan for overcoming my barriers: Aylin Padilla will work with Care Coordination.   Confidence: 9/10  Anticipated Goal Completion Date: 08/01/2022 Other      Medication Management   Worsening      I will take my medication as directed. I will notify my provider of any problems with medications, like adverse effects or side effects. I will notify my provider/Care Coordinator if I am unable to afford my medications. I will notify my provider for advice before I stop taking any of my medication. Barriers: financial, overwhelmed by complexity of regimen and lack of education  Plan for overcoming my barriers: Care Coordination  Confidence: 8/10  Anticipated Goal Completion Date:6/1/2019;  new Goal completion date: 4/30/2022    3/10/2022 goal reactivated - plan for bubble packaging of medications for compliance                Prior to Admission medications    Medication Sig Start Date End Date Taking? Authorizing Provider   Lactobacillus Acid-Pectin (ACIDOPHILUS/PECTIN) CAPS TAKE 1 CAPSULE BY MOUTH ONCE DAILY. 12/30/22  Yes Mati Damon MD   montelukast (SINGULAIR) 10 MG tablet TAKE 1 TABLET BY MOUTH ONCE DAILY AT NIGHT. 11/3/22  Yes Mati Damon MD   omeprazole (PRILOSEC) 20 MG delayed release capsule TAKE 1 CAPSULE BY MOUTH ONCE DAILY IN THE MORNING BEFORE BREAKFAST.  11/3/22  Yes Mati Damon MD   lisinopril (PRINIVIL;ZESTRIL) 5 MG tablet TAKE 1 TABLET BY MOUTH ONE TIME A DAY 10/4/22  Yes Mati Damon MD   Incontinence Supply Disposable (SELECT BOOSTER PADS) MISC Use 3-4/day 8/5/22  Yes Mati Damon MD   levothyroxine (SYNTHROID) 25 MCG tablet Once daily 6/15/22  Yes Mati Damon MD   amLODIPine (NORVASC) 10 MG tablet 1 tablet daily 6/15/22  Yes Mati Damon MD   fluticasone (FLONASE) 50 MCG/ACT nasal spray 1 spray by Each Nostril route daily Taking PRN   Yes Historical Provider, MD   mirabegron (MYRBETRIQ) 50 MG TB24 Take 50 mg by mouth daily Pt gets samples 3/10/22  Yes Mati Damon MD   albuterol (PROVENTIL) (2.5 MG/3ML) 0.083% nebulizer solution Take 3 mLs by nebulization every 6 hours as needed for Wheezing 2/25/22  Yes Arnulfo Rodriguez MD   albuterol sulfate  (90 Base) MCG/ACT inhaler INHALE 2 PUFFS BY MOUTH INTO LUNGS TWICE DAILY AS NEEDED FOR WHEEZING (NO  MORE  THAN  4  TIMES  DAILY) 2/8/22  Yes Arnulfo Rodriguez MD   QUEtiapine (SEROQUEL) 200 MG tablet Take 200 mg by mouth daily Last filled 11/8/2021 90 day supply,   Take 3 tablets once daily--600 mg dose   Yes Historical Provider, MD   FLUoxetine (PROZAC) 40 MG capsule Take 40 mg by mouth daily Last filled 1/24/2022   Yes Historical Provider, MD   ipratropium (ATROVENT) 0.03 % nasal spray 2 sprays by Nasal route 3 times daily as needed for Rhinitis    Yes Historical Provider, MD   vitamin D3 (CHOLECALCIFEROL) 25 MCG (1000 UT) TABS tablet TAKE 1 TABLET BY MOUTH ONE TIME A DAY  Patient not taking: Reported on 1/25/2023 11/3/22   Arnulfo Rodriguez MD   Probiotic, Lactobacillus, CAPS Take 1 tablet by mouth daily OTC  Patient not taking: Reported on 1/25/2023 6/15/22   Arnulfo Rodriguez MD   budesonide-formoterol (SYMBICORT) 80-4.5 MCG/ACT AERO Inhale 2 puffs into the lungs 2 times daily  Patient not taking: Reported on 12/21/2022 5/26/22   Roman Salazar MD   PSYLLIUM HUSK PO Take by mouth OTC  Patient not taking: Reported on 1/25/2023    Historical Provider, MD   triamcinolone (KENALOG) 0.1 % ointment Apply topically 2 times daily Using PRN  Patient not taking: No sig reported    Historical Provider, MD   lithium 300 MG capsule Take 300 mg by mouth 2 times daily (with meals).   Patient not taking: Reported on 1/25/2023    Historical Provider, MD       Future Appointments   Date Time Provider Ekta Tapia   2/14/2023  3:00 PM Arnulfo Rodriguez MD [address] [facility]

## 2023-01-26 RX ORDER — AMLODIPINE BESYLATE 10 MG/1
TABLET ORAL
Qty: 90 TABLET | Refills: 0 | Status: SHIPPED | OUTPATIENT
Start: 2023-01-26 | End: 2023-02-14 | Stop reason: SDUPTHER

## 2023-01-26 NOTE — TELEPHONE ENCOUNTER
Bipolar 1 disorder (HCC)     Ankle fracture, right, closed, with routine healing, subsequent encounter     OAB (overactive bladder)

## 2023-01-30 ENCOUNTER — CARE COORDINATION (OUTPATIENT)
Dept: CARE COORDINATION | Age: 68
End: 2023-01-30

## 2023-01-30 NOTE — CARE COORDINATION
Ambulatory Care Coordination Note  1/30/2023    ACC: Akila Estrada, RN    Incoming call from Hetal, she is assking me to call her guardian because she wants to sell her house. I explained that we have talked about this before. If she needs to talk to Florence Choudhury, she needs to call him. She complained that there is only a person in the office each week. I suggested that she send him a text message so he can receive the message right away. She agrees to call me tomorrow if she has problems with this    Offered patient enrollment in the Remote Patient Monitoring (RPM) program for in-home monitoring: Patient is not eligible for RPM program.    Lab Results       None            Care Coordination Interventions    Referral from Primary Care Provider: No  Suggested Interventions and [address] [facility] [address]: Completed  Life Skills Coaching: Completed  Specialty Services Referral: In Process (Comment: ability center)          Goals Addressed    None         Prior to Admission medications    Medication Sig Start Date End Date Taking? Authorizing Provider   amLODIPine (NORVASC) 10 MG tablet 1 tablet daily 1/26/23   Blaise Soria MD   Lactobacillus Acid-Pectin (ACIDOPHILUS/PECTIN) CAPS TAKE 1 CAPSULE BY MOUTH ONCE DAILY. 12/30/22   Blaise Soria MD   montelukast (SINGULAIR) 10 MG tablet TAKE 1 TABLET BY MOUTH ONCE DAILY AT NIGHT. 11/3/22   Blaise Soria MD   vitamin D3 (CHOLECALCIFEROL) 25 MCG (1000 UT) TABS tablet TAKE 1 TABLET BY MOUTH ONE TIME A DAY  Patient not taking: Reported on 1/25/2023 11/3/22   Blaise Soria MD   omeprazole (PRILOSEC) 20 MG delayed release capsule TAKE 1 CAPSULE BY MOUTH ONCE DAILY IN THE MORNING BEFORE BREAKFAST.  11/3/22   Blaise Soria MD   lisinopril (PRINIVIL;ZESTRIL) 5 MG tablet TAKE 1 TABLET BY MOUTH ONE TIME A DAY 10/4/22   Blaise Soria MD   Incontinence Supply Disposable (SELECT BOOSTER PADS) MISC Use 3-4/day 8/5/22   Blaise Soria MD   Probiotic, Lactobacillus, CAPS Take 1 tablet by mouth daily OTC  Patient not taking: Reported on 1/25/2023 6/15/22   Sonya Hall MD   levothyroxine (SYNTHROID) 25 MCG tablet Once daily 6/15/22   Sonya Hall MD   budesonide-formoterol [facility]) 80-4.5 MCG/ACT AERO Inhale 2 puffs into the lungs 2 times daily  Patient not taking: Reported on 12/21/2022 5/26/22   Shannon Sims MD   PSYLLIUM HUSK PO Take by mouth OTC  Patient not taking: Reported on 1/25/2023    Historical Provider, MD   fluticasone (FLONASE) 50 MCG/ACT nasal spray 1 spray by Each Nostril route daily Taking PRN    Historical Provider, MD   triamcinolone (KENALOG) 0.1 % ointment Apply topically 2 times daily Using PRN  Patient not taking: No sig reported    Historical Provider, MD   mirabegron (MYRBETRIQ) 50 MG TB24 Take 50 mg by mouth daily Pt gets samples 3/10/22   Sonya Hall MD   albuterol (PROVENTIL) (2.5 MG/3ML) 0.083% nebulizer solution Take 3 mLs by nebulization every 6 hours as needed for Wheezing 2/25/22   Sonya Hall MD   albuterol sulfate  (90 Base) MCG/ACT inhaler INHALE 2 PUFFS BY MOUTH INTO LUNGS TWICE DAILY AS NEEDED FOR WHEEZING (NO  MORE  THAN  4  TIMES  DAILY) 2/8/22   Sonya Hall MD   QUEtiapine (SEROQUEL) 200 MG tablet Take 200 mg by mouth daily Last filled 11/8/2021 90 day supply,   Take 3 tablets once daily--600 mg dose    Historical Provider, MD   FLUoxetine (PROZAC) 40 MG capsule Take 40 mg by mouth daily Last filled 1/24/2022    Historical Provider, MD   ipratropium (ATROVENT) 0.03 % nasal spray 2 sprays by Nasal route 3 times daily as needed for Rhinitis     Historical Provider, MD   lithium 300 MG capsule Take 300 mg by mouth 2 times daily (with meals).   Patient not taking: Reported on 1/25/2023    Historical Provider, MD       Future Appointments   Date Time Provider Ekta Tapia   2/14/2023  3:00 PM Sonya Hall MD [facility]

## 2023-02-06 ENCOUNTER — CARE COORDINATION (OUTPATIENT)
Dept: CARE COORDINATION | Age: 68
End: 2023-02-06

## 2023-02-06 NOTE — CARE COORDINATION
Ambulatory Care Coordination Note  2/6/2023    ACC: Mary Ellen Barrios, RN    Attempted to contact patient on both number's Unable to leave a message on either. Will attempt to reach about in about 1 week. Offered patient enrollment in the Remote Patient Monitoring (RPM) program for in-home monitoring: NA. Lab Results       None            Care Coordination Interventions    Referral from Primary Care Provider: No  Suggested Interventions and Community Resources  Behavorial Health: Completed  Life Skills Coaching: Completed  Specialty Services Referral: In Process (Comment: ability center)          Goals Addressed    None         Prior to Admission medications    Medication Sig Start Date End Date Taking? Authorizing Provider   amLODIPine (NORVASC) 10 MG tablet 1 tablet daily 1/26/23   Juanjose Laguerre MD   Lactobacillus Acid-Pectin (ACIDOPHILUS/PECTIN) CAPS TAKE 1 CAPSULE BY MOUTH ONCE DAILY. 12/30/22   Juanjose Laguerre MD   montelukast (SINGULAIR) 10 MG tablet TAKE 1 TABLET BY MOUTH ONCE DAILY AT NIGHT. 11/3/22   Juanjose Laguerre MD   vitamin D3 (CHOLECALCIFEROL) 25 MCG (1000 UT) TABS tablet TAKE 1 TABLET BY MOUTH ONE TIME A DAY  Patient not taking: Reported on 1/25/2023 11/3/22   Juanjose Laguerre MD   omeprazole (PRILOSEC) 20 MG delayed release capsule TAKE 1 CAPSULE BY MOUTH ONCE DAILY IN THE MORNING BEFORE BREAKFAST.  11/3/22   Juanjose Laguerre MD   lisinopril (PRINIVIL;ZESTRIL) 5 MG tablet TAKE 1 TABLET BY MOUTH ONE TIME A DAY 10/4/22   Juanjose Laguerre MD   Incontinence Supply Disposable (SELECT BOOSTER PADS) MISC Use 3-4/day 8/5/22   Juanjose Laguerre MD   Probiotic, Lactobacillus, CAPS Take 1 tablet by mouth daily OTC  Patient not taking: Reported on 1/25/2023 6/15/22   Juanjose Laguerre MD   levothyroxine (SYNTHROID) 25 MCG tablet Once daily 6/15/22   Juanjose Laguerre MD   budesonide-formoterol (SYMBICORT) 80-4.5 MCG/ACT AERO Inhale 2 puffs into the lungs 2 times daily  Patient not taking: Reported on 12/21/2022 5/26/22   Meryle Cinnamon, MD   PSYLLIUM HUSK PO Take by mouth OTC  Patient not taking: Reported on 1/25/2023    Historical Provider, MD   fluticasone (FLONASE) 50 MCG/ACT nasal spray 1 spray by Each Nostril route daily Taking PRN    Historical Provider, MD   triamcinolone (KENALOG) 0.1 % ointment Apply topically 2 times daily Using PRN  Patient not taking: No sig reported    Historical Provider, MD   mirabegron (MYRBETRIQ) 50 MG TB24 Take 50 mg by mouth daily Pt gets samples 3/10/22   Samuel Monte MD   albuterol (PROVENTIL) (2.5 MG/3ML) 0.083% nebulizer solution Take 3 mLs by nebulization every 6 hours as needed for Wheezing 2/25/22   Samuel Monte MD   albuterol sulfate  (90 Base) MCG/ACT inhaler INHALE 2 PUFFS BY MOUTH INTO LUNGS TWICE DAILY AS NEEDED FOR WHEEZING (NO  MORE  THAN  4  TIMES  DAILY) 2/8/22   Samuel Monte MD   QUEtiapine (SEROQUEL) 200 MG tablet Take 200 mg by mouth daily Last filled 11/8/2021 90 day supply,   Take 3 tablets once daily--600 mg dose    Historical Provider, MD   FLUoxetine (PROZAC) 40 MG capsule Take 40 mg by mouth daily Last filled 1/24/2022    Historical Provider, MD   ipratropium (ATROVENT) 0.03 % nasal spray 2 sprays by Nasal route 3 times daily as needed for Rhinitis     Historical Provider, MD   lithium 300 MG capsule Take 300 mg by mouth 2 times daily (with meals).   Patient not taking: Reported on 1/25/2023    Historical Provider, MD       Future Appointments   Date Time Provider Ekta Tapia   2/14/2023  3:00 PM Samuel Monte MD [facility] Delia Gonsalez

## 2023-02-09 ENCOUNTER — CARE COORDINATION (OUTPATIENT)
Dept: CARE COORDINATION | Age: 68
End: 2023-02-09

## 2023-02-09 SDOH — ECONOMIC STABILITY: FOOD INSECURITY: WITHIN THE PAST 12 MONTHS, YOU WORRIED THAT YOUR FOOD WOULD RUN OUT BEFORE YOU GOT MONEY TO BUY MORE.: NEVER TRUE

## 2023-02-09 SDOH — ECONOMIC STABILITY: FOOD INSECURITY: WITHIN THE PAST 12 MONTHS, THE FOOD YOU BOUGHT JUST DIDN'T LAST AND YOU DIDN'T HAVE MONEY TO GET MORE.: SOMETIMES TRUE

## 2023-02-09 NOTE — CARE COORDINATION
Ambulatory Care Coordination Note  2023    Patient Current Location:  Home: [address] [facility] [address]     ACM contacted the patient by telephone. Verified name and  with patient as identifiers. Provided introduction to self, and explanation of the ACM role. Challenges to be reviewed by the provider   Additional needs identified to be addressed with provider: No  none               Method of communication with provider: phone. ACM: Eric Lucia, RN    Incoming call from patient, she states that medically she's doing fine. She states that she wants to find a new guardian because she is very unhappy with her current one. I agrred to [employer] team to figure out what needs to me done. Yue Villanueva states that medically she is doing fine.  Will F/U next week about guardianship  In basket message sent to  team    Offered patient enrollment in the Remote Patient Monitoring (RPM) program for in-home monitoring: Patient is not eligible for RPM program.    Lab Results       None            Care Coordination Interventions    Referral from Primary Care Provider: No  Suggested Interventions and [address] [place] [address]: Completed  Life Skills Coaching: Completed  Specialty Services Referral: In Process (Comment: ability center)          Goals Addressed    None         Future Appointments   Date Time Provider Ekta Tapia   2023  3:00 PM Maria Elena Gibbs MD [facility]

## 2023-02-14 ENCOUNTER — OFFICE VISIT (OUTPATIENT)
Dept: INTERNAL MEDICINE | Age: 68
End: 2023-02-14
Payer: MEDICARE

## 2023-02-14 VITALS
OXYGEN SATURATION: 98 % | DIASTOLIC BLOOD PRESSURE: 64 MMHG | TEMPERATURE: 97.3 F | BODY MASS INDEX: 33.15 KG/M2 | WEIGHT: 199 LBS | HEART RATE: 89 BPM | SYSTOLIC BLOOD PRESSURE: 132 MMHG | HEIGHT: 65 IN

## 2023-02-14 DIAGNOSIS — I10 ESSENTIAL HYPERTENSION: Primary | ICD-10-CM

## 2023-02-14 DIAGNOSIS — E83.52 HYPERCALCEMIA: ICD-10-CM

## 2023-02-14 DIAGNOSIS — J45.30 MILD PERSISTENT ASTHMA WITHOUT COMPLICATION: ICD-10-CM

## 2023-02-14 DIAGNOSIS — E03.9 HYPOTHYROIDISM, UNSPECIFIED TYPE: ICD-10-CM

## 2023-02-14 DIAGNOSIS — R73.02 IGT (IMPAIRED GLUCOSE TOLERANCE): ICD-10-CM

## 2023-02-14 DIAGNOSIS — E66.9 OBESITY (BMI 30-39.9): ICD-10-CM

## 2023-02-14 DIAGNOSIS — F31.9 BIPOLAR 1 DISORDER (HCC): ICD-10-CM

## 2023-02-14 LAB — HBA1C MFR BLD: 5.7 %

## 2023-02-14 PROCEDURE — 3017F COLORECTAL CA SCREEN DOC REV: CPT | Performed by: INTERNAL MEDICINE

## 2023-02-14 PROCEDURE — 1036F TOBACCO NON-USER: CPT | Performed by: INTERNAL MEDICINE

## 2023-02-14 PROCEDURE — 1123F ACP DISCUSS/DSCN MKR DOCD: CPT | Performed by: INTERNAL MEDICINE

## 2023-02-14 PROCEDURE — G8427 DOCREV CUR MEDS BY ELIG CLIN: HCPCS | Performed by: INTERNAL MEDICINE

## 2023-02-14 PROCEDURE — 1090F PRES/ABSN URINE INCON ASSESS: CPT | Performed by: INTERNAL MEDICINE

## 2023-02-14 PROCEDURE — G8417 CALC BMI ABV UP PARAM F/U: HCPCS | Performed by: INTERNAL MEDICINE

## 2023-02-14 PROCEDURE — 3075F SYST BP GE 130 - 139MM HG: CPT | Performed by: INTERNAL MEDICINE

## 2023-02-14 PROCEDURE — G8484 FLU IMMUNIZE NO ADMIN: HCPCS | Performed by: INTERNAL MEDICINE

## 2023-02-14 PROCEDURE — G8399 PT W/DXA RESULTS DOCUMENT: HCPCS | Performed by: INTERNAL MEDICINE

## 2023-02-14 PROCEDURE — 83036 HEMOGLOBIN GLYCOSYLATED A1C: CPT | Performed by: INTERNAL MEDICINE

## 2023-02-14 PROCEDURE — 3078F DIAST BP <80 MM HG: CPT | Performed by: INTERNAL MEDICINE

## 2023-02-14 PROCEDURE — 99214 OFFICE O/P EST MOD 30 MIN: CPT | Performed by: INTERNAL MEDICINE

## 2023-02-14 RX ORDER — OXYBUTYNIN CHLORIDE 5 MG/1
TABLET ORAL
COMMUNITY

## 2023-02-14 RX ORDER — BUDESONIDE AND FORMOTEROL FUMARATE DIHYDRATE 160; 4.5 UG/1; UG/1
2 AEROSOL RESPIRATORY (INHALATION) 2 TIMES DAILY
Qty: 1 EACH | Refills: 5 | Status: SHIPPED | OUTPATIENT
Start: 2023-02-14

## 2023-02-14 RX ORDER — BUDESONIDE AND FORMOTEROL FUMARATE DIHYDRATE 160; 4.5 UG/1; UG/1
2 AEROSOL RESPIRATORY (INHALATION) 2 TIMES DAILY
Qty: 1 EACH | Refills: 5 | Status: SHIPPED | OUTPATIENT
Start: 2023-02-14 | End: 2023-02-14 | Stop reason: SDUPTHER

## 2023-02-14 RX ORDER — CYCLOBENZAPRINE HCL 5 MG
1 TABLET ORAL 3 TIMES DAILY PRN
COMMUNITY
End: 2023-02-14

## 2023-02-14 RX ORDER — ALBUTEROL SULFATE 2.5 MG/3ML
2.5 SOLUTION RESPIRATORY (INHALATION) EVERY 6 HOURS PRN
Qty: 120 EACH | Refills: 3 | Status: SHIPPED | OUTPATIENT
Start: 2023-02-14

## 2023-02-14 RX ORDER — LISINOPRIL 5 MG/1
TABLET ORAL
Qty: 90 TABLET | Refills: 1 | Status: SHIPPED | OUTPATIENT
Start: 2023-02-14

## 2023-02-14 RX ORDER — LEVOTHYROXINE SODIUM 0.03 MG/1
TABLET ORAL
Qty: 28 TABLET | Refills: 3 | Status: SHIPPED | OUTPATIENT
Start: 2023-02-14 | End: 2023-02-14 | Stop reason: SDUPTHER

## 2023-02-14 RX ORDER — LISINOPRIL 5 MG/1
TABLET ORAL
Qty: 90 TABLET | Refills: 1 | Status: SHIPPED | OUTPATIENT
Start: 2023-02-14 | End: 2023-02-14 | Stop reason: SDUPTHER

## 2023-02-14 RX ORDER — MONTELUKAST SODIUM 10 MG/1
TABLET ORAL
Qty: 90 TABLET | Refills: 1 | Status: SHIPPED | OUTPATIENT
Start: 2023-02-14

## 2023-02-14 RX ORDER — AMLODIPINE BESYLATE 10 MG/1
TABLET ORAL
Qty: 90 TABLET | Refills: 1 | Status: SHIPPED | OUTPATIENT
Start: 2023-02-14 | End: 2023-02-14 | Stop reason: SDUPTHER

## 2023-02-14 RX ORDER — AMLODIPINE BESYLATE 10 MG/1
TABLET ORAL
Qty: 90 TABLET | Refills: 1 | Status: SHIPPED | OUTPATIENT
Start: 2023-02-14

## 2023-02-14 RX ORDER — MONTELUKAST SODIUM 10 MG/1
TABLET ORAL
Qty: 90 TABLET | Refills: 1 | Status: SHIPPED | OUTPATIENT
Start: 2023-02-14 | End: 2023-02-14 | Stop reason: SDUPTHER

## 2023-02-14 RX ORDER — ALBUTEROL SULFATE 90 UG/1
AEROSOL, METERED RESPIRATORY (INHALATION)
Qty: 9 G | Refills: 5 | Status: SHIPPED | OUTPATIENT
Start: 2023-02-14

## 2023-02-14 RX ORDER — LEVOTHYROXINE SODIUM 0.03 MG/1
TABLET ORAL
Qty: 28 TABLET | Refills: 3 | Status: SHIPPED | OUTPATIENT
Start: 2023-02-14

## 2023-02-14 ASSESSMENT — PATIENT HEALTH QUESTIONNAIRE - PHQ9
5. POOR APPETITE OR OVEREATING: 1
6. FEELING BAD ABOUT YOURSELF - OR THAT YOU ARE A FAILURE OR HAVE LET YOURSELF OR YOUR FAMILY DOWN: 0
SUM OF ALL RESPONSES TO PHQ QUESTIONS 1-9: 1
10. IF YOU CHECKED OFF ANY PROBLEMS, HOW DIFFICULT HAVE THESE PROBLEMS MADE IT FOR YOU TO DO YOUR WORK, TAKE CARE OF THINGS AT HOME, OR GET ALONG WITH OTHER PEOPLE: 0
9. THOUGHTS THAT YOU WOULD BE BETTER OFF DEAD, OR OF HURTING YOURSELF: 0
7. TROUBLE CONCENTRATING ON THINGS, SUCH AS READING THE NEWSPAPER OR WATCHING TELEVISION: 0
2. FEELING DOWN, DEPRESSED OR HOPELESS: 0
4. FEELING TIRED OR HAVING LITTLE ENERGY: 0
8. MOVING OR SPEAKING SO SLOWLY THAT OTHER PEOPLE COULD HAVE NOTICED. OR THE OPPOSITE, BEING SO FIGETY OR RESTLESS THAT YOU HAVE BEEN MOVING AROUND A LOT MORE THAN USUAL: 0
SUM OF ALL RESPONSES TO PHQ QUESTIONS 1-9: 1
3. TROUBLE FALLING OR STAYING ASLEEP: 0

## 2023-02-14 ASSESSMENT — ENCOUNTER SYMPTOMS
BACK PAIN: 0
SHORTNESS OF BREATH: 0
WHEEZING: 0
COUGH: 1

## 2023-02-14 NOTE — PROGRESS NOTES
[facility]    Progress Note    Date of patient's visit: 2/14/2023    Patient's Name:  Tammy Jones    YOB: 1955            Patient Care Team:  Joanne Melo MD as PCP - aNina Huang MD as PCP - Empaneled Provider  Shahrzad Mckeon MD as Consulting Physician (Gastroenterology)  Andre Rao MD as Consulting Physician (Pulmonology)  Anjel Dejesus MD as Surgeon (Orthopedic Surgery)  Gabi Gonzalez MD as Consulting Physician (Infectious Diseases)  Johanna Brunson RN as [address] [place] [address] (Care Coordinator)    REASON FOR VISIT: Routine outpatient follow     Chief Complaint   Patient presents with    Cough     Pt states she coughs and cant stop started 4 weeks ago    Health Maintenance     Prevnar vaccine          HISTORY OF PRESENT ILLNESS:    History was obtained from the patient. Tammy Jones is a 69 y.o. is here for follow-up. Overall she is doing well. Blood pressure is controlled. She is getting all her medications blister pack. She cannot remember when she last saw her pulmonologist.  She says she only has an albuterol inhaler at home and she is getting Singulair. She is no longer receiving Symbicort. She cannot remember when she last saw her pulmonologist.  She also has sleep apnea and was being followed by pulmonologist.  She has a dry cough for the last few weeks. No expectoration. Dry cough could be secondary to lisinopril as well but for now will restart Symbicort and decide. If cough does not improve will stop lisinopril and see. She also has a rash which is a hyperpigmented on her face and neck and upper chest area. Seems like photosensitivity. She had seen a dermatologist last year. She says she has been getting different antibiotics from this dermatologist.  Not sure what the etiology is.   Will advise her to go and follow-up with again with the dermatologist.  She had a fixed drug eruption which was treated. She is following up with urologist.  She has overactive bladder. She is getting Botox injections. She is following up with her psychiatrist and currently she says medications are working. She also saw endocrinologist last year for hypercalcemia and mild hyperparathyroidism. Repeat labs were better with higher normal PTH and mildly elevated calcium but urine calcium was normal.  I am not sure what the follow-up was with endocrinology. Will get records. Results for POC orders placed in visit on 02/14/23   POCT glycosylated hemoglobin (Hb A1C)   Result Value Ref Range    Hemoglobin A1C 5.7 %       Past Medical History:   Diagnosis Date    Anxiety     Arrhythmia     Asthma     Bipolar 1 disorder ([facility] 2/14/2019    Bipolar disorder ([facility]     Chronic diarrhea 2/14/2019    COPD (chronic obstructive pulmonary disease) (HCC)     Depression     Essential hypertension     GERD (gastroesophageal reflux disease)     GERD (gastroesophageal reflux disease)     History of stroke 8/9/2018    Hyperlipidemia     Hypertension     Hypothyroidism     Mild persistent asthma without complication 0/59/5335    OAB (overactive bladder)     Obesity (BMI 30-39. 9) 8/19/2016    Osteoarthritis     Poor historian     Pulmonary fibrosis (HCC)     sjogrens syndrome    Pulmonary hypertension (HCC)     Pure hypercholesterolemia     Sleep apnea     cpap    Stroke ([facility]     Unspecified sleep apnea     on cpap    Urinary incontinence        Past Surgical History:   Procedure Laterality Date    ANKLE SURGERY Right 09/11/2019    COLONOSCOPY  04/2015    COLONOSCOPY  2018        CYSTOSCOPY N/A 3/2/2022    CYSTOSCOPY WITH BOTOX  (100 units) performed by Paul Lemos MD at [address] N/A 12/21/2022    CYSTOSCOPY WITH BOTOX 100 UNITS performed by Paul Lemos MD at [address] Left     Stye removal    TOOTH EXTRACTION      x 2         ALLERGIES      Allergies   Allergen Reactions Motrin [Ibuprofen]     Aspirin Rash    Blue Dyes (Parenteral) Rash    Hctz [Hydrochlorothiazide] Rash     Fixed drug reaction    Sulfa Antibiotics Rash    Tetracyclines & Related Rash       MEDICATIONS:      Current Outpatient Medications on File Prior to Visit   Medication Sig Dispense Refill    amLODIPine (NORVASC) 10 MG tablet 1 tablet daily 90 tablet 0    Lactobacillus Acid-Pectin (ACIDOPHILUS/PECTIN) CAPS TAKE 1 CAPSULE BY MOUTH ONCE DAILY. 30 capsule 6    montelukast (SINGULAIR) 10 MG tablet TAKE 1 TABLET BY MOUTH ONCE DAILY AT NIGHT. 28 tablet 3    vitamin D3 (CHOLECALCIFEROL) 25 MCG (1000 UT) TABS tablet TAKE 1 TABLET BY MOUTH ONE TIME A DAY 28 tablet 3    omeprazole (PRILOSEC) 20 MG delayed release capsule TAKE 1 CAPSULE BY MOUTH ONCE DAILY IN THE MORNING BEFORE BREAKFAST.  28 capsule 3    lisinopril (PRINIVIL;ZESTRIL) 5 MG tablet TAKE 1 TABLET BY MOUTH ONE TIME A DAY 90 tablet 1    Incontinence Supply Disposable (SELECT BOOSTER PADS) MISC Use 3-4/day 100 each 11    levothyroxine (SYNTHROID) 25 MCG tablet Once daily 28 tablet 3    fluticasone (FLONASE) 50 MCG/ACT nasal spray 1 spray by Each Nostril route daily Taking PRN      albuterol (PROVENTIL) (2.5 MG/3ML) 0.083% nebulizer solution Take 3 mLs by nebulization every 6 hours as needed for Wheezing 120 each 3    albuterol sulfate  (90 Base) MCG/ACT inhaler INHALE 2 PUFFS BY MOUTH INTO LUNGS TWICE DAILY AS NEEDED FOR WHEEZING (NO  MORE  THAN  4  TIMES  DAILY) 9 g 5    FLUoxetine (PROZAC) 40 MG capsule Take 40 mg by mouth daily Last filled 1/24/2022      ipratropium (ATROVENT) 0.03 % nasal spray 2 sprays by Nasal route 3 times daily as needed for Rhinitis       lithium 300 MG capsule Take 300 mg by mouth 2 times daily (with meals)      cyclobenzaprine (FLEXERIL) 5 MG tablet Take 1 tablet by mouth 3 times daily as needed      oxybutynin (DITROPAN) 5 MG tablet  (Patient not taking: Reported on 2/14/2023)      Probiotic, Lactobacillus, CAPS Take 1 tablet by mouth daily OTC (Patient not taking: No sig reported) 30 capsule 6    budesonide-formoterol (SYMBICORT) 80-4.5 MCG/ACT AERO Inhale 2 puffs into the lungs 2 times daily (Patient not taking: No sig reported) 10.2 g 3    PSYLLIUM HUSK PO Take by mouth OTC (Patient not taking: No sig reported)      triamcinolone (KENALOG) 0.1 % ointment Apply topically 2 times daily Using PRN (Patient not taking: No sig reported)      mirabegron (MYRBETRIQ) 50 MG TB24 Take 50 mg by mouth daily Pt gets samples (Patient not taking: Reported on 2/14/2023) 28 tablet 3    QUEtiapine (SEROQUEL) 200 MG tablet Take 200 mg by mouth daily Last filled 11/8/2021 90 day supply,   Take 3 tablets once daily--600 mg dose (Patient not taking: Reported on 2/14/2023)       No current facility-administered medications on file prior to visit. HISTORY    Reviewed and no change from previous record. Dulce  reports that she has never smoked. She has never used smokeless tobacco.    FAMILY HISTORY:    Reviewed and No change from previous visit    HEALTH MAINTENANCE DUE:      Health Maintenance Due   Topic Date Due    Pneumococcal 65+ years Vaccine (1 - PCV) Never done    Shingles vaccine (1 of 2) Never done    COVID-19 Vaccine (4 - Booster for Moderna series) 03/22/2022    Breast cancer screen  08/24/2022       REVIEW OF SYSTEMS:    12 point review of symptoms completed and found to be normal except noted in the HPI    Review of Systems   Constitutional:  Negative for chills and fever. Respiratory:  Positive for cough. Negative for shortness of breath and wheezing. Cardiovascular:  Negative for chest pain, palpitations and leg swelling. Musculoskeletal:  Positive for arthralgias. Negative for back pain. Skin:  Positive for rash. Negative for wound. Neurological:  Negative for dizziness, weakness, numbness and headaches.       PHYSICAL EXAM:     Vitals:    02/14/23 1517 02/14/23 1529   BP: (!) 140/79 132/64   Site: Left Upper Arm Right Upper Arm   Position: Sitting Sitting   Cuff Size: Large Adult Large Adult   Pulse: 89    Temp: 97.3 °F (36.3 °C)    TempSrc: Infrared    SpO2: 98%    Weight: 199 lb (90.3 kg)    Height: 5' 5\" (1.651 m)      Body mass index is 33.12 kg/m². BP Readings from Last 3 Encounters:   02/14/23 132/64   12/21/22 (!) 137/96   08/02/22 129/75        Wt Readings from Last 3 Encounters:   02/14/23 199 lb (90.3 kg)   12/21/22 199 lb (90.3 kg)   08/02/22 204 lb (92.5 kg)       Physical Exam  Vitals and nursing note reviewed. Constitutional:       Appearance: Normal appearance. HENT:      Head: Normocephalic and atraumatic. Eyes:      Extraocular Movements: Extraocular movements intact. Conjunctiva/sclera: Conjunctivae normal.      Pupils: Pupils are equal, round, and reactive to light. Cardiovascular:      Rate and Rhythm: Normal rate and regular rhythm. Pulmonary:      Effort: Pulmonary effort is normal.      Breath sounds: Normal breath sounds. Skin:     General: Skin is warm and dry. Findings: Rash (hyperpigmented macules on sun exposed areas of face, neck and upper chest) present. Neurological:      General: No focal deficit present. Mental Status: She is alert and oriented to person, place, and time.              LABORATORY FINDINGS:    CBC:  Lab Results   Component Value Date/Time    WBC 6.3 07/25/2022 09:35 PM    HGB 11.6 07/25/2022 09:35 PM     07/25/2022 09:35 PM     05/02/2012 02:16 PM     BMP:    Lab Results   Component Value Date/Time     07/25/2022 09:35 PM    K 4.0 07/25/2022 09:35 PM     07/25/2022 09:35 PM    CO2 23 07/25/2022 09:35 PM    BUN 14 07/25/2022 09:35 PM    CREATININE 0.95 08/01/2022 09:46 AM    GLUCOSE 96 07/25/2022 09:35 PM    GLUCOSE 119 05/02/2012 02:16 PM     HEMOGLOBIN A1C:   Lab Results   Component Value Date/Time    LABA1C 5.5 08/31/2021 11:34 AM     MICROALBUMIN URINE:   Lab Results   Component Value Date/Time    MICROALBUR <12 11/10/2016 07:44 PM     FASTING LIPID PANEL:  Lab Results   Component Value Date    CHOL 204 (H) 02/08/2022    HDL 49 02/08/2022    TRIG 158 (H) 02/08/2022     Lab Results   Component Value Date    LDLCHOLESTEROL 123 02/08/2022       LIVER PROFILE:  Lab Results   Component Value Date/Time    ALT 30 02/08/2022 03:08 PM    AST 29 02/08/2022 03:08 PM    PROT 7.5 02/08/2022 03:08 PM    BILITOT 0.25 02/08/2022 03:08 PM    BILIDIR 0.14 05/21/2019 04:29 PM    LABALBU 4.4 02/08/2022 03:08 PM    LABALBU 4.8 05/02/2012 02:16 PM      THYROID FUNCTION:   Lab Results   Component Value Date/Time    TSH 2.53 02/08/2022 03:08 PM      URINEANALYSIS: No results found for: LABURIN  ASSESSMENT AND PLAN:    1. Essential hypertension    - Basic Metabolic Panel; Future  - amLODIPine (NORVASC) 10 MG tablet; 1 tablet daily  Dispense: 90 tablet; Refill: 1  - lisinopril (PRINIVIL;ZESTRIL) 5 MG tablet; TAKE 1 TABLET BY MOUTH ONE TIME A DAY  Dispense: 90 tablet; Refill: 1    2. Hypothyroidism, unspecified type    - TSH with Reflex; Future  - levothyroxine (SYNTHROID) 25 MCG tablet; Once daily  Dispense: 28 tablet; Refill: 3    3. Hypercalcemia  PTH last upper limits of normal  Seen by endocrinology- get reports    - Basic Metabolic Panel; Future  - Vitamin D 25 Hydroxy; Future    4. Mild persistent asthma without complication  Restart symbicort    - montelukast (SINGULAIR) 10 MG tablet; TAKE 1 TABLET BY MOUTH ONCE DAILY AT NIGHT. Dispense: 90 tablet; Refill: 1  - budesonide-formoterol (SYMBICORT) 160-4.5 MCG/ACT AERO; Inhale 2 puffs into the lungs 2 times daily  Dispense: 1 each; Refill: 5  - albuterol (PROVENTIL) (2.5 MG/3ML) 0.083% nebulizer solution; Take 3 mLs by nebulization every 6 hours as needed for Wheezing  Dispense: 120 each;  Refill: 3  - albuterol sulfate HFA (PROVENTIL;VENTOLIN;PROAIR) 108 (90 Base) MCG/ACT inhaler; INHALE 2 PUFFS BY MOUTH INTO LUNGS TWICE DAILY AS NEEDED FOR WHEEZING (NO  MORE  THAN  4  TIMES  DAILY)  Dispense: 9 g; Refill: 5    5. Obesity (BMI 30-39.9)      6. Bipolar 1 disorder/Schizoaffective  (HCC)  Stable on meds     7. IGT (impaired glucose tolerance)    - POCT glycosylated hemoglobin (Hb A1C)          FOLLOW UP AND INSTRUCTIONS:   Return in about 6 months (around 8/14/2023). Dulce received counseling on the following healthy behaviors: nutrition, exercise, and medication adherence    Reviewed prior labs and health maintenance. Discussed use, benefit, and side effects of prescribed medications. Barriers to medication compliance addressed. All patient questions answered. Pt voiced understanding.        Albania Alcantara  Attending Physician, [address] [place] [address], Internal Medicine Residency Program  [address] [place] [address]  2/14/2023, 3:30 PM

## 2023-02-21 DIAGNOSIS — K21.9 GASTROESOPHAGEAL REFLUX DISEASE, UNSPECIFIED WHETHER ESOPHAGITIS PRESENT: ICD-10-CM

## 2023-02-22 RX ORDER — MELATONIN
Qty: 30 TABLET | Refills: 5 | Status: SHIPPED | OUTPATIENT
Start: 2023-02-22

## 2023-02-22 RX ORDER — OMEPRAZOLE 20 MG/1
CAPSULE, DELAYED RELEASE ORAL
Qty: 30 CAPSULE | Refills: 5 | Status: SHIPPED | OUTPATIENT
Start: 2023-02-22

## 2023-02-22 NOTE — TELEPHONE ENCOUNTER
Last visit: 2/14/23  Last Med refill: 11/3/22  Does patient have enough medication for 72 hours: No:     Next Visit Date:  Future Appointments   Date Time Provider Ekta Tapia   3/16/2023 11:45 AM ST[facility] DIAG [facility] RM 3 [facility] Radiolog       Health Maintenance   Topic Date Due    Pneumococcal 65+ years Vaccine (1 - PCV) Never done    Shingles vaccine (1 of 2) Never done    COVID-19 Vaccine (4 - Booster for Moderna series) 03/22/2022    Breast cancer screen  08/24/2022    Annual Wellness Visit (AWV)  04/08/2023    A1C test (Diabetic or Prediabetic)  02/14/2024    Depression Monitoring  02/14/2024    Lipids  02/08/2027    Colorectal Cancer Screen  09/25/2028    DTaP/Tdap/Td vaccine (2 - Td or Tdap) 08/15/2032    DEXA (modify frequency per FRAX score)  Completed    Flu vaccine  Completed    Hepatitis C screen  Completed    Hepatitis A vaccine  Aged Out    Hib vaccine  Aged Out    Meningococcal (ACWY) vaccine  Aged Out       Hemoglobin A1C (%)   Date Value   02/14/2023 5.7   08/31/2021 5.5   12/21/2020 5.6             ( goal A1C is < 7)   Microalb/Crt.  Ratio (mcg/mg creat)   Date Value   11/10/2016 9     LDL Cholesterol (mg/dL)   Date Value   02/08/2022 123   08/31/2021 158 (H)       (goal LDL is <100)   AST (U/L)   Date Value   02/08/2022 29     ALT (U/L)   Date Value   02/08/2022 30     BUN (mg/dL)   Date Value   07/25/2022 14     BP Readings from Last 3 Encounters:   02/14/23 132/64   12/21/22 (!) 137/96   08/02/22 129/75          (goal 120/80)    All Future Testing planned in [facility]  Lab Frequency Next Occurrence   Basic Metabolic Panel Once 92/86/8004   AMOL DIGITAL SCREEN W OR WO CAD BILATERAL Once 06/15/2022   DEXA BONE DENSITY AXIAL SKELETON Once 77/51/8626   Basic Metabolic Panel Once 55/18/2684   Vitamin D 25 Hydroxy Once 05/18/2023   TSH with Reflex Once 05/18/2023               Patient Active Problem List:     Essential hypertension     Pure hypercholesterolemia     Urinary incontinence Anxiety     Sleep apnea     GERD (gastroesophageal reflux disease)     Hypothyroidism     Obesity (BMI 30-39. 9)     Mild persistent asthma without complication     History of stroke     Chronic diarrhea     Bipolar 1 disorder (HCC)     Ankle fracture, right, closed, with routine healing, subsequent encounter     OAB (overactive bladder)

## 2023-02-23 ENCOUNTER — TELEPHONE (OUTPATIENT)
Dept: INTERNAL MEDICINE | Age: 68
End: 2023-02-23

## 2023-02-23 NOTE — TELEPHONE ENCOUNTER
Pt called in asking for appt with PCP. Pt was just here last week, writer advised PCP has no availability and that pt is on wait list to f/u in 6 months. Pt stated she needed to talk to PCP about a couple other things, writer offered to send PCP a message and pt agreeable. Pt then stated she has had some numbness in her feet recently. Writer asked if pt has a podiatrist to which pt stated yes. Writer advised pt to contact that provider as they will most likely be able to get pt in sooner than PCP.

## 2023-02-27 ENCOUNTER — CARE COORDINATION (OUTPATIENT)
Dept: CARE COORDINATION | Age: 68
End: 2023-02-27

## 2023-02-27 NOTE — CARE COORDINATION
Incoming call from [employer]. She states that she wants to get a  dog as she has anxiety when she goes out of the house. She again asked to have PCP send something to the [facility] so she can get one or train her dog Chili to be one. I explained that Dr. Clifton Resendiz has told her this is out of her scope of practice. Dulce then started talking about having to go to assisted living if she didn't get one so she needs it now. I explained that she needs to contact the ability center and talk to them about that. She then stated that she has an application but doesn't know how to fill this out. I instructed her to call her guardian Mikel Coffey and have him help with the application. She stated that she doesn't have his number (although it has been provided to her on multiple occasions). Cell phone number for guardian Ihsan Betancourtn provided. [phone]. Patient agrees to contact him.  Patient has no medical needs at this time and will graduate

## 2023-03-01 ENCOUNTER — HOSPITAL ENCOUNTER (OUTPATIENT)
Age: 68
Setting detail: SPECIMEN
Discharge: HOME | End: 2023-03-01
Payer: MEDICARE

## 2023-03-01 DIAGNOSIS — E83.52 HYPERCALCEMIA: ICD-10-CM

## 2023-03-01 DIAGNOSIS — I10 ESSENTIAL HYPERTENSION: ICD-10-CM

## 2023-03-01 DIAGNOSIS — E03.9 HYPOTHYROIDISM, UNSPECIFIED TYPE: ICD-10-CM

## 2023-03-01 LAB
25(OH)D3 SERPL-MCNC: 32.2 NG/ML
ANION GAP SERPL CALCULATED.3IONS-SCNC: 9 MMOL/L (ref 9–17)
BUN SERPL-MCNC: 20 MG/DL (ref 8–23)
CALCIUM SERPL-MCNC: 10.3 MG/DL (ref 8.6–10.4)
CHLORIDE SERPL-SCNC: 106 MMOL/L (ref 98–107)
CO2 SERPL-SCNC: 22 MMOL/L (ref 20–31)
CREAT SERPL-MCNC: 1.19 MG/DL (ref 0.5–0.9)
GFR SERPL CREATININE-BSD FRML MDRD: 49 ML/MIN/1.73M2
GLUCOSE SERPL-MCNC: 100 MG/DL (ref 70–99)
POTASSIUM SERPL-SCNC: 4.4 MMOL/L (ref 3.7–5.3)
SODIUM SERPL-SCNC: 137 MMOL/L (ref 135–144)
TSH SERPL-ACNC: 2.48 UIU/ML (ref 0.3–5)

## 2023-03-01 PROCEDURE — 80048 BASIC METABOLIC PNL TOTAL CA: CPT

## 2023-03-01 PROCEDURE — 84443 ASSAY THYROID STIM HORMONE: CPT

## 2023-03-01 PROCEDURE — 36415 COLL VENOUS BLD VENIPUNCTURE: CPT

## 2023-03-01 PROCEDURE — 82306 VITAMIN D 25 HYDROXY: CPT

## 2023-03-02 DIAGNOSIS — J45.30 MILD PERSISTENT ASTHMA WITHOUT COMPLICATION: ICD-10-CM

## 2023-03-02 NOTE — TELEPHONE ENCOUNTER
Pt is calling the office to let the office know that her medication singular insurance not going to cover it, pt states that the cost is $321.00, pt need alterative inhaler that insurance will cover, pt also states that she forgot the name of the skin cream you told her about at her last appointment.  Please advise

## 2023-03-06 RX ORDER — MONTELUKAST SODIUM 10 MG/1
TABLET ORAL
Qty: 90 TABLET | Refills: 1 | Status: SHIPPED | OUTPATIENT
Start: 2023-03-06

## 2023-03-06 ASSESSMENT — ENCOUNTER SYMPTOMS
BACK PAIN: 0
COUGH: 0
SHORTNESS OF BREATH: 0
WHEEZING: 0

## 2023-03-08 ENCOUNTER — OFFICE VISIT (OUTPATIENT)
Dept: DERMATOLOGY | Age: 68
End: 2023-03-08
Payer: MEDICARE

## 2023-03-08 VITALS
HEART RATE: 82 BPM | SYSTOLIC BLOOD PRESSURE: 119 MMHG | BODY MASS INDEX: 33.69 KG/M2 | HEIGHT: 65 IN | DIASTOLIC BLOOD PRESSURE: 79 MMHG | OXYGEN SATURATION: 97 % | TEMPERATURE: 97.5 F | WEIGHT: 202.2 LBS

## 2023-03-08 DIAGNOSIS — L28.0 LICHEN SIMPLEX CHRONICUS: Primary | ICD-10-CM

## 2023-03-08 DIAGNOSIS — L30.8 OTHER SPECIFIED DERMATITIS: ICD-10-CM

## 2023-03-08 PROCEDURE — 1036F TOBACCO NON-USER: CPT | Performed by: DERMATOLOGY

## 2023-03-08 PROCEDURE — 1123F ACP DISCUSS/DSCN MKR DOCD: CPT | Performed by: DERMATOLOGY

## 2023-03-08 PROCEDURE — G8484 FLU IMMUNIZE NO ADMIN: HCPCS | Performed by: DERMATOLOGY

## 2023-03-08 PROCEDURE — G8399 PT W/DXA RESULTS DOCUMENT: HCPCS | Performed by: DERMATOLOGY

## 2023-03-08 PROCEDURE — G8427 DOCREV CUR MEDS BY ELIG CLIN: HCPCS | Performed by: DERMATOLOGY

## 2023-03-08 PROCEDURE — 3078F DIAST BP <80 MM HG: CPT | Performed by: DERMATOLOGY

## 2023-03-08 PROCEDURE — 1090F PRES/ABSN URINE INCON ASSESS: CPT | Performed by: DERMATOLOGY

## 2023-03-08 PROCEDURE — G8417 CALC BMI ABV UP PARAM F/U: HCPCS | Performed by: DERMATOLOGY

## 2023-03-08 PROCEDURE — 99204 OFFICE O/P NEW MOD 45 MIN: CPT | Performed by: DERMATOLOGY

## 2023-03-08 PROCEDURE — 3017F COLORECTAL CA SCREEN DOC REV: CPT | Performed by: DERMATOLOGY

## 2023-03-08 PROCEDURE — 3074F SYST BP LT 130 MM HG: CPT | Performed by: DERMATOLOGY

## 2023-03-08 RX ORDER — TRIAMCINOLONE ACETONIDE 0.25 MG/G
CREAM TOPICAL
Qty: 80 G | Refills: 2 | Status: SHIPPED | OUTPATIENT
Start: 2023-03-08

## 2023-03-08 RX ORDER — AMMONIUM LACTATE 12 G/100G
CREAM TOPICAL
Qty: 385 G | Refills: 2 | Status: SHIPPED | OUTPATIENT
Start: 2023-03-08

## 2023-03-08 NOTE — PROGRESS NOTES
Dermatology Patient Note  [address] [address] [facility] [address] [address]  Dept: [phone]  Dept Fax: [phone]      VISITDATE: 3/8/2023   REFERRING PROVIDER: No ref. provider found      Aranza Smart is a 69 y.o. female  who presents today in the office for:    New Patient (Pt states she has a raised spot on her rt elbow that has been present for a month. Pt denies any irritation but states she brought a cream offline that she doesn't remember the name pt states its a pain cream and it irritates the spot when she applies it. Pt states she has discoloration on her chest and face that she states PCP said are age spots   )      HISTORY OF PRESENT ILLNESS:  New patient for evaluation of lesion on right elbow x 1 month. She has used OTC topicals on the lesion without improvement. The patient is also concerned about discoloration on the chest and face, she was previously told these are age spots by her PCP. MEDICAL PROBLEMS:  Patient Active Problem List    Diagnosis Date Noted    OAB (overactive bladder) 08/04/2022     Priority: Medium    Ankle fracture, right, closed, with routine healing, subsequent encounter 04/19/2022    Chronic diarrhea 02/14/2019    Bipolar 1 disorder ([facility] 02/14/2019    History of stroke 08/09/2018    Mild persistent asthma without complication 64/69/5999    Obesity (BMI 30-39.9) 08/19/2016    Hypothyroidism 12/03/2015    Essential hypertension     Pure hypercholesterolemia     Urinary incontinence     Anxiety     Sleep apnea     GERD (gastroesophageal reflux disease)        CURRENT MEDICATIONS:   Current Outpatient Medications   Medication Sig Dispense Refill    montelukast (SINGULAIR) 10 MG tablet TAKE 1 TABLET BY MOUTH ONCE DAILY AT NIGHT. 90 tablet 1    vitamin D3 (CHOLECALCIFEROL) 25 MCG (1000 UT) TABS tablet TAKE 1 TABLET BY MOUTH ONCE DAILY.  30 tablet 5    omeprazole (PRILOSEC) 20 MG delayed release capsule TAKE 1 CAPSULE BY MOUTH ONCE DAILY IN THE MORNING BEFORE BREAKFAST. 30 capsule 5    amLODIPine (NORVASC) 10 MG tablet 1 tablet daily 90 tablet 1    lisinopril (PRINIVIL;ZESTRIL) 5 MG tablet TAKE 1 TABLET BY MOUTH ONE TIME A DAY 90 tablet 1    levothyroxine (SYNTHROID) 25 MCG tablet Once daily 28 tablet 3    budesonide-formoterol (SYMBICORT) 160-4.5 MCG/ACT AERO Inhale 2 puffs into the lungs 2 times daily 1 each 5    albuterol (PROVENTIL) (2.5 MG/3ML) 0.083% nebulizer solution Take 3 mLs by nebulization every 6 hours as needed for Wheezing 120 each 3    albuterol sulfate HFA (PROVENTIL;VENTOLIN;PROAIR) 108 (90 Base) MCG/ACT inhaler INHALE 2 PUFFS BY MOUTH INTO LUNGS TWICE DAILY AS NEEDED FOR WHEEZING (NO  MORE  THAN  4  TIMES  DAILY) 9 g 5    Lactobacillus Acid-Pectin (ACIDOPHILUS/PECTIN) CAPS TAKE 1 CAPSULE BY MOUTH ONCE DAILY.  30 capsule 6    Incontinence Supply Disposable (SELECT BOOSTER PADS) MISC Use 3-4/day 100 each 11    Probiotic, Lactobacillus, CAPS Take 1 tablet by mouth daily OTC 30 capsule 6    fluticasone (FLONASE) 50 MCG/ACT nasal spray 1 spray by Each Nostril route daily Taking PRN      QUEtiapine (SEROQUEL) 200 MG tablet Take 200 mg by mouth daily Last filled 11/8/2021 90 day supply,   Take 3 tablets once daily--600 mg dose      FLUoxetine (PROZAC) 40 MG capsule Take 40 mg by mouth daily Last filled 1/24/2022      ipratropium (ATROVENT) 0.03 % nasal spray 2 sprays by Nasal route 3 times daily as needed for Rhinitis       lithium 300 MG capsule Take 300 mg by mouth 2 times daily (with meals)      oxybutynin (DITROPAN) 5 MG tablet  (Patient not taking: No sig reported)      PSYLLIUM HUSK PO Take by mouth OTC (Patient not taking: No sig reported)      triamcinolone (KENALOG) 0.1 % ointment Apply topically 2 times daily Using PRN (Patient not taking: No sig reported)      mirabegron (MYRBETRIQ) 50 MG TB24 Take 50 mg by mouth daily Pt gets samples (Patient not taking: No sig reported) 28 tablet 3     No current facility-administered medications for this visit. ALLERGIES:   Allergies   Allergen Reactions    Codeine Other (See Comments)    Iodinated Contrast Media     Motrin [Ibuprofen]     Aspirin Rash    Blue Dyes (Parenteral) Rash    Hctz [Hydrochlorothiazide] Rash     Fixed drug reaction    Sulfa Antibiotics Rash    Tetracyclines & Related Rash       SOCIAL HISTORY:  Social History     Tobacco Use    Smoking status: Never    Smokeless tobacco: Never   Substance Use Topics    Alcohol use: No       Pertinent ROS:  Review of Systems  Skin: Denies any new changing, growing or bleeding lesions or rashes except as described in the HPI   Constitutional: Denies fevers, chills, and malaise. PHYSICAL EXAM:   /79   Pulse 82   Temp 97.5 °F (36.4 °C) (Temporal)   Ht 5' 5\" (1.651 m)   Wt 202 lb 3.2 oz (91.7 kg)   SpO2 97%   BMI 33.65 kg/m²     The patient is generally well appearing, well nourished, alert and conversational. Affect is normal.    Cutaneous Exam:  Physical Exam  Waist-up skin: the head/face,neck, both arms, chest, back, abdomen, digits and/or nails, was examined. Facial covering was removed during examination. Diagnoses/exam findings/medical history pertinent to this visit are listed below:    Assessment:   Diagnosis Orders   1. Lichen simplex chronicus        2.  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 Ashley Cutler, personally scribed the services dictated to me by Dr. Chiquita Herrera in this documentation. I, Dr. Chiquita Herrera, personally performed the services described in this documentation, as scribed by [facility] in my presence, and it is both accurate and complete.

## 2023-03-16 ENCOUNTER — HOSPITAL ENCOUNTER (OUTPATIENT)
Dept: MAMMOGRAPHY | Age: 68
Discharge: HOME | End: 2023-03-18
Payer: MEDICARE

## 2023-03-16 DIAGNOSIS — Z12.31 ENCOUNTER FOR SCREENING MAMMOGRAM FOR MALIGNANT NEOPLASM OF BREAST: ICD-10-CM

## 2023-03-16 PROCEDURE — 77063 BREAST TOMOSYNTHESIS BI: CPT

## 2023-03-22 DIAGNOSIS — I10 ESSENTIAL HYPERTENSION: ICD-10-CM

## 2023-03-23 RX ORDER — LISINOPRIL 5 MG/1
TABLET ORAL
Qty: 90 TABLET | Refills: 1 | OUTPATIENT
Start: 2023-03-23

## 2023-03-29 ENCOUNTER — TELEPHONE (OUTPATIENT)
Dept: INTERNAL MEDICINE | Age: 68
End: 2023-03-29

## 2023-04-14 ENCOUNTER — TELEPHONE (OUTPATIENT)
Dept: INFECTIOUS DISEASES | Age: 68
End: 2023-04-14

## 2023-04-26 NOTE — TELEPHONE ENCOUNTER
Routed call to [facility]  pool (previously routed to the clinical pool) and Dr Juanito Gonzalez as no appt has been made yet with them.  Closing encounter

## 2023-05-18 ENCOUNTER — TELEPHONE (OUTPATIENT)
Dept: INTERNAL MEDICINE | Age: 68
End: 2023-05-18

## 2023-05-22 ENCOUNTER — CARE COORDINATION (OUTPATIENT)
Dept: CARE COORDINATION | Age: 68
End: 2023-05-22

## 2023-05-22 NOTE — CARE COORDINATION
Incoming call from [facility]. [facility] asking if I would call Dr. Garcia Katz office and find out about her jose hose. I explained that if she had a question it would better for her to call. Phone number provided.

## 2023-05-22 NOTE — CARE COORDINATION
Incoming call from [employer], She wants the phone number for her dermatologist. [employer] was having multiple conversations while trying to talk with her. Writer attempted to get her attention multiple times with no response. Phone then disconnected.   Will wait a return call from patient  Derm # [phone]

## 2023-05-24 ENCOUNTER — OFFICE VISIT (OUTPATIENT)
Dept: INTERNAL MEDICINE | Age: 68
End: 2023-05-24
Payer: MEDICARE

## 2023-05-24 VITALS
BODY MASS INDEX: 33.82 KG/M2 | OXYGEN SATURATION: 99 % | SYSTOLIC BLOOD PRESSURE: 119 MMHG | TEMPERATURE: 98.6 F | HEART RATE: 88 BPM | WEIGHT: 203 LBS | HEIGHT: 65 IN | DIASTOLIC BLOOD PRESSURE: 72 MMHG

## 2023-05-24 DIAGNOSIS — I10 ESSENTIAL HYPERTENSION: Primary | ICD-10-CM

## 2023-05-24 DIAGNOSIS — M79.89 LEG SWELLING: ICD-10-CM

## 2023-05-24 PROCEDURE — 1090F PRES/ABSN URINE INCON ASSESS: CPT | Performed by: STUDENT IN AN ORGANIZED HEALTH CARE EDUCATION/TRAINING PROGRAM

## 2023-05-24 PROCEDURE — 3078F DIAST BP <80 MM HG: CPT | Performed by: STUDENT IN AN ORGANIZED HEALTH CARE EDUCATION/TRAINING PROGRAM

## 2023-05-24 PROCEDURE — G8417 CALC BMI ABV UP PARAM F/U: HCPCS | Performed by: STUDENT IN AN ORGANIZED HEALTH CARE EDUCATION/TRAINING PROGRAM

## 2023-05-24 PROCEDURE — 99211 OFF/OP EST MAY X REQ PHY/QHP: CPT | Performed by: INTERNAL MEDICINE

## 2023-05-24 PROCEDURE — G8427 DOCREV CUR MEDS BY ELIG CLIN: HCPCS | Performed by: STUDENT IN AN ORGANIZED HEALTH CARE EDUCATION/TRAINING PROGRAM

## 2023-05-24 PROCEDURE — G8399 PT W/DXA RESULTS DOCUMENT: HCPCS | Performed by: STUDENT IN AN ORGANIZED HEALTH CARE EDUCATION/TRAINING PROGRAM

## 2023-05-24 PROCEDURE — 1123F ACP DISCUSS/DSCN MKR DOCD: CPT | Performed by: STUDENT IN AN ORGANIZED HEALTH CARE EDUCATION/TRAINING PROGRAM

## 2023-05-24 PROCEDURE — 1036F TOBACCO NON-USER: CPT | Performed by: STUDENT IN AN ORGANIZED HEALTH CARE EDUCATION/TRAINING PROGRAM

## 2023-05-24 PROCEDURE — 99213 OFFICE O/P EST LOW 20 MIN: CPT | Performed by: STUDENT IN AN ORGANIZED HEALTH CARE EDUCATION/TRAINING PROGRAM

## 2023-05-24 PROCEDURE — 3017F COLORECTAL CA SCREEN DOC REV: CPT | Performed by: STUDENT IN AN ORGANIZED HEALTH CARE EDUCATION/TRAINING PROGRAM

## 2023-05-24 PROCEDURE — 3074F SYST BP LT 130 MM HG: CPT | Performed by: STUDENT IN AN ORGANIZED HEALTH CARE EDUCATION/TRAINING PROGRAM

## 2023-05-24 SDOH — ECONOMIC STABILITY: FOOD INSECURITY: WITHIN THE PAST 12 MONTHS, THE FOOD YOU BOUGHT JUST DIDN'T LAST AND YOU DIDN'T HAVE MONEY TO GET MORE.: NEVER TRUE

## 2023-05-24 SDOH — ECONOMIC STABILITY: FOOD INSECURITY: WITHIN THE PAST 12 MONTHS, YOU WORRIED THAT YOUR FOOD WOULD RUN OUT BEFORE YOU GOT MONEY TO BUY MORE.: NEVER TRUE

## 2023-05-24 SDOH — ECONOMIC STABILITY: INCOME INSECURITY: HOW HARD IS IT FOR YOU TO PAY FOR THE VERY BASICS LIKE FOOD, HOUSING, MEDICAL CARE, AND HEATING?: NOT HARD AT ALL

## 2023-05-24 ASSESSMENT — ENCOUNTER SYMPTOMS
WHEEZING: 0
COUGH: 0
STRIDOR: 0
CHOKING: 0
CHEST TIGHTNESS: 0
SHORTNESS OF BREATH: 0

## 2023-05-24 NOTE — PROGRESS NOTES
[facility], [place] [address] [address]  Dept: [phone]  Dept Fax: [phone]    Office Progress/Follow Up Note  Date ofpatient's visit: 5/24/2023  Patient's Name:  Larry Alas YOB: 1955            Patient Care Team:  Jennifer Pimentel MD as PCP - Cary Flores MD as PCP - Empaneled Provider  Briana Ruelas MD as Consulting Physician (Gastroenterology)  Kalie Vasquez MD as Consulting Physician (Pulmonology)  Arianna Gamez MD as Surgeon (Orthopedic Surgery)  Melissa Hernandez MD as Consulting Physician (Infectious Diseases)  ================================================================    REASON FOR VISIT/CHIEF COMPLAINT:  Leg Swelling (Same day ) and Foot Swelling    HISTORY OF PRESENTING ILLNESS:    History was obtained from: patient, electronic medical record. Larry Alas a 77 y.o. is here for a same day visit for bilateral lower extremity swellings. Since 5 years intermittent by the end of the day around 10 pm, she usually uses stockings since last 5 yrs which relieves her edema and wants to get evaluated today for leg swelling. Hx + sleep apnea, hypertension on amlodipine 10 mg, hypothyroidism on levothyroxine. Hx +mild sob  on exertion with out associated symptoms. Has XAVIER omn sleep apnea  She denies chest pain, dizziness, palpitations. Labs showed unremarkable BMP, normal TSH. She had TTE in 2017 with Preserved ejection fraction. And mild grade I diastolic dysfunction. Normal EKG in 2022. On exam she doesn't have any edema. Patient Active Problem List   Diagnosis    Essential hypertension    Pure hypercholesterolemia    Urinary incontinence    Anxiety    Sleep apnea    GERD (gastroesophageal reflux disease)    Hypothyroidism    Obesity (BMI 30-39. 9)    Mild persistent asthma without complication    History of stroke    Chronic diarrhea    Bipolar 1 disorder (HCC)    Ankle

## 2023-06-07 ENCOUNTER — HOSPITAL ENCOUNTER (OUTPATIENT)
Dept: NON INVASIVE DIAGNOSTICS | Age: 68
Discharge: HOME | End: 2023-06-07
Payer: MEDICARE

## 2023-06-07 ENCOUNTER — HOSPITAL ENCOUNTER (OUTPATIENT)
Age: 68
Discharge: HOME | End: 2023-06-07
Payer: MEDICARE

## 2023-06-07 DIAGNOSIS — M79.89 LEG SWELLING: ICD-10-CM

## 2023-06-07 DIAGNOSIS — I51.9 DIASTOLIC DYSFUNCTION, LEFT VENTRICLE: ICD-10-CM

## 2023-06-07 LAB
ALBUMIN SERPL-MCNC: 4.2 G/DL (ref 3.5–5.2)
ALBUMIN/GLOB SERPL: 1.3 {RATIO} (ref 1–2.5)
ALP SERPL-CCNC: 99 U/L (ref 35–104)
ALT SERPL-CCNC: 39 U/L (ref 5–33)
ANION GAP SERPL CALCULATED.3IONS-SCNC: 11 MMOL/L (ref 9–17)
AST SERPL-CCNC: 27 U/L
BILIRUB SERPL-MCNC: 0.4 MG/DL (ref 0.3–1.2)
BUN SERPL-MCNC: 17 MG/DL (ref 8–23)
CALCIUM SERPL-MCNC: 10.5 MG/DL (ref 8.6–10.4)
CHLORIDE SERPL-SCNC: 107 MMOL/L (ref 98–107)
CHOLEST SERPL-MCNC: 180 MG/DL
CHOLESTEROL/HDL RATIO: 4
CO2 SERPL-SCNC: 21 MMOL/L (ref 20–31)
CREAT SERPL-MCNC: 0.95 MG/DL (ref 0.5–0.9)
GFR SERPL CREATININE-BSD FRML MDRD: >60 ML/MIN/1.73M2
GLUCOSE SERPL-MCNC: 84 MG/DL (ref 70–99)
HDLC SERPL-MCNC: 45 MG/DL
LDLC SERPL CALC-MCNC: 116 MG/DL (ref 0–130)
LV EF: 60 %
LVEF MODALITY: NORMAL
POTASSIUM SERPL-SCNC: 4.3 MMOL/L (ref 3.7–5.3)
PROT SERPL-MCNC: 7.5 G/DL (ref 6.4–8.3)
SODIUM SERPL-SCNC: 139 MMOL/L (ref 135–144)
TRIGL SERPL-MCNC: 96 MG/DL

## 2023-06-07 PROCEDURE — 93306 TTE W/DOPPLER COMPLETE: CPT

## 2023-06-07 PROCEDURE — 80061 LIPID PANEL: CPT

## 2023-06-07 PROCEDURE — 36415 COLL VENOUS BLD VENIPUNCTURE: CPT

## 2023-06-07 PROCEDURE — 80053 COMPREHEN METABOLIC PANEL: CPT

## 2023-06-07 PROCEDURE — 83036 HEMOGLOBIN GLYCOSYLATED A1C: CPT

## 2023-06-08 LAB
EST. AVERAGE GLUCOSE BLD GHB EST-MCNC: 108 MG/DL
HBA1C MFR BLD: 5.4 % (ref 4–6)

## 2023-06-19 DIAGNOSIS — E03.9 HYPOTHYROIDISM, UNSPECIFIED TYPE: ICD-10-CM

## 2023-06-20 ENCOUNTER — TELEPHONE (OUTPATIENT)
Dept: OTHER | Age: 68
End: 2023-06-20

## 2023-06-20 RX ORDER — LEVOTHYROXINE SODIUM 0.03 MG/1
TABLET ORAL
Qty: 28 TABLET | Refills: 3 | Status: SHIPPED | OUTPATIENT
Start: 2023-06-20

## 2023-06-20 NOTE — TELEPHONE ENCOUNTER
Appointment Cancellation: patient called to cancel her appointment for 11:30 tomorrow 23. She has to attend a  service in [facility] for the sudden death of a relative.

## 2023-06-20 NOTE — TELEPHONE ENCOUNTER
Medication refill for Synthroid    Last visit: 5/24/23  Last Med refill: 2/14/23  Does patient have enough medication for 72 hours: Yes    Next Visit Date:  Future Appointments   Date Time Provider Ekta Clarki   6/22/2023 11:30 AM Brendan Rogers MD  derm [address] Maintenance   Topic Date Due    Pneumococcal 65+ years Vaccine (1 - PCV) Never done    Shingles vaccine (1 of 2) Never done    COVID-19 Vaccine (4 - Booster for Moderna series) 03/22/2022    Annual Wellness Visit (AWV)  04/08/2023    Depression Monitoring  02/14/2024    Breast cancer screen  03/16/2025    Lipids  06/07/2028    Colorectal Cancer Screen  09/25/2028    DTaP/Tdap/Td vaccine (2 - Td or Tdap) 08/15/2032    DEXA (modify frequency per FRAX score)  Completed    Flu vaccine  Completed    Hepatitis C screen  Completed    Hepatitis A vaccine  Aged Out    Hib vaccine  Aged Out    Meningococcal (ACWY) vaccine  Aged Out    A1C test (Diabetic or Prediabetic)  Discontinued    Depression Screen  Discontinued    GFR test (Diabetes, CKD 3-4, OR last GFR 15-59)  Discontinued    Diabetes screen  Discontinued       Hemoglobin A1C (%)   Date Value   06/07/2023 5.4   02/14/2023 5.7   08/31/2021 5.5             ( goal A1C is < 7)   Microalb/Crt.  Ratio (mcg/mg creat)   Date Value   11/10/2016 9     LDL Cholesterol (mg/dL)   Date Value   06/07/2023 116   02/08/2022 123       (goal LDL is <100)   AST (U/L)   Date Value   06/07/2023 27     ALT (U/L)   Date Value   06/07/2023 39 (H)     BUN (mg/dL)   Date Value   06/07/2023 17     BP Readings from Last 3 Encounters:   05/24/23 119/72   03/08/23 119/79   02/14/23 132/64          (goal 120/80)    All Future Testing planned in [facility]  Lab Frequency Next Occurrence   DEXA BONE DENSITY AXIAL SKELETON Once 03/17/2024               Patient Active Problem List:     Essential hypertension     Pure hypercholesterolemia     Urinary incontinence     Anxiety     Sleep apnea     GERD (gastroesophageal reflux

## 2023-06-23 ENCOUNTER — TELEPHONE (OUTPATIENT)
Dept: INTERNAL MEDICINE | Age: 68
End: 2023-06-23

## 2023-06-28 ENCOUNTER — TELEPHONE (OUTPATIENT)
Dept: INTERNAL MEDICINE | Age: 68
End: 2023-06-28

## 2023-07-03 ENCOUNTER — OFFICE VISIT (OUTPATIENT)
Dept: DERMATOLOGY | Age: 68
End: 2023-07-03
Payer: MEDICARE

## 2023-07-03 VITALS
SYSTOLIC BLOOD PRESSURE: 138 MMHG | BODY MASS INDEX: 33.51 KG/M2 | HEART RATE: 98 BPM | TEMPERATURE: 96.4 F | OXYGEN SATURATION: 94 % | DIASTOLIC BLOOD PRESSURE: 80 MMHG | WEIGHT: 201.4 LBS

## 2023-07-03 DIAGNOSIS — L30.8 OTHER SPECIFIED DERMATITIS: Primary | ICD-10-CM

## 2023-07-03 PROCEDURE — 11104 PUNCH BX SKIN SINGLE LESION: CPT | Performed by: DERMATOLOGY

## 2023-07-03 RX ORDER — CEPHALEXIN 500 MG/1
CAPSULE ORAL
COMMUNITY
Start: 2023-06-21

## 2023-07-03 RX ORDER — LIDOCAINE HYDROCHLORIDE AND EPINEPHRINE 10; 10 MG/ML; UG/ML
1 INJECTION, SOLUTION INFILTRATION; PERINEURAL ONCE
Status: SHIPPED | OUTPATIENT
Start: 2023-07-03

## 2023-07-03 NOTE — PROGRESS NOTES
Dermatology Patient Note  [address]  [address] [facility] [address] [address]  Dept: [phone]  Dept Fax: [phone]      VISITDATE: 7/3/2023   REFERRING PROVIDER: No ref. provider found      Fernando Broussard is a 77 y.o. female  who presents today in the office for:    Follow-up (LV: Lichen simplex chronicus/Pt has questions on sunscreen today. Pt new concerns are that spots of skin are getting darker.)      HISTORY OF PRESENT ILLNESS:  3 month FBSE and follow up for possible lichen simplex chronicus and discoloration of the neck and face. Patient is a poor historian. She reports a history of a rash of chest and face, and was previously following with Dr. Alise Sewell and was also referred to [address] [place] [address] where a biopsy was done. Notes in care everywhere reveal that patient was diagnosed with fixed drug eruption and told to d/c hctz and ibuprofen. She also reports that she has a history of lupus, and that she previously followed with a rheumatologist who retired. Patient reports increased dark spots on her chest. The severity of the spots are intermittent. There is no pruritis. They seem to worsen with sun exposure. She had been seeing Dr. Alise Sewell and switched care to me because of convenience.      MEDICAL PROBLEMS:  Patient Active Problem List    Diagnosis Date Noted    OAB (overactive bladder) 08/04/2022     Priority: Medium    Ankle fracture, right, closed, with routine healing, subsequent encounter 04/19/2022    Chronic diarrhea 02/14/2019    Bipolar 1 disorder ([facility]) 02/14/2019    History of stroke 08/09/2018    Mild persistent asthma without complication 07/24/0302    Obesity (BMI 30-39.9) 08/19/2016    Hypothyroidism 12/03/2015    Essential hypertension     Pure hypercholesterolemia     Urinary incontinence     Anxiety     Sleep apnea     GERD (gastroesophageal reflux disease)        CURRENT

## 2023-07-03 NOTE — PATIENT INSTRUCTIONS
Recommended Neutrogen 30+ SPF  - continue triamcinolone to face and neck  - apply thick layer of lac-hydrin to chest as well as elbows  - wash with luffa  - apply sunscreen    Sun Protection     There are two types of sun rays that are harmful to the skin. UVA rays cause skin aging and skin cancer, such as melanoma. UVB rays cause sunburns, cataracts, and also contribute to skin cancer. The American-Academy of Dermatology recommends that children and adults wear a broad spectrum, waterproof sunscreen with a Sun Protection Factor (SPF) of 30 or higher. It is important to check the ingredient label to be sure the sunscreen will protect the skin from both UVA and UVB sunrays. Your sunscreen should contain at least one of the following ingredients: titanium dioxide, zinc oxide, or avobenzone. Sunscreen will not be effective unless it is applied to all exposed skin. Sunscreens work best if they are applied 30 minutes before sun exposure. They should be reapplied every 2 hours and after any water exposure. Sunscreen is not perfect. It is important to use other methods to protect the skin from sun exposure also. Wear hats, sunglasses and other sun protective clothing when outdoors.   Stay in the shade during the peak hours of sun exposure between 10 AM and 4 PM.

## 2023-07-04 ENCOUNTER — NURSE ONLY (OUTPATIENT)
Dept: LAB | Age: 68
End: 2023-07-04

## 2023-07-10 DIAGNOSIS — B36.0 TINEA VERSICOLOR: Primary | ICD-10-CM

## 2023-07-10 RX ORDER — KETOCONAZOLE 20 MG/ML
SHAMPOO TOPICAL
Qty: 120 ML | Refills: 3 | Status: SHIPPED | OUTPATIENT
Start: 2023-07-10

## 2023-07-11 ENCOUNTER — TELEPHONE (OUTPATIENT)
Dept: INTERNAL MEDICINE | Age: 68
End: 2023-07-11

## 2023-07-11 NOTE — TELEPHONE ENCOUNTER
Patient scheduled for appointment for 8/29/23. Patient wants new prescription for compression stockings. Patient states previous stockings are worn out and she throw them out. Please print script when in office.

## 2023-07-17 NOTE — TELEPHONE ENCOUNTER
Last visit: 05/24/23  Last Med refill: 12/30/23  Does patient have enough medication for 72 hours: no    Next Visit Date:  Future Appointments   Date Time Provider [address]   8/29/2023  1:40 PM Remi Key MD [facility] IM [facility]   10/3/2023  1:30 PM Pao Sosa MD  derm [address] Maintenance   Topic Date Due    Pneumococcal 65+ years Vaccine (1 - PCV) Never done    Shingles vaccine (1 of 2) Never done    COVID-19 Vaccine (4 - Booster for Moderna series) 03/22/2022    Annual Wellness Visit (AWV)  04/08/2023    Flu vaccine (1) 08/01/2023    Depression Monitoring  02/14/2024    Breast cancer screen  03/16/2025    Lipids  06/07/2028    Colorectal Cancer Screen  09/25/2028    DTaP/Tdap/Td vaccine (2 - Td or Tdap) 08/15/2032    DEXA (modify frequency per FRAX score)  Completed    Hepatitis C screen  Completed    Hepatitis A vaccine  Aged Out    Hib vaccine  Aged Out    Meningococcal (ACWY) vaccine  Aged Out    A1C test (Diabetic or Prediabetic)  Discontinued    Depression Screen  Discontinued    GFR test (Diabetes, CKD 3-4, OR last GFR 15-59)  Discontinued    Diabetes screen  Discontinued       Hemoglobin A1C (%)   Date Value   06/07/2023 5.4   02/14/2023 5.7   08/31/2021 5.5             ( goal A1C is < 7)   Microalb/Crt.  Ratio (mcg/mg creat)   Date Value   11/10/2016 9     LDL Cholesterol (mg/dL)   Date Value   06/07/2023 116   02/08/2022 123       (goal LDL is <100)   AST (U/L)   Date Value   06/07/2023 27     ALT (U/L)   Date Value   06/07/2023 39 (H)     BUN (mg/dL)   Date Value   06/07/2023 17     BP Readings from Last 3 Encounters:   07/03/23 138/80   05/24/23 119/72   03/08/23 119/79          (goal 120/80)    All Future Testing planned in [facility]  Lab Frequency Next Occurrence   DEXA BONE DENSITY AXIAL SKELETON Once 03/17/2024   Surgical Pathology Once 07/03/2023               Patient Active Problem List:     Essential hypertension     Pure hypercholesterolemia     Urinary incontinence

## 2023-07-18 RX ORDER — GARLIC EXTRACT 500 MG
CAPSULE ORAL
Qty: 30 CAPSULE | Refills: 6 | Status: SHIPPED | OUTPATIENT
Start: 2023-07-18

## 2023-07-26 ENCOUNTER — TELEPHONE (OUTPATIENT)
Dept: INTERNAL MEDICINE | Age: 68
End: 2023-07-26

## 2023-07-26 NOTE — TELEPHONE ENCOUNTER
Writer was given a message to call Maegan Manzano back from [employer], [phone] regarding possible forgery of documents by pt. PC back to Maegan Manzano, she states she is almost certain pt forged Dr. Chito Means signatures on the service dog application as well as a medical reference. Maegan Manzano states she believes this b/c of the handwriting as well as the doctor's name is misspelled. Maegan Manzano also states they found that the pt forged the vet reference that was needed. Pt has been told multiple times that PCP will not sign these documents, writer also told Maegan Manzano this. Pt was advised to seek signature on these documents by her mental health professional. Per Maegan Manzano, they do not do psych  dogs, only mobility-trained assistance dogs. Maegan Manzano will fax writer a copy of the documents. [facility], awaiting call back. Addendum 1107: Willow Self advised writer to send documents to her once received, she will in turn reach out to Risk Dept.

## 2023-07-31 NOTE — TELEPHONE ENCOUNTER
PCP would like pt dismissed from her pt panel. Writer was advised by Geovani Ocampo and legal department to notify pt's legal guardian. PC to Usman Vee re: forged documents at [phone], no answer. Unable to [facility] mailbox full. PC to Geo Patino at [phone], no answer. Left HIPAA compliant message identifying self and nature of call, requested call back to writer, phone number given.

## 2023-08-07 NOTE — TELEPHONE ENCOUNTER
PC to legal guardian Radha Fowler at [phone], no answer again. Left HIPAA compliant message identifying self and nature of call, requested call back to writer acknowledging message was received, phone number given. PC to pt to discuss needing to change providers and schedule an appt to do so, no answer. Left HIPAA compliant message identifying self and nature of call, requested call back to writer, phone number given.

## 2023-08-07 NOTE — TELEPHONE ENCOUNTER
Patient calling requesting a refill on Acetaminophen 325 mg. Patient has an appt on 8/29/23.       Health Maintenance   Topic Date Due    Pneumococcal 65+ years Vaccine (1 - PCV) Never done    Shingles vaccine (1 of 2) Never done    COVID-19 Vaccine (4 - Booster for Moderna series) 03/22/2022    Annual Wellness Visit (AWV)  04/08/2023    Flu vaccine (1) 08/01/2023    Depression Monitoring  02/14/2024    Breast cancer screen  03/16/2025    Lipids  06/07/2028    Colorectal Cancer Screen  09/25/2028    DTaP/Tdap/Td vaccine (2 - Td or Tdap) 08/15/2032    DEXA (modify frequency per FRAX score)  Completed    Hepatitis C screen  Completed    Hepatitis A vaccine  Aged Out    Hib vaccine  Aged Out    Meningococcal (ACWY) vaccine  Aged Out    A1C test (Diabetic or Prediabetic)  Discontinued    Depression Screen  Discontinued    GFR test (Diabetes, CKD 3-4, OR last GFR 15-59)  Discontinued    Diabetes screen  Discontinued             (applicable per patient's age: Cancer Screenings, Depression Screening, Fall Risk Screening, Immunizations)    Hemoglobin A1C (%)   Date Value   06/07/2023 5.4   02/14/2023 5.7   08/31/2021 5.5     LDL Cholesterol (mg/dL)   Date Value   06/07/2023 116     AST (U/L)   Date Value   06/07/2023 27     ALT (U/L)   Date Value   06/07/2023 39 (H)     BUN (mg/dL)   Date Value   06/07/2023 17      (goal A1C is < 7)   (goal LDL is <100) need 30-50% reduction from baseline     BP Readings from Last 3 Encounters:   07/03/23 138/80   05/24/23 119/72   03/08/23 119/79    (goal /80)      All Future Testing planned in [facility]:  Lab Frequency Next Occurrence   DEXA BONE DENSITY AXIAL SKELETON Once 03/17/2024   Surgical Pathology Once 07/03/2023       Next Visit Date:  Future Appointments   Date Time Provider [address]   8/29/2023  1:40 PM Petty Broussard MD [facility] IM [facility]   10/3/2023  1:30 PM Dalton James MD [facility]            Patient Active Problem List:     Essential hypertension

## 2023-08-08 RX ORDER — ACETAMINOPHEN 325 MG/1
650 TABLET ORAL EVERY 6 HOURS PRN
Qty: 60 TABLET | Refills: 0 | OUTPATIENT
Start: 2023-08-08

## 2023-08-11 DIAGNOSIS — K21.9 GASTROESOPHAGEAL REFLUX DISEASE, UNSPECIFIED WHETHER ESOPHAGITIS PRESENT: ICD-10-CM

## 2023-08-11 DIAGNOSIS — J45.30 MILD PERSISTENT ASTHMA WITHOUT COMPLICATION: ICD-10-CM

## 2023-08-14 NOTE — TELEPHONE ENCOUNTER
Received VM from Chrystal Reyna, pt's guardian, stating he will let pt's psychiatrist know that documents were forged. He states in VM that pt has been trying to be declared competent to handle her own finances and that she had mentioned trying to get a /service dog to help with her anxiety in order to show she could be competent. PC to pt to r/s for NTP appt as PCP will no longer see pt, no answer. Unable to [facility] [place] mailbox is full.

## 2023-08-14 NOTE — TELEPHONE ENCOUNTER
Dr Vanessa Silva is no longer pt's pcp, awaiting callback from pt to schedule NTP appt with another provider.  VM left by Zenaida 8/7/23 and on 8/14/23

## 2023-08-15 DIAGNOSIS — J45.30 MILD PERSISTENT ASTHMA WITHOUT COMPLICATION: ICD-10-CM

## 2023-08-15 DIAGNOSIS — K21.9 GASTROESOPHAGEAL REFLUX DISEASE, UNSPECIFIED WHETHER ESOPHAGITIS PRESENT: ICD-10-CM

## 2023-08-15 RX ORDER — OMEPRAZOLE 20 MG/1
CAPSULE, DELAYED RELEASE ORAL
Qty: 30 CAPSULE | Refills: 5 | OUTPATIENT
Start: 2023-08-15

## 2023-08-15 RX ORDER — MONTELUKAST SODIUM 10 MG/1
TABLET ORAL
Qty: 90 TABLET | Refills: 1 | OUTPATIENT
Start: 2023-08-15

## 2023-08-15 RX ORDER — MULTIVIT-MIN/IRON/FOLIC ACID/K 18-600-40
CAPSULE ORAL
Qty: 30 TABLET | Refills: 5 | OUTPATIENT
Start: 2023-08-15

## 2023-08-15 NOTE — TELEPHONE ENCOUNTER
Medication refill for Omeprazole, Montelukast and D3  Currently waiting on call back to schedule patient with NTP. Please send 30 day supply to pharmacy.

## 2023-08-16 RX ORDER — MONTELUKAST SODIUM 10 MG/1
TABLET ORAL
Qty: 90 TABLET | Refills: 0 | OUTPATIENT
Start: 2023-08-16

## 2023-08-16 RX ORDER — MELATONIN
1 DAILY
Qty: 30 TABLET | Refills: 0 | OUTPATIENT
Start: 2023-08-16

## 2023-08-16 RX ORDER — OMEPRAZOLE 20 MG/1
CAPSULE, DELAYED RELEASE ORAL
Qty: 30 CAPSULE | Refills: 0 | OUTPATIENT
Start: 2023-08-16

## 2023-08-21 NOTE — TELEPHONE ENCOUNTER
Writer has made multiple calls to pt in an attempt to r/s appt with Dr. Patrick Dowell on 8/29. Left several messages but now the mailbox is full.

## 2023-09-11 DIAGNOSIS — I10 ESSENTIAL HYPERTENSION: ICD-10-CM

## 2023-09-11 DIAGNOSIS — J45.30 MILD PERSISTENT ASTHMA WITHOUT COMPLICATION: ICD-10-CM

## 2023-09-11 DIAGNOSIS — K21.9 GASTROESOPHAGEAL REFLUX DISEASE, UNSPECIFIED WHETHER ESOPHAGITIS PRESENT: ICD-10-CM

## 2023-09-12 NOTE — TELEPHONE ENCOUNTER
Pharmacy requesting refills for pended medications. Please review and e-scribe to pharmacy listed in chart if appropriate. Thank you.       Next Visit Date: 9/27/23  Last Visit Date: 5/24/23    Future Appointments   Date Time Provider [address]   9/27/2023 10:50 AM Sophia Chris MD [address] IM [facility]   10/3/2023  1:30 PM Stephanie Kline MD  derm [address] Maintenance   Topic Date Due    Pneumococcal 65+ years Vaccine (1 - PCV) Never done    Shingles vaccine (1 of 2) Never done    COVID-19 Vaccine (4 - Moderna series) 03/22/2022    Annual Wellness Visit (AWV)  04/08/2023    Flu vaccine (1) 08/01/2023    Depression Monitoring  02/14/2024    Breast cancer screen  03/16/2025    Lipids  06/07/2028    Colorectal Cancer Screen  09/25/2028    DTaP/Tdap/Td vaccine (2 - Td or Tdap) 08/15/2032    DEXA (modify frequency per FRAX score)  Completed    Hepatitis C screen  Completed    Hepatitis A vaccine  Aged Out    Hepatitis B vaccine  Aged Out    Hib vaccine  Aged Out    Meningococcal (ACWY) vaccine  Aged Out    A1C test (Diabetic or Prediabetic)  Discontinued    Depression Screen  Discontinued    GFR test (Diabetes, CKD 3-4, OR last GFR 15-59)  Discontinued    Diabetes screen  Discontinued       Hemoglobin A1C (%)   Date Value   06/07/2023 5.4   02/14/2023 5.7   08/31/2021 5.5             ( goal A1C is < 7)   No components found for: \"LABMICR\"  LDL Cholesterol (mg/dL)   Date Value   06/07/2023 116       (goal LDL is <100)   AST (U/L)   Date Value   06/07/2023 27     ALT (U/L)   Date Value   06/07/2023 39 (H)     BUN (mg/dL)   Date Value   06/07/2023 17     BP Readings from Last 3 Encounters:   07/03/23 138/80   05/24/23 119/72   03/08/23 119/79          (goal 120/80)    All Future Testing planned in [facility]  Lab Frequency Next Occurrence   DEXA BONE DENSITY AXIAL SKELETON Once 03/17/2024   Surgical Pathology Once 07/03/2023         Patient Active Problem List:     Essential hypertension     Pure
hypercholesterolemia     Urinary incontinence     Anxiety     Sleep apnea     GERD (gastroesophageal reflux disease)     Hypothyroidism     Obesity (BMI 30-39. 9)     Mild persistent asthma without complication     History of stroke     Chronic diarrhea     Bipolar 1 disorder (HCC)     Ankle fracture, right, closed, with routine healing, subsequent encounter     OAB (overactive bladder)

## 2023-09-13 RX ORDER — MULTIVIT-MIN/IRON/FOLIC ACID/K 18-600-40
1 CAPSULE ORAL DAILY
Qty: 30 TABLET | Refills: 5 | Status: SHIPPED | OUTPATIENT
Start: 2023-09-13

## 2023-09-13 RX ORDER — LISINOPRIL 5 MG/1
TABLET ORAL
Qty: 90 TABLET | Refills: 1 | Status: SHIPPED | OUTPATIENT
Start: 2023-09-13

## 2023-09-13 RX ORDER — MONTELUKAST SODIUM 10 MG/1
TABLET ORAL
Qty: 90 TABLET | Refills: 1 | Status: SHIPPED | OUTPATIENT
Start: 2023-09-13

## 2023-09-13 RX ORDER — OMEPRAZOLE 20 MG/1
CAPSULE, DELAYED RELEASE ORAL
Qty: 30 CAPSULE | Refills: 5 | Status: SHIPPED | OUTPATIENT
Start: 2023-09-13

## 2023-10-02 ENCOUNTER — TELEPHONE (OUTPATIENT)
Dept: DERMATOLOGY | Age: 68
End: 2023-10-02

## 2023-10-02 NOTE — TELEPHONE ENCOUNTER
I was unable to confirm Dermatology appointment scheduled for 10/3/2023. The patient was unavailable and the voicemail was full.

## 2023-10-04 ENCOUNTER — TELEPHONE (OUTPATIENT)
Dept: INTERNAL MEDICINE | Age: 68
End: 2023-10-04

## 2023-10-04 NOTE — TELEPHONE ENCOUNTER
Advised patient it is not wise to prescribe an antidiarrhea agent at this stage in her diarrhea. Her diarrhea is likely secondary to something she ate and her body will naturally clear this out. Advised to monitor for any blood in her stool, ensure that she stays hydrated and if she has any new or worsening signs or symptoms or diarrhea becomes unbearable please call the office or report to the emergency department. Patient was rescheduled.

## 2023-10-04 NOTE — TELEPHONE ENCOUNTER
Patient ate a [employer] yesterday and since then has had an upset stomach and diarrhea. She is afraid to come in for visit without taking something to help her with the diarrhea. Please advise what OTC medications she can take for this to help her so she can come to her appointment today with you at 14:00 without being in the bathroom constantly.     Thanks

## 2023-10-12 DIAGNOSIS — I10 ESSENTIAL HYPERTENSION: ICD-10-CM

## 2023-10-12 DIAGNOSIS — E03.9 HYPOTHYROIDISM, UNSPECIFIED TYPE: ICD-10-CM

## 2023-10-12 NOTE — TELEPHONE ENCOUNTER
.. Request for   Requested Prescriptions     Pending Prescriptions Disp Refills    amLODIPine (NORVASC) 10 MG tablet [Pharmacy Med Name: AMLODIPINE BESYLATE 10MG TABS] 90 tablet 0     Sig: TAKE ONE TABLET BY MOUTH ONCE A DAY    . Please review and e-scribe to pharmacy listed in chart if appropriate. Thank you.       Last Visit Date: 5/24/2023  Next Visit Date: 10/12/2023    Future Appointments   Date Time Provider [address]   10/20/2023  2:00 PM Sergio Faust MD [facility] [address] Maintenance   Topic Date Due    Pneumococcal 65+ years Vaccine (1 - PCV) Never done    Shingles vaccine (1 of 2) Never done    COVID-19 Vaccine (4 - Moderna series) 03/22/2022    Annual Wellness Visit (AWV)  04/08/2023    Flu vaccine (1) 08/01/2023    Depression Monitoring  02/14/2024    Breast cancer screen  03/16/2025    Lipids  06/07/2028    Colorectal Cancer Screen  09/25/2028    DTaP/Tdap/Td vaccine (2 - Td or Tdap) 08/15/2032    DEXA (modify frequency per FRAX score)  Completed    Hepatitis C screen  Completed    Hepatitis A vaccine  Aged Out    Hepatitis B vaccine  Aged Out    Hib vaccine  Aged Out    Meningococcal (ACWY) vaccine  Aged Out    A1C test (Diabetic or Prediabetic)  Discontinued    Depression Screen  Discontinued    GFR test (Diabetes, CKD 3-4, OR last GFR 15-59)  Discontinued    Diabetes screen  Discontinued       Hemoglobin A1C (%)   Date Value   06/07/2023 5.4   02/14/2023 5.7   08/31/2021 5.5             ( goal A1C is < 7)   No components found for: \"LABMICR\"  LDL Cholesterol (mg/dL)   Date Value   06/07/2023 116       (goal LDL is <100)   AST (U/L)   Date Value   06/07/2023 27     ALT (U/L)   Date Value   06/07/2023 39 (H)     BUN (mg/dL)   Date Value   06/07/2023 17     BP Readings from Last 3 Encounters:   07/03/23 138/80   05/24/23 119/72   03/08/23 119/79          (goal 120/80)    All Future Testing planned in [facility]  Lab Frequency Next Occurrence   DEXA BONE DENSITY AXIAL SKELETON Once

## 2023-10-12 NOTE — TELEPHONE ENCOUNTER
.. Request for   Requested Prescriptions     Pending Prescriptions Disp Refills    levothyroxine (SYNTHROID) 25 MCG tablet [Pharmacy Med Name: LEVOTHYROXINE SODIUM 25MCG TABS] 28 tablet 0     Sig: TAKE ONE TABLET BY MOUTH ONCE A DAY    . Please review and e-scribe to pharmacy listed in chart if appropriate. Thank you.       Last Visit Date: 5/24/2023  Next Visit Date: 10/20/2023    Future Appointments   Date Time Provider [address]   10/20/2023  2:00 PM Lani Faust MD [facility] [address] [facility] Maintenance   Topic Date Due    Pneumococcal 65+ years Vaccine (1 - PCV) Never done    Shingles vaccine (1 of 2) Never done    COVID-19 Vaccine (4 - Moderna series) 03/22/2022    Annual Wellness Visit (AWV)  04/08/2023    Flu vaccine (1) 08/01/2023    Depression Monitoring  02/14/2024    Breast cancer screen  03/16/2025    Lipids  06/07/2028    Colorectal Cancer Screen  09/25/2028    DTaP/Tdap/Td vaccine (2 - Td or Tdap) 08/15/2032    DEXA (modify frequency per FRAX score)  Completed    Hepatitis C screen  Completed    Hepatitis A vaccine  Aged Out    Hepatitis B vaccine  Aged Out    Hib vaccine  Aged Out    Meningococcal (ACWY) vaccine  Aged Out    A1C test (Diabetic or Prediabetic)  Discontinued    Depression Screen  Discontinued    GFR test (Diabetes, CKD 3-4, OR last GFR 15-59)  Discontinued    Diabetes screen  Discontinued       Hemoglobin A1C (%)   Date Value   06/07/2023 5.4   02/14/2023 5.7   08/31/2021 5.5             ( goal A1C is < 7)   No components found for: \"LABMICR\"  LDL Cholesterol (mg/dL)   Date Value   06/07/2023 116       (goal LDL is <100)   AST (U/L)   Date Value   06/07/2023 27     ALT (U/L)   Date Value   06/07/2023 39 (H)     BUN (mg/dL)   Date Value   06/07/2023 17     BP Readings from Last 3 Encounters:   07/03/23 138/80   05/24/23 119/72   03/08/23 119/79          (goal 120/80)    All Future Testing planned in [facility]  Lab Frequency Next Occurrence   DEXA BONE DENSITY AXIAL SKELETON

## 2023-10-13 RX ORDER — AMLODIPINE BESYLATE 10 MG/1
TABLET ORAL
Qty: 90 TABLET | Refills: 0 | Status: SHIPPED | OUTPATIENT
Start: 2023-10-13

## 2023-10-13 RX ORDER — LEVOTHYROXINE SODIUM 0.03 MG/1
TABLET ORAL
Qty: 28 TABLET | Refills: 0 | Status: SHIPPED | OUTPATIENT
Start: 2023-10-13

## 2023-10-20 ENCOUNTER — OFFICE VISIT (OUTPATIENT)
Dept: INTERNAL MEDICINE | Age: 68
End: 2023-10-20
Payer: MEDICARE

## 2023-10-20 VITALS
HEIGHT: 65 IN | TEMPERATURE: 97.3 F | HEART RATE: 90 BPM | SYSTOLIC BLOOD PRESSURE: 130 MMHG | DIASTOLIC BLOOD PRESSURE: 88 MMHG | RESPIRATION RATE: 16 BRPM | BODY MASS INDEX: 33.26 KG/M2 | OXYGEN SATURATION: 91 % | WEIGHT: 199.6 LBS

## 2023-10-20 DIAGNOSIS — F31.9 BIPOLAR 1 DISORDER (HCC): ICD-10-CM

## 2023-10-20 DIAGNOSIS — G47.30 SLEEP APNEA, UNSPECIFIED TYPE: ICD-10-CM

## 2023-10-20 DIAGNOSIS — I87.2 VENOUS INSUFFICIENCY: ICD-10-CM

## 2023-10-20 DIAGNOSIS — E03.9 HYPOTHYROIDISM, UNSPECIFIED TYPE: ICD-10-CM

## 2023-10-20 DIAGNOSIS — E83.52 HYPERCALCEMIA: ICD-10-CM

## 2023-10-20 DIAGNOSIS — I51.89 DIASTOLIC DYSFUNCTION: ICD-10-CM

## 2023-10-20 DIAGNOSIS — I10 ESSENTIAL HYPERTENSION: Primary | ICD-10-CM

## 2023-10-20 DIAGNOSIS — J45.30 MILD PERSISTENT ASTHMA WITHOUT COMPLICATION: ICD-10-CM

## 2023-10-20 PROBLEM — S82.891D ANKLE FRACTURE, RIGHT, CLOSED, WITH ROUTINE HEALING, SUBSEQUENT ENCOUNTER: Status: RESOLVED | Noted: 2022-04-19 | Resolved: 2023-10-20

## 2023-10-20 PROCEDURE — 99213 OFFICE O/P EST LOW 20 MIN: CPT

## 2023-10-20 PROCEDURE — G8427 DOCREV CUR MEDS BY ELIG CLIN: HCPCS

## 2023-10-20 PROCEDURE — G8484 FLU IMMUNIZE NO ADMIN: HCPCS

## 2023-10-20 PROCEDURE — 1036F TOBACCO NON-USER: CPT

## 2023-10-20 PROCEDURE — 3078F DIAST BP <80 MM HG: CPT

## 2023-10-20 PROCEDURE — G8417 CALC BMI ABV UP PARAM F/U: HCPCS

## 2023-10-20 PROCEDURE — 3074F SYST BP LT 130 MM HG: CPT

## 2023-10-20 PROCEDURE — 3017F COLORECTAL CA SCREEN DOC REV: CPT

## 2023-10-20 PROCEDURE — G8399 PT W/DXA RESULTS DOCUMENT: HCPCS

## 2023-10-20 PROCEDURE — 1090F PRES/ABSN URINE INCON ASSESS: CPT

## 2023-10-20 PROCEDURE — 1123F ACP DISCUSS/DSCN MKR DOCD: CPT

## 2023-10-20 RX ORDER — LEVOTHYROXINE SODIUM 0.03 MG/1
TABLET ORAL
Qty: 28 TABLET | Refills: 2 | Status: SHIPPED | OUTPATIENT
Start: 2023-10-20

## 2023-10-20 RX ORDER — ALBUTEROL SULFATE 90 UG/1
AEROSOL, METERED RESPIRATORY (INHALATION)
Qty: 9 G | Refills: 5 | Status: SHIPPED | OUTPATIENT
Start: 2023-10-20

## 2023-10-20 ASSESSMENT — ENCOUNTER SYMPTOMS
ABDOMINAL DISTENTION: 0
COLOR CHANGE: 0
SHORTNESS OF BREATH: 0
ABDOMINAL PAIN: 0

## 2023-10-20 NOTE — PROGRESS NOTES
ONCE A DAY 90 tablet 0    montelukast (SINGULAIR) 10 MG tablet TAKE ONE TABLET BY MOUTH EVERY NIGHT 90 tablet 1    omeprazole (PRILOSEC) 20 MG delayed release capsule TAKE ONE CAPSULE BY MOUTH EVERY MORNING BEFORE BREAKFAST 30 capsule 5    D3-1000 25 MCG (1000 UT) TABS tablet TAKE ONE TABLET BY MOUTH ONCE A DAY 30 tablet 5    lisinopril (PRINIVIL;ZESTRIL) 5 MG tablet TAKE ONE TABLET BY MOUTH ONCE A DAY 90 tablet 1    Lactobacillus Acid-Pectin (ACIDOPHILUS/PECTIN) CAPS TAKE ONE CAPSULE BY MOUTH ONE TIME A DAY 30 capsule 6    ketoconazole (NIZORAL) 2 % shampoo Use as body wash leaving on for 5 minutes prior to washing off once daily for 2 weeks then three times weekly 120 mL 3    ammonium lactate (LAC-HYDRIN) 12 % cream Apply to elbows twice daily 385 g 2    triamcinolone (KENALOG) 0.025 % cream Apply to rash on face and chest twice daily 80 g 2    oxybutynin (DITROPAN) 5 MG tablet       budesonide-formoterol (SYMBICORT) 160-4.5 MCG/ACT AERO Inhale 2 puffs into the lungs 2 times daily 1 each 5    albuterol (PROVENTIL) (2.5 MG/3ML) 0.083% nebulizer solution Take 3 mLs by nebulization every 6 hours as needed for Wheezing 120 each 3    Incontinence Supply Disposable (SELECT BOOSTER PADS) MISC Use 3-4/day 100 each 11    Probiotic, Lactobacillus, CAPS Take 1 tablet by mouth daily OTC 30 capsule 6    PSYLLIUM HUSK PO Take by mouth OTC      fluticasone (FLONASE) 50 MCG/ACT nasal spray 1 spray by Each Nostril route daily Taking PRN      triamcinolone (KENALOG) 0.1 % ointment Apply topically 2 times daily Using PRN      mirabegron (MYRBETRIQ) 50 MG TB24 Take 50 mg by mouth daily Pt gets samples 28 tablet 3    QUEtiapine (SEROQUEL) 200 MG tablet Take 1 tablet by mouth daily Last filled 11/8/2021 90 day supply,   Take 3 tablets once daily--600 mg dose      FLUoxetine (PROZAC) 40 MG capsule Take 1 capsule by mouth daily Last filled 1/24/2022      ipratropium (ATROVENT) 0.03 % nasal spray 2 sprays by Nasal route 3 times daily as

## 2023-10-24 ENCOUNTER — TELEPHONE (OUTPATIENT)
Dept: INTERNAL MEDICINE | Age: 68
End: 2023-10-24

## 2023-10-24 NOTE — TELEPHONE ENCOUNTER
Covering Physician:  I do not see any indication for infectious disease consult. I will leave this in Dr. An Sawyer to review, but from his recent visit with patient, there is no indication.     Tatiana Rivera MD  Internal Medicine Resident, PGY-2  [facility]  10/24/23  6:38 PM

## 2023-10-25 ENCOUNTER — TELEPHONE (OUTPATIENT)
Dept: INTERNAL MEDICINE | Age: 68
End: 2023-10-25
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
## 2023-10-26 ENCOUNTER — TELEPHONE (OUTPATIENT)
Dept: INTERNAL MEDICINE | Age: 68
End: 2023-10-26

## 2023-10-26 DIAGNOSIS — I73.9 PVD (PERIPHERAL VASCULAR DISEASE) (HCC): ICD-10-CM

## 2023-10-26 DIAGNOSIS — I87.2 VENOUS INSUFFICIENCY: Primary | ICD-10-CM

## 2023-10-26 NOTE — TELEPHONE ENCOUNTER
----- Message from Enrico Tam sent at 10/26/2023  3:00 PM EDT -----  Subject: Message to Provider    QUESTIONS  Information for Provider? Pt. states the script for compression socks   originally sent to [facility] by provider is out of stock. Pt is   requesting those script orders be sent to [facility] located at   the [address] [place] [address]. Patient would also like to speak with the nurse   Carlton Min   ---------------------------------------------------------------------------  --------------  Carin Marine Pernell  [phone]; OK to leave message on voicemail  ---------------------------------------------------------------------------  --------------  SCRIPT ANSWERS  Relationship to Patient?  Self

## 2023-10-27 NOTE — TELEPHONE ENCOUNTER
Writer faxed script for compression stockings to [facility] on 10.25.2023 at [employer] request. Abiola Fairbanks then received a VM from [facility] requesting PC back on 10.26. PC to [employer], spoke with [facility]. She states that she needs a printed script (on script paper) with ICD-10 codes and strength of compression as well as wet signature, but she also let writer know pt's insurance is not going to cover the compression stockings right now. [facility] states that as of the first of the year all Medicare insurances will start covering compression stockings. Out of pocket cost for knee-high stockings is $58 plus tax. Pt will need to call [phone] to schedule an appt for fitting the stockings. PC to pt to update on situation, pt states she can get money from her guardian for the stockings. Dr. Juliocesar Laguerre, we are in need of a new script for compression stockings for the patient. The one that was previously placed did not print out on script paper and that is how the shop needs it in order to process the order. Dr. Adrianne Talley is out of the office until 11/10, would you be willing to sign off on the needed order so we can get the stockings processed for pt? Order pended, please advise, thank you!

## 2023-10-30 NOTE — TELEPHONE ENCOUNTER
Ordered signed electronically to print. Will physically sign when in office tomorrow. Please make sure it is placed on my desk. Thanks!

## 2023-10-31 NOTE — TELEPHONE ENCOUNTER
Script faxed to [employer]. PC to pt to let her know to call [phone] to schedule fitting, no answer. Unable to [facility] mailbox is currently full.
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
## 2023-11-01 NOTE — TELEPHONE ENCOUNTER
PC to pt to discuss order, she states she has already been in touch with [facility]. She states they told her she can get up to 3 pairs of stockings, pt would like the third pair to be thigh-high stocking. Writer let pt know PCP is out of office until 11/10 but that writer will get script for thigh high stockings at that time and send to [employer].

## 2023-11-09 DIAGNOSIS — J45.30 MILD PERSISTENT ASTHMA WITHOUT COMPLICATION: ICD-10-CM

## 2023-11-09 NOTE — TELEPHONE ENCOUNTER
Request for   Requested Prescriptions     Pending Prescriptions Disp Refills    albuterol (PROVENTIL) (2.5 MG/3ML) 0.083% nebulizer solution 120 each 3     Sig: Take 3 mLs by nebulization every 6 hours as needed for Wheezing    . Please review and e-scribe to pharmacy listed in chart if appropriate. Thank you.       Last Visit Date: 10/20/2023  Next Visit Date: Visit date not found

## 2023-11-10 RX ORDER — ALBUTEROL SULFATE 2.5 MG/3ML
2.5 SOLUTION RESPIRATORY (INHALATION) EVERY 6 HOURS PRN
Qty: 120 EACH | Refills: 3 | Status: SHIPPED | OUTPATIENT
Start: 2023-11-10

## 2023-11-10 NOTE — TELEPHONE ENCOUNTER
Script for thigh-high compression stockings faxed to [facility] at [phone], see media. PC to pt to let her know order faxed this morning.

## 2023-11-10 NOTE — TELEPHONE ENCOUNTER
Dr. Noemy Yañez, please review previous notes and sign pended order from an MA desk - I know [facility] desk will print correctly if signed from there.

## 2023-12-18 ENCOUNTER — TELEPHONE (OUTPATIENT)
Dept: INTERNAL MEDICINE | Age: 68
End: 2023-12-18

## 2023-12-18 NOTE — TELEPHONE ENCOUNTER
----- Message from Daltonsanty Freeman sent at 12/12/2023  3:04 PM EST -----  Subject: Message to Provider    QUESTIONS  Information for Provider? patient is needing a new  and not   sure who goes about that. please call her asap  ---------------------------------------------------------------------------  --------------  [address] Pernell  [phone]; OK to leave message on voicemail  ---------------------------------------------------------------------------  --------------  SCRIPT ANSWERS  Relationship to Patient?  Self

## 2023-12-18 NOTE — TELEPHONE ENCOUNTER
----- Message from Beatris Rahul sent at 12/12/2023  2:52 PM EST -----  Subject: Message to Provider    QUESTIONS  Information for Provider? patient is needing compression stockings thigh   high or pantie / almond color [place] [facility] [place] Phone? [phone] sent after the 1st of the year so   Medicare will pay for it.  ---------------------------------------------------------------------------  --------------  [address] [place]  [phone]; OK to leave message on voicemail  ---------------------------------------------------------------------------  --------------  SCRIPT ANSWERS  Relationship to Patient?  Self

## 2023-12-18 NOTE — TELEPHONE ENCOUNTER
----- Message from Pamela Antonio sent at 12/12/2023  2:58 PM EST -----  Subject: Referral Request    Reason for referral request? Infectious Disease  Provider patient wants to be referred to(if known): Catalina Warner    Provider Phone Number(if known):     Additional Information for Provider? needs to go back and see the   Infectious disease  about her legs   ---------------------------------------------------------------------------  --------------  Carin [place] Pernell    [phone]; OK to leave message on voicemail  ---------------------------------------------------------------------------  --------------

## 2024-01-04 DIAGNOSIS — I10 ESSENTIAL HYPERTENSION: ICD-10-CM

## 2024-01-04 RX ORDER — AMLODIPINE BESYLATE 10 MG/1
TABLET ORAL
Qty: 90 TABLET | Refills: 0 | Status: SHIPPED | OUTPATIENT
Start: 2024-01-04

## 2024-01-04 NOTE — TELEPHONE ENCOUNTER
..Request for   Requested Prescriptions     Pending Prescriptions Disp Refills    amLODIPine (NORVASC) 10 MG tablet [Pharmacy Med Name: AMLODIPINE BESYLATE 10MG TABS] 90 tablet 0     Sig: TAKE ONE TABLET BY MOUTH ONCE A DAY    .      Please review and e-scribe to pharmacy listed in chart if appropriate. Thank you.      Last Visit Date: 10/20/2023  Next Visit Date: Visit date not found    Future Appointments   Date Time Provider Department Center   2/5/2024 11:15 AM Lucie Guerra MD  derm [facility]       Health Maintenance   Topic Date Due    Pneumococcal 65+ years Vaccine (1 - PCV) Never done    Shingles vaccine (1 of 2) Never done    Respiratory Syncytial Virus (RSV) Pregnant or age 60 yrs+ (1 - 1-dose 60+ series) Never done    Flu vaccine (1) 08/01/2023    COVID-19 Vaccine (4 - 2023-24 season) 09/01/2023    Annual Wellness Visit (Medicare)  11/27/2023    Depression Monitoring  02/14/2024    Breast cancer screen  03/16/2025    Lipids  06/07/2028    Colorectal Cancer Screen  09/25/2028    DTaP/Tdap/Td vaccine (2 - Td or Tdap) 08/15/2032    DEXA (modify frequency per FRAX score)  Completed    Hepatitis C screen  Completed    Hepatitis A vaccine  Aged Out    Hepatitis B vaccine  Aged Out    Hib vaccine  Aged Out    Polio vaccine  Aged Out    Meningococcal (ACWY) vaccine  Aged Out    A1C test (Diabetic or Prediabetic)  Discontinued    Depression Screen  Discontinued    GFR test (Diabetes, CKD 3-4, OR last GFR 15-59)  Discontinued    Diabetes screen  Discontinued       Hemoglobin A1C (%)   Date Value   06/07/2023 5.4   02/14/2023 5.7   08/31/2021 5.5             ( goal A1C is < 7)   No components found for: \"LABMICR\"  LDL Cholesterol (mg/dL)   Date Value   06/07/2023 116       (goal LDL is <100)   AST (U/L)   Date Value   06/07/2023 27     ALT (U/L)   Date Value   06/07/2023 39 (H)     BUN (mg/dL)   Date Value   06/07/2023 17     BP Readings from Last 3 Encounters:   10/20/23 130/88   07/03/23 138/80   05/24/23

## 2024-01-25 ENCOUNTER — TELEPHONE (OUTPATIENT)
Dept: INTERNAL MEDICINE | Age: 69
End: 2024-01-25

## 2024-01-25 NOTE — TELEPHONE ENCOUNTER
----- Message from Tomasa Larson sent at 1/25/2024  3:22 PM EST -----  Subject: Message to Provider    QUESTIONS  Information for Provider? patient is needing compression stockings thigh   high or pantie / almond, brown color sent to [facility] [address], [place] [address] Phone? [phone].  ---------------------------------------------------------------------------  --------------  CALL BACK INFO  [phone]; OK to leave message on voicemail  ---------------------------------------------------------------------------  --------------  SCRIPT ANSWERS  Relationship to Patient? Self

## 2024-01-25 NOTE — TELEPHONE ENCOUNTER
Tried to call [facility] but no answer, they closed at 4 pm. An order exist in epic already for pt's request. Will call tomorrow to get fax number to re-fax order

## 2024-02-01 NOTE — TELEPHONE ENCOUNTER
Dulce Serrano is calling to request a refill on the following medication(s):    Medication Request:  Requested Prescriptions     Pending Prescriptions Disp Refills    Lactobacillus Acid-Pectin (ACIDOPHILUS/PECTIN) CAPS [Pharmacy Med Name: ACIDOPHILUS/PECTIN  CAPS] 30 capsule 4     Sig: TAKE ONE CAPSULE BY MOUTH ONCE A DAY       Last Visit Date (If Applicable):  2/14/2023    Next Visit Date:    Visit date not found

## 2024-02-02 DIAGNOSIS — K21.9 GASTROESOPHAGEAL REFLUX DISEASE, UNSPECIFIED WHETHER ESOPHAGITIS PRESENT: ICD-10-CM

## 2024-02-02 DIAGNOSIS — E03.9 HYPOTHYROIDISM, UNSPECIFIED TYPE: ICD-10-CM

## 2024-02-02 RX ORDER — GARLIC EXTRACT 500 MG
1 CAPSULE ORAL DAILY
Qty: 30 CAPSULE | Refills: 4 | Status: SHIPPED | OUTPATIENT
Start: 2024-02-02

## 2024-02-02 NOTE — TELEPHONE ENCOUNTER
Request for   Requested Prescriptions     Pending Prescriptions Disp Refills    levothyroxine (SYNTHROID) 25 MCG tablet 90 tablet 1     Sig: TAKE ONE TABLET BY MOUTH ONCE A DAY    omeprazole (PRILOSEC) 20 MG delayed release capsule 90 capsule 1     Sig: TAKE ONE CAPSULE BY MOUTH EVERY MORNING BEFORE BREAKFAST    .      Please review and e-scribe to pharmacy listed in chart if appropriate. Thank you.      Last Visit Date: 10/20/2023  Next Visit Date: 4/5/2024    Future Appointments   Date Time Provider Department Center   2/5/2024 11:15 AM [facility]   4/5/2024 10:00 AM Twin Faust MD [facility]       Health Maintenance   Topic Date Due    Pneumococcal 65+ years Vaccine (1 - PCV) Never done    Shingles vaccine (1 of 2) Never done    Respiratory Syncytial Virus (RSV) Pregnant or age 60 yrs+ (1 - 1-dose 60+ series) Never done    Flu vaccine (1) 08/01/2023    COVID-19 Vaccine (4 - 2023-24 season) 09/01/2023    Depression Monitoring  02/14/2024    Breast cancer screen  03/16/2025    Lipids  06/07/2028    Colorectal Cancer Screen  09/25/2028    DTaP/Tdap/Td vaccine (2 - Td or Tdap) 08/15/2032    DEXA (modify frequency per FRAX score)  Completed    Hepatitis C screen  Completed    Hepatitis A vaccine  Aged Out    Hepatitis B vaccine  Aged Out    Hib vaccine  Aged Out    Polio vaccine  Aged Out    Meningococcal (ACWY) vaccine  Aged Out    A1C test (Diabetic or Prediabetic)  Discontinued    Depression Screen  Discontinued    GFR test (Diabetes, CKD 3-4, OR last GFR 15-59)  Discontinued    Diabetes screen  Discontinued       Hemoglobin A1C (%)   Date Value   06/07/2023 5.4   02/14/2023 5.7   08/31/2021 5.5             ( goal A1C is < 7)   No components found for: \"LABMICR\"  LDL Cholesterol (mg/dL)   Date Value   06/07/2023 116       (goal LDL is <100)   AST (U/L)   Date Value   06/07/2023 27     ALT (U/L)   Date Value   06/07/2023 39 (H)     BUN (mg/dL)   Date Value   06/07/2023 17     BP

## 2024-02-03 RX ORDER — OMEPRAZOLE 20 MG/1
CAPSULE, DELAYED RELEASE ORAL
Qty: 90 CAPSULE | Refills: 1 | Status: SHIPPED | OUTPATIENT
Start: 2024-02-03

## 2024-02-03 RX ORDER — LEVOTHYROXINE SODIUM 0.03 MG/1
TABLET ORAL
Qty: 90 TABLET | Refills: 1 | Status: SHIPPED | OUTPATIENT
Start: 2024-02-03

## 2024-02-05 ENCOUNTER — OFFICE VISIT (OUTPATIENT)
Dept: DERMATOLOGY | Age: 69
End: 2024-02-05
Payer: MEDICARE

## 2024-02-05 ENCOUNTER — TELEPHONE (OUTPATIENT)
Dept: INTERNAL MEDICINE | Age: 69
End: 2024-02-05

## 2024-02-05 ENCOUNTER — HOSPITAL ENCOUNTER (OUTPATIENT)
Age: 69
Setting detail: SPECIMEN
Discharge: HOME | End: 2024-02-05

## 2024-02-05 VITALS
BODY MASS INDEX: 33.32 KG/M2 | WEIGHT: 200 LBS | TEMPERATURE: 98.1 F | SYSTOLIC BLOOD PRESSURE: 143 MMHG | HEART RATE: 90 BPM | DIASTOLIC BLOOD PRESSURE: 82 MMHG | HEIGHT: 65 IN | OXYGEN SATURATION: 97 %

## 2024-02-05 DIAGNOSIS — B36.0 TINEA VERSICOLOR: Primary | ICD-10-CM

## 2024-02-05 PROCEDURE — 1123F ACP DISCUSS/DSCN MKR DOCD: CPT | Performed by: DERMATOLOGY

## 2024-02-05 PROCEDURE — 99213 OFFICE O/P EST LOW 20 MIN: CPT | Performed by: DERMATOLOGY

## 2024-02-05 PROCEDURE — 3079F DIAST BP 80-89 MM HG: CPT | Performed by: DERMATOLOGY

## 2024-02-05 PROCEDURE — 3077F SYST BP >= 140 MM HG: CPT | Performed by: DERMATOLOGY

## 2024-02-05 RX ORDER — OXYBUTYNIN CHLORIDE 5 MG/1
TABLET ORAL
Qty: 90 TABLET | OUTPATIENT
Start: 2024-02-05

## 2024-02-05 NOTE — TELEPHONE ENCOUNTER
[facility] calling requesting a refill on Oxybutynin 5 mg.  Pharmacy Tech states that she gets her medication buble packed      Last visit: 10/20/23  Last Med refill:   Does patient have enough medication for 72 hours: No:     Next Visit Date: 4/5/24  Future Appointments   Date Time Provider Department Center   4/5/2024 10:00 AM Twin Faust MD [facility]   8/5/2024  1:00 PM [facility]       Health Maintenance   Topic Date Due    Pneumococcal 65+ years Vaccine (1 - PCV) Never done    Shingles vaccine (1 of 2) Never done    Respiratory Syncytial Virus (RSV) Pregnant or age 60 yrs+ (1 - 1-dose 60+ series) Never done    Flu vaccine (1) 08/01/2023    COVID-19 Vaccine (4 - 2023-24 season) 09/01/2023    Depression Monitoring  02/14/2024    Breast cancer screen  03/16/2025    Lipids  06/07/2028    Colorectal Cancer Screen  09/25/2028    DTaP/Tdap/Td vaccine (2 - Td or Tdap) 08/15/2032    DEXA (modify frequency per FRAX score)  Completed    Hepatitis C screen  Completed    Hepatitis A vaccine  Aged Out    Hepatitis B vaccine  Aged Out    Hib vaccine  Aged Out    Polio vaccine  Aged Out    Meningococcal (ACWY) vaccine  Aged Out    A1C test (Diabetic or Prediabetic)  Discontinued    Depression Screen  Discontinued    GFR test (Diabetes, CKD 3-4, OR last GFR 15-59)  Discontinued    Diabetes screen  Discontinued       Hemoglobin A1C (%)   Date Value   06/07/2023 5.4   02/14/2023 5.7   08/31/2021 5.5             ( goal A1C is < 7)   No components found for: \"LABMICR\"  LDL Cholesterol (mg/dL)   Date Value   06/07/2023 116   02/08/2022 123       (goal LDL is <100)   AST (U/L)   Date Value   06/07/2023 27     ALT (U/L)   Date Value   06/07/2023 39 (H)     BUN (mg/dL)   Date Value   06/07/2023 17     BP Readings from Last 3 Encounters:   02/05/24 (!) 143/82   10/20/23 130/88   07/03/23 138/80          (goal 120/80)    All Future Testing planned in [facility]  Lab Frequency Next Occurrence   DEXA

## 2024-02-05 NOTE — PATIENT INSTRUCTIONS
- talk with PCP about cough  - use ketoconazole shampoo to face, scalp, and chest  - use lac-hydrin lotion daily to dry skin   - use triamcinolone 0.1% for rash on the body   - use triamcinolone 0.025% for rash on the face

## 2024-02-05 NOTE — TELEPHONE ENCOUNTER
Attempted to call patient. No answer. Dr. Faust has no sooner availability than 4/5/2024.    Electronically Signed by  Mendez Aguilar MBA  [facility] Practice Manager

## 2024-02-05 NOTE — TELEPHONE ENCOUNTER
----- Message from Hubert Hamilton sent at 2/2/2024  2:36 PM EST -----  Subject: Appointment Request    Reason for Call: Established Patient Appointment needed: Routine Existing   Condition Follow Up    QUESTIONS    Reason for appointment request? No appointments available during search     Additional Information for Provider? Patient needs reschedule of 2/2 appt   to get referrals and discuss assisted living. Also would like to discuss   dementia.  ---------------------------------------------------------------------------  --------------  CALL BACK INFO  [phone]; OK to leave message on voicemail  ---------------------------------------------------------------------------  --------------  SCRIPT ANSWERS

## 2024-02-05 NOTE — PROGRESS NOTES
Dermatology Patient Note  [facility] DERMATOLOGY  [address]  [address]  [place] [address]  Dept: [phone]  Dept Fax: [phone]      VISITDATE: 2/5/2024   REFERRING PROVIDER: No ref. provider found      Dulce Serrano is a 68 y.o. female  who presents today in the office for:    Other (Patient presents today for a follow up from July 2023. LV she was seen for Lichen simplex chronicus. She is still having spots of this on her right elbow. But states other than that she is doing very well as far as her skin. )      HISTORY OF PRESENT ILLNESS:  Patient presents for FBSC and possible LSC follow up. She was last seen on 7/3/23, she had a punch biopsy of the chest demonstrating tinea versicolor. She was started on lac-hydrin lotion and ketoconazole shampoo. The pt has previously seen Dr. Blanco reporting a hx of fixed drug eruption and lupus.     The patient reports that she has only used the ketoconazole shampoo on the scalp and not on the chest rash. She continues to use the lac-hydrin lotion. She has been using a topical in a jar that she uses when necessary. She continues to see Dr. Blanco once yearly or \"for emergencies\".     The patient is not a reliable historian. She is requesting that she stays seeing us and stops going to Dr. Blacno.      MEDICAL PROBLEMS:  Patient Active Problem List    Diagnosis Date Noted    OAB (overactive bladder) 08/04/2022     Priority: Medium    Hypercalcemia 10/20/2023    Diastolic dysfunction 10/20/2023    Venous insufficiency 10/20/2023    Chronic diarrhea 02/14/2019    Bipolar 1 disorder (HCC) 02/14/2019    History of stroke 08/09/2018    Mild persistent asthma without complication 02/28/2018    Obesity (BMI 30-39.9) 08/19/2016    Hypothyroidism 12/03/2015    Essential hypertension     Pure hypercholesterolemia     Urinary incontinence     Anxiety     Sleep apnea     GERD (gastroesophageal

## 2024-02-06 LAB
ALBUMIN SERPL-MCNC: 4.4 G/DL (ref 3.5–5.2)
ALBUMIN/GLOB SERPL: 1.3 {RATIO} (ref 1–2.5)
ALP SERPL-CCNC: 93 U/L (ref 35–104)
ALT SERPL-CCNC: 45 U/L (ref 5–33)
ANION GAP SERPL CALCULATED.3IONS-SCNC: 11 MMOL/L (ref 9–17)
AST SERPL-CCNC: 37 U/L
BILIRUB SERPL-MCNC: 0.3 MG/DL (ref 0.3–1.2)
BUN SERPL-MCNC: 21 MG/DL (ref 8–23)
CALCIUM SERPL-MCNC: 11.2 MG/DL (ref 8.6–10.4)
CHLORIDE SERPL-SCNC: 109 MMOL/L (ref 98–107)
CHOLEST SERPL-MCNC: 215 MG/DL
CHOLESTEROL/HDL RATIO: 4.6
CO2 SERPL-SCNC: 21 MMOL/L (ref 20–31)
CREAT SERPL-MCNC: 1.1 MG/DL (ref 0.5–0.9)
EST. AVERAGE GLUCOSE BLD GHB EST-MCNC: 105 MG/DL
GFR SERPL CREATININE-BSD FRML MDRD: 54 ML/MIN/1.73M2
GLUCOSE SERPL-MCNC: 68 MG/DL (ref 70–99)
HBA1C MFR BLD: 5.3 % (ref 4–6)
HDLC SERPL-MCNC: 47 MG/DL
LDLC SERPL CALC-MCNC: 141 MG/DL (ref 0–130)
LITHIUM DATE LAST DOSE: NORMAL
LITHIUM DOSE AMOUNT: NORMAL
LITHIUM DOSE TIME: NORMAL
LITHIUM LEVEL: 0.9 MMOL/L (ref 0.6–1.2)
POTASSIUM SERPL-SCNC: 5 MMOL/L (ref 3.7–5.3)
PROT SERPL-MCNC: 7.9 G/DL (ref 6.4–8.3)
SODIUM SERPL-SCNC: 141 MMOL/L (ref 135–144)
TRIGL SERPL-MCNC: 137 MG/DL

## 2024-02-06 NOTE — TELEPHONE ENCOUNTER
She was previously taking 5mg tablets, once daily. Last prescribed by Dr. Gusman and she last picked up medication on 8/21/23.

## 2024-02-07 ENCOUNTER — TELEPHONE (OUTPATIENT)
Dept: INTERNAL MEDICINE | Age: 69
End: 2024-02-07

## 2024-02-07 DIAGNOSIS — N39.41 URGE INCONTINENCE OF URINE: Primary | ICD-10-CM

## 2024-02-07 RX ORDER — OXYBUTYNIN CHLORIDE 5 MG/1
5 TABLET ORAL DAILY
Qty: 90 TABLET | Refills: 1 | Status: SHIPPED | OUTPATIENT
Start: 2024-02-07

## 2024-02-07 NOTE — TELEPHONE ENCOUNTER
----- Message from Jeannette Byrne sent at 2/7/2024 12:54 PM EST -----  Subject: Refill Request    QUESTIONS  Name of Medication? Elastic Bandages & Supports (MEDICAL COMPRESSION   STOCKINGS) MISC  Patient-reported dosage and instructions? Wear all the time/ Knee High   How many days do you have left? 0  Preferred Pharmacy? [facility]  Pharmacy phone number (if available)? [phone]  Additional Information for Provider? Pt requested that the stockings be   either black or almond color. Please call, thanks  ---------------------------------------------------------------------------  --------------  CALL BACK INFO  What is the best way for the office to contact you? OK to leave message on   voicemail  Preferred Call Back Phone Number? [phone]  ---------------------------------------------------------------------------  --------------  SCRIPT ANSWERS  Relationship to Patient? Self

## 2024-02-15 ENCOUNTER — OFFICE VISIT (OUTPATIENT)
Dept: INTERNAL MEDICINE | Age: 69
End: 2024-02-15
Payer: MEDICARE

## 2024-02-15 VITALS
HEART RATE: 76 BPM | DIASTOLIC BLOOD PRESSURE: 75 MMHG | WEIGHT: 203 LBS | TEMPERATURE: 98.2 F | SYSTOLIC BLOOD PRESSURE: 132 MMHG | OXYGEN SATURATION: 92 % | BODY MASS INDEX: 33.82 KG/M2 | HEIGHT: 65 IN

## 2024-02-15 DIAGNOSIS — I73.9 PVD (PERIPHERAL VASCULAR DISEASE) (HCC): ICD-10-CM

## 2024-02-15 DIAGNOSIS — I87.2 VENOUS INSUFFICIENCY: Primary | ICD-10-CM

## 2024-02-15 PROCEDURE — 3075F SYST BP GE 130 - 139MM HG: CPT

## 2024-02-15 PROCEDURE — 3078F DIAST BP <80 MM HG: CPT

## 2024-02-15 PROCEDURE — 1123F ACP DISCUSS/DSCN MKR DOCD: CPT

## 2024-02-15 PROCEDURE — 99213 OFFICE O/P EST LOW 20 MIN: CPT

## 2024-02-15 ASSESSMENT — PATIENT HEALTH QUESTIONNAIRE - PHQ9
SUM OF ALL RESPONSES TO PHQ QUESTIONS 1-9: 8
6. FEELING BAD ABOUT YOURSELF - OR THAT YOU ARE A FAILURE OR HAVE LET YOURSELF OR YOUR FAMILY DOWN: 2
3. TROUBLE FALLING OR STAYING ASLEEP: 0
10. IF YOU CHECKED OFF ANY PROBLEMS, HOW DIFFICULT HAVE THESE PROBLEMS MADE IT FOR YOU TO DO YOUR WORK, TAKE CARE OF THINGS AT HOME, OR GET ALONG WITH OTHER PEOPLE: 1
1. LITTLE INTEREST OR PLEASURE IN DOING THINGS: 2
8. MOVING OR SPEAKING SO SLOWLY THAT OTHER PEOPLE COULD HAVE NOTICED. OR THE OPPOSITE, BEING SO FIGETY OR RESTLESS THAT YOU HAVE BEEN MOVING AROUND A LOT MORE THAN USUAL: 1
5. POOR APPETITE OR OVEREATING: 2
SUM OF ALL RESPONSES TO PHQ9 QUESTIONS 1 & 2: 3
2. FEELING DOWN, DEPRESSED OR HOPELESS: 1
SUM OF ALL RESPONSES TO PHQ QUESTIONS 1-9: 8
9. THOUGHTS THAT YOU WOULD BE BETTER OFF DEAD, OR OF HURTING YOURSELF: 0
4. FEELING TIRED OR HAVING LITTLE ENERGY: 0
SUM OF ALL RESPONSES TO PHQ QUESTIONS 1-9: 8
SUM OF ALL RESPONSES TO PHQ QUESTIONS 1-9: 8
7. TROUBLE CONCENTRATING ON THINGS, SUCH AS READING THE NEWSPAPER OR WATCHING TELEVISION: 0

## 2024-02-15 NOTE — PROGRESS NOTES
Attending Physician Statement  I have discussed the care of Dulce Gibbons, including pertinent history and exam findings,  with the resident. I have reviewed the key elements of all parts of the encounter with the resident.  I agree with the assessment, plan and orders as documented by the resident.  (GE Modifier)

## 2024-02-15 NOTE — PROGRESS NOTES
[facility]  [facility]  [address] [place] [address]  Dept: [phone]  Dept Fax: [phone]    Office Progress/Follow Up Note  Date ofpatient's visit: 2/15/2024  Patient's Name:  Dulce Serrano YOB: 1955            Patient Care Team:  Twin Faust MD as PCP - General (Internal Medicine)  Marsha Kohler MD as PCP - Empaneled Provider  Roxy Boyd MD as Consulting Physician (Gastroenterology)  Levon Tavares MD as Consulting Physician (Pulmonology)  Redd Frazier MD as Surgeon (Orthopedic Surgery)  Patrice Zelaya MD as Consulting Physician (Infectious Diseases)  ================================================================    REASON FOR VISIT/CHIEF COMPLAINT:  Foot Swelling (X 3month) and Hypertension    HISTORY OF PRESENTING ILLNESS:  History was obtained from: patient, electronic medical record. Dulce Bertrand a 68 y.o. is here for a acute care visit.  Patient states that she has swelling in bilateral legs with bulging veins.    Patient states that this problem has been going on for many years.  Patient stated she needs more of the compression stockings.  Patient also would like to try machine for her leg swelling.  Patient denies any fever, weight loss, any discharge from the legs, any wound in the legs.    Patient denied any other acute complaints at this point.      Patient Active Problem List   Diagnosis    Essential hypertension    Pure hypercholesterolemia    Urinary incontinence    Anxiety    Sleep apnea    GERD (gastroesophageal reflux disease)    Hypothyroidism    Obesity (BMI 30-39.9)    Mild persistent asthma without complication    History of stroke    Chronic diarrhea    Bipolar 1 disorder (HCC)    OAB (overactive bladder)    Hypercalcemia    Diastolic dysfunction    Venous insufficiency       Health Maintenance Due   Topic Date Due    Pneumococcal 65+ years Vaccine (1 - PCV) Never done    Shingles

## 2024-02-21 ENCOUNTER — TELEPHONE (OUTPATIENT)
Dept: INTERNAL MEDICINE | Age: 69
End: 2024-02-21

## 2024-02-21 NOTE — TELEPHONE ENCOUNTER
For compression stockings, Medicare only covers a dx of Lymphedema. Patient is not able to fill her script without this diagnosis.Please advise and place new order with new diagnosis if appropriate.

## 2024-02-21 NOTE — TELEPHONE ENCOUNTER
----- Message from Tomasa Holm sent at 2/20/2024 12:50 PM EST -----  Subject: Message to Provider    QUESTIONS  Information for Provider? patient needs office to give her a call asap  ---------------------------------------------------------------------------  --------------  CALL BACK INFO  [phone]; OK to leave message on voicemail  ---------------------------------------------------------------------------  --------------  SCRIPT ANSWERS  Relationship to Patient? Self

## 2024-02-26 DIAGNOSIS — B36.0 TINEA VERSICOLOR: ICD-10-CM

## 2024-02-26 RX ORDER — KETOCONAZOLE 20 MG/ML
SHAMPOO TOPICAL
Qty: 120 ML | Refills: 3 | Status: SHIPPED | OUTPATIENT
Start: 2024-02-26

## 2024-02-29 DIAGNOSIS — I10 ESSENTIAL HYPERTENSION: ICD-10-CM

## 2024-02-29 DIAGNOSIS — J45.30 MILD PERSISTENT ASTHMA WITHOUT COMPLICATION: ICD-10-CM

## 2024-02-29 NOTE — TELEPHONE ENCOUNTER
A Refill Has Been Requested for Dulce ORTEGA Maggie    Medication Requested  Requested Prescriptions     Pending Prescriptions Disp Refills    montelukast (SINGULAIR) 10 MG tablet [Pharmacy Med Name: MONTELUKAST SODIUM 10MG TABS] 90 tablet 1     Sig: TAKE 1 TABLET BY MOUTH EVERY NIGHT    lisinopril (PRINIVIL;ZESTRIL) 5 MG tablet [Pharmacy Med Name: LISINopril 5MG TABLET] 90 tablet 1     Sig: TAKE ONE TABLET BY MOUTH ONCE A DAY    VITAMIN D3 25 MCG (1000 UT) TABS tablet [Pharmacy Med Name: VITAMIN D3 25 MCG(1000 UT) TABS] 30 tablet 5     Sig: TAKE ONE TABLET BY MOUTH ONCE A DAY       Last Visit Date (If Applicable)  Visit date not found    Next Visit Date (If Applicable)  Visit date not found

## 2024-03-01 RX ORDER — MELATONIN
1 DAILY
Qty: 30 TABLET | Refills: 5 | Status: SHIPPED | OUTPATIENT
Start: 2024-03-01

## 2024-03-01 RX ORDER — MONTELUKAST SODIUM 10 MG/1
TABLET ORAL
Qty: 90 TABLET | Refills: 1 | Status: SHIPPED | OUTPATIENT
Start: 2024-03-01

## 2024-03-01 RX ORDER — LISINOPRIL 5 MG/1
TABLET ORAL
Qty: 90 TABLET | Refills: 1 | Status: SHIPPED | OUTPATIENT
Start: 2024-03-01

## 2024-03-06 ENCOUNTER — TELEPHONE (OUTPATIENT)
Dept: INTERNAL MEDICINE | Age: 69
End: 2024-03-06

## 2024-03-12 NOTE — TELEPHONE ENCOUNTER
Patient calling stating she was into the office on the 6th requesting a Podiatry referral.    She was wondering where we are at in the process.    Please Advise

## 2024-03-18 ENCOUNTER — HOSPITAL ENCOUNTER (OUTPATIENT)
Dept: OCCUPATIONAL THERAPY | Age: 69
Setting detail: THERAPIES SERIES
Discharge: HOME | End: 2024-03-18
Payer: MEDICARE

## 2024-03-18 PROCEDURE — 97166 OT EVAL MOD COMPLEX 45 MIN: CPT

## 2024-03-18 NOTE — CONSULTS
TREATMENT LOCATION:   [x] [facility]  Outpatient Rehabilitation &  Therapy  [address], [address]  P: [phone]  F: [phone] [] [facility]   Outpatient Rehabilitation &  Therapy  [address]  P: [phone]  F: [phone] [] [facility]  Outpatient Rehabilitation &  Therapy  [address]   P: [phone]  F: [phone]     Lymphedema Services - Initial Evaluation for Lower Extremity    Date:  3/18/2024  Patient: Dulce Serrano  : 1955             MRN: [id number]  Referring Provider: Marsha Kohler MD       Phone: [phone]  Fax: [phone]  Insurance:  AET[employer] MEDICARE-ADVANTAGE PPO (ID:[id number] ) - No prior auth, OT BOMN, follows Medicare guidelines owes $% co-pay  Medical Diagnosis: I87.2  Rehab Codes: I89.0  Onset Date: 2/15/2024  Visit# / total visits: 1/2 scheduled; Progress note due at visit 10 per POC.  (Certification Dates: 3/18/2024 - 2024)    Demographic info for garment/pump VOB:   [address]  [place] [address]  [phone]  Guardian pays her bills  Aaron Corey,  Relationship: Legal Guardian   [phone]    Complete patient demographics for benefit check:  Full name  Dulce Serrano  Date of Birth 1955  Insurance Name (all insurances) AETNA MEDICARE-ADVANTAGE PPO   Insurance ID (all insurances) - (ID:[id number] )   Diagnosis I87.2  Affected body part B LE    Allergies:  Medications  Medications: See charted information in Epic    Past Medical History: See charted information in Epic   Active Ambulatory Problems     Diagnosis Date Noted    Essential hypertension     Pure hypercholesterolemia     Urinary incontinence     Anxiety     Sleep apnea     GERD (gastroesophageal reflux disease)     Hypothyroidism 2015    Obesity (BMI 30-39.9) 2016    Mild persistent asthma without complication 2018

## 2024-03-25 DIAGNOSIS — I10 ESSENTIAL HYPERTENSION: ICD-10-CM

## 2024-03-25 NOTE — TELEPHONE ENCOUNTER
A Refill Has Been Requested for Dulce Serrano    Medication Requested  Requested Prescriptions     Pending Prescriptions Disp Refills    amLODIPine (NORVASC) 10 MG tablet [Pharmacy Med Name: AMLODIPINE BESYLATE 10MG TABS] 90 tablet 0     Sig: TAKE ONE TABLET BY MOUTH ONCE A DAY       Last Visit Date (If Applicable)  10/20/2023    Next Visit Date (If Applicable)  4/5/2024

## 2024-03-26 DIAGNOSIS — I10 ESSENTIAL HYPERTENSION: ICD-10-CM

## 2024-03-26 RX ORDER — AMLODIPINE BESYLATE 10 MG/1
TABLET ORAL
Qty: 90 TABLET | Refills: 0 | OUTPATIENT
Start: 2024-03-26

## 2024-03-26 NOTE — TELEPHONE ENCOUNTER
A Refill Has Been Requested for Dulce Serrano    Medication Requested  Requested Prescriptions     Pending Prescriptions Disp Refills    amLODIPine (NORVASC) 10 MG tablet 90 tablet 0     Sig: Take 1 tablet by mouth daily       Last Visit Date (If Applicable)  10/20/2023    Next Visit Date (If Applicable)  4/5/2024

## 2024-03-27 RX ORDER — AMLODIPINE BESYLATE 10 MG/1
10 TABLET ORAL DAILY
Qty: 90 TABLET | Refills: 0 | Status: SHIPPED | OUTPATIENT
Start: 2024-03-27

## 2024-04-02 ENCOUNTER — TELEPHONE (OUTPATIENT)
Dept: DERMATOLOGY | Age: 69
End: 2024-04-02

## 2024-04-02 ENCOUNTER — TELEMEDICINE (OUTPATIENT)
Dept: INTERNAL MEDICINE | Age: 69
End: 2024-04-02

## 2024-04-02 ENCOUNTER — NURSE TRIAGE (OUTPATIENT)
Dept: OTHER | Facility: CLINIC | Age: 69
End: 2024-04-02

## 2024-04-02 ENCOUNTER — TELEPHONE (OUTPATIENT)
Dept: INTERNAL MEDICINE | Age: 69
End: 2024-04-02

## 2024-04-02 DIAGNOSIS — M79.89 SWELLING OF LOWER EXTREMITY: ICD-10-CM

## 2024-04-02 DIAGNOSIS — M25.571 ACUTE RIGHT ANKLE PAIN: Primary | ICD-10-CM

## 2024-04-02 ASSESSMENT — ENCOUNTER SYMPTOMS
VOMITING: 0
CHEST TIGHTNESS: 0
COUGH: 0
SHORTNESS OF BREATH: 1
NAUSEA: 0
ABDOMINAL PAIN: 0
CONSTIPATION: 0

## 2024-04-02 NOTE — TELEPHONE ENCOUNTER
Location of patient: [place]    Received call from Zoya at [facility] with Red Flag Complaint.    Subjective: Caller states \" Foot swelling on right side, confusion.\"     Current Symptoms:   \"Right foot is swollen and I am [facility].\"    Can't walk on right foot due to the swelling and pain, limping.     Patient states the pain and swelling started 3 days ago and seems to be worse, she said she was going to go to the ED but couldn't find a ride a few days ago.     Onset: 3 days ago; worsening    Associated Symptoms: reduced activity    Pain Severity: 10/10; Feels really big; constant    Temperature: Denies  checking but feels like she has had one     What has been tried: NA    Recommended disposition: Go to ED/C Now (Or to Office with PCP Approval)    Care advice provided, patient verbalizes understanding; denies any other questions or concerns; instructed to call back for any new or worsening symptoms.    Patient was sent to nurse triage due to office going on lunch break, unsure if we can reach the office at this time so I advised that if  cannot reach the office for patient to be seen at [facility], transferred to [facility] agent.     Attention Provider:  Thank you for allowing me to participate in the care of your patient.  The patient was connected to triage in response to information provided to the [facility].  Please do not respond through this encounter as the response is not directed to a shared pool.    Reason for Disposition   Patient sounds very sick or weak to the triager    Protocols used: Ankle Swelling-ADULT-[place]

## 2024-04-02 NOTE — PROGRESS NOTES
[facility]  [facility]  [address] [place] [address]  Dept: [phone]  Dept Fax: [phone]    VIDEO VISIT.  Date of patient's visit: 4/2/2024  Patient's Name:  Dulce Serrano YOB: 1955            Patient Care Team:  Twin Faust MD as PCP - General (Internal Medicine)  Marsha Kohler MD as PCP - Empaneled Provider  Roxy Boyd MD as Consulting Physician (Gastroenterology)  Levon Tavares MD as Consulting Physician (Pulmonology)  Redd Frazier MD as Surgeon (Orthopedic Surgery)  Patrice Zelaya MD as Consulting Physician (Infectious Diseases)  ________________________________________________________________________      Reason for Visit: Same day visit- VIDEO VISIT  ________________________________________________________________________  Chief Complaint:  Right ankle pain  Lower limb swelling  ________________________________________________________________________  History of Presenting Illness:    Patient had a video visit today in this encounter    Patient stated that she went to grocery store on Sunday.  She was standing too long in the line when she started experiencing sudden onset of right ankle pain.  She denies any trauma.  She denies any twisting movement of her feet.  Patient states that her pain was gradually worsening to the point that she was experiencing difficulty with ambulation.  She also stated worsening of her swelling in her bilateral lower limb.    She denies any fever or chills.  She also reports numbness and tingling in her bilateral feet which patient states that is chronic and is not worsening.  She denies any muscular weakness but unable to walk due to her pain.  She denies any chest pain or shortness of breath.    Per last visit, patient was asked to follow-up with lymphedema clinic, as per patient she had an appointment with the clinic.  As per chart review, she also has a history of right ankle

## 2024-04-02 NOTE — TELEPHONE ENCOUNTER
Placed call to both home and mobile number for patient to be checked in for virtual appt. Unable to leave vm for both numbers

## 2024-04-02 NOTE — TELEPHONE ENCOUNTER
Pt phones to ask for refill of erythromycin. I advised her we did not prescribe her erythromycin. She states Dr. Blanco prescribed it for her. I told her she will need to contact his office for a refill. She said she can't call him, she's having phone problems. I advised her she is talking to me on her phone. She said it's not a fancy phone like we have. I asked her if she needed Dr. Blanco's office number. She said yes. I tried to give her his office number of [phone], but she kept repeating back the wrong numbers. I transferred her call to his number.

## 2024-04-05 ENCOUNTER — OFFICE VISIT (OUTPATIENT)
Dept: INTERNAL MEDICINE | Age: 69
End: 2024-04-05
Payer: MEDICARE

## 2024-04-05 VITALS
BODY MASS INDEX: 32.49 KG/M2 | OXYGEN SATURATION: 98 % | WEIGHT: 195 LBS | TEMPERATURE: 97.2 F | HEART RATE: 78 BPM | HEIGHT: 65 IN | DIASTOLIC BLOOD PRESSURE: 58 MMHG | SYSTOLIC BLOOD PRESSURE: 100 MMHG

## 2024-04-05 DIAGNOSIS — I87.2 VENOUS INSUFFICIENCY: ICD-10-CM

## 2024-04-05 DIAGNOSIS — I89.0 LYMPHEDEMA: Primary | ICD-10-CM

## 2024-04-05 DIAGNOSIS — I10 ESSENTIAL HYPERTENSION: ICD-10-CM

## 2024-04-05 DIAGNOSIS — Z00.00 HEALTHCARE MAINTENANCE: ICD-10-CM

## 2024-04-05 PROCEDURE — 99213 OFFICE O/P EST LOW 20 MIN: CPT

## 2024-04-05 PROCEDURE — 90677 PCV20 VACCINE IM: CPT

## 2024-04-05 RX ORDER — LISINOPRIL 20 MG/1
20 TABLET ORAL DAILY
Qty: 30 TABLET | Refills: 1 | Status: SHIPPED | OUTPATIENT
Start: 2024-04-05

## 2024-04-05 ASSESSMENT — ENCOUNTER SYMPTOMS
SHORTNESS OF BREATH: 0
ABDOMINAL PAIN: 0
CHEST TIGHTNESS: 0
WHEEZING: 0
ABDOMINAL DISTENTION: 0

## 2024-04-05 NOTE — PROGRESS NOTES
Procedures    DME Order for (Specify) as OP     - DME device ordered - Bilateral compression stockings, 15-20 mmHg, large   - Diagnosis: venous insufficienct  - Length of Need: Lifetime   Patient medically benefit from bilateral compression stockings 15 to 20 mmHg, large.  Patient has significant peripheral vascular disease, venous insufficiency and leg lymphedema.  Overall compression stockings will greatly decrease her symptoms and improve her quality of life.    Twin Faust M.D.  Internal Medicine Resident PGY-2  [facility],  [place], [place].      
Attending Physician Statement  I have discussed the care of Dulce Serrano, including pertinent history and exam findings,  with the resident. I have reviewed the key elements of all parts of the encounter with the resident.  I agree with the assessment, plan and orders as documented by the resident.  (GE Modifier)   
many years, seems to be stable at this time does not seem to be acutely worse from previous exams.  -     DME Order for (Specify) as OP    Essential hypertension: Blood pressure well-controlled 106/60 today, will adjust blood pressure medications and discontinue Norvasc and increase lisinopril from 5 to 20 mg daily.  -     lisinopril (PRINIVIL;ZESTRIL) 20 MG tablet; Take 1 tablet by mouth daily  -     Basic Metabolic Panel; Future    Other orders  -     Pneumococcal, PCV20, PREVNAR 20, (age 6w+), IM, PF        FOLLOW UP AND INSTRUCTIONS:  Return in about 4 weeks (around 5/3/2024).    Jennieere received counseling on the following healthy behaviors: nutrition, exercise, and medication adherence    Discussed use, benefit, and side effects of prescribed medications.  Barriers to medication compliance addressed.  All patient questions answered.  Pt voiced understanding.    Patient given educational materials - see patient instructions  Twin Faust MD   Internal Medicine  4/5/2024, 10:55 AM    This note is created with the assistance of a speech-recognition program. While intending to generate a document that actually reflects the content of thevisit, the document can still have some mistakes which may not have been identified and corrected by editing.

## 2024-04-09 ENCOUNTER — HOSPITAL ENCOUNTER (OUTPATIENT)
Dept: OCCUPATIONAL THERAPY | Age: 69
Setting detail: THERAPIES SERIES
Discharge: HOME | End: 2024-04-09
Payer: MEDICARE

## 2024-04-09 ENCOUNTER — TELEPHONE (OUTPATIENT)
Dept: INTERNAL MEDICINE | Age: 69
End: 2024-04-09

## 2024-04-09 PROCEDURE — 97535 SELF CARE MNGMENT TRAINING: CPT

## 2024-04-09 NOTE — TELEPHONE ENCOUNTER
Last office notes and notes from Lymphedema Clinic, demographics, and script for compression stockings faxed to [employer] at [phone].    PC to pt to let her know this is the last company we are familiar with that might be able to process compression stockings through insurance, no answer. Left HIPAA compliant message identifying self and nature of call, requested call back to writer, phone number given.

## 2024-04-09 NOTE — FLOWSHEET NOTE
Vasopneumatic Device (30277)                           $8.79     Total Billable Time    30 2         Time In:  1515  Time Out: 1545      Electronically signed by UMBERTO Gaviria, LUKAS/L, CLT-YONIS on 4/9/2024 at 3:17 PM

## 2024-04-15 ENCOUNTER — TELEPHONE (OUTPATIENT)
Dept: INTERNAL MEDICINE | Age: 69
End: 2024-04-15

## 2024-04-15 DIAGNOSIS — I73.9 PVD (PERIPHERAL VASCULAR DISEASE) (HCC): ICD-10-CM

## 2024-04-15 DIAGNOSIS — I87.2 VENOUS INSUFFICIENCY: Primary | ICD-10-CM

## 2024-04-15 DIAGNOSIS — M79.89 LEG SWELLING: ICD-10-CM

## 2024-04-15 DIAGNOSIS — S82.891D ANKLE FRACTURE, RIGHT, CLOSED, WITH ROUTINE HEALING, SUBSEQUENT ENCOUNTER: ICD-10-CM

## 2024-04-15 NOTE — TELEPHONE ENCOUNTER
----- Message from Hilda Locke sent at 4/10/2024  3:11 PM EDT -----  Subject: Referral Request    Reason for referral request? Patient would like to see if she can be   referred to another OT office, didn't like the service provided by Dr. Lay Franklin and would like to go anywhere but her office, please advise   and would like a callback.   Provider patient wants to be referred to(if known):     Provider Phone Number(if known):    Additional Information for Provider? Patient also stated Dr. Lay Franklin   told her legs weren't bad enough for OT and, wasn't qualified. Patient can   be reached via text and email. Would like a refer to [facility]. Wants   the OT to be within network.   ---------------------------------------------------------------------------  --------------  CALL BACK INFO    [phone]; Do not leave any message, patient will call back for answer  ---------------------------------------------------------------------------  --------------

## 2024-04-15 NOTE — PROGRESS NOTES
Pt referred to OT at [facility] per request.    Twin Faust M.D.  Internal Medicine Resident PGY-2  [facility],  [place], [place].\

## 2024-04-18 ENCOUNTER — HOSPITAL ENCOUNTER (OUTPATIENT)
Age: 69
Discharge: HOME | End: 2024-04-20
Payer: MEDICARE

## 2024-04-18 ENCOUNTER — HOSPITAL ENCOUNTER (OUTPATIENT)
Dept: GENERAL RADIOLOGY | Age: 69
Discharge: HOME | End: 2024-04-20
Payer: MEDICARE

## 2024-04-18 DIAGNOSIS — M25.571 ACUTE RIGHT ANKLE PAIN: ICD-10-CM

## 2024-04-18 PROCEDURE — 73610 X-RAY EXAM OF ANKLE: CPT

## 2024-04-18 NOTE — TELEPHONE ENCOUNTER
Please let pt know that the lymphedema clinic she went to through [facility] is the only one, there is not another lymphedema clinic that I am aware of. Pt will need to tell us where to send referral for lymphedema/OT.

## 2024-04-22 DIAGNOSIS — I10 ESSENTIAL HYPERTENSION: ICD-10-CM

## 2024-04-22 DIAGNOSIS — E03.9 HYPOTHYROIDISM, UNSPECIFIED TYPE: ICD-10-CM

## 2024-04-23 NOTE — TELEPHONE ENCOUNTER
A Refill Has Been Requested for Jenniejami JORDAN Serrano    Medication Requested  Requested Prescriptions     Pending Prescriptions Disp Refills    levothyroxine (SYNTHROID) 25 MCG tablet [Pharmacy Med Name: LEVOTHYROXINE SODIUM 25MCG TABS] 90 tablet 0     Sig: TAKE ONE TABLET BY MOUTH ONCE A DAY    amLODIPine (NORVASC) 10 MG tablet [Pharmacy Med Name: AMLODIPINE BESYLATE 10MG TABS] 90 tablet 0     Sig: TAKE ONE TABLET BY MOUTH ONCE A DAY       Last Visit Date (If Applicable)  4/5/2024    Next Visit Date (If Applicable)  5/31/2024

## 2024-04-24 ENCOUNTER — HOSPITAL ENCOUNTER (OUTPATIENT)
Dept: VASCULAR LAB | Age: 69
Discharge: HOME | End: 2024-04-26
Payer: MEDICARE

## 2024-04-24 DIAGNOSIS — M79.89 SWELLING OF LOWER EXTREMITY: ICD-10-CM

## 2024-04-24 PROCEDURE — 93970 EXTREMITY STUDY: CPT | Performed by: STUDENT IN AN ORGANIZED HEALTH CARE EDUCATION/TRAINING PROGRAM

## 2024-04-24 PROCEDURE — 93970 EXTREMITY STUDY: CPT

## 2024-04-25 RX ORDER — AMLODIPINE BESYLATE 10 MG/1
TABLET ORAL
Qty: 90 TABLET | Refills: 0 | Status: SHIPPED | OUTPATIENT
Start: 2024-04-25

## 2024-04-25 RX ORDER — LEVOTHYROXINE SODIUM 0.03 MG/1
TABLET ORAL
Qty: 90 TABLET | Refills: 0 | Status: SHIPPED | OUTPATIENT
Start: 2024-04-25

## 2024-04-26 DIAGNOSIS — I10 ESSENTIAL HYPERTENSION: ICD-10-CM

## 2024-04-26 DIAGNOSIS — E03.9 HYPOTHYROIDISM, UNSPECIFIED TYPE: ICD-10-CM

## 2024-04-29 RX ORDER — LEVOTHYROXINE SODIUM 0.03 MG/1
25 TABLET ORAL DAILY
Qty: 90 TABLET | Refills: 0 | Status: SHIPPED | OUTPATIENT
Start: 2024-04-29

## 2024-04-29 RX ORDER — AMLODIPINE BESYLATE 10 MG/1
10 TABLET ORAL DAILY
Qty: 90 TABLET | Refills: 0 | Status: SHIPPED | OUTPATIENT
Start: 2024-04-29 | End: 2024-04-29 | Stop reason: SINTOL

## 2024-04-29 RX ORDER — LISINOPRIL 20 MG/1
20 TABLET ORAL DAILY
Qty: 30 TABLET | Refills: 1 | Status: SHIPPED | OUTPATIENT
Start: 2024-04-29

## 2024-04-29 NOTE — PROGRESS NOTES
I removed Norvasc from the patient's medication list.  She will no longer be taking it due to leg swelling.  As discussed with the patient at our previous office visit we will increase her lisinopril dosing to 20 mg daily.  At this time I see no reason for discontinuation of Synthroid.  I will reorder her 25 mcg of Synthroid daily.  Will recheck a TSH and T4 in 6 weeks.    Twin Faust M.D.  Internal Medicine Resident PGY-2  [facility],  [place], [place].

## 2024-05-01 ENCOUNTER — TELEPHONE (OUTPATIENT)
Dept: INTERNAL MEDICINE | Age: 69
End: 2024-05-01

## 2024-05-01 NOTE — TELEPHONE ENCOUNTER
Pt calling in re: lymphedema clinic referral. Review of notes from RUBY Franklin shows pt likely does not have lymphedema, rather it is r/t vascular deficiency. Pt initially asking for referral to alternate lymphedema clinic b/c she wants to get compression stockings, then asked for referral to Dr. Torrez's office. Writer educated pt that neither referral is appropriate to obtain compression stockings. Writer let her know we have tried sending order for compression stockings to several companies and most will no longer bill insurance for them. Pt then states Lay (RUBY Franklin) ordered items but that pt's guardian doesn't want to pay the $300 for the items.    PC to pt's guardian to discuss the situation and request he purchase the patient the requested stockings, unable to LVM on cell number. PC to office and left  requesting call back to writer to discuss patient's needs.    Writer will call tomorrow and follow up with [facility] [place] [facility] which is where writer sent orders for compression stockings after pt's last office visit.

## 2024-05-02 NOTE — TELEPHONE ENCOUNTER
PC to Ann Capps with [employer] at [phone], no answer. Left secure VM with pt info requesting call back if order for compression stockings was received.    Further review of Lay's progress note on 4/9/24 indicates benefits verification completed with [employer]:    [employer] benefits returned 4/26/2024  EXPLANATION OF BENEFITS     Patient Name:  Dulce Serrano  Insurance provided to us: Karissa       WE ARE IN-NETWORK WITH THIS PLAN.     Information regarding the Patient’s insurance coverage:  Deductible: NA  Out of Pocket: $1,500 Currently, $110.43 has been met.  Insurance pays: 92%    The patient can pay using either a credit card or an HSA account card.  The patient’s plan renews on:  1/1/25  Quantity Available per plan year:   Padded Nighttime Garments / Compression Wraps w/adjustable straps - 12 per 350 days  Custom Thigh Highs, Chap, Pantyhose and Toe caps - 12 per 350 days  RTW Knee Highs 18-30mmHg - 12 per 365 days  RTW Thigh Highs, Chap, Pantyhose - 12 per 365 day  RTW and Custom Knee Highs 30mmHg and higher - 8 per 173 days  Bandaging - pending    ·         A Prior Authorization is NOT needed for this order to proceed.     NEXT STEPS: ITEMS NEEDED SO WE CAN PLACE AN ORDER FOR THIS PATIENT:  Your product selection   Please provide us with the following for the RTW 15-20 mmhg thigh highs:  /material  Size and length  Open toe or closed option     TO PAY CO-PAYS, DEDUCTIBLES, and NON-COVERED ITEMS, YOUR PATIENT SHOULD CALL:   [phone]

## 2024-05-16 ENCOUNTER — APPOINTMENT (OUTPATIENT)
Dept: CT IMAGING | Age: 69
End: 2024-05-16
Payer: MEDICARE

## 2024-05-16 ENCOUNTER — APPOINTMENT (OUTPATIENT)
Dept: GENERAL RADIOLOGY | Age: 69
End: 2024-05-16
Payer: MEDICARE

## 2024-05-16 ENCOUNTER — HOSPITAL ENCOUNTER (EMERGENCY)
Age: 69
Discharge: HOME | End: 2024-05-16
Attending: EMERGENCY MEDICINE
Payer: MEDICARE

## 2024-05-16 VITALS
HEART RATE: 75 BPM | DIASTOLIC BLOOD PRESSURE: 79 MMHG | BODY MASS INDEX: 30.56 KG/M2 | WEIGHT: 183.42 LBS | SYSTOLIC BLOOD PRESSURE: 164 MMHG | OXYGEN SATURATION: 99 % | TEMPERATURE: 98.1 F | HEIGHT: 65 IN | RESPIRATION RATE: 19 BRPM
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
## 2024-05-16 DIAGNOSIS — M25.551 RIGHT HIP PAIN: ICD-10-CM

## 2024-05-16 DIAGNOSIS — S09.90XA INJURY OF HEAD, INITIAL ENCOUNTER: ICD-10-CM

## 2024-05-16 PROCEDURE — 99284 EMERGENCY DEPT VISIT MOD MDM: CPT

## 2024-05-16 PROCEDURE — 70450 CT HEAD/BRAIN W/O DYE: CPT

## 2024-05-16 PROCEDURE — 72125 CT NECK SPINE W/O DYE: CPT

## 2024-05-16 PROCEDURE — 72170 X-RAY EXAM OF PELVIS: CPT

## 2024-05-16 PROCEDURE — 73552 X-RAY EXAM OF FEMUR 2/>: CPT

## 2024-05-16 PROCEDURE — 73562 X-RAY EXAM OF KNEE 3: CPT

## 2024-05-16 ASSESSMENT — LIFESTYLE VARIABLES
HOW OFTEN DO YOU HAVE A DRINK CONTAINING ALCOHOL: NEVER
HOW MANY STANDARD DRINKS CONTAINING ALCOHOL DO YOU HAVE ON A TYPICAL DAY: PATIENT DOES NOT DRINK

## 2024-05-16 ASSESSMENT — PAIN SCALES - GENERAL: PAINLEVEL_OUTOF10: 9

## 2024-05-16 ASSESSMENT — PAIN - FUNCTIONAL ASSESSMENT: PAIN_FUNCTIONAL_ASSESSMENT: 0-10

## 2024-05-16 ASSESSMENT — PAIN DESCRIPTION - LOCATION: LOCATION: HEAD

## 2024-05-16 NOTE — DISCHARGE INSTRUCTIONS
You were evaluated in the emergency department for fall 2 weeks ago and you have been having left hip pain as well as head pain.  CT of your head and neck did not reveal any acute abnormalities.  X-ray of your right knee, right thigh and pelvis did not reveal any acute abnormalities.  You were seen by case management in the emergency department.  Please follow-up with your primary care physician.  Please return to the emergency department for any worsening symptoms, questions or concerns.

## 2024-05-16 NOTE — ED NOTES
Pt to ED with c/o falling 2 weeks ago at the bus stop. Pt reports her bag got caught on the bus lift which caused her to trip and hit her head. Pt reports possible LOC stating she was unable to come to ED due to no ride.

## 2024-05-16 NOTE — ED NOTES
Patient does not have anyone that is able to pick her up. Went over all of her HIPAA contacts listed.   Will arrange cab via [employer] through [employer].

## 2024-05-16 NOTE — ED PROVIDER NOTES
[facility]  FACULTY HANDOFF     4:10 PM EDT  Handoff taken on the following patient from prior Attending Physician:  Pt Name: Dulce Serrano  PCP:  Twin Faust MD    Attestation  I was available and discussed any additional care issues that arose and coordinated the management plans with the resident(s) caring for the patient during my duty period. Any areas of disagreement with resident's documentation of care or procedures are noted on the chart. I was personally present for the key portions of any/all procedures during my duty period. I have documented in the chart those procedures where I was not present during the key portions.         CHIEF COMPLAINT       Chief Complaint   Patient presents with    Fall         CURRENT MEDICATIONS     Previous Medications  Previous Medications    ALBUTEROL (PROVENTIL) (2.5 MG/3ML) 0.083% NEBULIZER SOLUTION    Take 3 mLs by nebulization every 6 hours as needed for Wheezing    ALBUTEROL SULFATE HFA (PROVENTIL;VENTOLIN;PROAIR) 108 (90 BASE) MCG/ACT INHALER    INHALE 2 PUFFS BY MOUTH INTO LUNGS TWICE DAILY AS NEEDED FOR WHEEZING (NO  MORE  THAN  4  TIMES  DAILY)    AMMONIUM LACTATE (LAC-HYDRIN) 12 % CREAM    Apply to elbows twice daily    BUDESONIDE-FORMOTEROL (SYMBICORT) 160-4.5 MCG/ACT AERO    Inhale 2 puffs into the lungs 2 times daily    DICLOFENAC SODIUM (VOLTAREN) 1 % GEL    Apply 4 g topically 4 times daily    ELASTIC BANDAGES & SUPPORTS (MEDICAL COMPRESSION STOCKINGS) MISC    1 each by Does not apply route daily One pair, foot to thigh compression stocking. Compression strength 20-30 mm/Hg    ELASTIC BANDAGES & SUPPORTS (MEDICAL COMPRESSION STOCKINGS) MISC    4 each by Does not apply route daily as needed (For Lymphedema and venous insufficiency)    FLUOXETINE (PROZAC) 40 MG CAPSULE    Take 1 capsule by mouth daily Last filled 1/24/2022    FLUTICASONE (FLONASE) 50 MCG/ACT NASAL SPRAY    1 spray by Each Nostril route daily Taking PRN     of the cervical spine.         CT CERVICAL SPINE WO CONTRAST   Preliminary Result   1. No acute intracranial abnormality.   2. No acute osseous abnormality of the cervical spine.         XR FEMUR RIGHT (MIN 2 VIEWS)    (Results Pending)   XR KNEE RIGHT (3 VIEWS)    (Results Pending)   XR PELVIS (1-2 VIEWS)    (Results Pending)       LABS:  Labs Reviewed - No data to display    Delayed presentation after a fall, right thigh contusion rule out fracture  CT pending rule out bleed    PLAN/ TASKS OUTSTANDING     Imaging, reassess, disposition      (Please note that portions of this note were completed with a voice recognition program.  Efforts were made to edit the dictations but occasionally words are mis-transcribed.)    Bjorn Khan MD,, MD, F.A.C.E.P.  Attending Emergency Physician        Bjorn Khan MD  05/16/24 6118

## 2024-05-16 NOTE — ED PROVIDER NOTES
[facility] ED     Emergency Department     Faculty Attestation        I performed a history and physical examination of the patient and discussed management with the resident. I reviewed the resident’s note and agree with the documented findings and plan of care. Any areas of disagreement are noted on the chart. I was personally present for the key portions of any procedures. I have documented in the chart those procedures where I was not present during the key portions. I have reviewed the emergency nurses triage note. I agree with the chief complaint, past medical history, past surgical history, allergies, medications, social and family history as documented unless otherwise noted below.  For Physician Assistant/ Nurse Practitioner cases/documentation I have personally evaluated this patient and have completed at least one if not all key elements of the E/M (history, physical exam, and MDM). Additional findings are as noted.      Vital Signs: BP: (!) 164/79  Pulse: 75  Respirations: 19  Temp: 98.1 °F (36.7 °C) SpO2: 99 %  PCP:  Twin Faust MD  Note Started: 5/16/24, 3:32 PM EDT    Pertinent Comments:         Critical Care  None      (Please note that portions of this note were completed with a voice recognition program. Efforts were made to edit the dictations but occasionally words are mis-transcribed. Whenever words are used in this note in any gender, they shall be construed as though they were used in the gender appropriate to the circumstances; and whenever words are used in this note in the singular or plural form, they shall be construed as though they were used in the form appropriate to the circumstances.)    Dash Mckeon MD [facility]  Attending Emergency Medicine Physician           Dash Mckeon MD  05/16/24 0450

## 2024-05-16 NOTE — ED PROVIDER NOTES
[facility] ED  Emergency Department Encounter  Emergency Medicine Resident     Pt Name:Dulce Serrano  MRN: [id number]  Birthdate 1955  Date of evaluation: 5/16/24  PCP:  Twin Faust MD  Note Started: 3:05 PM EDT      CHIEF COMPLAINT       Chief Complaint   Patient presents with    Fall       HISTORY OF PRESENT ILLNESS  (Location/Symptom, Timing/Onset, Context/Setting, Quality, Duration, Modifying Factors, Severity.)      Patient is a poor historian.  History was difficult to obtain due to the story changing.    Dulce Serrano is a 69 y.o. female who presents with head pain, bilateral hip pain, right thigh pain.  Patient states that 2 weeks ago she was attempting to get on a bus when she tripped and fell over the chairlift.  States that she hit her head.  Endorses losing consciousness at that time.  Not on blood thinners***    PAST MEDICAL / SURGICAL / SOCIAL / FAMILY HISTORY      has a past medical history of Ankle fracture, right, closed, with routine healing, subsequent encounter, Anxiety, Arrhythmia, Asthma, Bipolar 1 disorder (HCC), Bipolar disorder (HCC), Chronic diarrhea, COPD (chronic obstructive pulmonary disease) (HCC), Depression, Essential hypertension, GERD (gastroesophageal reflux disease), GERD (gastroesophageal reflux disease), History of stroke, Hyperlipidemia, Hypertension, Hypothyroidism, Mild persistent asthma without complication, OAB (overactive bladder), Obesity (BMI 30-39.9), Osteoarthritis, Poor historian, Pulmonary fibrosis (HCC), Pulmonary hypertension (HCC), Pure hypercholesterolemia, Sleep apnea, Stroke (HCC), Unspecified sleep apnea, and Urinary incontinence.  ***     has a past surgical history that includes Colonoscopy (04/2015); Colonoscopy (2018); Ankle surgery (Right, 09/11/2019); Eye surgery (Left); Tooth Extraction; Cystoscopy (N/A, 3/2/2022); and Cystoscopy (N/A, 12/21/2022).  ***    Social History     Socioeconomic History    Marital status:

## 2024-05-30 ENCOUNTER — HOSPITAL ENCOUNTER (EMERGENCY)
Age: 69
Discharge: HOME | End: 2024-05-30
Attending: EMERGENCY MEDICINE
Payer: MEDICARE

## 2024-05-30 VITALS
DIASTOLIC BLOOD PRESSURE: 78 MMHG | OXYGEN SATURATION: 96 % | BODY MASS INDEX: 30.79 KG/M2 | WEIGHT: 185 LBS | RESPIRATION RATE: 18 BRPM | SYSTOLIC BLOOD PRESSURE: 116 MMHG | TEMPERATURE: 97.3 F | HEART RATE: 105 BPM

## 2024-05-30 DIAGNOSIS — L85.3 DRY SKIN: Primary | ICD-10-CM

## 2024-05-30 PROCEDURE — 99282 EMERGENCY DEPT VISIT SF MDM: CPT

## 2024-05-30 ASSESSMENT — ENCOUNTER SYMPTOMS
CONSTIPATION: 0
WHEEZING: 0
SINUS PRESSURE: 0
DIARRHEA: 0
NAUSEA: 0
BACK PAIN: 0
SHORTNESS OF BREATH: 0
ABDOMINAL PAIN: 0
SORE THROAT: 0
VOMITING: 0

## 2024-05-30 ASSESSMENT — PAIN - FUNCTIONAL ASSESSMENT: PAIN_FUNCTIONAL_ASSESSMENT: NONE - DENIES PAIN

## 2024-05-30 NOTE — ED NOTES
PT walked out to her car with RN carrying her bags and assisting to get into the car. Discharge instructions reviewed

## 2024-05-30 NOTE — ED PROVIDER NOTES
[facility] ED     Emergency Department     Faculty Attestation    I performed a history and physical examination of the patient and discussed management with the resident. I reviewed the resident’s note and agree with the documented findings and plan of care. Any areas of disagreement are noted on the chart. I was personally present for the key portions of any procedures. I have documented in the chart those procedures where I was not present during the key portions. I have reviewed the emergency nurses triage note. I agree with the chief complaint, past medical history, past surgical history, allergies, medications, social and family history as documented unless otherwise noted below. For Physician Assistant/ Nurse Practitioner cases/documentation I have personally evaluated this patient and have completed at least one if not all key elements of the E/M (history, physical exam, and MDM). Additional findings are as noted.    Note Started: 5:44 PM EDT    Patient here with rash low back and buttocks.  She was concerned for shingles.  She does state that she took a bath the other day normally has her  to help her get out but he was not home and when she was in the tub for 3 to 4 hours.  My exam chaperoned by Citlali DENIS.  No shingles no vesicles.  Bilateral low back buttock dry skin no other lesions.  Reassurance provided will discharge      Critical Care     none    Bjorn Vee MD, FACEP, FAAEM  Attending Emergency  Physician           Bjorn Vee MD  05/30/24 7153

## 2024-05-30 NOTE — ED PROVIDER NOTES
[facility] ED  Emergency Department Encounter  Emergency Medicine Resident     Pt Name:Dulce Serrano  MRN: [id number]  Birthdate 1955  Date of evaluation: 5/30/24  PCP:  Twin Faust MD  Note Started: 6:33 PM EDT      CHIEF COMPLAINT       Chief Complaint   Patient presents with    Blister     On back. Concern for shingles       HISTORY OF PRESENT ILLNESS  (Location/Symptom, Timing/Onset, Context/Setting, Quality, Duration, Modifying Factors, Severity.)      Dulce Serrano is a 69 y.o. female who presents with rash.  Patient states that the other day she was in her bathtub using Epsom salts when she was unable to get out.  States that her  usually helps her get out of the bathtub but he is currently incarcerated.  Had to call EMS for help with removal.  Patient states since that she has been having itchiness between the thighs and in the lower back.  Concerned that she has a rash or some sort.  No fevers, chills.  No other complaints at this time.    PAST MEDICAL / SURGICAL / SOCIAL / FAMILY HISTORY      has a past medical history of Ankle fracture, right, closed, with routine healing, subsequent encounter, Anxiety, Arrhythmia, Asthma, Bipolar 1 disorder (HCC), Bipolar disorder (HCC), Chronic diarrhea, COPD (chronic obstructive pulmonary disease) (HCC), Depression, Essential hypertension, GERD (gastroesophageal reflux disease), GERD (gastroesophageal reflux disease), History of stroke, Hyperlipidemia, Hypertension, Hypothyroidism, Mild persistent asthma without complication, OAB (overactive bladder), Obesity (BMI 30-39.9), Osteoarthritis, Poor historian, Pulmonary fibrosis (HCC), Pulmonary hypertension (HCC), Pure hypercholesterolemia, Sleep apnea, Stroke (HCC), Unspecified sleep apnea, and Urinary incontinence.       has a past surgical history that includes Colonoscopy (04/2015); Colonoscopy (2018); Ankle surgery (Right, 09/11/2019); Eye surgery (Left); Tooth Extraction;    Lactobacillus Acid-Pectin (ACIDOPHILUS/PECTIN) CAPS TAKE ONE CAPSULE BY MOUTH ONCE A DAY 2/2/24   Twin Faust MD   albuterol (PROVENTIL) (2.5 MG/3ML) 0.083% nebulizer solution Take 3 mLs by nebulization every 6 hours as needed for Wheezing 11/10/23   Twin Faust MD   albuterol sulfate HFA (PROVENTIL;VENTOLIN;PROAIR) 108 (90 Base) MCG/ACT inhaler INHALE 2 PUFFS BY MOUTH INTO LUNGS TWICE DAILY AS NEEDED FOR WHEEZING (NO  MORE  THAN  4  TIMES  DAILY) 10/20/23   Twin Faust MD   ammonium lactate (LAC-HYDRIN) 12 % cream Apply to elbows twice daily 3/8/23   Lucie Guerra MD   triamcinolone (KENALOG) 0.025 % cream Apply to rash on face and chest twice daily  Patient not taking: Reported on 4/5/2024 3/8/23   Lucie Guerra MD   budesonide-formoterol (SYMBICORT) 160-4.5 MCG/ACT AERO Inhale 2 puffs into the lungs 2 times daily 2/14/23   Marsha Kohler MD   Incontinence Supply Disposable (SELECT BOOSTER PADS) MISC Use 3-4/day 8/5/22   Marsha Kohler MD   Probiotic, Lactobacillus, CAPS Take 1 tablet by mouth daily OTC 6/15/22   Marsha Kohler MD   PSYLLIUM HUSK PO Take by mouth OTC  Patient not taking: Reported on 4/5/2024    Luna Wall MD   fluticasone (FLONASE) 50 MCG/ACT nasal spray 1 spray by Each Nostril route daily Taking PRN    Luna Wall MD   triamcinolone (KENALOG) 0.1 % ointment Apply topically 2 times daily Using PRN  Patient not taking: Reported on 4/5/2024    Luna Wall MD   QUEtiapine (SEROQUEL) 200 MG tablet Take 1 tablet by mouth daily Last filled 11/8/2021 90 day supply,   Take 3 tablets once daily--600 mg dose    Luna Wall MD   FLUoxetine (PROZAC) 40 MG capsule Take 1 capsule by mouth daily Last filled 1/24/2022    Luna Wall MD   ipratropium (ATROVENT) 0.03 % nasal spray 2 sprays by Nasal route 3 times daily as needed for Rhinitis    Provider, MD Luna   lithium 300 MG capsule Take 1 capsule by mouth 2 times daily (with

## 2024-05-30 NOTE — DISCHARGE INSTRUCTIONS
You are seen emergency department for rash.  Symptoms likely due to dry skin from being in the bathtub with some salts.  Please contact your guardian tomorrow about getting her cell phone restarted.  Additionally I recommend not getting into the bathtub unless she has someone available to help you to get out.  If at any point to feel like you are not able to take care of yourself please call 911 and come back to the emergency department.  Please return for any new or worsening symptoms.

## 2024-05-30 NOTE — ED NOTES
Pt presents to the ER with complaints of blisters on her skin  Pt reports taking a \"hot tub\" in her tub that has a whirlpool feature lastnight  Pt used epsom salts and she fears that the tub was to much for her  Pt states she was unable to get out of the tub so she stayed in it to long    Pt complaining of pain in her feet , back and inner thigh  RN sees dried skin and some discoloration, no blisters present

## 2024-06-26 ENCOUNTER — CLINICAL DOCUMENTATION (OUTPATIENT)
Dept: OCCUPATIONAL THERAPY | Age: 69
End: 2024-06-26

## 2024-06-26 NOTE — DISCHARGE SUMMARY
TREATMENT LOCATION:   [x][facility]  Outpatient Rehabilitation &  Therapy  [address], [address]  P: [phone]  F: [phone] [][facility]  Outpatient Rehabilitation &  Therapy  [address]  P: [phone]  F: [phone] [][facility]  Outpatient Rehabilitation &  Therapy  [address]  P: [phone]  F: [phone]     Lymphedema Therapy Discharge Note    Date: 2024      Patient: Dulce Serrano  : 1955  MRN: [id number] Provider: Marsha Kohler MD       Phone: [phone]                    Fax: [phone]  Insurance:      [employer] MEDICARE-[employer]VANTAGE P[employer] (ID:[id number] ) - No prior auth, OT BOMN, follows Medicare guidelines owes $4% co-pay  Medical Diagnosis: I87.2  Rehab Codes: I89.0  Onset Date: 2/15/2024     Demographic info for garment/pump VOB:   [address] [facility] [address]  [phone]  Guardian pays her bills  Aaron Corey,  Relationship: Legal Guardian   [phone]     Overview of Evaluation dated 3/18/2024:Patient is a 68 year old female referred to the Lymphedema Clinic with a diagnosis of bilateral Lower Extremity I87.2 CVI and c/o swelling to B LE.  PT has a hx of wearing compression stockings.   She presents with interest in a pneumatic pump.  She is concerned about swelling, need for compression stockings.  She has thigh high stockings but the seem to wear then as knee highs perhaps because they fall down.  She has a legal guardian.  She was a poor historian and requires extended time for education. She takes Amlodipine a known major side effect being B LE edema.      Total visits attended: 2        Cancels/No shows: 1  Date of initial visit: 3/18/2024                Date of final visit: 2024      Subjective:  Refer to initial evaluation/ treatment notes.   2024 Unable to recite own phone number. Pt perception is

## 2024-07-17 DIAGNOSIS — E03.9 HYPOTHYROIDISM, UNSPECIFIED TYPE: ICD-10-CM

## 2024-07-17 RX ORDER — LEVOTHYROXINE SODIUM 0.03 MG/1
25 TABLET ORAL DAILY
Qty: 90 TABLET | Refills: 0 | Status: SHIPPED | OUTPATIENT
Start: 2024-07-17

## 2024-07-17 NOTE — TELEPHONE ENCOUNTER
Dulce Serrano is calling to request a refill on the following medication(s):    Medication Request:  Requested Prescriptions     Pending Prescriptions Disp Refills    levothyroxine (SYNTHROID) 25 MCG tablet [Pharmacy Med Name: LEVOTHYROXINE SODIUM 25MCG TABS] 90 tablet 0     Sig: TAKE ONE TABLET BY MOUTH ONCE A DAY       Last Visit Date (If Applicable):  4/5/2024    Next Visit Date:    Visit date not found

## 2024-07-20 PROCEDURE — 99283 EMERGENCY DEPT VISIT LOW MDM: CPT

## 2024-07-21 ENCOUNTER — HOSPITAL ENCOUNTER (EMERGENCY)
Age: 69
Discharge: HOME | End: 2024-07-21
Attending: EMERGENCY MEDICINE
Payer: MEDICARE

## 2024-07-21 VITALS
TEMPERATURE: 98.3 F | RESPIRATION RATE: 26 BRPM | DIASTOLIC BLOOD PRESSURE: 65 MMHG | SYSTOLIC BLOOD PRESSURE: 124 MMHG | HEART RATE: 92 BPM | OXYGEN SATURATION: 95 %

## 2024-07-21 DIAGNOSIS — L03.116 CELLULITIS OF LEFT LOWER EXTREMITY: Primary | ICD-10-CM

## 2024-07-21 DIAGNOSIS — I87.2 VENOUS INSUFFICIENCY: ICD-10-CM

## 2024-07-21 PROCEDURE — 6370000000 HC RX 637 (ALT 250 FOR IP)

## 2024-07-21 RX ORDER — CEPHALEXIN 500 MG/1
500 CAPSULE ORAL ONCE
Status: COMPLETED | OUTPATIENT
Start: 2024-07-21 | End: 2024-07-21

## 2024-07-21 RX ORDER — CEPHALEXIN 500 MG/1
500 CAPSULE ORAL 2 TIMES DAILY
Qty: 14 CAPSULE | Refills: 0 | Status: SHIPPED | OUTPATIENT
Start: 2024-07-21 | End: 2024-07-28

## 2024-07-21 RX ADMIN — CEPHALEXIN 500 MG: 500 CAPSULE ORAL at 02:11

## 2024-07-21 NOTE — ED TRIAGE NOTES
Pt to ED 9 from triage by self.  Pt is a/o x4.  Pt c/o BLE swelling and the need for compression stockings.  Pt vitals assessed and noted.  Pt denies CP, SOB, other concerns.  Pt stated that she has a script for compression stockings but her guardian has not paid for them so she can get them.  Pt stated she has not worn them for 6 months.  NAD noted.  Care ongoing.

## 2024-07-21 NOTE — ED PROVIDER NOTES
[facility]     Emergency Department     Faculty Attestation    I performed a history and physical examination of the patient and discussed management with the resident. I reviewed the resident’s note and agree with the documented findings and plan of care. Any areas of disagreement are noted on the chart. I was personally present for the key portions of any procedures. I have documented in the chart those procedures where I was not present during the key portions. I have reviewed the emergency nurses triage note. I agree with the chief complaint, past medical history, past surgical history, allergies, medications, social and family history as documented unless otherwise noted below. Documentation of the HPI, Physical Exam and Medical Decision Making performed by medical students or scribes is based on my personal performance of the HPI, PE and MDM. For Physician Assistant/ Nurse Practitioner cases/documentation I have personally evaluated this patient and have completed at least one if not all key elements of the E/M (history, physical exam, and MDM). Additional findings are as noted.    Vital signs:   Vitals:    07/20/24 2359   BP: 124/65   Pulse: (!) 103   Resp: 26   Temp: 98.3 °F (36.8 °C)   SpO2: 95%      Known history of lymphedema. Here requesting a new prescription for compression socks and her triamcinolone cream.  On physical exam, the patient does have lower extremity edema.  No calf tenderness.  She has mild erythema in the lateral aspect of her left lower leg concerning for cellulitis.  Plan for prescription for Keflex.  Prescription for compression socks.            Earnestine Corona M.D,  Attending Emergency  Physician            Earnestine Corona MD  07/21/24 0105

## 2024-07-21 NOTE — DISCHARGE INSTRUCTIONS
You are seen in the emergency department for pain in your left leg.  This is a small area of skin infection and you were started on a course of antibiotics which you should take until they are completely gone.  Additionally you have mild swelling which appears to be due to venous insufficiency.  Please  the compression stockings and wear these daily.  You should follow-up with your primary care provider within the next week to ensure your symptoms are resolving.    If at any point you have worsening symptoms, you should return to emergency department for further evaluation

## 2024-07-21 NOTE — ED PROVIDER NOTES
[facility] ED  Emergency Department Encounter  Emergency Medicine Resident     Pt Name:Dulce Serrano  MRN: [id number]  Birthdate 1955  Date of evaluation: 7/21/24  PCP:  Twin Faust MD  Note Started: 1:01 AM EDT      CHIEF COMPLAINT       Chief Complaint   Patient presents with    Leg Pain     States she has tendonitis, states she wants compression stockings        HISTORY OF PRESENT ILLNESS  (Location/Symptom, Timing/Onset, Context/Setting, Quality, Duration, Modifying Factors, Severity.)      Dulce Serrano is a 69 y.o. female who presents with left calf swelling. The swelling began about 3 days ago and is accompanied by redness and pain. She has a history of lymphedema in both legs but has not had compression stockings for several months due to her social situation with her guardian. Patient denies history of blood clots in her legs or lungs. Patient denies fevers, chills, cough, shortness of breath, chest pain, nausea, vomiting, constipation, diarrhea, or urinary symptoms. Patient is able to ambulate as normal with her walker. Patient lives alone at home and her  resides in a recovery center. Patient has been able to take her medications.    PAST MEDICAL / SURGICAL / SOCIAL / FAMILY HISTORY      has a past medical history of Ankle fracture, right, closed, with routine healing, subsequent encounter, Anxiety, Arrhythmia, Asthma, Bipolar 1 disorder ([facility]), Bipolar disorder (HCC), Chronic diarrhea, COPD (chronic obstructive pulmonary disease) ([facility]), Depression, Essential hypertension, GERD (gastroesophageal reflux disease), GERD (gastroesophageal reflux disease), History of stroke, Hyperlipidemia, Hypertension, Hypothyroidism, Mild persistent asthma without complication, OAB (overactive bladder), Obesity (BMI 30-39.9), Osteoarthritis, Poor historian, Pulmonary fibrosis ([facility]), Pulmonary hypertension (HCC), Pure hypercholesterolemia, Sleep apnea, Stroke ([facility]), Unspecified

## 2024-07-23 NOTE — ED NOTES
ANDREI received callback from Xiomara at [facility] who reports that she is very familiar with pt and Guardian. Feli reports pt has mental health issues and memory issues and is referring to stuff from a long time ago. Xiomara reports she has no concerns and informed writer that if pt comes in again to call her Guardian.   
Floresita called and left another message with APS to make a report.   
SW attempted to reach APS again and left message with pt's name for APS worker to call back. SW informed day shift SW Quin of hand off.   
Sw arranged transport for pt back to address on file as approved by Guardian. [employer] utilized due to only having medicare insurance. Trip # [id number]   
otherwise answered all other questions appropriately.      SW called Guardian Aaron Madhav to inform him of pt being in ED. Guardian reports he has been pt's Guardian for approximately 4-5 years and that pt's prior Guardian who was an  did live across the street from her as pt stated. Per Guardian pt's prior Guardian/  did get in trouble and that he took over after this. Guardian reports pt has not been communicating well with him lately and has not communicated with pt's psychiatrist lately. Per Guardian pt is safe to cab back to address on file when discharged.     SW had to make report due to mandatory reporting law and left HIPAA compliant message with APS and is awaiting callback. ANDREI will inform day shift SW of concerns.

## 2024-08-08 ENCOUNTER — TELEPHONE (OUTPATIENT)
Dept: INTERNAL MEDICINE | Age: 69
End: 2024-08-08

## 2024-08-08 NOTE — TELEPHONE ENCOUNTER
Writer received triage call from [facility], pt stating she is having trouble with her speech. Call transferred, pt states she left her cell phone outside last night and is now having to use her home phone. Writer repeatedly asked her if she is having speech difficulty - pt's speech over the phone very typical to pt's baseline speech pattern - pt states she is just feeling out of sorts d/t her phone going missing. Pt then mentioned she was at ED a couple weeks ago and has been trying to schedule a f/u appt. Appt scheduled.    Pt states she was told to get compression stockings, order was placed during ED visit. Writer explained to pt several times that her insurance won't cover the cost of compression stockings and that she can go to [facility] to purchase less expensive stockings that will help with her swelling. Pt then stated they gave her \"bandages\" at the ED and she needs some more of them. Writer able to figure out pt talking about Ace bandages used to wrap pt's legs at that time. Writer let pt know she can purchase more Ace bandages at [facility] as well.    Pt then stated she needs a place to take her dogs b/c she has health issues going on that she needs to focus on and needs a couple days \"to get things together\". Writer attempted to provide dog boarding companies phone numbers to pt, but she was unable to find a pen that works at the time. Writer will provide pt with list of phone numbers as well as items to purchase from [facility].     Pt then asked if she can be referred to a . Pt states she has one, but that the  doesn't always come through for her. Writer let pt know we can put a referral to Zev BATEMAN in when she come in on 8/14. Pt agreeable to all of the above.

## 2024-08-15 DIAGNOSIS — J45.30 MILD PERSISTENT ASTHMA WITHOUT COMPLICATION: ICD-10-CM

## 2024-08-15 DIAGNOSIS — I10 ESSENTIAL HYPERTENSION: ICD-10-CM

## 2024-08-15 DIAGNOSIS — K21.9 GASTROESOPHAGEAL REFLUX DISEASE, UNSPECIFIED WHETHER ESOPHAGITIS PRESENT: ICD-10-CM

## 2024-08-15 NOTE — TELEPHONE ENCOUNTER
Dulce Serrano is calling to request a refill on the following medication(s):    Medication Request:  Requested Prescriptions     Pending Prescriptions Disp Refills    vitamin D3 (CHOLECALCIFEROL) 25 MCG (1000 UT) TABS tablet [Pharmacy Med Name: VITAMIN D3 25 MCG(1000 UT) TABS] 30 tablet 5     Sig: TAKE ONE TABLET BY MOUTH ONCE A DAY    montelukast (SINGULAIR) 10 MG tablet [Pharmacy Med Name: MONTELUKAST SODIUM 10MG TABS] 90 tablet 1     Sig: TAKE ONE TABLET BY MOUTH EVERY NIGHT    lisinopril (PRINIVIL;ZESTRIL) 20 MG tablet [Pharmacy Med Name: LISINOPRIL 20MG TABS] 30 tablet 1     Sig: TAKE ONE TABLET BY MOUTH ONCE A DAY    omeprazole (PRILOSEC) 20 MG delayed release capsule [Pharmacy Med Name: OMEPRAZOLE 20MG CPDR] 90 capsule 1     Sig: TAKE ONE CAPSULE BY MOUTH EVERY MORNING BEFORE BREAKFAST       Last Visit Date (If Applicable):  4/5/2024    Next Visit Date:    Visit date not found

## 2024-08-16 RX ORDER — CHOLECALCIFEROL (VITAMIN D3) 25 MCG
1 TABLET ORAL DAILY
Qty: 30 TABLET | Refills: 5 | Status: SHIPPED | OUTPATIENT
Start: 2024-08-16

## 2024-08-16 RX ORDER — OMEPRAZOLE 20 MG/1
CAPSULE, DELAYED RELEASE ORAL
Qty: 90 CAPSULE | Refills: 1 | Status: SHIPPED | OUTPATIENT
Start: 2024-08-16

## 2024-08-16 RX ORDER — LISINOPRIL 20 MG/1
20 TABLET ORAL DAILY
Qty: 30 TABLET | Refills: 1 | Status: SHIPPED | OUTPATIENT
Start: 2024-08-16

## 2024-08-16 RX ORDER — MONTELUKAST SODIUM 10 MG/1
TABLET ORAL
Qty: 90 TABLET | Refills: 1 | Status: SHIPPED | OUTPATIENT
Start: 2024-08-16

## 2024-08-19 DIAGNOSIS — N39.41 URGE INCONTINENCE OF URINE: ICD-10-CM

## 2024-08-19 RX ORDER — OXYBUTYNIN CHLORIDE 5 MG/1
5 TABLET ORAL DAILY
Qty: 90 TABLET | Refills: 1 | Status: SHIPPED | OUTPATIENT
Start: 2024-08-19

## 2024-08-19 NOTE — TELEPHONE ENCOUNTER
Dulce Serrano is calling to request a refill on the following medication(s):    Medication Request:  Requested Prescriptions     Pending Prescriptions Disp Refills    oxyBUTYnin (DITROPAN) 5 MG tablet 90 tablet 1     Sig: Take 1 tablet by mouth daily       Last Visit Date (If Applicable):  4/5/2024    Next Visit Date:    Visit date not found

## 2024-08-30 ENCOUNTER — HOSPITAL ENCOUNTER (EMERGENCY)
Age: 69
Discharge: HOME | End: 2024-08-30
Attending: EMERGENCY MEDICINE
Payer: MEDICARE

## 2024-08-30 ENCOUNTER — APPOINTMENT (OUTPATIENT)
Dept: GENERAL RADIOLOGY | Age: 69
End: 2024-08-30
Payer: MEDICARE

## 2024-08-30 VITALS
OXYGEN SATURATION: 96 % | DIASTOLIC BLOOD PRESSURE: 78 MMHG | RESPIRATION RATE: 19 BRPM | HEIGHT: 65 IN | WEIGHT: 173 LBS | HEART RATE: 98 BPM | BODY MASS INDEX: 28.82 KG/M2 | SYSTOLIC BLOOD PRESSURE: 147 MMHG | TEMPERATURE: 98.9 F

## 2024-08-30 DIAGNOSIS — U07.1 COVID: Primary | ICD-10-CM

## 2024-08-30 LAB
FLUAV AG SPEC QL: NEGATIVE
FLUBV AG SPEC QL: NEGATIVE
SARS-COV-2 RDRP RESP QL NAA+PROBE: DETECTED
SPECIMEN DESCRIPTION: ABNORMAL
SPECIMEN SOURCE: NORMAL
STREP A, MOLECULAR: NEGATIVE

## 2024-08-30 PROCEDURE — 6370000000 HC RX 637 (ALT 250 FOR IP)

## 2024-08-30 PROCEDURE — 87651 STREP A DNA AMP PROBE: CPT

## 2024-08-30 PROCEDURE — 87804 INFLUENZA ASSAY W/OPTIC: CPT

## 2024-08-30 PROCEDURE — 87635 SARS-COV-2 COVID-19 AMP PRB: CPT

## 2024-08-30 PROCEDURE — 71046 X-RAY EXAM CHEST 2 VIEWS: CPT

## 2024-08-30 PROCEDURE — 99284 EMERGENCY DEPT VISIT MOD MDM: CPT | Performed by: EMERGENCY MEDICINE

## 2024-08-30 RX ORDER — ACETAMINOPHEN 500 MG
1000 TABLET ORAL ONCE
Status: COMPLETED | OUTPATIENT
Start: 2024-08-30 | End: 2024-08-30

## 2024-08-30 RX ORDER — ACETAMINOPHEN 500 MG
1000 TABLET ORAL 3 TIMES DAILY PRN
Qty: 180 TABLET | Refills: 0 | Status: SHIPPED | OUTPATIENT
Start: 2024-08-30

## 2024-08-30 RX ADMIN — ACETAMINOPHEN 1000 MG: 500 TABLET ORAL at 19:37

## 2024-08-30 ASSESSMENT — PAIN - FUNCTIONAL ASSESSMENT: PAIN_FUNCTIONAL_ASSESSMENT: 0-10

## 2024-08-30 ASSESSMENT — ENCOUNTER SYMPTOMS
SORE THROAT: 1
VOMITING: 0
ABDOMINAL PAIN: 0
COUGH: 1
NAUSEA: 0
DIARRHEA: 0
SHORTNESS OF BREATH: 1

## 2024-08-30 ASSESSMENT — PAIN DESCRIPTION - LOCATION: LOCATION: THROAT

## 2024-08-30 ASSESSMENT — PAIN SCALES - GENERAL: PAINLEVEL_OUTOF10: 10

## 2024-08-30 NOTE — ED NOTES
The following labs were labeled with appropriate pt sticker and tubed to lab:     [] Blue     [] Lavender   [] on ice  [] Green/yellow  [] Green/black [] on ice  [] Grey  [] on ice  [] Yellow  [] Red  [] Pink  [] Type/ Screen  [] ABG  [] VBG    [x] COVID-19 swab    [] Rapid  [] PCR  [x] Flu swab  [] Peds Viral Panel     [] Urine Sample  [] Fecal Sample  [] Pelvic Cultures  [] Blood Cultures  [] X 2  [x] STREP Cultures  [] Wound Cultures

## 2024-08-30 NOTE — ED PROVIDER NOTES
[facility] ED     Emergency Department     Faculty Attestation    I performed a history and physical examination of the patient and discussed management with the resident. I reviewed the resident’s note and agree with the documented findings and plan of care. Any areas of disagreement are noted on the chart. I was personally present for the key portions of any procedures. I have documented in the chart those procedures where I was not present during the key portions. I have reviewed the emergency nurses triage note. I agree with the chief complaint, past medical history, past surgical history, allergies, medications, social and family history as documented unless otherwise noted below. For Physician Assistant/ Nurse Practitioner cases/documentation I have personally evaluated this patient and have completed at least one if not all key elements of the E/M (history, physical exam, and MDM). Additional findings are as noted.    Note Started: 7:31 PM EDT    Here with multiple complaints sore throat cough arthralgias myalgias headache.  States  recently tested positive for strep and was treated.  No vomiting no diarrhea.  No chest pain.  On exam nontoxic well-appearing chatting with her .  No rest or distress speaking in full sentences.  Throat mild erythema dry mucous membranes but no asymmetry no uvular deviation no drooling stridor dysphonia.  Will check COVID flu strep plan discharge      Critical Care     none    Bjorn Vee MD, FACEP, FAAEM  Attending Emergency  Physician           Bjorn Vee MD  08/30/24 2030

## 2024-08-30 NOTE — ED NOTES
Pt arrives alert and oriented x4 and ambulatory from triage   Pt complains of a sore throat x5 days   Pt states that her family member at bedside had the same symptoms last week and now she has them   Pt also complains of cough, nasal congestion and her legs itching d/t \"the heat\"   Pt denies any chest pain/sob/dizziness   RR even and unlabored.   NAD noted.   Whiteboard updated.  Will continue with plan of care.

## 2024-08-30 NOTE — ED NOTES
Report taken from Thor RN  Pt is A&Ox4, even unlabored RR NAD  Pt resting comfortably on cot with call light within reach

## 2024-08-30 NOTE — ED PROVIDER NOTES
[facility] ED  Emergency Department Encounter  Emergency Medicine Resident     Pt Name:Dulce Serrano  MRN: [id number]  Birthdate 1955  Date of evaluation: 8/30/24  PCP:  Twin Faust MD  Note Started: 7:05 PM EDT      CHIEF COMPLAINT       Chief Complaint   Patient presents with    Pharyngitis       HISTORY OF PRESENT ILLNESS  (Location/Symptom, Timing/Onset, Context/Setting, Quality, Duration, Modifying Factors, Severity.)      Dulce Serrano is a 69 y.o. female who presents with 5 days of sore throat, cough, congestion, fever and chills.  Patient states her cough is productive but does not know the color of the sputum because she swallows it.  Patient also complains of ear fullness and pain.  Patient's  is with her and believes he was treated for strep throat last week.  Patient denies nausea, vomiting, diarrhea or abdominal pain.  She does endorse some shortness of breath.  Some chest pain when she coughs.  Patient voices \"can you please give me some Keflex? I need antibiotics.\"    PAST MEDICAL / SURGICAL / SOCIAL / FAMILY HISTORY      has a past medical history of Ankle fracture, right, closed, with routine healing, subsequent encounter, Anxiety, Arrhythmia, Asthma, Bipolar 1 disorder (HCC), Bipolar disorder (HCC), Chronic diarrhea, COPD (chronic obstructive pulmonary disease) (HCC), Depression, Essential hypertension, GERD (gastroesophageal reflux disease), GERD (gastroesophageal reflux disease), History of stroke, Hyperlipidemia, Hypertension, Hypothyroidism, Mild persistent asthma without complication, OAB (overactive bladder), Obesity (BMI 30-39.9), Osteoarthritis, Poor historian, Pulmonary fibrosis (HCC), Pulmonary hypertension (HCC), Pure hypercholesterolemia, Sleep apnea, Stroke (HCC), Unspecified sleep apnea, and Urinary incontinence.       has a past surgical history that includes Colonoscopy (04/2015); Colonoscopy (2018); Ankle surgery (Right, 09/11/2019);  There is no guarding or rebound.   Skin:     General: Skin is warm and dry.      Capillary Refill: Capillary refill takes less than 2 seconds.   Neurological:      General: No focal deficit present.      Mental Status: She is alert and oriented to person, place, and time.           DDX/DIAGNOSTIC RESULTS / EMERGENCY DEPARTMENT COURSE / [facility]     Medical Decision Making  69-year-old female presents to the emergency department with complaints of cold-like symptoms for 5 days.  Known contact with strep throat last week.    Patient is overall well-appearing on exam, though she is febrile at 100.8.  Lungs are clear, posterior oropharynx is clear with some very mild erythema, no exudates.  Unable to visualize bilateral TMs due to cerumen impaction.    Plan to swab for strep, COVID, flu.  Will also x-ray chest and give Tylenol.    Amount and/or Complexity of Data Reviewed  Labs: ordered. Decision-making details documented in ED Course.  Radiology: ordered. Decision-making details documented in ED Course.    Risk  OTC drugs.          EMERGENCY DEPARTMENT COURSE:    ED Course as of 08/30/24 2052   Fri Aug 30, 2024   2015 XR CHEST (2 VW)  IMPRESSION:  No acute process.   [MB]   2027 Rapid influenza A/B antigens:    Flu A Antigen NEGATIVE   Flu B Antigen NEGATIVE [MB]   2032 Temp: 98.9 °F (37.2 °C)  Temperature decreased after Tylenol administration [MB]   2033 Rapid Strep Screen:    SOURCE, [id number] .THROAT SWAB   Strep A, Molecular NEGATIVE [MB]   2033 COVID-19, Rapid(!):    Specimen Description .NASOPHARYNGEAL SWAB   SARS-CoV-2, Rapid DETECTED(!) [MB]   2036 Updated patient on COVID results.  Advised her to continue wearing a mask until she is afebrile without medications.  Gave strict return precautions.  I will also provide a prescription for Tylenol and ibuprofen.  Patient is understanding and agreeable to the plan and discharge home at this time. [MB]      ED Course User Index  [MB] Sharon Cervantes DO

## 2024-08-31 NOTE — DISCHARGE INSTRUCTIONS
You were seen in the emergency department today for cold-like symptoms and diagnosed with COVID-19.    Please continue to isolate and wear a mask until you are afebrile without medication.    Please follow-up with your primary care provider on Wednesday as scheduled.  Please return to the emergency department if you have any new or worsening symptoms.    Thank you for choosing [facility] for your medical care today.

## 2024-09-04 ENCOUNTER — OFFICE VISIT (OUTPATIENT)
Dept: INTERNAL MEDICINE | Age: 69
End: 2024-09-04
Payer: MEDICARE

## 2024-09-04 VITALS
HEIGHT: 65 IN | SYSTOLIC BLOOD PRESSURE: 138 MMHG | TEMPERATURE: 97.2 F | BODY MASS INDEX: 29.99 KG/M2 | RESPIRATION RATE: 16 BRPM | OXYGEN SATURATION: 98 % | WEIGHT: 180 LBS | HEART RATE: 114 BPM | DIASTOLIC BLOOD PRESSURE: 72 MMHG

## 2024-09-04 DIAGNOSIS — L03.116 CELLULITIS OF LEFT LOWER EXTREMITY: Primary | ICD-10-CM

## 2024-09-04 DIAGNOSIS — U07.1 COVID-19: ICD-10-CM

## 2024-09-04 PROCEDURE — 99213 OFFICE O/P EST LOW 20 MIN: CPT | Performed by: HOSPITALIST

## 2024-09-04 PROCEDURE — 1123F ACP DISCUSS/DSCN MKR DOCD: CPT

## 2024-09-04 PROCEDURE — 99213 OFFICE O/P EST LOW 20 MIN: CPT

## 2024-09-04 PROCEDURE — 3078F DIAST BP <80 MM HG: CPT

## 2024-09-04 PROCEDURE — 3075F SYST BP GE 130 - 139MM HG: CPT

## 2024-09-04 RX ORDER — CEPHALEXIN 500 MG/1
500 CAPSULE ORAL 2 TIMES DAILY
Qty: 14 CAPSULE | Refills: 0 | Status: SHIPPED | OUTPATIENT
Start: 2024-09-04 | End: 2024-09-11

## 2024-09-04 RX ORDER — ACETAMINOPHEN 500 MG
1000 TABLET ORAL 3 TIMES DAILY PRN
Qty: 180 TABLET | Refills: 0 | Status: SHIPPED | OUTPATIENT
Start: 2024-09-04

## 2024-09-04 RX ORDER — BENZONATATE 100 MG/1
100 CAPSULE ORAL 3 TIMES DAILY PRN
Qty: 30 CAPSULE | Refills: 0 | Status: SHIPPED | OUTPATIENT
Start: 2024-09-04 | End: 2024-09-14

## 2024-09-04 SDOH — ECONOMIC STABILITY: INCOME INSECURITY: HOW HARD IS IT FOR YOU TO PAY FOR THE VERY BASICS LIKE FOOD, HOUSING, MEDICAL CARE, AND HEATING?: NOT HARD AT ALL

## 2024-09-04 SDOH — ECONOMIC STABILITY: FOOD INSECURITY: WITHIN THE PAST 12 MONTHS, THE FOOD YOU BOUGHT JUST DIDN'T LAST AND YOU DIDN'T HAVE MONEY TO GET MORE.: NEVER TRUE

## 2024-09-04 SDOH — ECONOMIC STABILITY: FOOD INSECURITY: WITHIN THE PAST 12 MONTHS, YOU WORRIED THAT YOUR FOOD WOULD RUN OUT BEFORE YOU GOT MONEY TO BUY MORE.: NEVER TRUE

## 2024-09-04 ASSESSMENT — ENCOUNTER SYMPTOMS
CHEST TIGHTNESS: 0
ABDOMINAL DISTENTION: 0
ABDOMINAL PAIN: 0

## 2024-09-04 NOTE — PROGRESS NOTES
[facility]  [facility]  [address] [place] [address]  Dept: [phone]  Dept Fax: [phone]    Office Progress/Follow Up Note  Date ofpatient's visit: 9/4/2024  Patient's Name:  Dulce Serrano YOB: 1955            Patient Care Team:  Twin Faust MD as PCP - General (Internal Medicine)  Marco A Bonner MD as PCP - Empaneled Provider  Roxy Boyd MD as Consulting Physician (Gastroenterology)  Levon Tavares MD as Consulting Physician (Pulmonology)  Redd Frazier MD as Surgeon (Orthopedic Surgery)  Patrice Zelaya MD as Consulting Physician (Infectious Diseases)  ================================================================    REASON FOR VISIT/CHIEF COMPLAINT:  FOLLOW-UP VISIT; Cellulitis (Patient presents today with Left Lower Extremity Cellulitis she sustained from a contusion getting on the [employer] bus per patient.)    HISTORY OF PRESENTING ILLNESS:  History was obtained from: patient, electronic medical record. Dulce Serrano is a 69 y.o. is here for a acute care visit for left leg lower extremity swelling.  Patient was recently seen on 7/21 in the ED for cellulitis at that time she was prescribed Keflex and compression stockings.  Patient stated that she never took her Keflex and never received the compression stockings.  More recently on 8/30 patient tested positive for COVID after going to the emergency department for sore throat.  Strep was negative at that time.  Patient was discharged with Tylenol.    Today the patient reports left lower extremity swelling and redness.  Patient states it has been ongoing for several months.  Patient states that she has not been able to get her compression stockings due to the inability to afford them.  I spoke with the patient regarding getting compression stockings from [facility].  Patient was agreeable.    Today the patient also complained of cough.  Patient states she has

## 2024-09-04 NOTE — PROGRESS NOTES
Attending Physician Statement  I have discussed the care of Dulce Serrano, including pertinent history and exam findings with the resident. I have reviewed the key elements of all parts of the encounter with the resident. I have seen and examined the patient with the resident and the key elements of all parts of the encounter have been performed by me.  I agree with the assessment, and status of the problem list as documented. The plan and orders should include No orders of the defined types were placed in this encounter.   and this was also documented by the resident.  The medication list was reviewed with the resident and is up to date. The return visit should be in 4 weeks .    Cristian Recio MD  Attending Physician,  Department of Internal Medicine  [facility]  [facility]      9/4/2024, 1:55 PM

## 2024-09-26 DIAGNOSIS — U07.1 COVID-19: ICD-10-CM

## 2024-09-26 NOTE — TELEPHONE ENCOUNTER
..Request for   Requested Prescriptions     Pending Prescriptions Disp Refills    benzonatate (TESSALON) 100 MG capsule [Pharmacy Med Name: BENZONATATE 100MG CAPS] 30 capsule 0     Sig: TAKE ONE CAPSULE BY MOUTH THREE TIMES A DAY AS NEEDED FOR COUGH    .      Please review and e-scribe to pharmacy listed in chart if appropriate. Thank you.      Last Visit Date: 9/4/2024  Next Visit Date: 10/9/2024    Future Appointments   Date Time Provider Department Center   10/9/2024 10:30 AM Twin Faust MD [facility] DEP       Health Maintenance   Topic Date Due    Shingles vaccine (1 of 2) Never done    Respiratory Syncytial Virus (RSV) Pregnant or age 60 yrs+ (1 - 1-dose 60+ series) Never done    Annual Wellness Visit (Medicare Advantage)  01/01/2024    Flu vaccine (1) 08/01/2024    COVID-19 Vaccine (4 - 2023-24 season) 09/01/2024    GFR test (Diabetes, CKD 3-4, OR last GFR 15-59)  02/05/2025    Depression Monitoring  02/15/2025    Breast cancer screen  03/16/2025    Colorectal Cancer Screen  09/25/2028    Lipids  02/05/2029    DTaP/Tdap/Td vaccine (2 - Td or Tdap) 08/15/2032    DEXA (modify frequency per FRAX score)  Completed    Pneumococcal 65+ years Vaccine  Completed    Hepatitis C screen  Completed    Hepatitis A vaccine  Aged Out    Hepatitis B vaccine  Aged Out    Hib vaccine  Aged Out    Polio vaccine  Aged Out    Meningococcal (ACWY) vaccine  Aged Out    A1C test (Diabetic or Prediabetic)  Discontinued    Depression Screen  Discontinued    Diabetes screen  Discontinued       Hemoglobin A1C (%)   Date Value   02/05/2024 5.3   06/07/2023 5.4   02/14/2023 5.7             ( goal A1C is < 7)   No components found for: \"LABMICR\"  No components found for: \"LDLCHOLESTEROL\", \"LDLCALC\"    (goal LDL is <100)   AST (U/L)   Date Value   02/05/2024 37 (H)     ALT (U/L)   Date Value   02/05/2024 45 (H)     BUN (mg/dL)   Date Value   02/05/2024 21     BP Readings from Last 3 Encounters:   09/04/24 138/72   08/30/24 (!)

## 2024-09-27 ENCOUNTER — TELEPHONE (OUTPATIENT)
Dept: INTERNAL MEDICINE | Age: 69
End: 2024-09-27

## 2024-09-27 RX ORDER — BENZONATATE 100 MG/1
CAPSULE ORAL
Qty: 30 CAPSULE | Refills: 0 | OUTPATIENT
Start: 2024-09-27

## 2024-09-27 NOTE — TELEPHONE ENCOUNTER
Patient is scheduled with me for an appointment on October 9.  Was unable to reach the patient to ask about cough.  Will discontinue medication at this time and follow-up at her next scheduled appointment.

## 2024-10-02 DIAGNOSIS — I73.9 PVD (PERIPHERAL VASCULAR DISEASE) (HCC): Primary | ICD-10-CM

## 2024-10-02 NOTE — PROGRESS NOTES
Completed handicap placard renewal for the patient.     Twin Faust M.D.  Internal Medicine Chief Resident PGY-3  [facility],  [place], [place].

## 2024-10-03 ENCOUNTER — TELEPHONE (OUTPATIENT)
Dept: INTERNAL MEDICINE | Age: 69
End: 2024-10-03

## 2024-10-03 NOTE — TELEPHONE ENCOUNTER
Pt calling back and is requesting this medication. Pt c/o dry cough that is not productive for a few days now. Please advise

## 2024-10-03 NOTE — TELEPHONE ENCOUNTER
Called patient to reschedule 10/9/24 appointment due to provider being out of office. No answer and voicemail full

## 2024-10-04 RX ORDER — BENZONATATE 100 MG/1
100 CAPSULE ORAL 3 TIMES DAILY PRN
Qty: 30 CAPSULE | Refills: 0 | Status: SHIPPED | OUTPATIENT
Start: 2024-10-04 | End: 2024-10-14

## 2024-10-04 NOTE — TELEPHONE ENCOUNTER
Covering Physician:  I can give her a 10 days of tessalon, but the cough needs to be evaluated. If it's a new cough needs initial evaluation and if its the same cough then its almost been a month and needs to be re-evaluated by a provider.    Manfred Loya MD  Internal Medicine Resident, PGY-3  [facility]  10/04/24  4:51 PM

## 2024-11-04 DIAGNOSIS — E03.9 HYPOTHYROIDISM, UNSPECIFIED TYPE: ICD-10-CM

## 2024-11-04 NOTE — TELEPHONE ENCOUNTER
Dulce Serrano is calling to request a refill on the following medication(s):    Medication Request:  Requested Prescriptions     Pending Prescriptions Disp Refills    levothyroxine (SYNTHROID) 25 MCG tablet [Pharmacy Med Name: LEVOTHYROXINE SODIUM 25MCG TABS] 90 tablet 0     Sig: TAKE ONE TABLET BY MOUTH ONCE A DAY       Last Visit Date (If Applicable):  9/4/2024    Next Visit Date:    Visit date not found

## 2024-11-05 RX ORDER — LEVOTHYROXINE SODIUM 25 UG/1
25 TABLET ORAL DAILY
Qty: 90 TABLET | Refills: 0 | Status: SHIPPED | OUTPATIENT
Start: 2024-11-05

## 2024-12-23 ENCOUNTER — OFFICE VISIT (OUTPATIENT)
Dept: INTERNAL MEDICINE | Age: 69
End: 2024-12-23
Payer: MEDICARE

## 2024-12-23 VITALS
WEIGHT: 188.8 LBS | SYSTOLIC BLOOD PRESSURE: 135 MMHG | HEART RATE: 94 BPM | BODY MASS INDEX: 31.46 KG/M2 | HEIGHT: 65 IN | TEMPERATURE: 97.8 F | DIASTOLIC BLOOD PRESSURE: 82 MMHG | OXYGEN SATURATION: 96 %

## 2024-12-23 DIAGNOSIS — N30.00 ACUTE CYSTITIS WITHOUT HEMATURIA: Primary | ICD-10-CM

## 2024-12-23 PROCEDURE — 3079F DIAST BP 80-89 MM HG: CPT

## 2024-12-23 PROCEDURE — 1123F ACP DISCUSS/DSCN MKR DOCD: CPT

## 2024-12-23 PROCEDURE — 99214 OFFICE O/P EST MOD 30 MIN: CPT

## 2024-12-23 PROCEDURE — 1159F MED LIST DOCD IN RCRD: CPT

## 2024-12-23 PROCEDURE — 3075F SYST BP GE 130 - 139MM HG: CPT

## 2024-12-23 RX ORDER — PHENAZOPYRIDINE HYDROCHLORIDE 95 MG/1
95 TABLET ORAL 3 TIMES DAILY PRN
Qty: 9 TABLET | Refills: 0 | Status: SHIPPED | OUTPATIENT
Start: 2024-12-23 | End: 2024-12-26

## 2024-12-23 RX ORDER — NITROFURANTOIN MACROCRYSTALS 50 MG/1
50 CAPSULE ORAL 4 TIMES DAILY
Qty: 20 CAPSULE | Refills: 0 | Status: SHIPPED | OUTPATIENT
Start: 2024-12-23 | End: 2024-12-23 | Stop reason: ALTCHOICE

## 2024-12-23 RX ORDER — CEPHALEXIN 500 MG/1
500 CAPSULE ORAL 2 TIMES DAILY
Qty: 10 CAPSULE | Refills: 0 | Status: SHIPPED | OUTPATIENT
Start: 2024-12-23 | End: 2024-12-28

## 2024-12-23 NOTE — PROGRESS NOTES
Dulce Serrano (: 1955) is a 69 y.o. female is here for evaluation of the following chief complaint(s): Urinary Frequency (Pt states she change her brief 3 or 4 time a day)    Assessment/Plan:     Assessment & Plan  Acute cystitis without hematuria   Acute condition, new,  for the last 1 week.  Has not tried any over-the-counter medications.  Will prescribe Keflex as patient is allergic to Bactrim.  Will also provide prescription for Azo for symptomatic relief.    Orders:    phenazopyridine (AZO URINARY PAIN RELIEF) 95 MG tablet; Take 1 tablet by mouth 3 times daily as needed for Pain    cephALEXin (KEFLEX) 500 MG capsule; Take 1 capsule by mouth 2 times daily for 5 days      The above diagnosis is a new problem.  We discussed expected course, resolution, and complications of diagnosis in detail.  I advised her to call back or return to office if symptoms worsen/change/persist.        Return in 4 weeks (on 2025).    Subjective/Objective:   She complains of dysuria, frequency, urgency, incomplete bladder emptying, suprapubic pressure for 1 week .  She denies foul smelling urine, nausea, vomiting, hematuria, and flank pain.  Since starting she reports her symptoms are not changed.  She denies fever and chills.  She has no previous history of recurrent UTIs.  She was recently treated with Keflex for lower extremity cellulitis.  She has allergies to Bactrim.  She has not tried any over-the-counter medications or antibiotics for the UTI.  Treatments tried: none.    A comprehensive review of systems was negative.      /82   Pulse 94   Temp 97.8 °F (36.6 °C)   Ht 1.651 m (5' 5\")   Wt 85.6 kg (188 lb 12.8 oz)   SpO2 96%   BMI 31.42 kg/m²      Physical Exam  Constitutional:       Appearance: Normal appearance.   Cardiovascular:      Rate and Rhythm: Normal rate.   Pulmonary:      Effort: Pulmonary effort is normal.   Abdominal:      General: There is no distension.      Palpations: Abdomen is

## 2024-12-23 NOTE — PATIENT INSTRUCTIONS
have sex.  Change sanitary pads often.  Avoid douches, bubble baths, feminine hygiene sprays, and other feminine hygiene products that have deodorants.  After going to the bathroom, wipe from front to back.  When should you call for help?   Call your doctor now or seek immediate medical care if:    You have new or worse fever, chills, nausea, or vomiting.     You have new pain in your back just below your rib cage. This is called flank pain.     There is new blood or pus in your urine.     You have any problems with your antibiotic medicine.   Watch closely for changes in your health, and be sure to contact your doctor if:    You are not getting better after taking an antibiotic for 2 days.     Your symptoms go away but then come back.   Where can you learn more?  Go to https://www.[employer].net/patientEd and enter K848 to learn more about \"Urinary Tract Infection (UTI) in Women: Care Instructions.\"  Current as of: November 15, 2023  Content Version: 14.2  © 2024 [employer].   Care instructions adapted under license by [employer]. If you have questions about a medical condition or this instruction, always ask your healthcare professional. [employer] disclaims any warranty or liability for your use of this information.           Painful Urination (Dysuria): Care Instructions  Your Care Instructions  Burning pain with urination (dysuria) is a common symptom of a urinary tract infection or other urinary problems. The bladder may become inflamed. This can cause pain when the bladder fills and empties. You may also feel pain if the tube that carries urine from the bladder to the outside of the body (urethra) gets irritated or infected.  Sexually transmitted infections (STIs) also may cause pain when you urinate.  Sometimes the pain can be caused by things other than an infection. The urethra can be irritated by soaps, perfumes, or foreign objects in the urethra. Kidney stones can cause pain

## 2024-12-23 NOTE — PROGRESS NOTES
Attending Physician Statement  I have discussed the care of Dulce Serrano, including pertinent history and exam findings, with the resident. I have seen and examined the patient and the key elements of all parts of the encounter have been performed by me.  I agree with the assessment, plan and orders as documented by the resident.  (GC Modifier)    MD DEMARIO Soler  Attending Physician  Internal Medicine Residency Program, Nephrology   [facility]  12/23/2024, 4:03 PM

## 2025-01-01 DIAGNOSIS — N30.00 ACUTE CYSTITIS WITHOUT HEMATURIA: ICD-10-CM

## 2025-01-02 NOTE — TELEPHONE ENCOUNTER
Dulce Serrano is calling to request a refill on the following medication(s):    Medication Request:  Requested Prescriptions     Pending Prescriptions Disp Refills    cephALEXin (KEFLEX) 500 MG capsule [Pharmacy Med Name: CEPHALEXIN 500MG CAPS] 10 capsule 0     Sig: TAKE ONE CAPSULE BY MOUTH TWICE A DAY FOR 5 DAYS       Last Visit Date (If Applicable):  12/23/2024    Next Visit Date:    1/15/2025

## 2025-01-03 DIAGNOSIS — N30.00 ACUTE CYSTITIS WITHOUT HEMATURIA: Primary | ICD-10-CM

## 2025-01-03 RX ORDER — CEPHALEXIN 500 MG/1
CAPSULE ORAL
Qty: 10 CAPSULE | Refills: 0 | OUTPATIENT
Start: 2025-01-03

## 2025-01-03 NOTE — TELEPHONE ENCOUNTER
Attempted to leave message for Legal Guardian Aaron mailbox was full.    Left message for patient to call the office regarding a refill request recieved   <--- Click to Launch ICDx for PreOp, PostOp and Procedure

## 2025-01-09 ENCOUNTER — TRANSCRIBE ORDERS (OUTPATIENT)
Dept: ADMINISTRATIVE | Age: 70
End: 2025-01-09

## 2025-01-09 DIAGNOSIS — N25.81 SECONDARY HYPERPARATHYROIDISM OF RENAL ORIGIN (HCC): Primary | ICD-10-CM

## 2025-01-22 ENCOUNTER — OFFICE VISIT (OUTPATIENT)
Dept: INTERNAL MEDICINE | Age: 70
End: 2025-01-22

## 2025-01-22 VITALS
OXYGEN SATURATION: 94 % | SYSTOLIC BLOOD PRESSURE: 113 MMHG | TEMPERATURE: 97.2 F | DIASTOLIC BLOOD PRESSURE: 67 MMHG | HEART RATE: 88 BPM | BODY MASS INDEX: 31.32 KG/M2 | WEIGHT: 188 LBS | HEIGHT: 65 IN

## 2025-01-22 DIAGNOSIS — K21.9 GASTROESOPHAGEAL REFLUX DISEASE, UNSPECIFIED WHETHER ESOPHAGITIS PRESENT: ICD-10-CM

## 2025-01-22 DIAGNOSIS — J45.30 MILD PERSISTENT ASTHMA WITHOUT COMPLICATION: ICD-10-CM

## 2025-01-22 DIAGNOSIS — E03.9 HYPOTHYROIDISM, UNSPECIFIED TYPE: ICD-10-CM

## 2025-01-22 DIAGNOSIS — N39.41 URGE INCONTINENCE OF URINE: ICD-10-CM

## 2025-01-22 DIAGNOSIS — I10 ESSENTIAL HYPERTENSION: ICD-10-CM

## 2025-01-22 DIAGNOSIS — M25.571 ACUTE RIGHT ANKLE PAIN: ICD-10-CM

## 2025-01-22 RX ORDER — MONTELUKAST SODIUM 10 MG/1
TABLET ORAL
Qty: 90 TABLET | Refills: 1 | Status: SHIPPED | OUTPATIENT
Start: 2025-01-22

## 2025-01-22 RX ORDER — BUDESONIDE AND FORMOTEROL FUMARATE DIHYDRATE 160; 4.5 UG/1; UG/1
2 AEROSOL RESPIRATORY (INHALATION) 2 TIMES DAILY
Qty: 3 EACH | Refills: 1 | Status: SHIPPED | OUTPATIENT
Start: 2025-01-22

## 2025-01-22 RX ORDER — CHOLECALCIFEROL (VITAMIN D3) 25 MCG
1 TABLET ORAL DAILY
Qty: 90 TABLET | Refills: 1 | Status: SHIPPED | OUTPATIENT
Start: 2025-01-22

## 2025-01-22 RX ORDER — TRIAMCINOLONE ACETONIDE 1 MG/G
OINTMENT TOPICAL
COMMUNITY
Start: 2024-12-16

## 2025-01-22 RX ORDER — ACETAMINOPHEN 500 MG
1 TABLET ORAL DAILY
Qty: 90 CAPSULE | Refills: 1 | Status: SHIPPED | OUTPATIENT
Start: 2025-01-22

## 2025-01-22 RX ORDER — OXYBUTYNIN CHLORIDE 5 MG/1
5 TABLET ORAL DAILY
Qty: 90 TABLET | Refills: 1 | Status: SHIPPED | OUTPATIENT
Start: 2025-01-22

## 2025-01-22 RX ORDER — FLUTICASONE PROPIONATE 50 MCG
1 SPRAY, SUSPENSION (ML) NASAL DAILY
Qty: 16 G | Refills: 5 | Status: SHIPPED | OUTPATIENT
Start: 2025-01-22

## 2025-01-22 RX ORDER — LISINOPRIL 20 MG/1
20 TABLET ORAL DAILY
Qty: 90 TABLET | Refills: 1 | Status: SHIPPED | OUTPATIENT
Start: 2025-01-22

## 2025-01-22 RX ORDER — ALBUTEROL SULFATE 90 UG/1
INHALANT RESPIRATORY (INHALATION)
Qty: 27 G | Refills: 1 | Status: SHIPPED | OUTPATIENT
Start: 2025-01-22

## 2025-01-22 RX ORDER — ATORVASTATIN CALCIUM 10 MG/1
TABLET, FILM COATED ORAL
COMMUNITY
Start: 2025-01-07

## 2025-01-22 RX ORDER — LEVOTHYROXINE SODIUM 25 UG/1
25 TABLET ORAL DAILY
Qty: 90 TABLET | Refills: 1 | Status: SHIPPED | OUTPATIENT
Start: 2025-01-22

## 2025-01-22 ASSESSMENT — PATIENT HEALTH QUESTIONNAIRE - PHQ9: DEPRESSION UNABLE TO ASSESS: FUNCTIONAL CAPACITY MOTIVATION LIMITS ACCURACY

## 2025-01-22 NOTE — PROGRESS NOTES
[facility]  [facility]  [address] [place] [address]  Dept: [phone]  Dept Fax: [phone]    Office Progress/Follow Up Note  Date ofpatient's visit: 1/22/2025  Patient's Name:  Dulce Serrano YOB: 1955            Patient Care Team:  Twin Faust MD as PCP - General (Internal Medicine)  Roxy Boyd MD as Consulting Physician (Gastroenterology)  Levon Tavares MD as Consulting Physician (Pulmonology)  Redd Frazier MD as Surgeon (Orthopedic Surgery)  Patrice Zelaya MD as Consulting Physician (Infectious Diseases)  ================================================================    REASON FOR VISIT/CHIEF COMPLAINT:  FOLLOW-UP VISIT; lymphedema (4 week follow up), Dental Pain (Pt c/o gum pain. Pt had surgery recently. Pt had crown surgery), and Medication Refill (Meds pended)    HISTORY OF PRESENTING ILLNESS:  History was obtained from: patient, electronic medical record. Dulce Serrano is a 69 y.o. is here for a follow-up regarding her lymphedema and overall health.  Patient was recently seen for left lower extremity cellulitis and prescribed Keflex.  Patient completed Keflex and has no acute concerns regarding cellulitis today.  Patient does have a history of lymphedema and lower extremity swelling which she has compression stockings for at home.  Patient has also been elevating her legs at night.  Swelling is minimal today compared to prior exams.    Hypertension: Blood pressure well-controlled 113/67, patient is currently compliant with her lisinopril 20 mg.    Hypothyroidism: Patient is currently compliant with her Synthroid.  No acute complaints today.    Patient also had acute complaint of dry skin on bilateral upper extremities.  I counseled her on the importance of moisturizing and appropriate clothing.  Patient was agreeable.    Patient was accompanied by her  today who stated that he has been encouraging

## 2025-01-22 NOTE — PROGRESS NOTES
Attending Physician Statement  I have discussed the care of Dulce Serrano, including pertinent history and exam findings with the resident. I have reviewed the key elements of all parts of the encounter with the resident. I agree with the assessment, and status of the problem list as documented.    Diagnosis Orders   1. Mild persistent asthma without complication  budesonide-formoterol (SYMBICORT) 160-4.5 MCG/ACT AERO    albuterol sulfate HFA (PROVENTIL;VENTOLIN;PROAIR) 108 (90 Base) MCG/ACT inhaler    montelukast (SINGULAIR) 10 MG tablet      2. Acute right ankle pain  diclofenac sodium (VOLTAREN) 1 % GEL      3. Hypothyroidism, unspecified type  levothyroxine (SYNTHROID) 25 MCG tablet      4. Essential hypertension  lisinopril (PRINIVIL;ZESTRIL) 20 MG tablet      5. Gastroesophageal reflux disease, unspecified whether esophagitis present  omeprazole (PRILOSEC) 20 MG delayed release capsule      6. Urge incontinence of urine  oxyBUTYnin (DITROPAN) 5 MG tablet      Stable today. Needs refills.     The plan and orders should include No orders of the defined types were placed in this encounter.   and this was also documented by the resident.     Dr Ruiz Martinez MD, FACP  Associate , Internal Medicine Residency Program  [facility] , [facility]  Chair, Department of Internal Medicine  [facility]erks[facility]         1/22/2025, 11:48 AM

## 2025-01-23 ENCOUNTER — CARE COORDINATION (OUTPATIENT)
Dept: CARE COORDINATION | Age: 70
End: 2025-01-23

## 2025-01-23 NOTE — CARE COORDINATION
Call from patient, states that she has been unable to contact FRANCINE Bruce RN at [facility]. She is requesting information on appointment for tomorrow. States that she does not remember where or what time it is. Appointment is at 2 pm at [facility] radiology for a thyroid ultrasound. I asked that if she is not able to go, to call and cancel. Phone number provided.  Patient called again, states that she has an appointment on 1/10 at [facility]. I explained. That January 10th has passed. The only appointment for January is for tomorrow. She states that she has an appointment on 4/17/25 but does not remember where to go. Appointment is with Dermatology at [facility]. Address and phone number provide. Patient verbalized that she understands now

## 2025-01-24 ENCOUNTER — HOSPITAL ENCOUNTER (OUTPATIENT)
Dept: ULTRASOUND IMAGING | Age: 70
Discharge: HOME | End: 2025-01-26
Payer: MEDICARE

## 2025-01-24 DIAGNOSIS — N30.00 ACUTE CYSTITIS WITHOUT HEMATURIA: ICD-10-CM

## 2025-01-24 DIAGNOSIS — N25.81 SECONDARY HYPERPARATHYROIDISM OF RENAL ORIGIN (HCC): ICD-10-CM

## 2025-01-24 PROCEDURE — 76536 US EXAM OF HEAD AND NECK: CPT

## 2025-01-24 RX ORDER — CEPHALEXIN 500 MG/1
CAPSULE ORAL
Qty: 10 CAPSULE | Refills: 0 | OUTPATIENT
Start: 2025-01-24

## 2025-02-26 NOTE — CARE COORDINATION
called and spoke with Heather Rios. Heather Rios reports that he has not been able to contact Dulce. Heather Rios reports that he received a VM from her last week about needing a ride to court. Heather Rios reports that he has not been able to reach her or leave a message.  attempted to contact Dulce.  was unable to leave a message due to VM full. No call back.  informed Heather Rios that Ebony Vasques did not answer or call her back. Plan:   Ebony Vasques will follow up with Heather Rios to discuss court on 4/13. Dulce called  and informed her that she is at [facility] [employer] [facility] Dulce reports that its a mental health facility and helps express yourself through art. Dulce reports that she has not spoke to Heather Rios. Dulce reports that she will call him. Chiquijacqui Vasques is agreeable to go to court. Plan:   Ebony Vasques will follow up with Heather Rios. Ebony Vasques will attend court. 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    Anesthesia Type:  General    Note:  Anesthesia Post Evaluation    Patient location during evaluation: PACU  Patient participation: Able to fully participate in evaluation  Level of consciousness: awake  Pain management: adequate  Airway patency: patent  Cardiovascular status: acceptable  Respiratory status: acceptable  Hydration status: acceptable  PONV: none     Anesthetic complications: None          Last vitals:  Vitals:    05/13/20 1130 05/13/20 1140 05/13/20 1150   BP: 135/82 (!) 149/73 (!) 149/82   Pulse: 60 60 60   Resp: 10 10 12   Temp:      SpO2: 100% 98% 100%         Electronically Signed By: Tamir Pretty MD  May 13, 2020  12:15 PM   Attending Attestation (For Attendings USE Only)...

## 2025-03-25 ENCOUNTER — CARE COORDINATION (OUTPATIENT)
Dept: CARE COORDINATION | Age: 70
End: 2025-03-25

## 2025-03-25 NOTE — CARE COORDINATION
Call from Dulce, states that she needs the phone number for LESLI Hurst . She states that she fell off the lencho and wanted to talk to someone about it. I asked if she tried the internet and she stated she did but couldn't find the number. Internet search shows phone number as [phone]. Patient wrote this down. She also requested the number for [facility] and nurse ramya leyva. Phone number provided [phone]. Patient wrote this down also. No other needs

## 2025-04-21 ENCOUNTER — OFFICE VISIT (OUTPATIENT)
Age: 70
End: 2025-04-21
Payer: MEDICARE

## 2025-04-21 VITALS
BODY MASS INDEX: 31.32 KG/M2 | HEIGHT: 65 IN | WEIGHT: 188 LBS | HEART RATE: 88 BPM | DIASTOLIC BLOOD PRESSURE: 90 MMHG | TEMPERATURE: 98.4 F | OXYGEN SATURATION: 97 % | SYSTOLIC BLOOD PRESSURE: 150 MMHG

## 2025-04-21 DIAGNOSIS — L85.3 XEROSIS CUTIS: Primary | ICD-10-CM

## 2025-04-21 DIAGNOSIS — L21.9 SEBORRHEIC DERMATITIS: ICD-10-CM

## 2025-04-21 DIAGNOSIS — L30.8 OTHER ECZEMA: ICD-10-CM

## 2025-04-21 PROCEDURE — 1159F MED LIST DOCD IN RCRD: CPT | Performed by: DERMATOLOGY

## 2025-04-21 PROCEDURE — 3080F DIAST BP >= 90 MM HG: CPT | Performed by: DERMATOLOGY

## 2025-04-21 PROCEDURE — 99213 OFFICE O/P EST LOW 20 MIN: CPT | Performed by: DERMATOLOGY

## 2025-04-21 PROCEDURE — 1123F ACP DISCUSS/DSCN MKR DOCD: CPT | Performed by: DERMATOLOGY

## 2025-04-21 PROCEDURE — 3077F SYST BP >= 140 MM HG: CPT | Performed by: DERMATOLOGY

## 2025-04-21 RX ORDER — TRIAMCINOLONE ACETONIDE 1 MG/G
OINTMENT TOPICAL
Qty: 453.6 G | Refills: 3 | Status: SHIPPED | OUTPATIENT
Start: 2025-04-21

## 2025-04-21 RX ORDER — TRIAMCINOLONE ACETONIDE 0.25 MG/G
CREAM TOPICAL
Qty: 80 G | Refills: 2 | Status: SHIPPED | OUTPATIENT
Start: 2025-04-21

## 2025-04-21 RX ORDER — KETOCONAZOLE 20 MG/ML
SHAMPOO, SUSPENSION TOPICAL
Qty: 120 ML | Refills: 2 | Status: SHIPPED | OUTPATIENT
Start: 2025-04-21

## 2025-04-21 NOTE — PATIENT INSTRUCTIONS
Recommend speaking with primary care physician about concerns for virus    Dandruff of face, scalp, and ears  - continue ketoconazole 2% shampoo 2-3 times weekly, leaving on for 5 min prior to rinsing   - start triamcinolone 0.025% cream to scaly, dry areas of face twice daily    Eczema   - continue triamcinolone 0.1% cream twice daily as needed to patches on body (avoid use on face, groin, or skin folds)    Bring all medications to future visits

## 2025-04-21 NOTE — PROGRESS NOTES
Use as body wash leaving on for 5 minutes prior to washing off once daily for 2 weeks then three times weekly 120 mL 3    Elastic Bandages & Supports (MEDICAL COMPRESSION STOCKINGS) MISC 1 each by Does not apply route daily One pair, foot to thigh compression stocking. Compression strength 20-30 mm/Hg 2 each 3    Lactobacillus Acid-Pectin (ACIDOPHILUS/PECTIN) CAPS TAKE ONE CAPSULE BY MOUTH ONCE A DAY 30 capsule 4    albuterol (PROVENTIL) (2.5 MG/3ML) 0.083% nebulizer solution Take 3 mLs by nebulization every 6 hours as needed for Wheezing 120 each 3    ammonium lactate (LAC-HYDRIN) 12 % cream Apply to elbows twice daily 385 g 2    Incontinence Supply Disposable (SELECT BOOSTER PADS) MISC Use 3-4/day 100 each 11    QUEtiapine (SEROQUEL) 200 MG tablet Take 1 tablet by mouth daily Last filled 11/8/2021 90 day supply,   Take 3 tablets once daily--600 mg dose      FLUoxetine (PROZAC) 40 MG capsule Take 1 capsule by mouth daily Last filled 1/24/2022      ipratropium (ATROVENT) 0.03 % nasal spray 2 sprays by Nasal route 3 times daily as needed for Rhinitis      lithium 300 MG capsule Take 1 capsule by mouth 2 times daily (with meals)       Current Facility-Administered Medications   Medication Dose Route Frequency Provider Last Rate Last Admin    lidocaine-EPINEPHrine 1 %-1:522817 injection 1 mL  1 mL IntraDERmal Once Lucie Guerra MD           ALLERGIES:   Allergies   Allergen Reactions    Codeine Other (See Comments)    Iodinated Contrast Media     Motrin [Ibuprofen]     Aspirin Rash    Blue Dyes (Parenteral) Rash    Hctz [Hydrochlorothiazide] Rash     Fixed drug reaction    Sulfa Antibiotics Rash    Tetracyclines & Related Rash       SOCIAL HISTORY:  Social History     Tobacco Use    Smoking status: Never    Smokeless tobacco: Never   Substance Use Topics    Alcohol use: No       Pertinent ROS:  Review of Systems  Skin: Denies any new changing, growing or bleeding lesions or rashes except as described in the HPI

## 2025-04-26 ENCOUNTER — HOSPITAL ENCOUNTER (EMERGENCY)
Age: 70
Discharge: HOME | End: 2025-04-26
Attending: EMERGENCY MEDICINE
Payer: MEDICARE

## 2025-04-26 VITALS
SYSTOLIC BLOOD PRESSURE: 149 MMHG | TEMPERATURE: 98.4 F | HEART RATE: 81 BPM | DIASTOLIC BLOOD PRESSURE: 85 MMHG | RESPIRATION RATE: 14 BRPM | OXYGEN SATURATION: 98 %

## 2025-04-26 DIAGNOSIS — R21 RASH AND OTHER NONSPECIFIC SKIN ERUPTION: Primary | ICD-10-CM

## 2025-04-26 PROCEDURE — 99283 EMERGENCY DEPT VISIT LOW MDM: CPT

## 2025-04-26 PROCEDURE — 6370000000 HC RX 637 (ALT 250 FOR IP)

## 2025-04-26 RX ORDER — FAMOTIDINE 20 MG/1
20 TABLET, FILM COATED ORAL ONCE
Status: COMPLETED | OUTPATIENT
Start: 2025-04-26 | End: 2025-04-26

## 2025-04-26 RX ORDER — CETIRIZINE HYDROCHLORIDE 10 MG/1
10 TABLET ORAL DAILY
Qty: 14 TABLET | Refills: 0 | Status: SHIPPED | OUTPATIENT
Start: 2025-04-26

## 2025-04-26 RX ORDER — CETIRIZINE HYDROCHLORIDE 10 MG/1
10 TABLET ORAL ONCE
Status: COMPLETED | OUTPATIENT
Start: 2025-04-26 | End: 2025-04-26

## 2025-04-26 RX ADMIN — FAMOTIDINE 20 MG: 20 TABLET, FILM COATED ORAL at 02:43

## 2025-04-26 RX ADMIN — CETIRIZINE HYDROCHLORIDE 10 MG: 10 TABLET, FILM COATED ORAL at 02:43

## 2025-04-26 ASSESSMENT — PAIN - FUNCTIONAL ASSESSMENT
PAIN_FUNCTIONAL_ASSESSMENT: NONE - DENIES PAIN
PAIN_FUNCTIONAL_ASSESSMENT: 0-10

## 2025-04-26 ASSESSMENT — PAIN SCALES - GENERAL: PAINLEVEL_OUTOF10: 0

## 2025-04-26 NOTE — DISCHARGE INSTRUCTIONS
Call today or tomorrow to follow up with Twin Faust MD  in 2 days.    Take your medication as prescribed.  Use Benadryl 25 - 50 milligrams (1 - 2 tabs) every 6 hours for the next 24 - 48 hours (do not use if you were given a prescription for hydroxyzine).   Take Pepcid (famotidine - over the counter) every day (if you experience any stomach upset) while you are taking the steroids.     Stop using any new medications, detergents, soaps, hair dye or anything else that could have caused this rash.    Return to the Emergency Department for worsening of the reaction, shortness of breath, sensation that your throat is closing, fever > 101.5, any other care or concern.

## 2025-04-26 NOTE — ED PROVIDER NOTES
[facility]     Emergency Department     Faculty Attestation    I performed a history and physical examination of the patient and discussed management with the resident. I reviewed the resident’s note and agree with the documented findings including all diagnostic interpretations and plan of care. Any areas of disagreement are noted on the chart. I was personally present for the key portions of any procedures. I have documented in the chart those procedures where I was not present during the key portions. I have reviewed the emergency nurses triage note. I agree with the chief complaint, past medical history, past surgical history, allergies, medications, social and family history as documented unless otherwise noted below. Documentation of the HPI, Physical Exam and Medical Decision Making performed by schuyleribanita is based on my personal performance of the HPI, PE and MDM. For Physician Assistant/ Nurse Practitioner cases/documentation I have personally evaluated this patient and have completed at least one if not all key elements of the E/M (history, physical exam, and MDM). Additional findings are as noted.    Primary Care Physician: Twin Faust MD    Note Started: 2:45 AM EDT     VITAL SIGNS:   oral temperature is 98.4 °F (36.9 °C). Her blood pressure is 149/85 (abnormal) and her pulse is 81. Her respiration is 14 and oxygen saturation is 98%.      Medical Decision Making  Rash predominantly to back but noted on neck and arms.  Recent new: Spray use prior to the rash.  Otherwise no new soaps or detergents.  Urticarial appearing raised erythematous lesions with some areas of confluence over the back.  No mucous membranes involvement, no nonblanching lesions.  Antihistamines and observe    Amount and/or Complexity of Data Reviewed  External Data Reviewed: notes.    Risk  OTC drugs.        Arthur Gary MD, FACEP, FAAEM  Attending Emergency Physician

## 2025-04-30 ENCOUNTER — OFFICE VISIT (OUTPATIENT)
Age: 70
End: 2025-04-30
Payer: MEDICARE

## 2025-04-30 VITALS
TEMPERATURE: 99.9 F | HEIGHT: 65 IN | HEART RATE: 70 BPM | WEIGHT: 193.6 LBS | OXYGEN SATURATION: 98 % | BODY MASS INDEX: 32.26 KG/M2 | DIASTOLIC BLOOD PRESSURE: 70 MMHG | SYSTOLIC BLOOD PRESSURE: 120 MMHG

## 2025-04-30 DIAGNOSIS — L50.8 URTICARIA, ACUTE: ICD-10-CM

## 2025-04-30 DIAGNOSIS — Z12.31 SCREENING MAMMOGRAM FOR BREAST CANCER: ICD-10-CM

## 2025-04-30 DIAGNOSIS — E83.52 HYPERCALCEMIA: ICD-10-CM

## 2025-04-30 DIAGNOSIS — I10 ESSENTIAL HYPERTENSION: Primary | ICD-10-CM

## 2025-04-30 DIAGNOSIS — E78.5 HYPERLIPIDEMIA, UNSPECIFIED HYPERLIPIDEMIA TYPE: ICD-10-CM

## 2025-04-30 PROCEDURE — 1125F AMNT PAIN NOTED PAIN PRSNT: CPT

## 2025-04-30 PROCEDURE — 1159F MED LIST DOCD IN RCRD: CPT

## 2025-04-30 PROCEDURE — 1123F ACP DISCUSS/DSCN MKR DOCD: CPT

## 2025-04-30 PROCEDURE — 3074F SYST BP LT 130 MM HG: CPT

## 2025-04-30 PROCEDURE — 99213 OFFICE O/P EST LOW 20 MIN: CPT

## 2025-04-30 PROCEDURE — 3078F DIAST BP <80 MM HG: CPT

## 2025-04-30 SDOH — ECONOMIC STABILITY: FOOD INSECURITY: WITHIN THE PAST 12 MONTHS, THE FOOD YOU BOUGHT JUST DIDN'T LAST AND YOU DIDN'T HAVE MONEY TO GET MORE.: NEVER TRUE

## 2025-04-30 SDOH — ECONOMIC STABILITY: FOOD INSECURITY: WITHIN THE PAST 12 MONTHS, YOU WORRIED THAT YOUR FOOD WOULD RUN OUT BEFORE YOU GOT MONEY TO BUY MORE.: NEVER TRUE

## 2025-04-30 ASSESSMENT — PATIENT HEALTH QUESTIONNAIRE - PHQ9
SUM OF ALL RESPONSES TO PHQ QUESTIONS 1-9: 2
6. FEELING BAD ABOUT YOURSELF - OR THAT YOU ARE A FAILURE OR HAVE LET YOURSELF OR YOUR FAMILY DOWN: NOT AT ALL
9. THOUGHTS THAT YOU WOULD BE BETTER OFF DEAD, OR OF HURTING YOURSELF: NOT AT ALL
8. MOVING OR SPEAKING SO SLOWLY THAT OTHER PEOPLE COULD HAVE NOTICED. OR THE OPPOSITE, BEING SO FIGETY OR RESTLESS THAT YOU HAVE BEEN MOVING AROUND A LOT MORE THAN USUAL: NOT AT ALL
SUM OF ALL RESPONSES TO PHQ QUESTIONS 1-9: 2
2. FEELING DOWN, DEPRESSED OR HOPELESS: SEVERAL DAYS
3. TROUBLE FALLING OR STAYING ASLEEP: SEVERAL DAYS
5. POOR APPETITE OR OVEREATING: NOT AT ALL
10. IF YOU CHECKED OFF ANY PROBLEMS, HOW DIFFICULT HAVE THESE PROBLEMS MADE IT FOR YOU TO DO YOUR WORK, TAKE CARE OF THINGS AT HOME, OR GET ALONG WITH OTHER PEOPLE: NOT DIFFICULT AT ALL
SUM OF ALL RESPONSES TO PHQ QUESTIONS 1-9: 2
7. TROUBLE CONCENTRATING ON THINGS, SUCH AS READING THE NEWSPAPER OR WATCHING TELEVISION: NOT AT ALL
SUM OF ALL RESPONSES TO PHQ QUESTIONS 1-9: 2
4. FEELING TIRED OR HAVING LITTLE ENERGY: NOT AT ALL
1. LITTLE INTEREST OR PLEASURE IN DOING THINGS: NOT AT ALL

## 2025-04-30 NOTE — PROGRESS NOTES
[facility]  [facility]  [address] [place] [address]  Dept: [phone]  Dept Fax: [phone]    Office Progress/Follow Up Note  Date ofpatient's visit: 4/30/2025  Patient's Name:  Dulce Serrano YOB: 1955            Patient Care Team:  Twin Faust MD as PCP - General (Internal Medicine)  Roxy Boyd MD as Consulting Physician (Gastroenterology)  Levon Tavares MD as Consulting Physician (Pulmonology)  Redd Frazier MD as Surgeon (Orthopedic Surgery)  Patrice Zelaya MD as Consulting Physician (Infectious Diseases)  ================================================================    REASON FOR VISIT/CHIEF COMPLAINT:  FOLLOW-UP VISIT; ED Follow-up ([facility] ER 4/26/25 for a rash)    HISTORY OF PRESENTING ILLNESS:  History was obtained from: patient, electronic medical record. Dulce Bertrand a 70 y.o. is here for a follow-up from ED visit regarding rash.  Patient had what seemed to be urticaria from a new spray the patient was using.  Patient does follow up with dermatology outpatient and previously saw them on 4/21.  Patient went into the emergency department regarding this acute rash on 4/26.  At that time she was given Zyrtec.  Patient started taking Zyrtec Monday.  Rash is resolving.  Patient is avoiding all known irritants.    Hypertension: Blood pressure today well-controlled 120/70, continue current medications.    Bipolar 1/depression: PHQ-9 is 2, patient is compliant with medications, follows up with psych outpatient.    Patient is due for mammogram today, counseled patient on mammogram as well as lipid panel and CMP.  Patient was agreeable.    Healthcare maintenance:  Colon cancer:   Colonoscopy completed in 2018, normal.  Repeat due in 2028  Osteoporosis: DEXA scan completed in March 2022, within normal limits, repeat ordered by endocrinologist.  Cervical cancer: Last Pap completed in 2018 negative, patient

## 2025-04-30 NOTE — PROGRESS NOTES
Attending Physician Statement  I have discussed the care of Dulce Serrano, including pertinent history and exam findings with the resident. I have reviewed the key elements of all parts of the encounter with the resident. Added history includes recent ED visit for rash-non specific. Given zyrtec and reports improvement. She follows with a dermatologist. I agree with the assessment, and status of the problem list as documented.    Diagnosis Orders   1. Essential hypertension  Lipid Panel    Comprehensive Metabolic Panel      2. Urticaria, acute        3. Screening mammogram for breast cancer  AMOL DIGITAL SCREEN W OR WO CAD BILATERAL      4. Hyperlipidemia, unspecified hyperlipidemia type  Lipid Panel      Persistent hypercalcemia on the last 2 years BMP.  Will repeat labs today and workup if Calcium remains elevated.  The plan and orders should include   Orders Placed This Encounter   Procedures    AMOL DIGITAL SCREEN W OR WO CAD BILATERAL    Lipid Panel    Comprehensive Metabolic Panel    and this was also documented by the resident.The medication list was reviewed with the resident and is up to date. The return visit should be in 3 months .    Dr Ruiz Martinez MD, FACP  Associate , Internal Medicine Residency Program  Residency Clinic , [facility] IM  Chair, Department of Internal Medicine  [facility]         4/30/2025, 10:36 AM

## 2025-05-04 NOTE — ED PROVIDER NOTES
[facility] EMERGENCY DEPARTMENT  Emergency Department Encounter  Emergency Medicine Resident     Pt Name:Dulce Serrano  MRN: [id number]  Birthdate 1955  Date of evaluation: 5/4/25  PCP:  Twin Faust MD  Note Started: 8:13 AM EDT      CHIEF COMPLAINT       Chief Complaint   Patient presents with    Rash       HISTORY OF PRESENT ILLNESS  (Location/Symptom, Timing/Onset, Context/Setting, Quality, Duration, Modifying Factors, Severity.)      Dulce Serrano is a 70 y.o. female who presents with a rash.  Patient states that she used a old cologne that she sprayed onto her neck and back earlier this morning and that since then she has noticed a worsening rash that is raised and warm to the touch since.  She also has concerns that the pizza that her significant other and her ate for dinner also might have caused a skin rash eruption.  Denies any shortness of breath, scratchy or itchy throat, nausea, vomiting, abdominal pain.  Denies any previous reaction like this, exposure to new soaps, detergents, lotions, clothing.    PAST MEDICAL / SURGICAL / SOCIAL / FAMILY HISTORY      has a past medical history of Ankle fracture, right, closed, with routine healing, subsequent encounter, Anxiety, Arrhythmia, Asthma, Bipolar 1 disorder (HCC), Bipolar disorder (HCC), Chronic diarrhea, COPD (chronic obstructive pulmonary disease) (HCC), Depression, Essential hypertension, GERD (gastroesophageal reflux disease), GERD (gastroesophageal reflux disease), History of stroke, Hyperlipidemia, Hypertension, Hypothyroidism, Mild persistent asthma without complication, OAB (overactive bladder), Obesity (BMI 30-39.9), Osteoarthritis, Poor historian, Pulmonary fibrosis (HCC), Pulmonary hypertension (HCC), Pure hypercholesterolemia, Sleep apnea, Stroke (HCC), Unspecified sleep apnea, and Urinary incontinence.       has a past surgical history that includes Colonoscopy (04/2015); Colonoscopy (2018); Ankle surgery

## 2025-05-07 ENCOUNTER — HOSPITAL ENCOUNTER (OUTPATIENT)
Dept: MAMMOGRAPHY | Age: 70
Discharge: HOME | End: 2025-05-09
Attending: INTERNAL MEDICINE
Payer: MEDICARE

## 2025-05-07 DIAGNOSIS — Z12.31 SCREENING MAMMOGRAM FOR BREAST CANCER: ICD-10-CM

## 2025-05-07 PROCEDURE — 77063 BREAST TOMOSYNTHESIS BI: CPT

## 2025-05-08 ENCOUNTER — RESULTS FOLLOW-UP (OUTPATIENT)
Age: 70
End: 2025-05-08

## 2025-07-11 NOTE — ED NOTES
Deborah Albarran discharged to home accompanied by family member.   Patient provided with the following educational materials upon discharge:  AVS.   Valuables and belongings sent with patient.   discharge summary, discharge instructions, medications, and follow up appointments reviewed with patient and family member.  Patient and family member verbalized understanding.   Pt ambulated with RN around the unit to make sure she was safe to walk at home. Pt assurred me she had a walker in the car and has a ramp and appropriate grab bars  RN gave patient lotion for her dry skin and a meal  Pts guardian was called and no answer  Pt told us she would call him car to discuss the need for the fall alert button to be turned back on and her cell phone to be turned back on

## 2025-07-16 DIAGNOSIS — E03.9 HYPOTHYROIDISM, UNSPECIFIED TYPE: ICD-10-CM

## 2025-07-16 DIAGNOSIS — L21.9 SEBORRHEIC DERMATITIS: ICD-10-CM

## 2025-07-16 DIAGNOSIS — I10 ESSENTIAL HYPERTENSION: ICD-10-CM

## 2025-07-16 RX ORDER — KETOCONAZOLE 20 MG/ML
SHAMPOO, SUSPENSION TOPICAL
Qty: 120 ML | Refills: 2 | Status: SHIPPED | OUTPATIENT
Start: 2025-07-16

## 2025-07-16 NOTE — TELEPHONE ENCOUNTER
Dulce Serrano is calling to request a refill on the following medication(s):    Medication Request:  Requested Prescriptions     Pending Prescriptions Disp Refills    levothyroxine (SYNTHROID) 25 MCG tablet [Pharmacy Med Name: LEVOTHYROXINE SODIUM 25MCG TABS] 90 tablet 1     Sig: TAKE ONE TABLET BY MOUTH ONCE A DAY    lisinopril (PRINIVIL;ZESTRIL) 20 MG tablet [Pharmacy Med Name: LISINOPRIL 20MG TABS] 90 tablet 1     Sig: TAKE ONE TABLET BY MOUTH ONCE A DAY       Last Visit Date (If Applicable):  4/30/2025    Next Visit Date:    Visit date not found

## 2025-07-19 RX ORDER — LISINOPRIL 20 MG/1
20 TABLET ORAL DAILY
Qty: 90 TABLET | Refills: 1 | Status: SHIPPED | OUTPATIENT
Start: 2025-07-19

## 2025-07-19 RX ORDER — LEVOTHYROXINE SODIUM 25 UG/1
25 TABLET ORAL DAILY
Qty: 90 TABLET | Refills: 1 | Status: SHIPPED | OUTPATIENT
Start: 2025-07-19

## 2025-08-13 DIAGNOSIS — J45.30 MILD PERSISTENT ASTHMA WITHOUT COMPLICATION: ICD-10-CM

## 2025-08-19 RX ORDER — MONTELUKAST SODIUM 10 MG/1
10 TABLET ORAL NIGHTLY
Qty: 90 TABLET | Refills: 1 | Status: SHIPPED | OUTPATIENT
Start: 2025-08-19

## 2025-08-19 RX ORDER — MULTIVIT-MIN/IRON/FOLIC ACID/K 18-600-40
1 CAPSULE ORAL DAILY
Qty: 90 TABLET | Refills: 1 | Status: SHIPPED | OUTPATIENT
Start: 2025-08-19

## (undated) DEVICE — PREP-RESISTANT MARKER W/ RULER: Brand: MEDLINE INDUSTRIES, INC.

## (undated) DEVICE — Device

## (undated) DEVICE — SOLUTION IRRIGATION STRL H2O 1000 ML UROMATIC CONTAINER

## (undated) DEVICE — GLOVE SURG SZ 7 CRM LTX FREE POLYISOPRENE POLYMER BEAD ANTI

## (undated) DEVICE — DISPOSABLE NEEDLE: Brand: DISPOSABLE NEEDLE

## (undated) DEVICE — Z DUP USE 2522782 SOLUTION IRRIG 1000ML STRL H2O PLAS CONTAINER UROMATIC